# Patient Record
Sex: FEMALE | Race: WHITE | Employment: FULL TIME | URBAN - NONMETROPOLITAN AREA
[De-identification: names, ages, dates, MRNs, and addresses within clinical notes are randomized per-mention and may not be internally consistent; named-entity substitution may affect disease eponyms.]

---

## 2017-02-02 ENCOUNTER — OFFICE VISIT (OUTPATIENT)
Dept: CARDIOLOGY | Age: 47
End: 2017-02-02

## 2017-02-02 VITALS
BODY MASS INDEX: 41.02 KG/M2 | HEIGHT: 71 IN | WEIGHT: 293 LBS | DIASTOLIC BLOOD PRESSURE: 82 MMHG | SYSTOLIC BLOOD PRESSURE: 128 MMHG | HEART RATE: 81 BPM

## 2017-02-02 DIAGNOSIS — I25.10 CORONARY ARTERY DISEASE INVOLVING NATIVE CORONARY ARTERY OF NATIVE HEART WITHOUT ANGINA PECTORIS: ICD-10-CM

## 2017-02-02 DIAGNOSIS — I10 ESSENTIAL HYPERTENSION: ICD-10-CM

## 2017-02-02 DIAGNOSIS — F17.200 SMOKING: ICD-10-CM

## 2017-02-02 DIAGNOSIS — I10 ESSENTIAL HYPERTENSION: Primary | ICD-10-CM

## 2017-02-02 PROCEDURE — 99213 OFFICE O/P EST LOW 20 MIN: CPT | Performed by: NUCLEAR MEDICINE

## 2017-02-02 PROCEDURE — 93000 ELECTROCARDIOGRAM COMPLETE: CPT | Performed by: NUCLEAR MEDICINE

## 2017-02-02 RX ORDER — ATORVASTATIN CALCIUM 40 MG/1
TABLET, FILM COATED ORAL
Qty: 90 TABLET | Refills: 3 | Status: SHIPPED | OUTPATIENT
Start: 2017-02-02 | End: 2018-01-28 | Stop reason: SDUPTHER

## 2017-02-02 RX ORDER — ATORVASTATIN CALCIUM 40 MG/1
TABLET, FILM COATED ORAL
Qty: 90 TABLET | Refills: 0 | Status: SHIPPED | OUTPATIENT
Start: 2017-02-02 | End: 2017-02-02 | Stop reason: SDUPTHER

## 2017-02-02 RX ORDER — CLOPIDOGREL BISULFATE 75 MG/1
TABLET ORAL
Qty: 90 TABLET | Refills: 0 | Status: SHIPPED | OUTPATIENT
Start: 2017-02-02 | End: 2017-02-02 | Stop reason: SDUPTHER

## 2017-02-02 RX ORDER — CLOPIDOGREL BISULFATE 75 MG/1
TABLET ORAL
Qty: 90 TABLET | Refills: 3 | Status: SHIPPED | OUTPATIENT
Start: 2017-02-02 | End: 2018-01-28 | Stop reason: SDUPTHER

## 2017-02-02 RX ORDER — METOPROLOL SUCCINATE 25 MG/1
TABLET, EXTENDED RELEASE ORAL
Qty: 90 TABLET | Refills: 3 | Status: SHIPPED | OUTPATIENT
Start: 2017-02-02 | End: 2017-04-13 | Stop reason: SDUPTHER

## 2017-03-22 ENCOUNTER — TELEPHONE (OUTPATIENT)
Dept: CARDIOLOGY | Age: 47
End: 2017-03-22

## 2017-03-22 DIAGNOSIS — R00.2 PALPITATIONS: Primary | ICD-10-CM

## 2017-03-30 ENCOUNTER — TELEPHONE (OUTPATIENT)
Dept: CARDIOLOGY | Age: 47
End: 2017-03-30

## 2017-04-13 ENCOUNTER — OFFICE VISIT (OUTPATIENT)
Dept: CARDIOLOGY | Age: 47
End: 2017-04-13

## 2017-04-13 VITALS
BODY MASS INDEX: 39.68 KG/M2 | HEIGHT: 72 IN | WEIGHT: 293 LBS | DIASTOLIC BLOOD PRESSURE: 94 MMHG | SYSTOLIC BLOOD PRESSURE: 138 MMHG | HEART RATE: 80 BPM

## 2017-04-13 DIAGNOSIS — F17.200 SMOKING: Primary | ICD-10-CM

## 2017-04-13 DIAGNOSIS — E78.01 FAMILIAL HYPERCHOLESTEROLEMIA: ICD-10-CM

## 2017-04-13 DIAGNOSIS — I10 ESSENTIAL HYPERTENSION: ICD-10-CM

## 2017-04-13 DIAGNOSIS — R00.2 PALPITATION: ICD-10-CM

## 2017-04-13 PROCEDURE — 99213 OFFICE O/P EST LOW 20 MIN: CPT | Performed by: NUCLEAR MEDICINE

## 2017-04-13 RX ORDER — METOPROLOL SUCCINATE 50 MG/1
50 TABLET, EXTENDED RELEASE ORAL DAILY
Qty: 90 TABLET | Refills: 3 | Status: SHIPPED | OUTPATIENT
Start: 2017-04-13 | End: 2018-05-10 | Stop reason: SDUPTHER

## 2017-10-19 ENCOUNTER — OFFICE VISIT (OUTPATIENT)
Dept: CARDIOLOGY CLINIC | Age: 47
End: 2017-10-19
Payer: COMMERCIAL

## 2017-10-19 ENCOUNTER — HOSPITAL ENCOUNTER (OUTPATIENT)
Age: 47
Discharge: HOME | End: 2017-10-19
Payer: COMMERCIAL

## 2017-10-19 VITALS
SYSTOLIC BLOOD PRESSURE: 124 MMHG | WEIGHT: 293 LBS | BODY MASS INDEX: 39.68 KG/M2 | DIASTOLIC BLOOD PRESSURE: 82 MMHG | HEIGHT: 72 IN | HEART RATE: 80 BPM

## 2017-10-19 DIAGNOSIS — E78.01 FAMILIAL HYPERCHOLESTEROLEMIA: ICD-10-CM

## 2017-10-19 DIAGNOSIS — R06.09 DYSPNEA ON EXERTION: ICD-10-CM

## 2017-10-19 DIAGNOSIS — I10 ESSENTIAL HYPERTENSION: ICD-10-CM

## 2017-10-19 DIAGNOSIS — F17.200 SMOKING: Primary | ICD-10-CM

## 2017-10-19 LAB
ALBUMIN SERPL-MCNC: 3.9 G/DL (ref 3.5–5.1)
ALP BLD-CCNC: 95 U/L (ref 38–126)
ALT SERPL-CCNC: 19 U/L (ref 11–66)
AST SERPL-CCNC: 16 U/L (ref 5–40)
BILIRUB SERPL-MCNC: 0.6 MG/DL (ref 0.3–1.2)
BILIRUBIN DIRECT: < 0.2 MG/DL (ref 0–0.3)
CHOLESTEROL, TOTAL: 126 MG/DL (ref 100–199)
HDLC SERPL-MCNC: 26 MG/DL
LDL CHOLESTEROL CALCULATED: 72 MG/DL
TOTAL PROTEIN: 6.8 G/DL (ref 6.1–8)
TRIGL SERPL-MCNC: 139 MG/DL (ref 0–199)

## 2017-10-19 PROCEDURE — 36415 COLL VENOUS BLD VENIPUNCTURE: CPT

## 2017-10-19 PROCEDURE — 80076 HEPATIC FUNCTION PANEL: CPT

## 2017-10-19 PROCEDURE — 99213 OFFICE O/P EST LOW 20 MIN: CPT | Performed by: NUCLEAR MEDICINE

## 2017-10-19 PROCEDURE — 80061 LIPID PANEL: CPT

## 2017-10-19 NOTE — PROGRESS NOTES
[facility]  [facility] [place]  [address] [facility].  [address]  [address] [address]  Dept: [phone]  Dept Fax: [phone]  Loc: [phone]    Visit Date: 10/19/2017    Dixie Castano is a 41 y.o. female who presents today for:  Chief Complaint   Patient presents with    Check-Up     palpitations    Nicotine Dependence    Obesity    Hypertension    Hyperlipidemia   some palpitation  holter with minimal SVT ?/ A fib   Not worse than usual   Infrequent in nature  Cath 2014 was okay   Obesity issues  Still smoking  BP is better  No ER visits  No chest pain   No dizziness  No syncope        HPI:  HPI  Past Medical History:   Diagnosis Date    Hypothyroid       Past Surgical History:   Procedure Laterality Date    BREAST LUMPECTOMY      CARDIAC CATHETERIZATION      CHOLECYSTECTOMY      CORONARY ANGIOPLASTY       Family History   Problem Relation Age of Onset    Heart Disease Mother     Diabetes Mother     Cancer Mother      liposarcoma    Heart Disease Father     Diabetes Father      Social History   Substance Use Topics    Smoking status: Current Some Day Smoker     Packs/day: 1.00     Types: Cigarettes    Smokeless tobacco: Never Used    Alcohol use Yes      Comment: rare      Current Outpatient Prescriptions   Medication Sig Dispense Refill    metoprolol succinate (TOPROL XL) 50 MG extended release tablet Take 1 tablet by mouth daily TAKE 1 TABLET DAILY 90 tablet 3    atorvastatin (LIPITOR) 40 MG tablet TAKE 1 TABLET DAILY 90 tablet 3    clopidogrel (PLAVIX) 75 MG tablet TAKE 1 TABLET DAILY 90 tablet 3    aspirin 81 MG chewable tablet Take 1 tablet by mouth daily. 30 tablet 3    nitroGLYCERIN (NITROSTAT) 0.4 MG SL tablet Place 1 tablet under the tongue every 5 minutes as needed for Chest pain. 25 tablet 0    levothyroxine (SYNTHROID) 100 MCG tablet Take 125 mcg by mouth Daily        No current facility-administered medications for this visit.       Allergies

## 2018-01-28 DIAGNOSIS — I10 ESSENTIAL HYPERTENSION: ICD-10-CM

## 2018-01-29 RX ORDER — ATORVASTATIN CALCIUM 40 MG/1
TABLET, FILM COATED ORAL
Qty: 90 TABLET | Refills: 3 | Status: SHIPPED | OUTPATIENT
Start: 2018-01-29 | End: 2019-02-11 | Stop reason: SDUPTHER

## 2018-01-29 RX ORDER — CLOPIDOGREL BISULFATE 75 MG/1
TABLET ORAL
Qty: 90 TABLET | Refills: 3 | Status: SHIPPED | OUTPATIENT
Start: 2018-01-29 | End: 2019-02-11 | Stop reason: SDUPTHER

## 2018-04-24 ENCOUNTER — HOSPITAL ENCOUNTER (OUTPATIENT)
Age: 48
Discharge: HOME | End: 2018-04-24
Payer: COMMERCIAL

## 2018-04-24 LAB
ANION GAP SERPL CALCULATED.3IONS-SCNC: 12 MEQ/L (ref 8–16)
AVERAGE GLUCOSE: 105 MG/DL (ref 70–126)
BUN BLDV-MCNC: 10 MG/DL (ref 7–22)
CALCIUM SERPL-MCNC: 9.3 MG/DL (ref 8.5–10.5)
CHLORIDE BLD-SCNC: 104 MEQ/L (ref 98–111)
CO2: 25 MEQ/L (ref 23–33)
CREAT SERPL-MCNC: 0.6 MG/DL (ref 0.4–1.2)
GFR SERPL CREATININE-BSD FRML MDRD: > 90 ML/MIN/1.73M2
GLUCOSE BLD-MCNC: 95 MG/DL (ref 70–108)
HBA1C MFR BLD: 5.5 % (ref 4.4–6.4)
POTASSIUM SERPL-SCNC: 4.4 MEQ/L (ref 3.5–5.2)
SODIUM BLD-SCNC: 141 MEQ/L (ref 135–145)
T4 FREE: 1.26 NG/DL (ref 0.93–1.76)
TSH SERPL DL<=0.05 MIU/L-ACNC: 1.77 UIU/ML (ref 0.4–4.2)

## 2018-04-24 PROCEDURE — 80048 BASIC METABOLIC PNL TOTAL CA: CPT

## 2018-04-24 PROCEDURE — 36415 COLL VENOUS BLD VENIPUNCTURE: CPT

## 2018-04-24 PROCEDURE — 83036 HEMOGLOBIN GLYCOSYLATED A1C: CPT

## 2018-04-24 PROCEDURE — 84439 ASSAY OF FREE THYROXINE: CPT

## 2018-04-24 PROCEDURE — 84443 ASSAY THYROID STIM HORMONE: CPT

## 2018-05-10 DIAGNOSIS — I10 ESSENTIAL HYPERTENSION: ICD-10-CM

## 2018-05-10 RX ORDER — METOPROLOL SUCCINATE 50 MG/1
TABLET, EXTENDED RELEASE ORAL
Qty: 90 TABLET | Refills: 1 | Status: SHIPPED | OUTPATIENT
Start: 2018-05-10 | End: 2018-11-06 | Stop reason: SDUPTHER

## 2018-10-10 ENCOUNTER — TELEPHONE (OUTPATIENT)
Dept: CARDIOLOGY CLINIC | Age: 48
End: 2018-10-10

## 2018-10-10 DIAGNOSIS — I24.9 ACS (ACUTE CORONARY SYNDROME) (HCC): Primary | ICD-10-CM

## 2018-10-20 ENCOUNTER — HOSPITAL ENCOUNTER (OUTPATIENT)
Age: 48
Discharge: HOME | End: 2018-10-20
Payer: COMMERCIAL

## 2018-10-20 DIAGNOSIS — I24.9 ACS (ACUTE CORONARY SYNDROME) (HCC): ICD-10-CM

## 2018-10-20 LAB
ALBUMIN SERPL-MCNC: 3.9 G/DL (ref 3.5–5.1)
ALP BLD-CCNC: 84 U/L (ref 38–126)
ALT SERPL-CCNC: 12 U/L (ref 11–66)
AST SERPL-CCNC: 11 U/L (ref 5–40)
BILIRUB SERPL-MCNC: 0.8 MG/DL (ref 0.3–1.2)
BILIRUBIN DIRECT: < 0.2 MG/DL (ref 0–0.3)
CHOLESTEROL, TOTAL: 125 MG/DL (ref 100–199)
HDLC SERPL-MCNC: 32 MG/DL
LDL CHOLESTEROL CALCULATED: 72 MG/DL
TOTAL PROTEIN: 7.1 G/DL (ref 6.1–8)
TRIGL SERPL-MCNC: 106 MG/DL (ref 0–199)

## 2018-10-20 PROCEDURE — 36415 COLL VENOUS BLD VENIPUNCTURE: CPT

## 2018-10-20 PROCEDURE — 80076 HEPATIC FUNCTION PANEL: CPT

## 2018-10-20 PROCEDURE — 80061 LIPID PANEL: CPT

## 2018-10-24 ENCOUNTER — OFFICE VISIT (OUTPATIENT)
Dept: CARDIOLOGY CLINIC | Age: 48
End: 2018-10-24
Payer: COMMERCIAL

## 2018-10-24 VITALS
SYSTOLIC BLOOD PRESSURE: 124 MMHG | BODY MASS INDEX: 39.68 KG/M2 | HEART RATE: 66 BPM | WEIGHT: 293 LBS | DIASTOLIC BLOOD PRESSURE: 68 MMHG | HEIGHT: 72 IN

## 2018-10-24 DIAGNOSIS — F17.200 SMOKING: ICD-10-CM

## 2018-10-24 DIAGNOSIS — I47.1 SVT (SUPRAVENTRICULAR TACHYCARDIA) (HCC): ICD-10-CM

## 2018-10-24 DIAGNOSIS — E78.01 FAMILIAL HYPERCHOLESTEROLEMIA: ICD-10-CM

## 2018-10-24 DIAGNOSIS — I10 ESSENTIAL HYPERTENSION: Primary | ICD-10-CM

## 2018-10-24 PROCEDURE — 99214 OFFICE O/P EST MOD 30 MIN: CPT | Performed by: NUCLEAR MEDICINE

## 2018-10-24 PROCEDURE — 93000 ELECTROCARDIOGRAM COMPLETE: CPT | Performed by: NUCLEAR MEDICINE

## 2018-10-24 RX ORDER — NITROGLYCERIN 0.4 MG/1
0.4 TABLET SUBLINGUAL EVERY 5 MIN PRN
Qty: 25 TABLET | Refills: 3 | Status: SHIPPED | OUTPATIENT
Start: 2018-10-24 | End: 2019-02-25 | Stop reason: SDUPTHER

## 2018-10-24 NOTE — PROGRESS NOTES
[facility] CARDIOLOGY  [facility]  [address] [place] [address] Road [address]  Dept: [phone]  Dept Fax: [phone]  Loc: [phone]    Visit Date: 10/24/2018    Phillip Quinteros is a 57 y.o. female who presents todayfor:  Chief Complaint   Patient presents with    1 Year Follow Up    Hypertension    Nicotine Dependence    Obesity    Hyperlipidemia     Some more palpitation lately   Known SVT   No chest pain  Does have weight issues  Still smoking  Some dyspnea  Palpitation is once or twice a week   lasting few minutes  No associated symptoms  Still higher caffeine   Possible sleep apnea   On statins for hyperlipidemia  Cath 2014 was okay     HPI:  HPI  Past Medical History:   Diagnosis Date    Hypothyroid       Past Surgical History:   Procedure Laterality Date    BREAST LUMPECTOMY      CARDIAC CATHETERIZATION      CHOLECYSTECTOMY      CORONARY ANGIOPLASTY       Family History   Problem Relation Age of Onset    Heart Disease Mother     Diabetes Mother     Cancer Mother         liposarcoma    Heart Disease Father     Diabetes Father      Social History   Substance Use Topics    Smoking status: Current Some Day Smoker     Packs/day: 1.00     Types: Cigarettes    Smokeless tobacco: Never Used    Alcohol use Yes      Comment: rare      Current Outpatient Prescriptions   Medication Sig Dispense Refill    metoprolol succinate (TOPROL XL) 50 MG extended release tablet TAKE 1 TABLET DAILY 90 tablet 1    clopidogrel (PLAVIX) 75 MG tablet TAKE 1 TABLET DAILY 90 tablet 3    atorvastatin (LIPITOR) 40 MG tablet TAKE 1 TABLET DAILY 90 tablet 3    aspirin 81 MG chewable tablet Take 1 tablet by mouth daily. 30 tablet 3    nitroGLYCERIN (NITROSTAT) 0.4 MG SL tablet Place 1 tablet under the tongue every 5 minutes as needed for Chest pain.  25 tablet 0    levothyroxine (SYNTHROID) 100 MCG tablet Take 125 mcg by mouth Daily        No current facility-administered medications for this visit. Allergies   Allergen Reactions    Codeine     Cortisone     Darvocet A500 [Propoxyphene N-Acetaminophen]     Sulfa Antibiotics      Health Maintenance   Topic Date Due    HIV screen  06/29/1987    DTaP/Tdap/Td vaccine (1 - Tdap) 06/29/1991    Pneumococcal med risk (1 of 1 - PPSV23) 06/29/1991    Cervical cancer screen  06/29/1993    Flu vaccine (1) 09/01/2018    Lipid screen  10/20/2023       Subjective:  Review of Systems  General:   No fever, no chills, some fatigue or weight loss  Pulmonary:    some dyspnea, no wheezing  Cardiac:    Denies recent chest pain,   GI:     No nausea or vomiting, no abdominal pain  Neuro:     No dizziness or light headedness,   Musculoskeletal:  No recent active issues  Extremities:   No edema, good peripheral pulses      Objective:  Physical Exam  /68   Pulse 66   Ht 6' (1.829 m)   Wt 296 lb 11.2 oz (134.6 kg)   BMI 40.24 kg/m²   General:   Well developed, well nourished  Lungs:    Clear to auscultation  Heart:    Normal S1 S2, Slight murmur. no rubs, no gallops  Abdomen:   Soft, non tender, no organomegalies, positive bowel sounds  Extremities:   No edema, no cyanosis, good peripheral pulses  Neurological:   Awake, alert, oriented. No obvious focal deficits  Musculoskelatal:  No obvious deformities    Assessment:      Diagnosis Orders   1. Essential hypertension     2. Familial hypercholesterolemia     3. Smoking     4. SVT (supraventricular tachycardia) (HCC)     higher caffeine intake  Some more SVt lately   Risk for CAD  And risk for A fib   ECG in office was done today. I reviewed the ECG. No acute findings    Plan:  No Follow-up on file.   Discussed at length   Possible increase toprol   Cut down on caffeine  Obesity: extensive discussion was made with the patient regarding diet and weight control including specific food items to avoid and cut down  Continue risk factor modification and medical management  Thank you for

## 2018-11-06 DIAGNOSIS — I10 ESSENTIAL HYPERTENSION: ICD-10-CM

## 2018-11-06 RX ORDER — METOPROLOL SUCCINATE 50 MG/1
TABLET, EXTENDED RELEASE ORAL
Qty: 90 TABLET | Refills: 3 | Status: SHIPPED | OUTPATIENT
Start: 2018-11-06 | End: 2019-02-20 | Stop reason: SDUPTHER

## 2019-02-01 ENCOUNTER — APPOINTMENT (OUTPATIENT)
Dept: NON INVASIVE DIAGNOSTICS | Age: 49
DRG: 313 | End: 2019-02-01
Payer: COMMERCIAL

## 2019-02-01 ENCOUNTER — HOSPITAL ENCOUNTER (INPATIENT)
Age: 49
LOS: 1 days | Discharge: HOME | DRG: 313 | End: 2019-02-01
Attending: EMERGENCY MEDICINE | Admitting: INTERNAL MEDICINE
Payer: COMMERCIAL

## 2019-02-01 ENCOUNTER — APPOINTMENT (OUTPATIENT)
Dept: GENERAL RADIOLOGY | Age: 49
DRG: 313 | End: 2019-02-01
Payer: COMMERCIAL

## 2019-02-01 VITALS
HEIGHT: 72 IN | OXYGEN SATURATION: 97 % | SYSTOLIC BLOOD PRESSURE: 115 MMHG | HEART RATE: 61 BPM | TEMPERATURE: 97.3 F | WEIGHT: 292 LBS | RESPIRATION RATE: 16 BRPM | BODY MASS INDEX: 39.55 KG/M2 | DIASTOLIC BLOOD PRESSURE: 65 MMHG

## 2019-02-01 DIAGNOSIS — R07.9 CHEST PAIN WITH MODERATE RISK FOR CARDIAC ETIOLOGY: Primary | ICD-10-CM

## 2019-02-01 PROBLEM — Z72.0 TOBACCO ABUSE: Status: ACTIVE | Noted: 2019-02-01

## 2019-02-01 PROBLEM — I25.2 HISTORY OF MI (MYOCARDIAL INFARCTION): Status: ACTIVE | Noted: 2019-02-01

## 2019-02-01 PROBLEM — E03.9 HYPOTHYROID: Status: ACTIVE | Noted: 2019-02-01

## 2019-02-01 PROBLEM — I15.9 SECONDARY HYPERTENSION: Status: ACTIVE | Noted: 2019-02-01

## 2019-02-01 PROBLEM — E78.5 HLD (HYPERLIPIDEMIA): Status: ACTIVE | Noted: 2019-02-01

## 2019-02-01 LAB
ALBUMIN SERPL-MCNC: 4.2 G/DL (ref 3.5–5.1)
ALP BLD-CCNC: 76 U/L (ref 38–126)
ALT SERPL-CCNC: 17 U/L (ref 11–66)
ANION GAP SERPL CALCULATED.3IONS-SCNC: 13 MEQ/L (ref 8–16)
AST SERPL-CCNC: 16 U/L (ref 5–40)
AVERAGE GLUCOSE: 105 MG/DL (ref 70–126)
BASOPHILS # BLD: 0.4 %
BASOPHILS ABSOLUTE: 0 THOU/MM3 (ref 0–0.1)
BILIRUB SERPL-MCNC: 0.3 MG/DL (ref 0.3–1.2)
BILIRUBIN URINE: NEGATIVE
BLOOD, URINE: NEGATIVE
BUN BLDV-MCNC: 12 MG/DL (ref 7–22)
CALCIUM SERPL-MCNC: 8.6 MG/DL (ref 8.5–10.5)
CHARACTER, URINE: CLEAR
CHLORIDE BLD-SCNC: 102 MEQ/L (ref 98–111)
CO2: 22 MEQ/L (ref 23–33)
COLOR: YELLOW
CREAT SERPL-MCNC: 0.7 MG/DL (ref 0.4–1.2)
EKG ATRIAL RATE: 62 BPM
EKG ATRIAL RATE: 84 BPM
EKG P AXIS: 52 DEGREES
EKG P AXIS: 64 DEGREES
EKG P-R INTERVAL: 170 MS
EKG P-R INTERVAL: 178 MS
EKG Q-T INTERVAL: 372 MS
EKG Q-T INTERVAL: 416 MS
EKG QRS DURATION: 72 MS
EKG QRS DURATION: 82 MS
EKG QTC CALCULATION (BAZETT): 422 MS
EKG QTC CALCULATION (BAZETT): 439 MS
EKG R AXIS: 3 DEGREES
EKG R AXIS: 7 DEGREES
EKG T AXIS: 21 DEGREES
EKG T AXIS: 31 DEGREES
EKG VENTRICULAR RATE: 62 BPM
EKG VENTRICULAR RATE: 84 BPM
EOSINOPHIL # BLD: 3 %
EOSINOPHILS ABSOLUTE: 0.3 THOU/MM3 (ref 0–0.4)
ERYTHROCYTE [DISTWIDTH] IN BLOOD BY AUTOMATED COUNT: 12.5 % (ref 11.5–14.5)
ERYTHROCYTE [DISTWIDTH] IN BLOOD BY AUTOMATED COUNT: 42.4 FL (ref 35–45)
GFR SERPL CREATININE-BSD FRML MDRD: 90 ML/MIN/1.73M2
GLUCOSE BLD-MCNC: 132 MG/DL (ref 70–108)
GLUCOSE URINE: NEGATIVE MG/DL
HBA1C MFR BLD: 5.5 % (ref 4.4–6.4)
HCT VFR BLD CALC: 43 % (ref 37–47)
HEMOGLOBIN: 14.6 GM/DL (ref 12–16)
IMMATURE GRANS (ABS): 0.03 THOU/MM3 (ref 0–0.07)
IMMATURE GRANULOCYTES: 0.3 %
KETONES, URINE: NEGATIVE
LEUKOCYTE ESTERASE, URINE: NEGATIVE
LIPASE: 24 U/L (ref 5.6–51.3)
LV EF: 60 %
LVEF MODALITY: NORMAL
LYMPHOCYTES # BLD: 23 %
LYMPHOCYTES ABSOLUTE: 2.4 THOU/MM3 (ref 1–4.8)
MCH RBC QN AUTO: 31.3 PG (ref 26–33)
MCHC RBC AUTO-ENTMCNC: 34 GM/DL (ref 32.2–35.5)
MCV RBC AUTO: 92.3 FL (ref 81–99)
MONOCYTES # BLD: 6.6 %
MONOCYTES ABSOLUTE: 0.7 THOU/MM3 (ref 0.4–1.3)
NITRITE, URINE: NEGATIVE
NUCLEATED RED BLOOD CELLS: 0 /100 WBC
OSMOLALITY CALCULATION: 275.4 MOSMOL/KG (ref 275–300)
PH UA: 5.5
PLATELET # BLD: 213 THOU/MM3 (ref 130–400)
PMV BLD AUTO: 9.9 FL (ref 9.4–12.4)
POTASSIUM SERPL-SCNC: 3.6 MEQ/L (ref 3.5–5.2)
PRO-BNP: 56.4 PG/ML (ref 0–450)
PROTEIN UA: NEGATIVE
RBC # BLD: 4.66 MILL/MM3 (ref 4.2–5.4)
SEG NEUTROPHILS: 66.7 %
SEGMENTED NEUTROPHILS ABSOLUTE COUNT: 6.9 THOU/MM3 (ref 1.8–7.7)
SODIUM BLD-SCNC: 137 MEQ/L (ref 135–145)
SPECIFIC GRAVITY, URINE: 1.01 (ref 1–1.03)
TOTAL PROTEIN: 7.1 G/DL (ref 6.1–8)
TROPONIN T: < 0.01 NG/ML
TROPONIN T: < 0.01 NG/ML
TSH SERPL DL<=0.05 MIU/L-ACNC: 3.58 UIU/ML (ref 0.4–4.2)
UROBILINOGEN, URINE: 0.2 EU/DL
WBC # BLD: 10.4 THOU/MM3 (ref 4.8–10.8)

## 2019-02-01 PROCEDURE — G0378 HOSPITAL OBSERVATION PER HR: HCPCS

## 2019-02-01 PROCEDURE — 78452 HT MUSCLE IMAGE SPECT MULT: CPT | Performed by: NUCLEAR MEDICINE

## 2019-02-01 PROCEDURE — 71045 X-RAY EXAM CHEST 1 VIEW: CPT

## 2019-02-01 PROCEDURE — 2140000000 HC CCU INTERMEDIATE R&B

## 2019-02-01 PROCEDURE — 2709999900 HC NON-CHARGEABLE SUPPLY

## 2019-02-01 PROCEDURE — 6370000000 HC RX 637 (ALT 250 FOR IP): Performed by: INTERNAL MEDICINE

## 2019-02-01 PROCEDURE — 99239 HOSP IP/OBS DSCHRG MGMT >30: CPT | Performed by: INTERNAL MEDICINE

## 2019-02-01 PROCEDURE — 99285 EMERGENCY DEPT VISIT HI MDM: CPT

## 2019-02-01 PROCEDURE — 93306 TTE W/DOPPLER COMPLETE: CPT

## 2019-02-01 PROCEDURE — 99236 HOSP IP/OBS SAME DATE HI 85: CPT | Performed by: INTERNAL MEDICINE

## 2019-02-01 PROCEDURE — 36415 COLL VENOUS BLD VENIPUNCTURE: CPT

## 2019-02-01 PROCEDURE — 93010 ELECTROCARDIOGRAM REPORT: CPT | Performed by: INTERNAL MEDICINE

## 2019-02-01 PROCEDURE — 80053 COMPREHEN METABOLIC PANEL: CPT

## 2019-02-01 PROCEDURE — 93005 ELECTROCARDIOGRAM TRACING: CPT | Performed by: EMERGENCY MEDICINE

## 2019-02-01 PROCEDURE — 0296T HC EXT ECG RECORDING 2-21 DAY HOOKUP: CPT

## 2019-02-01 PROCEDURE — 3430000000 HC RX DIAGNOSTIC RADIOPHARMACEUTICAL: Performed by: NUCLEAR MEDICINE

## 2019-02-01 PROCEDURE — 93017 CV STRESS TEST TRACING ONLY: CPT | Performed by: NUCLEAR MEDICINE

## 2019-02-01 PROCEDURE — 83690 ASSAY OF LIPASE: CPT

## 2019-02-01 PROCEDURE — 83036 HEMOGLOBIN GLYCOSYLATED A1C: CPT

## 2019-02-01 PROCEDURE — 99254 IP/OBS CNSLTJ NEW/EST MOD 60: CPT | Performed by: NUCLEAR MEDICINE

## 2019-02-01 PROCEDURE — 83880 ASSAY OF NATRIURETIC PEPTIDE: CPT

## 2019-02-01 PROCEDURE — 84484 ASSAY OF TROPONIN QUANT: CPT

## 2019-02-01 PROCEDURE — 85025 COMPLETE CBC W/AUTO DIFF WBC: CPT

## 2019-02-01 PROCEDURE — 81003 URINALYSIS AUTO W/O SCOPE: CPT

## 2019-02-01 PROCEDURE — 6360000002 HC RX W HCPCS

## 2019-02-01 PROCEDURE — 93005 ELECTROCARDIOGRAM TRACING: CPT | Performed by: INTERNAL MEDICINE

## 2019-02-01 PROCEDURE — 84443 ASSAY THYROID STIM HORMONE: CPT

## 2019-02-01 PROCEDURE — A9500 TC99M SESTAMIBI: HCPCS | Performed by: NUCLEAR MEDICINE

## 2019-02-01 PROCEDURE — 6370000000 HC RX 637 (ALT 250 FOR IP): Performed by: EMERGENCY MEDICINE

## 2019-02-01 PROCEDURE — 2580000003 HC RX 258: Performed by: INTERNAL MEDICINE

## 2019-02-01 RX ORDER — IBUPROFEN 800 MG/1
800 TABLET ORAL EVERY 6 HOURS PRN
Status: ON HOLD | COMMUNITY
End: 2019-12-05 | Stop reason: HOSPADM

## 2019-02-01 RX ORDER — ONDANSETRON 2 MG/ML
4 INJECTION INTRAMUSCULAR; INTRAVENOUS EVERY 6 HOURS PRN
Status: DISCONTINUED | OUTPATIENT
Start: 2019-02-01 | End: 2019-02-01 | Stop reason: HOSPADM

## 2019-02-01 RX ORDER — LEVOTHYROXINE SODIUM 0.12 MG/1
125 TABLET ORAL DAILY
Status: DISCONTINUED | OUTPATIENT
Start: 2019-02-01 | End: 2019-02-01 | Stop reason: HOSPADM

## 2019-02-01 RX ORDER — ASPIRIN 81 MG/1
81 TABLET, CHEWABLE ORAL DAILY
Status: DISCONTINUED | OUTPATIENT
Start: 2019-02-01 | End: 2019-02-01 | Stop reason: HOSPADM

## 2019-02-01 RX ORDER — SODIUM CHLORIDE 9 MG/ML
INJECTION, SOLUTION INTRAVENOUS CONTINUOUS
Status: DISCONTINUED | OUTPATIENT
Start: 2019-02-01 | End: 2019-02-01

## 2019-02-01 RX ORDER — ATORVASTATIN CALCIUM 40 MG/1
40 TABLET, FILM COATED ORAL DAILY
Status: DISCONTINUED | OUTPATIENT
Start: 2019-02-01 | End: 2019-02-01 | Stop reason: HOSPADM

## 2019-02-01 RX ORDER — NICOTINE 21 MG/24HR
1 PATCH, TRANSDERMAL 24 HOURS TRANSDERMAL DAILY
Status: DISCONTINUED | OUTPATIENT
Start: 2019-02-01 | End: 2019-02-01 | Stop reason: HOSPADM

## 2019-02-01 RX ORDER — SODIUM CHLORIDE 0.9 % (FLUSH) 0.9 %
10 SYRINGE (ML) INJECTION PRN
Status: DISCONTINUED | OUTPATIENT
Start: 2019-02-01 | End: 2019-02-01 | Stop reason: HOSPADM

## 2019-02-01 RX ORDER — ASPIRIN 81 MG/1
243 TABLET, CHEWABLE ORAL ONCE
Status: COMPLETED | OUTPATIENT
Start: 2019-02-01 | End: 2019-02-01

## 2019-02-01 RX ORDER — ACETAMINOPHEN 325 MG/1
650 TABLET ORAL EVERY 4 HOURS PRN
Status: DISCONTINUED | OUTPATIENT
Start: 2019-02-01 | End: 2019-02-01 | Stop reason: HOSPADM

## 2019-02-01 RX ORDER — SODIUM CHLORIDE 0.9 % (FLUSH) 0.9 %
10 SYRINGE (ML) INJECTION EVERY 12 HOURS SCHEDULED
Status: DISCONTINUED | OUTPATIENT
Start: 2019-02-01 | End: 2019-02-01 | Stop reason: HOSPADM

## 2019-02-01 RX ORDER — METOPROLOL SUCCINATE 50 MG/1
50 TABLET, EXTENDED RELEASE ORAL DAILY
Status: DISCONTINUED | OUTPATIENT
Start: 2019-02-01 | End: 2019-02-01 | Stop reason: HOSPADM

## 2019-02-01 RX ORDER — CLOPIDOGREL BISULFATE 75 MG/1
75 TABLET ORAL DAILY
Status: DISCONTINUED | OUTPATIENT
Start: 2019-02-01 | End: 2019-02-01 | Stop reason: HOSPADM

## 2019-02-01 RX ADMIN — SODIUM CHLORIDE: 9 INJECTION, SOLUTION INTRAVENOUS at 06:19

## 2019-02-01 RX ADMIN — ASPIRIN 243 MG: 81 TABLET, CHEWABLE ORAL at 03:14

## 2019-02-01 RX ADMIN — NITROGLYCERIN 1 INCH: 20 OINTMENT TOPICAL at 03:14

## 2019-02-01 RX ADMIN — METOPROLOL SUCCINATE 50 MG: 50 TABLET, EXTENDED RELEASE ORAL at 11:34

## 2019-02-01 RX ADMIN — ASPIRIN 81 MG 81 MG: 81 TABLET ORAL at 11:34

## 2019-02-01 RX ADMIN — Medication 9.6 MILLICURIE: at 08:50

## 2019-02-01 RX ADMIN — CLOPIDOGREL 75 MG: 75 TABLET, FILM COATED ORAL at 11:36

## 2019-02-01 RX ADMIN — LEVOTHYROXINE SODIUM 125 MCG: 125 TABLET ORAL at 11:34

## 2019-02-01 RX ADMIN — Medication 32.3 MILLICURIE: at 10:25

## 2019-02-01 ASSESSMENT — ENCOUNTER SYMPTOMS
RHINORRHEA: 0
DIARRHEA: 0
WHEEZING: 0
EYE DISCHARGE: 0
ABDOMINAL DISTENTION: 0
VOMITING: 0
SHORTNESS OF BREATH: 0
COUGH: 0
ABDOMINAL PAIN: 0
NAUSEA: 0
EYE ITCHING: 0

## 2019-02-01 ASSESSMENT — PAIN DESCRIPTION - FREQUENCY: FREQUENCY: CONTINUOUS

## 2019-02-01 ASSESSMENT — PAIN DESCRIPTION - DESCRIPTORS
DESCRIPTORS_2: ACHING
DESCRIPTORS: DISCOMFORT
DESCRIPTORS: DISCOMFORT
DESCRIPTORS_3: ACHING

## 2019-02-01 ASSESSMENT — PAIN DESCRIPTION - PAIN TYPE
TYPE: ACUTE PAIN
TYPE_3: CHRONIC PAIN
TYPE_2: CHRONIC PAIN
TYPE: ACUTE PAIN

## 2019-02-01 ASSESSMENT — PAIN DESCRIPTION - LOCATION
LOCATION_2: BACK
LOCATION_3: KNEE
LOCATION: CHEST
LOCATION: CHEST

## 2019-02-01 ASSESSMENT — PAIN DESCRIPTION - ORIENTATION
ORIENTATION: MID
ORIENTATION_3: RIGHT;LEFT
ORIENTATION: MID

## 2019-02-01 ASSESSMENT — PAIN DESCRIPTION - INTENSITY
RATING_3: 2
RATING_2: 2

## 2019-02-01 ASSESSMENT — PAIN SCALES - GENERAL: PAINLEVEL_OUTOF10: 7

## 2019-02-01 ASSESSMENT — HEART SCORE: ECG: 1

## 2019-02-11 DIAGNOSIS — I10 ESSENTIAL HYPERTENSION: ICD-10-CM

## 2019-02-11 RX ORDER — CLOPIDOGREL BISULFATE 75 MG/1
TABLET ORAL
Qty: 90 TABLET | Refills: 0 | Status: SHIPPED | OUTPATIENT
Start: 2019-02-11 | End: 2019-02-20 | Stop reason: SDUPTHER

## 2019-02-11 RX ORDER — ATORVASTATIN CALCIUM 40 MG/1
TABLET, FILM COATED ORAL
Qty: 90 TABLET | Refills: 0 | Status: SHIPPED | OUTPATIENT
Start: 2019-02-11 | End: 2019-02-22 | Stop reason: SDUPTHER

## 2019-02-20 DIAGNOSIS — I10 ESSENTIAL HYPERTENSION: ICD-10-CM

## 2019-02-20 RX ORDER — METOPROLOL SUCCINATE 50 MG/1
TABLET, EXTENDED RELEASE ORAL
Qty: 90 TABLET | Refills: 3 | Status: SHIPPED | OUTPATIENT
Start: 2019-02-20 | End: 2019-02-25 | Stop reason: ALTCHOICE

## 2019-02-20 RX ORDER — CLOPIDOGREL BISULFATE 75 MG/1
TABLET ORAL
Qty: 90 TABLET | Refills: 3 | Status: SHIPPED | OUTPATIENT
Start: 2019-02-20 | End: 2020-01-08 | Stop reason: ALTCHOICE

## 2019-02-22 DIAGNOSIS — I10 ESSENTIAL HYPERTENSION: ICD-10-CM

## 2019-02-22 RX ORDER — ATORVASTATIN CALCIUM 40 MG/1
40 TABLET, FILM COATED ORAL DAILY
Qty: 90 TABLET | Refills: 3 | Status: SHIPPED | OUTPATIENT
Start: 2019-02-22 | End: 2020-02-04 | Stop reason: SDUPTHER

## 2019-02-25 ENCOUNTER — OFFICE VISIT (OUTPATIENT)
Dept: CARDIOLOGY CLINIC | Age: 49
End: 2019-02-25
Payer: COMMERCIAL

## 2019-02-25 VITALS
DIASTOLIC BLOOD PRESSURE: 70 MMHG | HEIGHT: 72 IN | WEIGHT: 293 LBS | HEART RATE: 84 BPM | BODY MASS INDEX: 39.68 KG/M2 | SYSTOLIC BLOOD PRESSURE: 110 MMHG

## 2019-02-25 DIAGNOSIS — Z72.0 TOBACCO ABUSE: ICD-10-CM

## 2019-02-25 DIAGNOSIS — I10 ESSENTIAL HYPERTENSION: ICD-10-CM

## 2019-02-25 DIAGNOSIS — I47.1 NARROW COMPLEX TACHYCARDIA (HCC): Primary | ICD-10-CM

## 2019-02-25 PROCEDURE — 99213 OFFICE O/P EST LOW 20 MIN: CPT | Performed by: PHYSICIAN ASSISTANT

## 2019-02-25 PROCEDURE — 99406 BEHAV CHNG SMOKING 3-10 MIN: CPT | Performed by: PHYSICIAN ASSISTANT

## 2019-02-25 RX ORDER — METOPROLOL SUCCINATE 50 MG/1
75 TABLET, EXTENDED RELEASE ORAL DAILY
Qty: 30 TABLET | Refills: 3 | Status: SHIPPED | OUTPATIENT
Start: 2019-02-25 | End: 2019-03-22 | Stop reason: SDUPTHER

## 2019-02-25 RX ORDER — NITROGLYCERIN 0.4 MG/1
0.4 TABLET SUBLINGUAL EVERY 5 MIN PRN
Qty: 25 TABLET | Refills: 3 | Status: SHIPPED | OUTPATIENT
Start: 2019-02-25

## 2019-03-22 ENCOUNTER — TELEPHONE (OUTPATIENT)
Dept: CARDIOLOGY CLINIC | Age: 49
End: 2019-03-22

## 2019-03-24 RX ORDER — METOPROLOL SUCCINATE 50 MG/1
75 TABLET, EXTENDED RELEASE ORAL DAILY
Qty: 135 TABLET | Refills: 0 | Status: SHIPPED | OUTPATIENT
Start: 2019-03-24 | End: 2019-06-12 | Stop reason: SDUPTHER

## 2019-04-23 ENCOUNTER — OFFICE VISIT (OUTPATIENT)
Dept: CARDIOLOGY CLINIC | Age: 49
End: 2019-04-23
Payer: COMMERCIAL

## 2019-04-23 VITALS
SYSTOLIC BLOOD PRESSURE: 136 MMHG | HEIGHT: 72 IN | WEIGHT: 293 LBS | BODY MASS INDEX: 39.68 KG/M2 | DIASTOLIC BLOOD PRESSURE: 62 MMHG | HEART RATE: 68 BPM

## 2019-04-23 DIAGNOSIS — R00.2 PALPITATION: Primary | ICD-10-CM

## 2019-04-23 DIAGNOSIS — E78.01 FAMILIAL HYPERCHOLESTEROLEMIA: ICD-10-CM

## 2019-04-23 DIAGNOSIS — I10 ESSENTIAL HYPERTENSION: ICD-10-CM

## 2019-04-23 PROCEDURE — 99213 OFFICE O/P EST LOW 20 MIN: CPT | Performed by: NUCLEAR MEDICINE

## 2019-04-23 NOTE — PROGRESS NOTES
by mouth every 6 hours as needed for Pain      aspirin 81 MG chewable tablet Take 1 tablet by mouth daily. 30 tablet 3    levothyroxine (SYNTHROID) 100 MCG tablet Take 125 mcg by mouth Daily        No current facility-administered medications for this visit. Allergies   Allergen Reactions    Codeine     Cortisone     Darvocet A500 [Propoxyphene N-Acetaminophen]     Sulfa Antibiotics      Health Maintenance   Topic Date Due    Pneumococcal 0-64 years Vaccine (1 of 1 - PPSV23) 06/29/1978    HIV screen  06/29/1987    DTaP/Tdap/Td vaccine (1 - Tdap) 06/29/1991    Cervical cancer screen  06/29/1993    TSH testing  02/01/2020    Diabetes screen  02/01/2022    Lipid screen  10/20/2023    Flu vaccine  Completed       Subjective:  Review of Systems  General:   No fever, no chills, No fatigue or weight loss  Pulmonary:    No dyspnea, no wheezing  Cardiac:    Denies recent chest pain,   GI:     No nausea or vomiting, no abdominal pain  Neuro:    No dizziness or light headedness,   Musculoskeletal:  No recent active issues  Extremities:   No edema, good peripheral pulses      Objective:  Physical Exam  /62   Pulse 68   Ht 6' (1.829 m)   Wt 293 lb (132.9 kg)   BMI 39.74 kg/m²   General:   Well developed, well nourished  Lungs:   Clear to auscultation  Heart:    Normal S1 S2, Slight murmur. no rubs, no gallops  Abdomen:   Soft, non tender, no organomegalies, positive bowel sounds  Extremities:   No edema, no cyanosis, good peripheral pulses  Neurological:   Awake, alert, oriented. No obvious focal deficits  Musculoskelatal:  No obvious deformities    Assessment:      Diagnosis Orders   1. Palpitation     2. Familial hypercholesterolemia     3. Essential hypertension     cardiac fair for now     Plan:  No follow-ups on file. As above  Continue risk factor modification and medical management  Thank you for allowing me to participate in the care of your patient.  Please don't hesitate to contact me regarding any further issues related to the patient care    Orders Placed:  No orders of the defined types were placed in this encounter. Medications Prescribed:  No orders of the defined types were placed in this encounter. Discussed use, benefit, and side effects of prescribed medications. All patient questions answered. Pt voicedunderstanding. Instructed to continue current medications, diet and exercise. Continue risk factor modification and medical management. Patient agreed with treatment plan. Follow up as directed.     Electronically signedby Brandon Carrero MD on 4/23/2019 at 10:50 AM

## 2019-04-23 NOTE — PROGRESS NOTES
Pt here for 3 mo fu appt    Pt c/o of tachycardia/palpitations    Pt denies chest pain, SOB,  Lightheadedness/dizziness, SARAHY

## 2019-06-12 RX ORDER — METOPROLOL SUCCINATE 50 MG/1
75 TABLET, EXTENDED RELEASE ORAL DAILY
Qty: 135 TABLET | Refills: 2 | Status: ON HOLD | OUTPATIENT
Start: 2019-06-12 | End: 2019-12-05 | Stop reason: SDUPTHER

## 2019-12-04 ENCOUNTER — APPOINTMENT (OUTPATIENT)
Dept: GENERAL RADIOLOGY | Age: 49
End: 2019-12-04
Payer: COMMERCIAL

## 2019-12-04 ENCOUNTER — HOSPITAL ENCOUNTER (OUTPATIENT)
Age: 49
Setting detail: OBSERVATION
Discharge: HOME | End: 2019-12-05
Attending: FAMILY MEDICINE | Admitting: INTERNAL MEDICINE
Payer: COMMERCIAL

## 2019-12-04 DIAGNOSIS — R07.9 ACUTE CHEST PAIN: Primary | ICD-10-CM

## 2019-12-04 PROBLEM — I25.10 CORONARY ARTERY DISEASE INVOLVING NATIVE CORONARY ARTERY OF NATIVE HEART WITHOUT ANGINA PECTORIS: Status: ACTIVE | Noted: 2019-12-04

## 2019-12-04 PROBLEM — I48.0 PAROXYSMAL ATRIAL FIBRILLATION (HCC): Status: ACTIVE | Noted: 2019-12-04

## 2019-12-04 PROBLEM — K21.9 GASTROESOPHAGEAL REFLUX DISEASE WITHOUT ESOPHAGITIS: Status: ACTIVE | Noted: 2019-12-04

## 2019-12-04 PROBLEM — I20.0 UNSTABLE ANGINA (HCC): Status: ACTIVE | Noted: 2019-12-04

## 2019-12-04 PROBLEM — I10 ESSENTIAL HYPERTENSION: Status: ACTIVE | Noted: 2019-02-01

## 2019-12-04 LAB
ALBUMIN SERPL-MCNC: 3.8 G/DL (ref 3.5–5.1)
ALP BLD-CCNC: 79 U/L (ref 38–126)
ALT SERPL-CCNC: 13 U/L (ref 11–66)
ANION GAP SERPL CALCULATED.3IONS-SCNC: 14 MEQ/L (ref 8–16)
APTT: 31.2 SECONDS (ref 22–38)
AST SERPL-CCNC: 12 U/L (ref 5–40)
BASOPHILS # BLD: 0.4 %
BASOPHILS ABSOLUTE: 0 THOU/MM3 (ref 0–0.1)
BILIRUB SERPL-MCNC: 0.3 MG/DL (ref 0.3–1.2)
BUN BLDV-MCNC: 9 MG/DL (ref 7–22)
CALCIUM SERPL-MCNC: 8.9 MG/DL (ref 8.5–10.5)
CHLORIDE BLD-SCNC: 104 MEQ/L (ref 98–111)
CO2: 23 MEQ/L (ref 23–33)
CREAT SERPL-MCNC: 0.6 MG/DL (ref 0.4–1.2)
D-DIMER QUANTITATIVE: < 215 NG/ML FEU (ref 0–500)
EKG ATRIAL RATE: 69 BPM
EKG P AXIS: 61 DEGREES
EKG P-R INTERVAL: 174 MS
EKG Q-T INTERVAL: 406 MS
EKG QRS DURATION: 84 MS
EKG QTC CALCULATION (BAZETT): 435 MS
EKG R AXIS: -2 DEGREES
EKG T AXIS: 13 DEGREES
EKG VENTRICULAR RATE: 69 BPM
EOSINOPHIL # BLD: 3.4 %
EOSINOPHILS ABSOLUTE: 0.3 THOU/MM3 (ref 0–0.4)
ERYTHROCYTE [DISTWIDTH] IN BLOOD BY AUTOMATED COUNT: 12.9 % (ref 11.5–14.5)
ERYTHROCYTE [DISTWIDTH] IN BLOOD BY AUTOMATED COUNT: 45 FL (ref 35–45)
GFR SERPL CREATININE-BSD FRML MDRD: > 90 ML/MIN/1.73M2
GLUCOSE BLD-MCNC: 137 MG/DL (ref 70–108)
HCT VFR BLD CALC: 43.7 % (ref 37–47)
HEMOGLOBIN: 14.3 GM/DL (ref 12–16)
IMMATURE GRANS (ABS): 0.02 THOU/MM3 (ref 0–0.07)
IMMATURE GRANULOCYTES: 0.2 %
INR BLD: 1.01 (ref 0.85–1.13)
LIPASE: 15.2 U/L (ref 5.6–51.3)
LYMPHOCYTES # BLD: 19.4 %
LYMPHOCYTES ABSOLUTE: 1.6 THOU/MM3 (ref 1–4.8)
MCH RBC QN AUTO: 31.3 PG (ref 26–33)
MCHC RBC AUTO-ENTMCNC: 32.7 GM/DL (ref 32.2–35.5)
MCV RBC AUTO: 95.6 FL (ref 81–99)
MONOCYTES # BLD: 6.2 %
MONOCYTES ABSOLUTE: 0.5 THOU/MM3 (ref 0.4–1.3)
NUCLEATED RED BLOOD CELLS: 0 /100 WBC
OSMOLALITY CALCULATION: 282.1 MOSMOL/KG (ref 275–300)
PLATELET # BLD: 204 THOU/MM3 (ref 130–400)
PMV BLD AUTO: 9.8 FL (ref 9.4–12.4)
POTASSIUM REFLEX MAGNESIUM: 3.8 MEQ/L (ref 3.5–5.2)
PRO-BNP: 91.6 PG/ML (ref 0–450)
RBC # BLD: 4.57 MILL/MM3 (ref 4.2–5.4)
SEG NEUTROPHILS: 70.4 %
SEGMENTED NEUTROPHILS ABSOLUTE COUNT: 5.8 THOU/MM3 (ref 1.8–7.7)
SODIUM BLD-SCNC: 141 MEQ/L (ref 135–145)
TOTAL PROTEIN: 6.9 G/DL (ref 6.1–8)
TROPONIN T: < 0.01 NG/ML
TROPONIN T: < 0.01 NG/ML
WBC # BLD: 8.3 THOU/MM3 (ref 4.8–10.8)

## 2019-12-04 PROCEDURE — 93010 ELECTROCARDIOGRAM REPORT: CPT | Performed by: INTERNAL MEDICINE

## 2019-12-04 PROCEDURE — 71045 X-RAY EXAM CHEST 1 VIEW: CPT

## 2019-12-04 PROCEDURE — 83880 ASSAY OF NATRIURETIC PEPTIDE: CPT

## 2019-12-04 PROCEDURE — 93005 ELECTROCARDIOGRAM TRACING: CPT | Performed by: FAMILY MEDICINE

## 2019-12-04 PROCEDURE — 85730 THROMBOPLASTIN TIME PARTIAL: CPT

## 2019-12-04 PROCEDURE — 6370000000 HC RX 637 (ALT 250 FOR IP): Performed by: FAMILY MEDICINE

## 2019-12-04 PROCEDURE — 99285 EMERGENCY DEPT VISIT HI MDM: CPT

## 2019-12-04 PROCEDURE — 99220 PR INITIAL OBSERVATION CARE/DAY 70 MINUTES: CPT | Performed by: INTERNAL MEDICINE

## 2019-12-04 PROCEDURE — 2580000003 HC RX 258: Performed by: INTERNAL MEDICINE

## 2019-12-04 PROCEDURE — 85379 FIBRIN DEGRADATION QUANT: CPT

## 2019-12-04 PROCEDURE — 80053 COMPREHEN METABOLIC PANEL: CPT

## 2019-12-04 PROCEDURE — G0378 HOSPITAL OBSERVATION PER HR: HCPCS

## 2019-12-04 PROCEDURE — 83690 ASSAY OF LIPASE: CPT

## 2019-12-04 PROCEDURE — 85025 COMPLETE CBC W/AUTO DIFF WBC: CPT

## 2019-12-04 PROCEDURE — 36415 COLL VENOUS BLD VENIPUNCTURE: CPT

## 2019-12-04 PROCEDURE — 85610 PROTHROMBIN TIME: CPT

## 2019-12-04 PROCEDURE — 84484 ASSAY OF TROPONIN QUANT: CPT

## 2019-12-04 PROCEDURE — 94761 N-INVAS EAR/PLS OXIMETRY MLT: CPT

## 2019-12-04 RX ORDER — NITROGLYCERIN 20 MG/100ML
5 INJECTION INTRAVENOUS CONTINUOUS PRN
Status: DISCONTINUED | OUTPATIENT
Start: 2019-12-04 | End: 2019-12-05 | Stop reason: HOSPADM

## 2019-12-04 RX ORDER — CLOPIDOGREL BISULFATE 75 MG/1
75 TABLET ORAL DAILY
Status: DISCONTINUED | OUTPATIENT
Start: 2019-12-05 | End: 2019-12-05 | Stop reason: HOSPADM

## 2019-12-04 RX ORDER — SODIUM CHLORIDE 0.9 % (FLUSH) 0.9 %
10 SYRINGE (ML) INJECTION EVERY 12 HOURS SCHEDULED
Status: DISCONTINUED | OUTPATIENT
Start: 2019-12-04 | End: 2019-12-05 | Stop reason: HOSPADM

## 2019-12-04 RX ORDER — ASPIRIN 81 MG/1
162 TABLET, CHEWABLE ORAL ONCE
Status: COMPLETED | OUTPATIENT
Start: 2019-12-04 | End: 2019-12-04

## 2019-12-04 RX ORDER — POTASSIUM CHLORIDE 20 MEQ/1
40 TABLET, EXTENDED RELEASE ORAL PRN
Status: DISCONTINUED | OUTPATIENT
Start: 2019-12-04 | End: 2019-12-05 | Stop reason: HOSPADM

## 2019-12-04 RX ORDER — ONDANSETRON 2 MG/ML
4 INJECTION INTRAMUSCULAR; INTRAVENOUS EVERY 6 HOURS PRN
Status: DISCONTINUED | OUTPATIENT
Start: 2019-12-04 | End: 2019-12-05 | Stop reason: HOSPADM

## 2019-12-04 RX ORDER — NITROGLYCERIN 0.4 MG/1
0.4 TABLET SUBLINGUAL EVERY 5 MIN PRN
Status: DISCONTINUED | OUTPATIENT
Start: 2019-12-04 | End: 2019-12-05 | Stop reason: HOSPADM

## 2019-12-04 RX ORDER — OMEPRAZOLE 10 MG/1
10 CAPSULE, DELAYED RELEASE ORAL DAILY
COMMUNITY

## 2019-12-04 RX ORDER — LEVOTHYROXINE SODIUM 0.12 MG/1
125 TABLET ORAL DAILY
Status: DISCONTINUED | OUTPATIENT
Start: 2019-12-05 | End: 2019-12-05 | Stop reason: HOSPADM

## 2019-12-04 RX ORDER — POLYETHYLENE GLYCOL 3350 17 G/17G
17 POWDER, FOR SOLUTION ORAL DAILY PRN
Status: DISCONTINUED | OUTPATIENT
Start: 2019-12-04 | End: 2019-12-05 | Stop reason: HOSPADM

## 2019-12-04 RX ORDER — ASPIRIN 81 MG/1
81 TABLET, CHEWABLE ORAL DAILY
Status: DISCONTINUED | OUTPATIENT
Start: 2019-12-05 | End: 2019-12-05 | Stop reason: HOSPADM

## 2019-12-04 RX ORDER — POTASSIUM CHLORIDE 7.45 MG/ML
10 INJECTION INTRAVENOUS PRN
Status: DISCONTINUED | OUTPATIENT
Start: 2019-12-04 | End: 2019-12-05 | Stop reason: HOSPADM

## 2019-12-04 RX ORDER — ACETAMINOPHEN 325 MG/1
650 TABLET ORAL EVERY 4 HOURS PRN
Status: DISCONTINUED | OUTPATIENT
Start: 2019-12-04 | End: 2019-12-05 | Stop reason: HOSPADM

## 2019-12-04 RX ORDER — PANTOPRAZOLE SODIUM 40 MG/1
40 TABLET, DELAYED RELEASE ORAL
Status: DISCONTINUED | OUTPATIENT
Start: 2019-12-05 | End: 2019-12-05

## 2019-12-04 RX ORDER — NITROGLYCERIN 0.4 MG/1
0.4 TABLET SUBLINGUAL EVERY 5 MIN PRN
Status: DISCONTINUED | OUTPATIENT
Start: 2019-12-04 | End: 2019-12-04

## 2019-12-04 RX ORDER — NICOTINE 21 MG/24HR
1 PATCH, TRANSDERMAL 24 HOURS TRANSDERMAL DAILY
Status: DISCONTINUED | OUTPATIENT
Start: 2019-12-04 | End: 2019-12-05 | Stop reason: HOSPADM

## 2019-12-04 RX ORDER — SODIUM CHLORIDE 0.9 % (FLUSH) 0.9 %
10 SYRINGE (ML) INJECTION PRN
Status: DISCONTINUED | OUTPATIENT
Start: 2019-12-04 | End: 2019-12-05 | Stop reason: HOSPADM

## 2019-12-04 RX ORDER — ATORVASTATIN CALCIUM 40 MG/1
40 TABLET, FILM COATED ORAL DAILY
Status: DISCONTINUED | OUTPATIENT
Start: 2019-12-05 | End: 2019-12-05 | Stop reason: HOSPADM

## 2019-12-04 RX ADMIN — NITROGLYCERIN 0.4 MG: 0.4 TABLET, ORALLY DISINTEGRATING SUBLINGUAL at 14:07

## 2019-12-04 RX ADMIN — NITROGLYCERIN 0.4 MG: 0.4 TABLET, ORALLY DISINTEGRATING SUBLINGUAL at 13:53

## 2019-12-04 RX ADMIN — NITROGLYCERIN 0.4 MG: 0.4 TABLET, ORALLY DISINTEGRATING SUBLINGUAL at 13:59

## 2019-12-04 RX ADMIN — Medication 10 ML: at 20:47

## 2019-12-04 RX ADMIN — NITROGLYCERIN 0.5 INCH: 20 OINTMENT TOPICAL at 15:55

## 2019-12-04 RX ADMIN — ASPIRIN 81 MG 162 MG: 81 TABLET ORAL at 13:53

## 2019-12-04 ASSESSMENT — PAIN DESCRIPTION - ORIENTATION: ORIENTATION: LEFT

## 2019-12-04 ASSESSMENT — PAIN SCALES - GENERAL
PAINLEVEL_OUTOF10: 6
PAINLEVEL_OUTOF10: 0
PAINLEVEL_OUTOF10: 4
PAINLEVEL_OUTOF10: 7

## 2019-12-04 ASSESSMENT — PAIN DESCRIPTION - PAIN TYPE: TYPE: ACUTE PAIN

## 2019-12-04 ASSESSMENT — PAIN DESCRIPTION - DESCRIPTORS: DESCRIPTORS: RADIATING

## 2019-12-04 ASSESSMENT — PAIN DESCRIPTION - FREQUENCY: FREQUENCY: CONTINUOUS

## 2019-12-04 ASSESSMENT — PAIN DESCRIPTION - LOCATION: LOCATION: CHEST;ARM

## 2019-12-04 ASSESSMENT — HEART SCORE: ECG: 0

## 2019-12-05 VITALS
BODY MASS INDEX: 38.63 KG/M2 | OXYGEN SATURATION: 96 % | HEART RATE: 70 BPM | RESPIRATION RATE: 16 BRPM | DIASTOLIC BLOOD PRESSURE: 82 MMHG | HEIGHT: 72 IN | SYSTOLIC BLOOD PRESSURE: 121 MMHG | WEIGHT: 285.2 LBS | TEMPERATURE: 97.9 F

## 2019-12-05 LAB
ANION GAP SERPL CALCULATED.3IONS-SCNC: 13 MEQ/L (ref 8–16)
BUN BLDV-MCNC: 8 MG/DL (ref 7–22)
CALCIUM SERPL-MCNC: 8.6 MG/DL (ref 8.5–10.5)
CHLORIDE BLD-SCNC: 106 MEQ/L (ref 98–111)
CHOLESTEROL, TOTAL: 138 MG/DL (ref 100–199)
CO2: 23 MEQ/L (ref 23–33)
CREAT SERPL-MCNC: 0.6 MG/DL (ref 0.4–1.2)
GFR SERPL CREATININE-BSD FRML MDRD: > 90 ML/MIN/1.73M2
GLUCOSE BLD-MCNC: 100 MG/DL (ref 70–108)
HDLC SERPL-MCNC: 31 MG/DL
LDL CHOLESTEROL CALCULATED: 79 MG/DL
MAGNESIUM: 2.3 MG/DL (ref 1.6–2.4)
POTASSIUM SERPL-SCNC: 4 MEQ/L (ref 3.5–5.2)
SODIUM BLD-SCNC: 142 MEQ/L (ref 135–145)
TRIGL SERPL-MCNC: 140 MG/DL (ref 0–199)
TROPONIN T: < 0.01 NG/ML
TSH SERPL DL<=0.05 MIU/L-ACNC: 3.25 UIU/ML (ref 0.4–4.2)

## 2019-12-05 PROCEDURE — G0378 HOSPITAL OBSERVATION PER HR: HCPCS

## 2019-12-05 PROCEDURE — 99217 PR OBSERVATION CARE DISCHARGE MANAGEMENT: CPT | Performed by: INTERNAL MEDICINE

## 2019-12-05 PROCEDURE — 36415 COLL VENOUS BLD VENIPUNCTURE: CPT

## 2019-12-05 PROCEDURE — 83735 ASSAY OF MAGNESIUM: CPT

## 2019-12-05 PROCEDURE — 80061 LIPID PANEL: CPT

## 2019-12-05 PROCEDURE — 99243 OFF/OP CNSLTJ NEW/EST LOW 30: CPT | Performed by: INTERNAL MEDICINE

## 2019-12-05 PROCEDURE — 93270 REMOTE 30 DAY ECG REV/REPORT: CPT

## 2019-12-05 PROCEDURE — 84443 ASSAY THYROID STIM HORMONE: CPT

## 2019-12-05 PROCEDURE — 84484 ASSAY OF TROPONIN QUANT: CPT

## 2019-12-05 PROCEDURE — 80048 BASIC METABOLIC PNL TOTAL CA: CPT

## 2019-12-05 PROCEDURE — 2580000003 HC RX 258: Performed by: INTERNAL MEDICINE

## 2019-12-05 RX ORDER — OMEPRAZOLE 10 MG/1
10 CAPSULE, DELAYED RELEASE ORAL DAILY
Status: DISCONTINUED | OUTPATIENT
Start: 2019-12-05 | End: 2019-12-05 | Stop reason: HOSPADM

## 2019-12-05 RX ORDER — METOPROLOL SUCCINATE 50 MG/1
100 TABLET, EXTENDED RELEASE ORAL DAILY
Qty: 60 TABLET | Refills: 2 | Status: SHIPPED | OUTPATIENT
Start: 2019-12-05 | End: 2020-03-06

## 2019-12-05 RX ADMIN — Medication 10 ML: at 09:15

## 2019-12-05 RX ADMIN — OMEPRAZOLE 10 MG: 10 CAPSULE, DELAYED RELEASE ORAL at 12:42

## 2019-12-05 RX ADMIN — ASPIRIN 81 MG: 81 TABLET, CHEWABLE ORAL at 12:42

## 2019-12-05 RX ADMIN — CLOPIDOGREL BISULFATE 75 MG: 75 TABLET ORAL at 12:43

## 2019-12-05 RX ADMIN — ATORVASTATIN CALCIUM 40 MG: 40 TABLET, FILM COATED ORAL at 12:44

## 2019-12-05 ASSESSMENT — PAIN SCALES - GENERAL
PAINLEVEL_OUTOF10: 0
PAINLEVEL_OUTOF10: 0

## 2020-01-08 ENCOUNTER — OFFICE VISIT (OUTPATIENT)
Dept: CARDIOLOGY CLINIC | Age: 50
End: 2020-01-08

## 2020-01-08 ENCOUNTER — TELEPHONE (OUTPATIENT)
Dept: CARDIOLOGY CLINIC | Age: 50
End: 2020-01-08

## 2020-01-08 VITALS
HEIGHT: 72 IN | HEART RATE: 77 BPM | DIASTOLIC BLOOD PRESSURE: 68 MMHG | BODY MASS INDEX: 39.68 KG/M2 | WEIGHT: 293 LBS | SYSTOLIC BLOOD PRESSURE: 121 MMHG

## 2020-01-08 PROCEDURE — 99213 OFFICE O/P EST LOW 20 MIN: CPT | Performed by: PHYSICIAN ASSISTANT

## 2020-01-08 RX ORDER — FLECAINIDE ACETATE 100 MG/1
100 TABLET ORAL 2 TIMES DAILY
Qty: 60 TABLET | Refills: 3 | Status: CANCELLED | OUTPATIENT
Start: 2020-01-08

## 2020-01-08 RX ORDER — FLECAINIDE ACETATE 100 MG/1
100 TABLET ORAL 2 TIMES DAILY
Qty: 180 TABLET | Refills: 3 | Status: CANCELLED | OUTPATIENT
Start: 2020-01-08

## 2020-01-08 RX ORDER — FLECAINIDE ACETATE 100 MG/1
100 TABLET ORAL 2 TIMES DAILY
COMMUNITY
End: 2020-01-09 | Stop reason: SDUPTHER

## 2020-01-08 NOTE — PROGRESS NOTES
Patient here for hospital fu. Patient complains of palpations. Patient denies SOB, chest pain, dizziness, lightheadedness and SARAHY.

## 2020-01-08 NOTE — TELEPHONE ENCOUNTER
PATIENT NOTIFIED AND VOICED UNDERSTANDING. SHE AGREED TO THE CHANGES AND WILL BE IN TOMORROW TO  SAMPLES. MEDICATION LIST UPDATED. RX PENDED IN A DIFFERENT ENCOUNTER.

## 2020-01-08 NOTE — PROGRESS NOTES
3    atorvastatin (LIPITOR) 40 MG tablet Take 1 tablet by mouth daily 90 tablet 3    clopidogrel (PLAVIX) 75 MG tablet TAKE 1 TABLET DAILY 90 tablet 3    aspirin 81 MG chewable tablet Take 1 tablet by mouth daily. 30 tablet 3    levothyroxine (SYNTHROID) 100 MCG tablet Take 125 mcg by mouth Daily        No current facility-administered medications for this visit.         Social History     Socioeconomic History    Marital status:      Spouse name: None    Number of children: 1    Years of education: None    Highest education level: None   Occupational History    Occupation: LPN   Social Needs    Financial resource strain: None    Food insecurity:     Worry: None     Inability: None    Transportation needs:     Medical: None     Non-medical: None   Tobacco Use    Smoking status: Current Every Day Smoker     Packs/day: 0.50     Years: 30.00     Pack years: 15.00     Types: Cigarettes     Start date: 12/4/1987    Smokeless tobacco: Never Used   Substance and Sexual Activity    Alcohol use: Yes     Comment: rare    Drug use: No    Sexual activity: None     Comment: boy friend   Lifestyle    Physical activity:     Days per week: None     Minutes per session: None    Stress: None   Relationships    Social connections:     Talks on phone: None     Gets together: None     Attends Jainism service: None     Active member of club or organization: None     Attends meetings of clubs or organizations: None     Relationship status: None    Intimate partner violence:     Fear of current or ex partner: None     Emotionally abused: None     Physically abused: None     Forced sexual activity: None   Other Topics Concern    None   Social History Narrative    None       Family History   Problem Relation Age of Onset    Heart Disease Mother     Diabetes Mother     Cancer Mother         liposarcoma    Kidney Disease Mother     Heart Disease Father     Diabetes Father     Kidney Disease Father    [facility] Cancer Father        Blood pressure 121/68, pulse 77, height 6' (1.829 m), weight 298 lb (135.2 kg). General:   Well developed, well nourished  Lungs:   Clear to auscultation  Heart:    Normal S1 S2, No murmur, rubs, or gallops  Abdomen:   Soft, non tender, no organomegalies, positive bowel sounds  Extremities:   No edema, no cyanosis, good peripheral pulses  Neurological:   Awake, alert, oriented. No obvious focal deficits  Musculoskeletal:  No obvious deformities      Event monitor 12/5/2019  CONCLUSION:  1. Intermittent episodes of atrial fibrillation with rapid ventricular  response. 2.  No sustained arrhythmia. 3.  No ventricular rhythm disturbance. 4.  Abnormal event monitor. 5.  Clinical correlation is recommended. Diagnosis Orders   1. Paroxysmal atrial fibrillation (HCC)     2. Essential hypertension     [id number]      I discussed her visit with Dr. Celestino Zayas. We did review her chads vascular score. He recommends that her Plavix be stopped, and she be started on Eliquis 5 mg twice a day. He also recommends that she be started on flecainide 100 mg grams twice a day. I would like her to continue aspirin. Continue all other current medications and with constant vigilance to changes in symptoms and also any potential side effects. Return for care if become worse or seek medical attention immediately. The patient is educated on symptoms of heart disease that include chest pain and passing out, dizziness, etc and to report them if there is any change or go to emergency room.          Follow up with Dr Celestino Zayas as scheduled or sooner if needed  (Please note that portions of this note were completed with a voice recognition program.  Efforts were made to edit the dictation but occasionally words are mis-transcribed.)      Ayanna Yañez PA-C

## 2020-01-08 NOTE — TELEPHONE ENCOUNTER
LM for pt to call the office. Per Jennie Castaneda, he would like the pt to start Flecainide 100 mg bid and Eliquis 5 mg bid. He would like her to stop Plavix. Jennie Castaneda would like her to agree to the changes. She could also stop in for samples of Eliquis, along with a 30 day free card. Pasha please agree.

## 2020-01-09 RX ORDER — FLECAINIDE ACETATE 100 MG/1
100 TABLET ORAL 2 TIMES DAILY
Qty: 180 TABLET | Refills: 3 | Status: SHIPPED | OUTPATIENT
Start: 2020-01-09 | End: 2020-12-16

## 2020-02-04 RX ORDER — ATORVASTATIN CALCIUM 40 MG/1
40 TABLET, FILM COATED ORAL DAILY
Qty: 90 TABLET | Refills: 3 | Status: SHIPPED | OUTPATIENT
Start: 2020-02-04 | End: 2021-01-18

## 2020-02-04 RX ORDER — CLOPIDOGREL BISULFATE 75 MG/1
TABLET ORAL
Qty: 90 TABLET | Refills: 3 | OUTPATIENT
Start: 2020-02-04

## 2020-02-04 RX ORDER — ATORVASTATIN CALCIUM 40 MG/1
TABLET, FILM COATED ORAL
Qty: 90 TABLET | Refills: 3 | OUTPATIENT
Start: 2020-02-04

## 2020-03-06 RX ORDER — METOPROLOL SUCCINATE 50 MG/1
TABLET, EXTENDED RELEASE ORAL
Qty: 135 TABLET | Refills: 0 | Status: SHIPPED | OUTPATIENT
Start: 2020-03-06 | End: 2020-06-11

## 2020-06-11 RX ORDER — METOPROLOL SUCCINATE 50 MG/1
TABLET, EXTENDED RELEASE ORAL
Qty: 135 TABLET | Refills: 0 | Status: SHIPPED | OUTPATIENT
Start: 2020-06-11 | End: 2020-09-08

## 2020-07-30 ENCOUNTER — OFFICE VISIT (OUTPATIENT)
Dept: CARDIOLOGY CLINIC | Age: 50
End: 2020-07-30
Payer: COMMERCIAL

## 2020-07-30 VITALS
HEART RATE: 68 BPM | WEIGHT: 292.8 LBS | DIASTOLIC BLOOD PRESSURE: 62 MMHG | SYSTOLIC BLOOD PRESSURE: 96 MMHG | HEIGHT: 72 IN | BODY MASS INDEX: 39.66 KG/M2

## 2020-07-30 PROCEDURE — 99214 OFFICE O/P EST MOD 30 MIN: CPT | Performed by: NUCLEAR MEDICINE

## 2020-07-30 RX ORDER — IBUPROFEN 800 MG/1
800 TABLET ORAL EVERY 6 HOURS PRN
COMMUNITY

## 2020-07-30 NOTE — PROGRESS NOTES
Pt here for a 1 yr f/u    Pt denies sob, dizziness and SARAHY     Pt c/o occasional cp and palpitations

## 2020-09-08 RX ORDER — METOPROLOL SUCCINATE 50 MG/1
TABLET, EXTENDED RELEASE ORAL
Qty: 135 TABLET | Refills: 0 | Status: SHIPPED | OUTPATIENT
Start: 2020-09-08 | End: 2020-12-16

## 2020-12-16 RX ORDER — APIXABAN 5 MG/1
TABLET, FILM COATED ORAL
Qty: 180 TABLET | Refills: 1 | Status: SHIPPED | OUTPATIENT
Start: 2020-12-16 | End: 2021-07-30

## 2020-12-16 RX ORDER — METOPROLOL SUCCINATE 50 MG/1
TABLET, EXTENDED RELEASE ORAL
Qty: 135 TABLET | Refills: 1 | Status: SHIPPED | OUTPATIENT
Start: 2020-12-16 | End: 2021-02-26 | Stop reason: SDUPTHER

## 2020-12-16 RX ORDER — FLECAINIDE ACETATE 100 MG/1
TABLET ORAL
Qty: 180 TABLET | Refills: 1 | Status: SHIPPED | OUTPATIENT
Start: 2020-12-16 | End: 2021-07-30

## 2021-01-17 DIAGNOSIS — I10 ESSENTIAL HYPERTENSION: ICD-10-CM

## 2021-01-18 RX ORDER — ATORVASTATIN CALCIUM 40 MG/1
TABLET, FILM COATED ORAL
Qty: 90 TABLET | Refills: 3 | Status: SHIPPED | OUTPATIENT
Start: 2021-01-18 | End: 2022-01-05

## 2021-02-25 NOTE — TELEPHONE ENCOUNTER
Pt states she has been taking 100 mg daily of metoprol succinate, but chart states 75 is it ok to order 100 for pt?

## 2021-02-26 RX ORDER — METOPROLOL SUCCINATE 100 MG/1
100 TABLET, EXTENDED RELEASE ORAL DAILY
Qty: 90 TABLET | Refills: 3 | Status: SHIPPED | OUTPATIENT
Start: 2021-02-26 | End: 2022-01-19

## 2021-04-08 ENCOUNTER — OFFICE VISIT (OUTPATIENT)
Dept: CARDIOLOGY CLINIC | Age: 51
End: 2021-04-08
Payer: COMMERCIAL

## 2021-04-08 VITALS
WEIGHT: 293 LBS | DIASTOLIC BLOOD PRESSURE: 79 MMHG | HEART RATE: 79 BPM | HEIGHT: 72 IN | SYSTOLIC BLOOD PRESSURE: 122 MMHG | BODY MASS INDEX: 39.68 KG/M2

## 2021-04-08 DIAGNOSIS — I10 ESSENTIAL HYPERTENSION: ICD-10-CM

## 2021-04-08 DIAGNOSIS — I25.2 HISTORY OF MI (MYOCARDIAL INFARCTION): ICD-10-CM

## 2021-04-08 DIAGNOSIS — I48.0 PAROXYSMAL ATRIAL FIBRILLATION (HCC): ICD-10-CM

## 2021-04-08 DIAGNOSIS — F17.200 SMOKING: ICD-10-CM

## 2021-04-08 DIAGNOSIS — R06.02 SOB (SHORTNESS OF BREATH) ON EXERTION: ICD-10-CM

## 2021-04-08 DIAGNOSIS — R53.83 FATIGUE, UNSPECIFIED TYPE: ICD-10-CM

## 2021-04-08 PROCEDURE — 93000 ELECTROCARDIOGRAM COMPLETE: CPT | Performed by: NURSE PRACTITIONER

## 2021-04-08 PROCEDURE — 99406 BEHAV CHNG SMOKING 3-10 MIN: CPT | Performed by: NURSE PRACTITIONER

## 2021-04-08 PROCEDURE — 99213 OFFICE O/P EST LOW 20 MIN: CPT | Performed by: NURSE PRACTITIONER

## 2021-04-08 RX ORDER — LEVOTHYROXINE SODIUM 0.12 MG/1
TABLET ORAL
COMMUNITY
Start: 2021-02-25

## 2021-04-08 NOTE — PROGRESS NOTES
[facility]  [facility] [address]   [address] [address]   Dept: [phone]   Dept Fax: [phone]   Loc: [phone]      Chief Complaint   Patient presents with   Hugovinicius Galvan     b/p issues    Atrial Fibrillation     OV to discuss B/P of 90's/60's in 51 y/o female with history of non-obstructive CAD, HTN, AFB on eliquis, HLD, Hypothyroid, currently smoking 6 cigarettes daily. B/P improved on its own. Denies chest pain, palpitations, lightheadedness, dizziness or syncope. Has intermittent fatigue and states just feels kind of off. Has some intermittent sob with exertion. Has intermittent swelling in ankles if she works 3 or more 12 hour shifts.      Cardiologist:  Dr. Safia Ryan:   No fever, no chills, No fatigue or weight loss  Pulmonary:    No dyspnea, no wheezing  Cardiac:    Denies recent chest pain   GI:     No nausea or vomiting, no abdominal pain  Neuro:    No dizziness or light headedness  Musculoskeletal:  No recent active issues  Extremities:   No edema, good peripheral pulses      Past Medical History:   Diagnosis Date    Afib ([address]     Arthritis     CAD (coronary artery disease)     GERD (gastroesophageal reflux disease)     Hyperlipidemia     Hypertension     Hypothyroid     Pneumonia        Allergies   Allergen Reactions    Codeine Hives and Swelling    Cortisone Swelling     Apparently tolerates topical and IV with benadryl well    Flu Virus Vaccine Other (See Comments)     Pt states could not walk after getting     Darvocet A500 [Propoxyphene N-Acetaminophen] Nausea And Vomiting    Sulfa Antibiotics Nausea And Vomiting and Swelling     Not throat swelling       Current Outpatient Medications   Medication Sig Dispense Refill    levothyroxine (SYNTHROID) 125 MCG tablet TAKE 1 TABLET BY MOUTH EVERY DAY IN THE MORNING ON EMPTY STOMACH      ZINC PO Take by mouth daily      Ascorbic Acid (VITAMIN C PO) Take by mouth daily      metoprolol succinate (TOPROL XL) 100 MG extended release tablet Take 1 tablet by mouth daily 90 tablet 3    atorvastatin (LIPITOR) 40 MG tablet TAKE 1 TABLET DAILY 90 tablet 3    ELIQUIS 5 MG TABS tablet TAKE 1 TABLET BY MOUTH TWICE A  tablet 1    flecainide (TAMBOCOR) 100 MG tablet TAKE 1 TABLET BY MOUTH TWICE A  tablet 1    ibuprofen (ADVIL;MOTRIN) 800 MG tablet Take 800 mg by mouth every 6 hours as needed for Pain      omeprazole (PRILOSEC) 10 MG delayed release capsule Take 10 mg by mouth daily      nitroGLYCERIN (NITROSTAT) 0.4 MG SL tablet Place 1 tablet under the tongue every 5 minutes as needed for Chest pain 25 tablet 3    aspirin 81 MG chewable tablet Take 1 tablet by mouth daily. 30 tablet 3     No current facility-administered medications for this visit.         Social History     Socioeconomic History    Marital status:      Spouse name: None    Number of children: 1    Years of education: None    Highest education level: None   Occupational History    Occupation: LPN   Social Needs    Financial resource strain: None    Food insecurity     Worry: None     Inability: None    Transportation needs     Medical: None     Non-medical: None   Tobacco Use    Smoking status: Current Every Day Smoker     Packs/day: 0.50     Years: 30.00     Pack years: 15.00     Types: Cigarettes     Start date: 12/4/1987    Smokeless tobacco: Never Used   Substance and Sexual Activity    Alcohol use: Yes     Comment: rare    Drug use: No    Sexual activity: None     Comment: boy friend   Lifestyle    Physical activity     Days per week: None     Minutes per session: None    Stress: None   Relationships    Social connections     Talks on phone: None     Gets together: None     Attends Lutheran service: None     Active member of club or organization: None     Attends meetings of clubs or organizations: None     Relationship status: None    Intimate partner violence Fear of current or ex partner: None     Emotionally abused: None     Physically abused: None     Forced sexual activity: None   Other Topics Concern    None   Social History Narrative    None       Family History   Problem Relation Age of Onset    Heart Disease Mother     Diabetes Mother     Cancer Mother         liposarcoma    Kidney Disease Mother     Heart Disease Father     Diabetes Father     Kidney Disease Father     Cancer Father        Blood pressure 122/79, pulse 79, height 6' (1.829 m), weight (!) 309 lb 8 oz (140.4 kg). General:   Well developed, well nourished  Lungs:   Clear to auscultation  Heart:    Normal S1 S2, No murmur, rubs, or gallops  Abdomen:   Soft, non tender, no organomegalies, positive bowel sounds  Extremities:   No edema, no cyanosis, good peripheral pulses  Neurological:   Awake, alert, oriented. No obvious focal deficits  Musculoskeletal:  No obvious deformities    EK21    SR, no acute abnormalities    Echo: 21  Summary   Ejection fraction is visually estimated at 60%. Overall left ventricular function is normal.      Signature      ----------------------------------------------------------------   Electronically signed by Vidal Bee MD     Stress Test: 19  Summary   This Nuclear Medicine study was negative for ischemia . normal E F      Recommendation   Medical management. Signatures      ----------------------------------------------------------------   Electronically signed by Vidal Bee MD    [facility]: 14   HEMODYNAMIC RESULTS AND LEFT VENTRICULOGRAM:  Left ventricular end diastolic  pressure was 12 mmHg with no with change before and after contrast injection. There was no significant gradient across the aortic valve to signify aortic  stenosis. Left ventricular function was lower limits of normal with ejection  fraction of 50%. CORONARY ARTERIOGRAM RESULTS:      1.     Left main is patent, gives rise to left anterior descending and  left   circumflex arteries. 2.    Left anterior descending artery has nonobstructive diffuse disease  about 40% with slight focal stenosis in the mid segment. Otherwise   nothing  severe or significantly obstructive. 3.    Left circumflex artery has diffuse nonobstructive luminal  irregularity. 4.    Right coronary artery has focal stenosis proximally about 50-60%  moderate with diffuse disease distally. CONCLUSION:      1. Nonobstructive coronary artery disease as above. 2.    Normal left ventricular function. 3.    No complications. RECOMMENDATIONS: Results were discussed with Dr. Denisa Dominguez. It was felt that the  patient is a candidate for aggressive medical therapy. She will be on statins  and metoprolol as tolerated and Plavix. We might consider a CT of the chest  if there is any recurrence of the symptoms to make sure PE would be ruled  out. Again dependent on her clinical scenario in the next 12-24 hours. For  the time being I would hold given the fact that she had good amount of  contrast and will decide accordingly. Carl Santana M.D. Diagnosis Orders   1. SOB (shortness of breath) on exertion  Stress test (Lexiscan)    Echo 2D w doppler w color complete   2. Paroxysmal atrial fibrillation (HCC)  EKG 12 lead    Stress test (Lexiscan)   3. History of MI (myocardial infarction)     4. Essential hypertension     5. Smoking     6. Fatigue, unspecified type         Orders Placed This Encounter   Procedures    Stress test Nallely Torres)     Standing Status:   Future     Standing Expiration Date:   4/8/2022     Order Specific Question:   Reason for Exam?     Answer:    Other     Order Specific Question:   Reason for Exam?     Answer:   Shortness of breath     Order Specific Question:   Does patient have left bundle branch block (LBBB) or left ventricular hypertrophy (LVH) based on resting ECG, a ventricular pacemaker, or is unable to exercise on a treadmill based on physical or mental limitations? Answer: Yes    EKG 12 lead     Order Specific Question:   Reason for Exam?     Answer: Other    Echo 2D w doppler w color complete     Standing Status:   Future     Standing Expiration Date:   4/8/2022     Order Specific Question:   Reason for exam:     Answer:   Fatigue, sob     OV f/u fatigue, sob with exertion, \"just feeling off, kind of like I did with my heart attack\"  No chest pain, intermittent sob with exertion, fatigue continues to smoke, has risk factors  B/P, HR stable. Did have one episode of B/P 90's/60's that resolved on its own. Further evaluation with Lexiscan stress- cannot walk on treadmill d/t knee issues and Echo    The patient has been educated on symptoms of heart disease that include chest pain, passing out, dizziness, etc. And to report them if there is any change or go to the emergency room. At least 5 min was spent discussing and encouraging smoking cessation to decrease adverse associated health risks including but not limited to heart disease, hypertension, peripheral vascular disease, lung disease, heart attack, stroke, cancer, loss of limb or death.     Discussed use, benefit, and side effects of prescribed medications. All patient questions answered. Pt voiced understanding. Instructed to continue current medications, diet and exercise. Continue risk factor modification and medical management. Patient agreed with treatment plan. Follow up as directed.     Continue Dr Zander Polk current treatment plan  Follow up with Dr Rivera Bernardo as scheduled or sooner if needed

## 2021-04-22 ENCOUNTER — HOSPITAL ENCOUNTER (OUTPATIENT)
Dept: NON INVASIVE DIAGNOSTICS | Age: 51
Discharge: HOME | End: 2021-04-22
Payer: COMMERCIAL

## 2021-04-22 VITALS — BODY MASS INDEX: 39.68 KG/M2 | HEIGHT: 72 IN | WEIGHT: 293 LBS

## 2021-04-22 DIAGNOSIS — I48.0 PAROXYSMAL ATRIAL FIBRILLATION (HCC): ICD-10-CM

## 2021-04-22 DIAGNOSIS — R06.02 SOB (SHORTNESS OF BREATH) ON EXERTION: ICD-10-CM

## 2021-04-22 LAB
LV EF: 60 %
LVEF MODALITY: NORMAL

## 2021-04-22 PROCEDURE — 93018 CV STRESS TEST I&R ONLY: CPT | Performed by: NUCLEAR MEDICINE

## 2021-04-22 PROCEDURE — A9500 TC99M SESTAMIBI: HCPCS | Performed by: NUCLEAR MEDICINE

## 2021-04-22 PROCEDURE — 93016 CV STRESS TEST SUPVJ ONLY: CPT | Performed by: NUCLEAR MEDICINE

## 2021-04-22 PROCEDURE — 6360000002 HC RX W HCPCS

## 2021-04-22 PROCEDURE — 78452 HT MUSCLE IMAGE SPECT MULT: CPT

## 2021-04-22 PROCEDURE — 93306 TTE W/DOPPLER COMPLETE: CPT

## 2021-04-22 PROCEDURE — 93017 CV STRESS TEST TRACING ONLY: CPT | Performed by: NUCLEAR MEDICINE

## 2021-04-22 PROCEDURE — 3430000000 HC RX DIAGNOSTIC RADIOPHARMACEUTICAL: Performed by: NUCLEAR MEDICINE

## 2021-04-22 PROCEDURE — 78452 HT MUSCLE IMAGE SPECT MULT: CPT | Performed by: NUCLEAR MEDICINE

## 2021-04-22 RX ADMIN — Medication 9.8 MILLICURIE: at 10:55

## 2021-04-22 RX ADMIN — Medication 32.5 MILLICURIE: at 11:35

## 2021-07-15 ENCOUNTER — OFFICE VISIT (OUTPATIENT)
Dept: CARDIOLOGY CLINIC | Age: 51
End: 2021-07-15
Payer: COMMERCIAL

## 2021-07-15 VITALS
BODY MASS INDEX: 39.68 KG/M2 | HEIGHT: 72 IN | WEIGHT: 293 LBS | DIASTOLIC BLOOD PRESSURE: 82 MMHG | HEART RATE: 64 BPM | SYSTOLIC BLOOD PRESSURE: 128 MMHG

## 2021-07-15 DIAGNOSIS — I10 ESSENTIAL HYPERTENSION: ICD-10-CM

## 2021-07-15 DIAGNOSIS — I48.0 PAROXYSMAL ATRIAL FIBRILLATION (HCC): Primary | ICD-10-CM

## 2021-07-15 PROCEDURE — 99213 OFFICE O/P EST LOW 20 MIN: CPT | Performed by: NUCLEAR MEDICINE

## 2021-07-15 NOTE — PROGRESS NOTES
[id number] Eileen Sarkar  [address]  [place] [address]  Dept: [phone]  Dept Fax: [phone]  Loc: [phone]    Visit Date: 7/15/2021    Sai Gunderson is a 54 y.o. female who presents todayfor:  Chief Complaint   Patient presents with    Check-Up    Atrial Fibrillation    Hypertension   seen Elastrid Carlton for low BP and chest heaviness  Known intermittent A fib   Known cath 2014  Mild CAD  Still smoking   No chest pain  No changes in breathing  Bp is stable  No dizziness  No syncope  Some palpitation         HPI:  HPI  Past Medical History:   Diagnosis Date    Afib ([address]     Arthritis     CAD (coronary artery disease)     GERD (gastroesophageal reflux disease)     Hyperlipidemia     Hypertension     Hypothyroid     Pneumonia       Past Surgical History:   Procedure Laterality Date    BREAST LUMPECTOMY Left 1999    CARDIAC CATHETERIZATION      CHOLECYSTECTOMY      CORONARY ANGIOPLASTY      KNEE ARTHROSCOPY Right     08    MOUTH SURGERY       Family History   Problem Relation Age of Onset    Heart Disease Mother     Diabetes Mother     Cancer Mother         liposarcoma    Kidney Disease Mother     Heart Disease Father     Diabetes Father     Kidney Disease Father     Cancer Father      Social History     Tobacco Use    Smoking status: Current Every Day Smoker     Packs/day: 0.50     Years: 30.00     Pack years: 15.00     Types: Cigarettes     Start date: 12/4/1987    Smokeless tobacco: Never Used   Substance Use Topics    Alcohol use: Yes     Comment: rare      Current Outpatient Medications   Medication Sig Dispense Refill    levothyroxine (SYNTHROID) 125 MCG tablet TAKE 1 TABLET BY MOUTH EVERY DAY IN THE MORNING ON EMPTY STOMACH      ZINC PO Take by mouth daily      Ascorbic Acid (VITAMIN C PO) Take by mouth daily      metoprolol succinate (TOPROL XL) 100 MG extended release tablet Take 1 tablet by mouth daily 90 tablet 3    atorvastatin (LIPITOR) 40 MG tablet TAKE 1 TABLET DAILY 90 tablet 3    ELIQUIS 5 MG TABS tablet TAKE 1 TABLET BY MOUTH TWICE A  tablet 1    flecainide (TAMBOCOR) 100 MG tablet TAKE 1 TABLET BY MOUTH TWICE A  tablet 1    ibuprofen (ADVIL;MOTRIN) 800 MG tablet Take 800 mg by mouth every 6 hours as needed for Pain      omeprazole (PRILOSEC) 10 MG delayed release capsule Take 10 mg by mouth daily      nitroGLYCERIN (NITROSTAT) 0.4 MG SL tablet Place 1 tablet under the tongue every 5 minutes as needed for Chest pain 25 tablet 3    aspirin 81 MG chewable tablet Take 1 tablet by mouth daily. 30 tablet 3     No current facility-administered medications for this visit.      Allergies   Allergen Reactions    Codeine Hives and Swelling    Cortisone Swelling     Apparently tolerates topical and IV with benadryl well    Flu Virus Vaccine Other (See Comments)     Pt states could not walk after getting     Darvocet A500 [Propoxyphene N-Acetaminophen] Nausea And Vomiting    Sulfa Antibiotics Nausea And Vomiting and Swelling     Not throat swelling     Health Maintenance   Topic Date Due    Hepatitis C screen  Never done    Pneumococcal 0-64 years Vaccine (1 of 2 - PPSV23) Never done    COVID-19 Vaccine (1) Never done    HIV screen  Never done    DTaP/Tdap/Td vaccine (1 - Tdap) Never done    Cervical cancer screen  Never done    Colon cancer screen colonoscopy  Never done    Lipid screen  12/05/2020    TSH testing  12/05/2020    Potassium monitoring  12/05/2020    Creatinine monitoring  12/05/2020    Hepatitis A vaccine  Aged Out    Hepatitis B vaccine  Aged Out    Hib vaccine  Aged Out    Meningococcal (ACWY) vaccine  Aged Out       Subjective:  Review of Systems  General:   No fever, no chills, No fatigue or weight loss  Pulmonary:    No dyspnea, no wheezing  Cardiac:    Denies recent chest pain,   GI:     No nausea or vomiting, no abdominal pain  Neuro:    No dizziness or light headedness,   Musculoskeletal:  No recent active issues  Extremities:   No edema, no obvious claudication       Objective:  Physical Exam  /82   Pulse 64   Ht 6' (1.829 m)   Wt (!) 313 lb 9.6 oz (142.2 kg)   BMI 42.53 kg/m²   General:   Well developed, well nourished  Lungs:   Clear to auscultation  Heart:    Normal S1 S2, Slight murmur. no rubs, no gallops  Abdomen:   Soft, non tender, no organomegalies, positive bowel sounds  Extremities:   No edema, no cyanosis, good peripheral pulses  Neurological:   Awake, alert, oriented. No obvious focal deficits  Musculoskelatal:  No obvious deformities    Assessment:      Diagnosis Orders   1. Paroxysmal atrial fibrillation (HCC)     2. Essential hypertension     as above  Seems to be stable   No active issues   Smoking: discussed with the patient the importance of smoke cessation especially with the risk of CAD. Plan:  No follow-ups on file. As above  Continue risk factor modification and medical management  Thank you for allowing me to participate in the care of your patient. Please don't hesitate to contact me regarding any further issues related to the patient care      Orders Placed:  No orders of the defined types were placed in this encounter. Medications Prescribed:  No orders of the defined types were placed in this encounter. Discussed use, benefit, and side effects of prescribed medications. All patient questions answered. Pt voicedunderstanding. Instructed to continue current medications, diet and exercise. Continue risk factor modification and medical management. Patient agreed with treatment plan. Follow up as directed.     Electronically signedby Kulwant Campos MD on 7/15/2021 at 1:46 PM

## 2021-07-30 RX ORDER — FLECAINIDE ACETATE 100 MG/1
TABLET ORAL
Qty: 180 TABLET | Refills: 3 | Status: SHIPPED | OUTPATIENT
Start: 2021-07-30 | End: 2022-05-02

## 2021-07-30 RX ORDER — METOPROLOL SUCCINATE 50 MG/1
100 TABLET, EXTENDED RELEASE ORAL DAILY
Qty: 30 TABLET | Refills: 0 | Status: ON HOLD | OUTPATIENT
Start: 2021-07-30 | End: 2022-03-22

## 2021-07-30 RX ORDER — APIXABAN 5 MG/1
TABLET, FILM COATED ORAL
Qty: 180 TABLET | Refills: 3 | Status: SHIPPED | OUTPATIENT
Start: 2021-07-30 | End: 2022-07-26

## 2022-01-05 DIAGNOSIS — I10 ESSENTIAL HYPERTENSION: ICD-10-CM

## 2022-01-05 RX ORDER — ATORVASTATIN CALCIUM 40 MG/1
TABLET, FILM COATED ORAL
Qty: 90 TABLET | Refills: 3 | Status: SHIPPED | OUTPATIENT
Start: 2022-01-05

## 2022-01-19 RX ORDER — METOPROLOL SUCCINATE 100 MG/1
TABLET, EXTENDED RELEASE ORAL
Qty: 90 TABLET | Refills: 1 | Status: SHIPPED | OUTPATIENT
Start: 2022-01-19 | End: 2022-08-12

## 2022-02-18 ENCOUNTER — HOSPITAL ENCOUNTER (OUTPATIENT)
Dept: WOMENS IMAGING | Age: 52
Discharge: HOME | End: 2022-02-18
Payer: COMMERCIAL

## 2022-02-18 ENCOUNTER — TELEPHONE (OUTPATIENT)
Dept: CARDIOLOGY CLINIC | Age: 52
End: 2022-02-18

## 2022-02-18 DIAGNOSIS — N63.42 SUBAREOLAR MASS OF LEFT BREAST: ICD-10-CM

## 2022-02-18 PROCEDURE — 76642 ULTRASOUND BREAST LIMITED: CPT

## 2022-02-18 PROCEDURE — G0279 TOMOSYNTHESIS, MAMMO: HCPCS

## 2022-02-18 NOTE — TELEPHONE ENCOUNTER
Pt called in requesting clearance for breast biopsy scheduled 2/24/2022. Would need to hold thinners starting Sunday.       Pre op Risk Assessment    Procedure breast biopsy  Physician   Date of surgery/procedure 2/24/22    Last OV 7/15/21  Last Stress 4/22/21  Last Echo 4/22/21  Last Cath 11/11/14   Last Stent none in epic   Is patient on blood thinners Eliquis, Asa   Hold Meds/how many days 5

## 2022-02-24 ENCOUNTER — HOSPITAL ENCOUNTER (OUTPATIENT)
Dept: WOMENS IMAGING | Age: 52
Discharge: HOME | End: 2022-02-24
Payer: COMMERCIAL

## 2022-02-24 DIAGNOSIS — N63.25 MASS OVERLAPPING MULTIPLE QUADRANTS OF LEFT BREAST: ICD-10-CM

## 2022-02-24 DIAGNOSIS — R59.9 LYMPH NODE ENLARGEMENT: ICD-10-CM

## 2022-02-24 PROCEDURE — C1894 INTRO/SHEATH, NON-LASER: HCPCS

## 2022-02-24 PROCEDURE — G0279 TOMOSYNTHESIS, MAMMO: HCPCS

## 2022-02-24 PROCEDURE — 88360 TUMOR IMMUNOHISTOCHEM/MANUAL: CPT

## 2022-02-24 PROCEDURE — 88377 M/PHMTRC ALYS ISHQUANT/SEMIQ: CPT

## 2022-02-24 PROCEDURE — 88305 TISSUE EXAM BY PATHOLOGIST: CPT

## 2022-02-24 PROCEDURE — 76942 ECHO GUIDE FOR BIOPSY: CPT

## 2022-02-24 NOTE — PROGRESS NOTES
Breast Biopsy Flowsheet/Post-Operative Care    Date of Procedure: 2/24/2022  Physician: Dr. Antonio Tovar  Technologist: Melva Scott guided breast biopsy  Lesion type: Palpable  Breast: left    Clock face position: Site #1: 12:00     Site #2: 9:00     Site #3 axilla    Primary Method of Detection: Palpation      Microcalcification's: no   Distribution: N/A    Asymmetry: asymmetric    Biopsy Method:   Sertera:    Site # 1    Gauge: 14    # of Passes: 4     Clip: U    Sertera:    Site # 2    Gauge: 14    # of Passes: 4     Clip:  Barbell      Sertera:    Site # 3    Gauge: 14    # of Passes: 4     Clip: Tribell    Pre-Op Assessment: (BI-RADS)   4. Suspicious Abnormality    Patient Tolerated Procedure: good  Complications: none  Comments: none    Post Operative Care  Steri strips: Yes  Dressing: Gauze, Tape   Ice Applied to Site:  Yes  Evidence of Bleeding:  No    Pain Verbalized: No      Written Discharge Instructions: Yes  Condition at Discharge: good  Time of Discharge: 0910    Electronically signed by Bud Covert on 2/24/2022 at 9:45 AM

## 2022-02-24 NOTE — PROGRESS NOTES
[facility] [address]  Pre-Biopsy Assessment      Patient Education    Written information about procedure Yes  left   Procedural steps explained Yes Ultrasound Biopsy   Post-op potential: bruising, hematoma, pain Yes    Self-care: activity, care of dressing Yes    Patient verbalized understanding Yes    Consent signed and witnessed Yes      Hormone Therapy Status: none    Recent Medication: Aspirin and Eliquis  Last Dose: stopped Monday                                     Hormone Replacement Therapy: no    Previous Breast Biopsy: yes - excisional biopsy done in 1998 left breast, benign    Previous Diagnosis Cancer: no    Hysterectomy:no    Emotional Status: Calm    Language or Physical Barriers: none    Comments: none      Electronically signed by Donold Kawasaki on 2/24/2022 at 9:43 AM

## 2022-02-25 ENCOUNTER — CLINICAL DOCUMENTATION (OUTPATIENT)
Dept: WOMENS IMAGING | Age: 52
End: 2022-02-25

## 2022-02-25 NOTE — PROGRESS NOTES
[facility]    Diagnosis:  Invasive ductal carcinoma    Home Disposition: Lives with adult daughter    Contact Information: Arrived alone for biopsy results. Dr. Mino Mckeon discussed pathology and recommended integrated breast clinic. Encouraged Breast Clinic attendance. All cards given for surgeons and oncologists. Wants to see Dr(s): Undecided. Patient Expresses Need(s) For: Her daughter is getting  in October. Requests Referral to Doctor(s) with appointment(s): Undecided    Additional Referral(s): Kat Cherry/Zhanna Bae: [facility]     Integrated breast cancer clinic appointment: 3-4-22 @ 7:30    Biopsy site status: Felt like she was kicked by a horse last night, but feels better today. Teaching Sheets provided: Cancer Type: Invasive ductal carcinoma, What is Breast Cancer, Tumor Markers, Needle Localization, [place] Lymph Node Surgery, Lumpectomy/Mastectomy Comparison, Radiation Therapy, Breast Cancer Navigation Packet, Nurse Navigation contact information, Breast Clinic appointment and map. Currently on HRT: no    If yes, was patient instructed to stop immediately: N/A     Notes: Explained terms doctor and navigators will discuss at next appointments. Questions addressed and encouraged patient to ask [facility]. Will receive a call Monday. Referral emailed to Navigation. Answered yes on Genetic Risk Assessment: yes     Additional information: Works third shift at [employer]. Results Faxed to:  Sujata Smith and Dr. Nel Melendez number [phone] Anytime on Monday. Does not work Sunday night.

## 2022-02-28 ENCOUNTER — TELEPHONE (OUTPATIENT)
Dept: CASE MANAGEMENT | Age: 52
End: 2022-02-28

## 2022-02-28 ENCOUNTER — CLINICAL DOCUMENTATION (OUTPATIENT)
Dept: CASE MANAGEMENT | Age: 52
End: 2022-02-28

## 2022-02-28 NOTE — TELEPHONE ENCOUNTER
Name: Austen Lowery  : 1970  MRN: [id number]    Oncology Navigation Follow-Up Note    Contact Type:  Telephone    Notes: Coming for teaching today at 1 pm, Verbalizes understanding of [facility] location and bringing packet.     Electronically signed by Flavia Bennett RN on 2022 at 9:47 AM

## 2022-02-28 NOTE — PROGRESS NOTES
Name: Katelynn Cueva  : 1970  MRN: [id number]    Oncology Navigation- Initial Note:    Intake-  Contact Type: Cancer Center    Diagnosis: Breast-malignant    Home Disposition: Lives with other who is able to assist, Patient has daughter, daughter's fiance, mother and father living with her. She works 12 hrs 7p-7a, 3 days per week. Patient needs and barriers to care: Coordination of Care, Knowledge deficit, Emotional Issues/ Fear/ Anxiety and Financial Concerns/ Disability-voicing concerns about ability to stay working during treatment, already requested [facility] paperwork for intermittent leave. Works as nurse on [employer]. Referral Source: Outpatient    Receptive to Advanced Care Planning/ Palliative Care:  Deferred, stage pending    Interventions-   General Interventions: Arrived alone at [facility] for new breast cancer teaching with me. Patient knows will need cardiac clearance by Dr. De La Torre Life ASA 81 mg, Eliquis, and flecainide (Tambocor) for Afib. Started within last 1-2 years. Had MI 7 years ago. Last Echo and stress test 2021-EF 60%. Genetics/Family History: PGM breast cancer, age 50 with recurrence, age 48; breast cancer-opposite breast, age 54's; ovarian cancer, age 46. Father non-melanoma skin cancer multiple times. Mother liposarcoma; maternal aunt breast cancer, age 48. Meets criteria for genetic evaluation but wants to think about it. Education/Screenings:  yes - Breast Cancer Information Packet, Navigation packet, Pre-op: Lymphedema, exercises after breast surgery, pre-hab, genetic evaluation and testing, breast MRI, sentinel node. Deferred needle localization and magseed to surgeon who determines which procedure to be ordered. Currently on HRT: no     Referrals: Financial Kathrynn Sessions, card given to patient; spiritual care per protocol for all newly diagnosed patients.       Continuum of Care: Diagnosis/Active Treatment    Notes: Teaching completed and verbalizes understanding. Questions addressed. States has exemption from [address] vaccine. Had bad reaction to flu shot in past and doctor recommended she not get either one. Affected her joints and muscles. Emotional support offered. Has our numbers if any questions. Will follow through continuum of care.     Electronically signed by Mj Malone RN on 2/28/2022 at 9:49 AM

## 2022-03-04 ENCOUNTER — CLINICAL DOCUMENTATION (OUTPATIENT)
Dept: CASE MANAGEMENT | Age: 52
End: 2022-03-04

## 2022-03-04 NOTE — PROGRESS NOTES
Name: Brigida Gandhi  : 1970  MRN: [id number]     Oncology Navigation Follow-Up Note     Contact Type:  Medical Oncology for genetic lab draw     Notes: Blood drawn using Segment kit and shipped by [employer]. Tracking information scanned under Media tab. [facility] Cancer Genetic Counseling Referral Form, breast cancer path report, cancer related family history, guarantor information  faxed to Carlos Shah. Patient instructed to monitor e-mail from [employer] and set up appointment for genetic counseling.        Electronically signed by Tara Godwin RN on 3/4/2022 at 9:03 AM

## 2022-03-04 NOTE — PROGRESS NOTES
Name: Jason Washington  : 1970  MRN: [id number]    Oncology Navigation Follow-Up Note      Contact Type: Integrated Breast Cancer Clinic    Interventions-   General Interventions: Patient arrived alone to breast clinic. Treatment plan discussed with breast team, Carlitos Flower (Virtual). Questions addressed. Emotional support given. Education/Screenings: Breast Clinic Recommendation form completed, discussed, and copy given to patient. Referrals: Oncology Rehab for baseline assessment,  consult with Dr. Fernando Rocha  For 3/7/22 at 0900. Referral made to Dr. Frederick Vallejo, information given to Zi Ramos to schedule 2 weeks post-op. Continuum of Care: Diagnosis/Active Treatment    Note: Will continue to follow through continuum of care. Refer to Recommendation form under media tab for further information.     Electronically signed by Connie Gandhi RN on 3/4/2022 at 10:33 AM

## 2022-03-07 ENCOUNTER — TELEPHONE (OUTPATIENT)
Dept: SURGERY | Age: 52
End: 2022-03-07

## 2022-03-07 ENCOUNTER — OFFICE VISIT (OUTPATIENT)
Dept: SURGERY | Age: 52
End: 2022-03-07
Payer: COMMERCIAL

## 2022-03-07 VITALS
HEART RATE: 62 BPM | HEIGHT: 72 IN | TEMPERATURE: 96.2 F | DIASTOLIC BLOOD PRESSURE: 62 MMHG | SYSTOLIC BLOOD PRESSURE: 128 MMHG | OXYGEN SATURATION: 98 % | RESPIRATION RATE: 18 BRPM | WEIGHT: 293 LBS | BODY MASS INDEX: 39.68 KG/M2

## 2022-03-07 DIAGNOSIS — I48.0 PAROXYSMAL ATRIAL FIBRILLATION (HCC): ICD-10-CM

## 2022-03-07 DIAGNOSIS — E03.9 ACQUIRED HYPOTHYROIDISM: ICD-10-CM

## 2022-03-07 DIAGNOSIS — I10 ESSENTIAL HYPERTENSION: ICD-10-CM

## 2022-03-07 DIAGNOSIS — Z17.1 MALIGNANT NEOPLASM OF LOWER-INNER QUADRANT OF LEFT BREAST IN FEMALE, ESTROGEN RECEPTOR NEGATIVE (HCC): Primary | ICD-10-CM

## 2022-03-07 DIAGNOSIS — I25.2 HISTORY OF MI (MYOCARDIAL INFARCTION): ICD-10-CM

## 2022-03-07 DIAGNOSIS — Z72.0 TOBACCO ABUSE: ICD-10-CM

## 2022-03-07 DIAGNOSIS — K21.9 GASTROESOPHAGEAL REFLUX DISEASE WITHOUT ESOPHAGITIS: ICD-10-CM

## 2022-03-07 DIAGNOSIS — C50.312 MALIGNANT NEOPLASM OF LOWER-INNER QUADRANT OF LEFT BREAST IN FEMALE, ESTROGEN RECEPTOR NEGATIVE (HCC): Primary | ICD-10-CM

## 2022-03-07 DIAGNOSIS — I25.10 CORONARY ARTERY DISEASE INVOLVING NATIVE CORONARY ARTERY OF NATIVE HEART WITHOUT ANGINA PECTORIS: ICD-10-CM

## 2022-03-07 PROCEDURE — 99204 OFFICE O/P NEW MOD 45 MIN: CPT | Performed by: SURGERY

## 2022-03-07 ASSESSMENT — ENCOUNTER SYMPTOMS
VOICE CHANGE: 0
WHEEZING: 0
NAUSEA: 0
TROUBLE SWALLOWING: 0
COLOR CHANGE: 0
SORE THROAT: 0
CHEST TIGHTNESS: 0
VOMITING: 0
COUGH: 0
BLOOD IN STOOL: 0
ABDOMINAL PAIN: 0
SHORTNESS OF BREATH: 0

## 2022-03-07 NOTE — PROGRESS NOTES
Bernard Potter MD   General Surgery  New Patient Evaluation in Office  Pt Name: Chayito Rajput  Date of Birth 1970   Today's Date: 3/7/2022  Medical Record Number: [id number]  Referring Provider:RODRIGO Freedman  Primary Care Provider: Antolin Brown  Chief Complaint:  Chief Complaint   Patient presents with    Surgical Consult     New patient-referred by [facility] breast invasive ductal carcinoma       ASSESSMENT      1. Malignant neoplasm of lower-inner quadrant of left breast in female, estrogen receptor negative ([address]    2. Essential hypertension    3. Tobacco abuse    4. History of MI (myocardial infarction)    5. Acquired hypothyroidism    6. Coronary artery disease involving native coronary artery of native heart without angina pectoris    7. Paroxysmal atrial fibrillation (HCC)    8. Gastroesophageal reflux disease without esophagitis    9. Tobacco dependence     PLANS      1. Genetic testing and counseling  2. Lengthy discussion regarding surgical treatment of triple negative breast cancer. Patient also presented at multidisciplinary breast clinic for discussion. If patient test negative for deleterious mutation she would opt towards breast conservation therapy with localization lumpectomy sentinel node biopsy as well as removal of the previously biopsied lymph node. Alternatively if she does test positive for deleterious mutation, she would likely opt for bilateral mastectomy with removal of lymph nodes. Discussed reconstruction as well. 3.  Postoperative oncology consultations as appropriate  4. Preoperative testing per anesthesia guidelines  5. Cardiology risk assessment. Patient to hold Eliquis 48 hours prior to procedure  6. Surgical risk discussed at length and include but not limited to bleeding, infection as well as need for reexcision, lymphedema as well as potential for recurrence of cancer  7. General anesthesia    8. 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   Ronda Longo had a prior biopsy of the left breast in 1998 which showed benign fibrocystic changes. She presented with palpable changes in the periareolar area of the left breast at about the 9 o'clock position. She underwent diagnostic imaging followed by ultrasound and ultrasound-guided biopsies. On mammography she had a hypoechoic lobulated mass inner central left breast 1 cm from the nipple which measured 1.9 x 1.4 x 1.6 cm correlating with mammographic finding. There is a small hypoechoic nodular lesion left breast 12 o'clock position about 3 mm in size. She had an enlarged left axillary lymph node with cortical thickening of 4.9 mm with retained fatty hilum. Ultrasound-guided biopsies of all 3 areas were performed. Pathology of the small lesion 12:00 U-Clip fibroglandular breast tissue with stromal fibrosis the mass at 9:00 was marked by a barbell clip showed invasive ductal carcinoma [place] grade 3 triple negative. Left axillary lymph node was negative for malignancy. Patient on examination today has questionable abnormal mass medial aspect left breast with biopsy changes and tenderness on examination. There is no clinical palpable enlarged lymph nodes. Right breast examination is benign. She is undergoing genetic counseling and testing with family history and triple negative cancer. She is on Eliquis for paroxysmal atrial fibrillation and follows with Dr. Adolfo Biswas. Menarche age 17.  G3, P3. Had her child at 27. She did take oral contraceptive medications for many years. Menopause age 41. No history of estrogen replacement therapy. Family history paternal grandmother history of breast ovarian and uterine cancer.   Paternal aunt history of breast cancer, maternal aunt history of breast cancer, mother history of liposarcoma. Past Medical History  Past Medical History:   Diagnosis Date    Afib [facility])     Baki    Arthritis     CAD (coronary artery disease)     GERD (gastroesophageal reflux disease)     Hyperlipidemia     Hypertension     Hypothyroid     Invasive ductal carcinoma of breast, female, left ([facility] [address] 02/24/2022    Malignant neoplasm of lower-inner quadrant of left breast in female, estrogen receptor negative ([facility] [address] 03/07/2022    Pneumonia        Past Surgical History  Past Surgical History:   Procedure Laterality Date    CARDIAC CATHETERIZATION      CHOLECYSTECTOMY  1992    CORONARY ANGIOPLASTY      KNEE ARTHROSCOPY Right     08    JENNIFER BIOPSY LYMPH NODE BY NEEDLE SUPERFICIAL  02/24/2022    JENNIFER BIOPSY LYMPH NODE BY NEEDLE SUPERFICIAL 2/24/2022 Colletta Chamber, MD [facility] US GUID NDL BIOPSY LEFT Left 02/24/2022    JENNIFER US GUID NDL BIOPSY LEFT 2/24/2022 Colletta Chamber, MD [facility]    MOUTH SURGERY      NV BIOPSY OF BREAST, INCISIONAL Left 1998    excisional bx-H. Chollet fibrocystic changes    US BREAST BIOPSY NEEDLE ADDITIONAL LEFT Left 02/24/2022    US BREAST BIOPSY NEEDLE ADDITIONAL LEFT 2/24/2022 Colletta Chamber, MD [facility]       Medications  Current Outpatient Medications   Medication Sig Dispense Refill    metoprolol succinate (TOPROL XL) 100 MG extended release tablet TAKE 1 TABLET BY MOUTH EVERY DAY 90 tablet 1    atorvastatin (LIPITOR) 40 MG tablet TAKE 1 TABLET DAILY 90 tablet 3    ELIQUIS 5 MG TABS tablet TAKE 1 TABLET BY MOUTH TWICE A  tablet 3    flecainide (TAMBOCOR) 100 MG tablet TAKE 1 TABLET BY MOUTH TWICE A  tablet 3    metoprolol succinate (TOPROL XL) 50 MG extended release tablet Take 2 tablets by mouth daily 30 tablet 0    levothyroxine (SYNTHROID) 125 MCG tablet TAKE 1 TABLET BY MOUTH EVERY DAY IN THE MORNING ON EMPTY STOMACH      ZINC PO Take by mouth daily      Ascorbic Acid (VITAMIN C PO) Take by mouth Current Every Day Smoker     Packs/day: 0.50     Years: 30.00     Pack years: 15.00     Types: Cigarettes     Start date: 12/4/1987    Smokeless tobacco: Never Used   Vaping Use    Vaping Use: Never used   Substance and Sexual Activity    Alcohol use: Yes     Comment: rare    Drug use: No    Sexual activity: Not on file     Comment: boy friend   Other Topics Concern    Not on file   Social History Narrative    Not on file     Social Determinants of Health     Financial Resource Strain:     Difficulty of Paying Living Expenses: Not on file   Food Insecurity:     Worried About Running Out of Food in the Last Year: Not on file    Gaurav of Food in the Last Year: Not on file   Transportation Needs:     Lack of Transportation (Medical): Not on file    Lack of Transportation (Non-Medical): Not on file   Physical Activity:     Days of Exercise per Week: Not on file    Minutes of Exercise per Session: Not on file   Stress:     Feeling of Stress : Not on file   Social Connections:     Frequency of Communication with Friends and Family: Not on file    Frequency of Social Gatherings with Friends and Family: Not on file    Attends Scientologist Services: Not on file    Active Member of [address] [place] [address] [employer] or Organizations: Not on file    Attends Club or Organization Meetings: Not on file    Marital Status: Not on file   Intimate Partner Violence:     Fear of Current or Ex-Partner: Not on file    Emotionally Abused: Not on file    Physically Abused: Not on file    Sexually Abused: Not on file   Housing Stability:     Unable to Pay for Housing in the Last Year: Not on file    Number of [place] in the Last Year: Not on file    Unstable Housing in the Last Year: Not on file           Review of Systems  Review of Systems   Constitutional: Negative for chills, fatigue, fever and unexpected weight change. HENT: Negative for sore throat, trouble swallowing and voice change. Eyes: Negative for visual disturbance. Respiratory: Negative for cough, chest tightness, shortness of breath and wheezing. Cardiovascular: Negative for chest pain and palpitations. Gastrointestinal: Negative for abdominal pain, blood in stool, nausea and vomiting. Endocrine: Negative for cold intolerance, heat intolerance and polydipsia. Genitourinary: Negative for dysuria, flank pain and hematuria. Musculoskeletal: Negative for gait problem, joint swelling and myalgias. Skin: Negative for color change and rash. Allergic/Immunologic: Negative for immunocompromised state. Neurological: Negative for dizziness, tremors, light-headedness and headaches. Hematological: Does not bruise/bleed easily. Psychiatric/Behavioral: Negative for behavioral problems and confusion. OBJECTIVE     /62 (Site: Right Upper Arm, Position: Sitting, Cuff Size: Medium Adult)   Pulse 62   Temp 96.2 °F (35.7 °C) (Axillary)   Resp 18   Ht 6' (1.829 m)   Wt (!) 318 lb 3.2 oz (144.3 kg)   SpO2 98%   BMI 43.16 kg/m²      Physical Exam  Constitutional:       Appearance: Normal appearance. She is well-developed. She is obese. She is not ill-appearing or diaphoretic. HENT:      Head: Normocephalic and atraumatic. Eyes:      General: No scleral icterus. Extraocular Movements: Extraocular movements intact. Pupils: Pupils are equal, round, and reactive to light. Comments: glasses   Neck:      Vascular: No JVD. Trachea: No tracheal deviation. Cardiovascular:      Rate and Rhythm: Normal rate. Heart sounds: Normal heart sounds. No murmur heard. Pulmonary:      Effort: Pulmonary effort is normal. No respiratory distress. Breath sounds: No wheezing or rales. Chest:      Chest wall: No tenderness. Breasts:      Right: No swelling, bleeding, inverted nipple, mass, nipple discharge, skin change, tenderness, axillary adenopathy or supraclavicular adenopathy. Left: Tenderness present.  No swelling, bleeding, inverted nipple, mass, nipple discharge, skin change, axillary adenopathy or supraclavicular adenopathy. Abdominal:      General: There is no distension. Palpations: There is no mass. Tenderness: There is no abdominal tenderness. Musculoskeletal:         General: No deformity. Right lower leg: No edema. Left lower leg: No edema. Lymphadenopathy:      Cervical: No cervical adenopathy. Right cervical: No superficial, deep or posterior cervical adenopathy. Left cervical: No superficial, deep or posterior cervical adenopathy. Upper Body:      Right upper body: No supraclavicular, axillary or pectoral adenopathy. Left upper body: No supraclavicular, axillary or pectoral adenopathy. Skin:     General: Skin is warm and dry. Coloration: Skin is not jaundiced. Findings: No rash. Neurological:      General: No focal deficit present. Mental Status: She is alert and oriented to person, place, and time. Cranial Nerves: No cranial nerve deficit. Psychiatric:         Behavior: Behavior normal.         Thought Content:  Thought content normal.         Lab Results   Component Value Date    WBC 8.8 12/21/2021    HGB 15.2 12/21/2021    HCT 47.3 (H) 12/21/2021     12/21/2021    CHOL 167 12/21/2021    TRIG 174 12/21/2021    HDL 35 12/21/2021    ALT 23 12/21/2021    AST 18 12/21/2021     12/21/2021    K 4.2 12/21/2021     12/21/2021    CREATININE 0.5 12/21/2021    BUN 12 12/21/2021    CO2 24 12/21/2021    TSH 4.690 (H) 12/21/2021    INR 1.01 12/04/2019    LABA1C 5.5 02/01/2019     Performed by: [id number] Pathology     Kellie English             [id number]   Assoc.                                              Page 1 of 1 [address], [place] [address]                                                       PROC: 02/24/2022   [facility]                                    RECV: 02/24/2022   [address]   buffered formalin   Tissue removal time:  08:34   Tissue fixation time:  08:35   Total fixation time: 11 hours 25 minutes     C - The container is labeled Jacqui Hathaway, left enlarged axillary   lymph node.  Received in formalin are four cylindrical fragments of tan   tissue, each 0.1 cm in diameter and varying in length from 0.4 cm up to   about 0.8 cm.  1 ns.  PCF/DKR:[id number]     Fixative:  10% neutral buffered formalin   Tissue removal time:  08:55   Tissue fixation time:  08:56   Total fixation time: 11 hours 4 minutes     Microscopic Examination:   A.  Sections show fibroglandular breast tissue with stromal fibrosis. There is no evidence of malignancy. B.  Procedure: Needle biopsy   Specimen laterality: Left   Tumor site: 9:00   Histologic type: Invasive ductal carcinoma     Rekha histologic score   Glandular/tubular differentiation: Score 3   Nuclear pleomorphism: Score 3   Mitotic rate: Score 3   Overall grade: Grade 3     Tumor size: At least 9 mm   Ductal carcinoma in situ: Not identified   Breast biomarker studies: Pending     C.  Sections show multiple fragments of benign lymph node. [id number] x 3   [id number]   [id number]                                                       <Sign Out Dr. Jah Zavala M.D., F.C. A. P       NVML/ [address] [address]  Printed on:  2/28/2022   [facility] [place] [address]   [address] [facility], [address] [place] [address]   Original print date: 02/28/2022          LOCATION: [place]       PROCEDURE: JENNIFER MICHAEL DIGITAL DIAGNOSTIC BILATERAL, US BREAST LIMITED LEFT       CLINICAL INFORMATION: Subareolar mass of left breast . Tomosynthesis.  Meets criteria for genetic evaluation and has been offered information for possible referral.           PATIENT MEDICAL HISTORY: No relevant medical history has been documented for this patient.       FAMILY HISTORY: Family medical history includes breast cancer in paternal grandmother and ovarian cancer in paternal grandmother.       RISK VALUES: Edy Cueva.: 4.2%, Reginald life: 15.3%       COMPARISON: 6/1/2015       [facility] MICHAEL DIGITAL DIAGNOSTIC BILATERAL:       TECHNIQUE: Bilateral CC and MLO views were obtained each breast. Tomosynthesis was used. CAD was used.        BREAST COMPOSITION: The tissue of the breast(s) is heterogeneously dense. This may lower the sensitivity of mammography.       FINDINGS:        There is a nodular density within the inner central left breast anterior depth which correlates to the palpable abnormality. Further evaluation with targeted ultrasound is reported below.       The additional palpable abnormality within the periareolar region of the left breast does not demonstrate 8 definite mammographic correlate. However, further evaluation with targeted ultrasound is reported below.           US BREAST LIMITED LEFT:       TECHNIQUE: Targeted ultrasound of the left breast was performed. Grayscale images and color images of the real-time examination were reviewed. The axilla was also imaged.       FINDINGS:        There is a hypoechoic lobulated mass within the inner central left breast near the 9:00 position 1 cm from the nipple measuring 1.9 x 1.4 x 1.6 cm. This correlates with the mammographic finding and the palpable abnormality. Recommend ultrasound-guided    biopsy.       There is a small hypoechoic nodular lesion within the upper central left breast 12:00 position 3 cm from the nipple measuring 0.3 x 0.2 x 0.3 cm. This is incidental. However, recommend ultrasound-guided biopsy.       There is an enlarged lymph node demonstrated within the left axilla was cortical thickening measuring 4.9 mm. There is retained fatty hilum. Jillene Bergamo is an additional prominent lymph node within the left axilla with slightly less cortical thickening    measuring 4 mm. Recommend ultrasound-guided biopsy of the largest left axillary lymph node.               Impression   1.  There is a hypoechoic lobulated mass within the inner central left breast near the 9:00 position 1 cm from the nipple measuring 1.9 x 1.4 x 1.6 cm. This correlates with the mammographic finding and the palpable abnormality. Recommend ultrasound-guided    biopsy.       2. There is a small hypoechoic nodular lesion within the upper central left breast 12:00 position 3 cm from the nipple measuring 0.3 x 0.2 x 0.3 cm. This is incidental. However, recommend ultrasound-guided biopsy.       3. There is an enlarged lymph node demonstrated within the left axilla was cortical thickening measuring 4.9 mm. There is retained fatty hilum. Lei Waylon is an additional prominent lymph node within the left axilla with slightly less cortical thickening    measuring 4 mm. Recommend ultrasound-guided biopsy of the largest left axillary lymph node.       These results were discussed with the patient. The tech navigator was notified. We will arrange scheduling the patient for her procedure per department protocol.       BI-RADS CATEGORY 4C - Suspicious abnormality - High suspicion for malignancy.       Management: Tissue diagnosis.  Biopsy should be performed in the absence of clinical contraindication.               **This report has been created using voice recognition software. It may contain minor errors which are inherent in voice recognition technology. **       Final report electronically signed by Dr. Oz Patetrson on 2/18/2022 4:48 PM          [address] LEFT (Order [id number]) - Reflex for Order [id number]  Narrative   LOCATION: [place]       EXAM:     1. MAMMOGRAM POST BX CLIP PLACEMENT LEFT, JENNIFER US GUID NDL BIOPSY LEFT, JENNIFER NEEDLE BIOPSY LYMPH NODE SUPERFICIAL, US BREAST BIOPSY W LOC DEVICE EACH ADDL LESION LEFT       1. Biopsy of the left breast, percutaneous, using imaging guidance, 2 sites. 2. Biopsy of the left axilla, axillary lymph node, percutaneous, using imaging guidance.    3. Ultrasound guidance for biopsy, imaging supervision and interpretation. 4. Postprocedural diagnostic left mammogram.           HISTORY: 51 years, Female, Mass overlapping multiple quadrants of left breast, Lymph node enlargement. .       COMPARISON:  2/18/2022 and 6/1/2015.       TECHNIQUE/FINDINGS:        Written, informed consent was obtained, including discussion of the risks, benefits and alternatives. The patient's left breast was marked by the physician with initials. A timeout was performed including the patient's name, date of birth, procedure and    laterality.       Site 1, 3 mm nodular density, upper central left breast, 12:00, U clip:  An ultrasound-guided biopsy using real-time ultrasound was performed. The nodule in the upper central left breast was identified, localized , and the site was marked. With    ultrasound guidance from a medial approach, the area was prepped and draped in sterile fashion. 2 % lidocaine was used for local anesthesia. 14 ga Sertera coaxial needle was advanced under ultrasound guidance. Once the needle was documented to be in the    correct location, 4 samples were taken from the area of interest. Samples were placed in formalin sent to pathology. A U shaped biopsy marker was placed at the biopsy site.       Site 2, 1.9 cm mass, 9:00, inner central left breast, barbell clip:  An ultrasound-guided biopsy using real-time ultrasound was performed. The mass in the inner central left breast was identified, localized , and the site was marked. With ultrasound    guidance from a medial approach, the area was prepped and draped in sterile fashion. 2 % lidocaine was used for local anesthesia. 14 ga Sertera coaxial needle was advanced under ultrasound guidance. Once the needle was documented to be in the correct    location, 4 samples were taken from the area of interest. Samples were placed in formalin sent to pathology.  A barbell biopsy marker was placed at the biopsy site.       Site 3, left axillary lymph node, tribell clip: An ultrasound-guided biopsy using real-time ultrasound was performed. The lymph node in the left axilla was identified, localized , and the site was marked. With ultrasound guidance from a lateral approach,    the area was prepped and draped in sterile fashion. 2 % lidocaine was used for local anesthesia. 14 ga Sertera coaxial needle was advanced under ultrasound guidance. Once the needle was documented to be in the correct location, 4 samples were taken from    the area of interest. Samples were placed in formalin sent to pathology. A tribell biopsy marker was placed at the biopsy site.       Clip placement was confirmed with a left digital diagnostic mammogram. The mammogram was performed as a separate procedure in a separate room with a dedicated machine.       The patient tolerated the procedure well. No evidence of immediate complication. The patient was given post-procedure instructions, including wound care.           POSTPROCEDURE MAMMOGRAM:       Images of the left include a CC view and MLO view. Tomosynthesis. CAD was utilized.       There are scattered fibroglandular elements in the breast(s) that could obscure a lesion on mammography. Post procedure mammograms were taken in a separate room. There is a U shaped biopsy clip at the biopsy site 1, 12:00, anterior depth. There is a    barbell clip in the inner central left breast, within the mammographic mass. This corresponds with the original mammographic density. There is a tribell clip in the left axilla.       There are no other significant findings in the breast.               Impression   1. Successful left breast ultrasound guided core needle biopsy, 2 sites. 2. Successful left axillary lymph node ultrasound guided core needle biopsy. 3. Successful marker clip placements with confirmation by digital diagnostic mammogram.            Waiting for pathology results.  A final report will be issued when these become available.           BI-RADS Final Assessment Category: Waiting for pathology.       Management Recommendation: Assess radiologic/pathologic concordance.                   **This report has been created using voice recognition software. It may contain minor errors which are inherent in voice recognition technology. **       Final report electronically signed by Dr. Aliza Su on 2/24/2022 10:00 AM          [facility] NEEDLE BIOPSY LYMPH NODE SUPERFICIAL (Order [id number]) - Reflex for Order [id number]  Narrative   [facility]       EXAM:     1. MAMMOGRAM POST BX CLIP PLACEMENT LEFT, JENNIFER US GUID NDL BIOPSY LEFT, JENNIFER NEEDLE BIOPSY LYMPH NODE SUPERFICIAL, US BREAST BIOPSY W LOC DEVICE EACH ADDL LESION LEFT       1. Biopsy of the left breast, percutaneous, using imaging guidance, 2 sites. 2. Biopsy of the left axilla, axillary lymph node, percutaneous, using imaging guidance. 3. Ultrasound guidance for biopsy, imaging supervision and interpretation. 4. Postprocedural diagnostic left mammogram.           HISTORY: 51 years, Female, Mass overlapping multiple quadrants of left breast, Lymph node enlargement. .       COMPARISON:  2/18/2022 and 6/1/2015.       TECHNIQUE/FINDINGS:        Written, informed consent was obtained, including discussion of the risks, benefits and alternatives. The patient's left breast was marked by the physician with initials. A timeout was performed including the patient's name, date of birth, procedure and    laterality.       Site 1, 3 mm nodular density, upper central left breast, 12:00, U clip:  An ultrasound-guided biopsy using real-time ultrasound was performed. The nodule in the upper central left breast was identified, localized , and the site was marked. With    ultrasound guidance from a medial approach, the area was prepped and draped in sterile fashion. 2 % lidocaine was used for local anesthesia. 14 ga Sertera coaxial needle was advanced under ultrasound guidance.  Once the needle was documented to be in the correct location, 4 samples were taken from the area of interest. Samples were placed in formalin sent to pathology. A U shaped biopsy marker was placed at the biopsy site.       Site 2, 1.9 cm mass, 9:00, inner central left breast, barbell clip:  An ultrasound-guided biopsy using real-time ultrasound was performed. The mass in the inner central left breast was identified, localized , and the site was marked. With ultrasound    guidance from a medial approach, the area was prepped and draped in sterile fashion. 2 % lidocaine was used for local anesthesia. 14 ga Sertera coaxial needle was advanced under ultrasound guidance. Once the needle was documented to be in the correct    location, 4 samples were taken from the area of interest. Samples were placed in formalin sent to pathology. A barbell biopsy marker was placed at the biopsy site.       Site 3, left axillary lymph node, tribell clip: An ultrasound-guided biopsy using real-time ultrasound was performed. The lymph node in the left axilla was identified, localized , and the site was marked. With ultrasound guidance from a lateral approach,    the area was prepped and draped in sterile fashion. 2 % lidocaine was used for local anesthesia. 14 ga Sertera coaxial needle was advanced under ultrasound guidance. Once the needle was documented to be in the correct location, 4 samples were taken from    the area of interest. Samples were placed in formalin sent to pathology. A tribell biopsy marker was placed at the biopsy site.       Clip placement was confirmed with a left digital diagnostic mammogram. The mammogram was performed as a separate procedure in a separate room with a dedicated machine.       The patient tolerated the procedure well. No evidence of immediate complication. The patient was given post-procedure instructions, including wound care.           POSTPROCEDURE MAMMOGRAM:       Images of the left include a CC view and MLO view. Tomosynthesis.  CAD was utilized.       There are scattered fibroglandular elements in the breast(s) that could obscure a lesion on mammography. Post procedure mammograms were taken in a separate room. There is a U shaped biopsy clip at the biopsy site 1, 12:00, anterior depth. There is a    barbell clip in the inner central left breast, within the mammographic mass. This corresponds with the original mammographic density. There is a tribell clip in the left axilla.       There are no other significant findings in the breast.               Impression   1. Successful left breast ultrasound guided core needle biopsy, 2 sites. 2. Successful left axillary lymph node ultrasound guided core needle biopsy. 3. Successful marker clip placements with confirmation by digital diagnostic mammogram.            Waiting for pathology results. A final report will be issued when these become available.           BI-RADS Final Assessment Category: Waiting for pathology.       Management Recommendation: Assess radiologic/pathologic concordance.                   **This report has been created using voice recognition software. It may contain minor errors which are inherent in voice recognition technology. **       Final report electronically signed by Dr. Colletta Chamber on 2/24/2022 10:00 AM          [facility] [address] LEFT (Order [id number]) - Reflex for Order [id number]  Narrative   LOCATION: [place]       EXAM:     1. MAMMOGRAM POST BX CLIP PLACEMENT LEFT, [facility] US GUID NDL BIOPSY LEFT, JENNIFER NEEDLE BIOPSY LYMPH NODE SUPERFICIAL, US BREAST BIOPSY W LOC DEVICE EACH ADDL LESION LEFT       1. Biopsy of the left breast, percutaneous, using imaging guidance, 2 sites. 2. Biopsy of the left axilla, axillary lymph node, percutaneous, using imaging guidance. 3. Ultrasound guidance for biopsy, imaging supervision and interpretation.     4. Postprocedural diagnostic left mammogram.           HISTORY: 54 years, Female, Mass overlapping multiple quadrants of left breast, Lymph node enlargement. .       COMPARISON:  2/18/2022 and 6/1/2015.       TECHNIQUE/FINDINGS:        Written, informed consent was obtained, including discussion of the risks, benefits and alternatives. The patient's left breast was marked by the physician with initials. A timeout was performed including the patient's name, date of birth, procedure and    laterality.       Site 1, 3 mm nodular density, upper central left breast, 12:00, U clip:  An ultrasound-guided biopsy using real-time ultrasound was performed. The nodule in the upper central left breast was identified, localized , and the site was marked. With    ultrasound guidance from a medial approach, the area was prepped and draped in sterile fashion. 2 % lidocaine was used for local anesthesia. 14 ga Sertera coaxial needle was advanced under ultrasound guidance. Once the needle was documented to be in the    correct location, 4 samples were taken from the area of interest. Samples were placed in formalin sent to pathology. A U shaped biopsy marker was placed at the biopsy site.       Site 2, 1.9 cm mass, 9:00, inner central left breast, barbell clip:  An ultrasound-guided biopsy using real-time ultrasound was performed. The mass in the inner central left breast was identified, localized , and the site was marked. With ultrasound    guidance from a medial approach, the area was prepped and draped in sterile fashion. 2 % lidocaine was used for local anesthesia. 14 ga Sertera coaxial needle was advanced under ultrasound guidance. Once the needle was documented to be in the correct    location, 4 samples were taken from the area of interest. Samples were placed in formalin sent to pathology. A barbell biopsy marker was placed at the biopsy site.       Site 3, left axillary lymph node, tribell clip: An ultrasound-guided biopsy using real-time ultrasound was performed.  The lymph node in the left axilla was identified, localized , and the site was marked. With ultrasound guidance from a lateral approach,    the area was prepped and draped in sterile fashion. 2 % lidocaine was used for local anesthesia. 14 ga Sertera coaxial needle was advanced under ultrasound guidance. Once the needle was documented to be in the correct location, 4 samples were taken from    the area of interest. Samples were placed in formalin sent to pathology. A tribell biopsy marker was placed at the biopsy site.       Clip placement was confirmed with a left digital diagnostic mammogram. The mammogram was performed as a separate procedure in a separate room with a dedicated machine.       The patient tolerated the procedure well. No evidence of immediate complication. The patient was given post-procedure instructions, including wound care.           POSTPROCEDURE MAMMOGRAM:       Images of the left include a CC view and MLO view. Tomosynthesis. CAD was utilized.       There are scattered fibroglandular elements in the breast(s) that could obscure a lesion on mammography. Post procedure mammograms were taken in a separate room. There is a U shaped biopsy clip at the biopsy site 1, 12:00, anterior depth. There is a    barbell clip in the inner central left breast, within the mammographic mass. This corresponds with the original mammographic density. There is a tribell clip in the left axilla.       There are no other significant findings in the breast.               Impression   1. Successful left breast ultrasound guided core needle biopsy, 2 sites. 2. Successful left axillary lymph node ultrasound guided core needle biopsy. 3. Successful marker clip placements with confirmation by digital diagnostic mammogram.            Waiting for pathology results.  A final report will be issued when these become available.           BI-RADS Final Assessment Category: Waiting for pathology.       Management Recommendation: Assess radiologic/pathologic concordance.                 **This report has been created using voice recognition software. It may contain minor errors which are inherent in voice recognition technology. **       Final report electronically signed by Dr. Arminda Jones on 2/24/2022 10:00 AM         Order History    Open Order Details     PACS Images     Show images for [facility] MICHAEL DIGITAL DIAGNOSTIC BILATERAL    Results History Report    View Report         Imaging reviewed

## 2022-03-07 NOTE — TELEPHONE ENCOUNTER
Pt of DR. Hurtado's last seen 7/15/21 was recently diagnosed with left breast cancer and will be scheduled for either a left breast lumpectomy, sentinel lymph node biopsy or bilateral breast mastectomy based on genetic testing results. Surgery will be scheduled after results of genetic testing are back in about a week. Can she be cleared for surgery? Roxana Schusteruty for pt to hold eliquis 2 days prior?

## 2022-03-08 ENCOUNTER — TELEPHONE (OUTPATIENT)
Dept: SPIRITUAL SERVICES | Facility: CLINIC | Age: 52
End: 2022-03-08

## 2022-03-14 ENCOUNTER — TELEPHONE (OUTPATIENT)
Dept: SURGERY | Age: 52
End: 2022-03-14

## 2022-03-14 NOTE — TELEPHONE ENCOUNTER
Spoke w/ pt, surgery scheduled 3/22, arrive at 9:30 am to [facility], SLN injection scheduled at 11:30, surgery to follow. Instructed pt to hold Eliquis 2 days prior to surgery, hold vitamins and supplements 1 week prior, NPO after midnight, bring a , shower with antibacterial soap morning of surgery, no jewelry or piercings.   Pt voiced understanding

## 2022-03-17 ENCOUNTER — HOSPITAL ENCOUNTER (OUTPATIENT)
Dept: PHYSICAL THERAPY | Age: 52
Setting detail: THERAPIES SERIES
Discharge: HOME | End: 2022-03-17

## 2022-03-22 ENCOUNTER — ANESTHESIA EVENT (OUTPATIENT)
Dept: OPERATING ROOM | Age: 52
End: 2022-03-22
Payer: COMMERCIAL

## 2022-03-22 ENCOUNTER — ANESTHESIA (OUTPATIENT)
Dept: OPERATING ROOM | Age: 52
End: 2022-03-22
Payer: COMMERCIAL

## 2022-03-22 ENCOUNTER — HOSPITAL ENCOUNTER (OUTPATIENT)
Dept: NUCLEAR MEDICINE | Age: 52
Discharge: HOME | End: 2022-03-22
Payer: COMMERCIAL

## 2022-03-22 ENCOUNTER — HOSPITAL ENCOUNTER (OUTPATIENT)
Dept: WOMENS IMAGING | Age: 52
Discharge: HOME | End: 2022-03-22
Payer: COMMERCIAL

## 2022-03-22 ENCOUNTER — HOSPITAL ENCOUNTER (OUTPATIENT)
Age: 52
Setting detail: OUTPATIENT SURGERY
Discharge: HOME | End: 2022-03-22
Attending: SURGERY | Admitting: SURGERY
Payer: COMMERCIAL

## 2022-03-22 VITALS — DIASTOLIC BLOOD PRESSURE: 68 MMHG | OXYGEN SATURATION: 96 % | SYSTOLIC BLOOD PRESSURE: 104 MMHG | TEMPERATURE: 97.7 F

## 2022-03-22 VITALS
HEIGHT: 72 IN | RESPIRATION RATE: 20 BRPM | OXYGEN SATURATION: 96 % | DIASTOLIC BLOOD PRESSURE: 83 MMHG | HEART RATE: 63 BPM | WEIGHT: 293 LBS | SYSTOLIC BLOOD PRESSURE: 130 MMHG | TEMPERATURE: 96.7 F | BODY MASS INDEX: 39.68 KG/M2

## 2022-03-22 DIAGNOSIS — C50.312 MALIGNANT NEOPLASM OF LOWER-INNER QUADRANT OF LEFT BREAST IN FEMALE, ESTROGEN RECEPTOR NEGATIVE (HCC): ICD-10-CM

## 2022-03-22 DIAGNOSIS — C50.912 INFILTRATING DUCTAL CARCINOMA OF LEFT BREAST (HCC): ICD-10-CM

## 2022-03-22 DIAGNOSIS — Z17.1 MALIGNANT NEOPLASM OF LOWER-INNER QUADRANT OF LEFT BREAST IN FEMALE, ESTROGEN RECEPTOR NEGATIVE (HCC): ICD-10-CM

## 2022-03-22 DIAGNOSIS — Z17.1 MALIGNANT NEOPLASM OF CENTRAL PORTION OF LEFT BREAST IN FEMALE, ESTROGEN RECEPTOR NEGATIVE (HCC): Primary | ICD-10-CM

## 2022-03-22 DIAGNOSIS — C50.112 MALIGNANT NEOPLASM OF CENTRAL PORTION OF LEFT BREAST IN FEMALE, ESTROGEN RECEPTOR NEGATIVE (HCC): Primary | ICD-10-CM

## 2022-03-22 PROCEDURE — 7100000001 HC PACU RECOVERY - ADDTL 15 MIN: Performed by: SURGERY

## 2022-03-22 PROCEDURE — 7100000010 HC PHASE II RECOVERY - FIRST 15 MIN: Performed by: SURGERY

## 2022-03-22 PROCEDURE — 76098 X-RAY EXAM SURGICAL SPECIMEN: CPT

## 2022-03-22 PROCEDURE — 7100000000 HC PACU RECOVERY - FIRST 15 MIN: Performed by: SURGERY

## 2022-03-22 PROCEDURE — 38792 RA TRACER ID OF SENTINL NODE: CPT

## 2022-03-22 PROCEDURE — 2500000003 HC RX 250 WO HCPCS

## 2022-03-22 PROCEDURE — 77065 DX MAMMO INCL CAD UNI: CPT

## 2022-03-22 PROCEDURE — 2709999900 HC NON-CHARGEABLE SUPPLY: Performed by: SURGERY

## 2022-03-22 PROCEDURE — 2580000003 HC RX 258: Performed by: SURGERY

## 2022-03-22 PROCEDURE — 38525 BIOPSY/REMOVAL LYMPH NODES: CPT | Performed by: SURGERY

## 2022-03-22 PROCEDURE — 6360000002 HC RX W HCPCS: Performed by: SURGERY

## 2022-03-22 PROCEDURE — 6370000000 HC RX 637 (ALT 250 FOR IP): Performed by: SURGERY

## 2022-03-22 PROCEDURE — 88307 TISSUE EXAM BY PATHOLOGIST: CPT

## 2022-03-22 PROCEDURE — 19301 PARTIAL MASTECTOMY: CPT | Performed by: SURGERY

## 2022-03-22 PROCEDURE — A9541 TC99M SULFUR COLLOID: HCPCS | Performed by: SURGERY

## 2022-03-22 PROCEDURE — 3700000000 HC ANESTHESIA ATTENDED CARE: Performed by: SURGERY

## 2022-03-22 PROCEDURE — 3430000000 HC RX DIAGNOSTIC RADIOPHARMACEUTICAL: Performed by: SURGERY

## 2022-03-22 PROCEDURE — 3700000001 HC ADD 15 MINUTES (ANESTHESIA): Performed by: SURGERY

## 2022-03-22 PROCEDURE — 2500000003 HC RX 250 WO HCPCS: Performed by: SURGERY

## 2022-03-22 PROCEDURE — 6360000002 HC RX W HCPCS: Performed by: ANESTHESIOLOGY

## 2022-03-22 PROCEDURE — 2709999900 MAM NEEDLE BREAST LOCALIZATION LEFT

## 2022-03-22 PROCEDURE — 3600000002 HC SURGERY LEVEL 2 BASE: Performed by: SURGERY

## 2022-03-22 PROCEDURE — 7100000011 HC PHASE II RECOVERY - ADDTL 15 MIN: Performed by: SURGERY

## 2022-03-22 PROCEDURE — 3600000012 HC SURGERY LEVEL 2 ADDTL 15MIN: Performed by: SURGERY

## 2022-03-22 PROCEDURE — 2709999900 US PLACE BREAST LOC DEVICE EACH ADDL LEFT

## 2022-03-22 PROCEDURE — 6360000002 HC RX W HCPCS

## 2022-03-22 RX ORDER — TRAMADOL HYDROCHLORIDE 50 MG/1
50 TABLET ORAL EVERY 4 HOURS PRN
Qty: 18 TABLET | Refills: 0 | Status: SHIPPED | OUTPATIENT
Start: 2022-03-22 | End: 2022-03-25

## 2022-03-22 RX ORDER — DEXAMETHASONE SODIUM PHOSPHATE 10 MG/ML
INJECTION, EMULSION INTRAMUSCULAR; INTRAVENOUS PRN
Status: DISCONTINUED | OUTPATIENT
Start: 2022-03-22 | End: 2022-03-22 | Stop reason: SDUPTHER

## 2022-03-22 RX ORDER — FENTANYL CITRATE 50 UG/ML
INJECTION, SOLUTION INTRAMUSCULAR; INTRAVENOUS PRN
Status: DISCONTINUED | OUTPATIENT
Start: 2022-03-22 | End: 2022-03-22 | Stop reason: SDUPTHER

## 2022-03-22 RX ORDER — TRAMADOL HYDROCHLORIDE 50 MG/1
100 TABLET ORAL EVERY 6 HOURS PRN
Status: DISCONTINUED | OUTPATIENT
Start: 2022-03-22 | End: 2022-03-22 | Stop reason: HOSPADM

## 2022-03-22 RX ORDER — SUCCINYLCHOLINE/SOD CL,ISO/PF 200MG/10ML
SYRINGE (ML) INTRAVENOUS PRN
Status: DISCONTINUED | OUTPATIENT
Start: 2022-03-22 | End: 2022-03-22 | Stop reason: SDUPTHER

## 2022-03-22 RX ORDER — ONDANSETRON 2 MG/ML
INJECTION INTRAMUSCULAR; INTRAVENOUS PRN
Status: DISCONTINUED | OUTPATIENT
Start: 2022-03-22 | End: 2022-03-22 | Stop reason: SDUPTHER

## 2022-03-22 RX ORDER — SODIUM CHLORIDE 0.9 % (FLUSH) 0.9 %
5-40 SYRINGE (ML) INJECTION EVERY 12 HOURS SCHEDULED
Status: DISCONTINUED | OUTPATIENT
Start: 2022-03-22 | End: 2022-03-22 | Stop reason: HOSPADM

## 2022-03-22 RX ORDER — SODIUM CHLORIDE 9 MG/ML
25 INJECTION, SOLUTION INTRAVENOUS PRN
Status: DISCONTINUED | OUTPATIENT
Start: 2022-03-22 | End: 2022-03-22 | Stop reason: HOSPADM

## 2022-03-22 RX ORDER — SODIUM CHLORIDE 9 MG/ML
INJECTION, SOLUTION INTRAVENOUS CONTINUOUS
Status: DISCONTINUED | OUTPATIENT
Start: 2022-03-22 | End: 2022-03-22 | Stop reason: HOSPADM

## 2022-03-22 RX ORDER — FENTANYL CITRATE 50 UG/ML
50 INJECTION, SOLUTION INTRAMUSCULAR; INTRAVENOUS EVERY 5 MIN PRN
Status: COMPLETED | OUTPATIENT
Start: 2022-03-22 | End: 2022-03-22

## 2022-03-22 RX ORDER — SODIUM CHLORIDE 0.9 % (FLUSH) 0.9 %
5-40 SYRINGE (ML) INJECTION PRN
Status: DISCONTINUED | OUTPATIENT
Start: 2022-03-22 | End: 2022-03-22 | Stop reason: HOSPADM

## 2022-03-22 RX ORDER — BUPIVACAINE HYDROCHLORIDE AND EPINEPHRINE 5; 5 MG/ML; UG/ML
INJECTION, SOLUTION EPIDURAL; INTRACAUDAL; PERINEURAL PRN
Status: DISCONTINUED | OUTPATIENT
Start: 2022-03-22 | End: 2022-03-22 | Stop reason: ALTCHOICE

## 2022-03-22 RX ORDER — TRAMADOL HYDROCHLORIDE 50 MG/1
50 TABLET ORAL EVERY 6 HOURS PRN
Status: DISCONTINUED | OUTPATIENT
Start: 2022-03-22 | End: 2022-03-22 | Stop reason: HOSPADM

## 2022-03-22 RX ORDER — PROPOFOL 10 MG/ML
INJECTION, EMULSION INTRAVENOUS PRN
Status: DISCONTINUED | OUTPATIENT
Start: 2022-03-22 | End: 2022-03-22 | Stop reason: SDUPTHER

## 2022-03-22 RX ORDER — MORPHINE SULFATE 2 MG/ML
4 INJECTION, SOLUTION INTRAMUSCULAR; INTRAVENOUS
Status: DISCONTINUED | OUTPATIENT
Start: 2022-03-22 | End: 2022-03-22 | Stop reason: HOSPADM

## 2022-03-22 RX ORDER — MORPHINE SULFATE 2 MG/ML
2 INJECTION, SOLUTION INTRAMUSCULAR; INTRAVENOUS
Status: DISCONTINUED | OUTPATIENT
Start: 2022-03-22 | End: 2022-03-22 | Stop reason: HOSPADM

## 2022-03-22 RX ORDER — ONDANSETRON 4 MG/1
4 TABLET, ORALLY DISINTEGRATING ORAL EVERY 8 HOURS PRN
Status: DISCONTINUED | OUTPATIENT
Start: 2022-03-22 | End: 2022-03-22 | Stop reason: HOSPADM

## 2022-03-22 RX ORDER — FENTANYL CITRATE 50 UG/ML
25 INJECTION, SOLUTION INTRAMUSCULAR; INTRAVENOUS EVERY 5 MIN PRN
Status: DISCONTINUED | OUTPATIENT
Start: 2022-03-22 | End: 2022-03-22 | Stop reason: HOSPADM

## 2022-03-22 RX ORDER — ONDANSETRON 2 MG/ML
4 INJECTION INTRAMUSCULAR; INTRAVENOUS EVERY 6 HOURS PRN
Status: DISCONTINUED | OUTPATIENT
Start: 2022-03-22 | End: 2022-03-22 | Stop reason: HOSPADM

## 2022-03-22 RX ORDER — ROCURONIUM BROMIDE 10 MG/ML
INJECTION, SOLUTION INTRAVENOUS PRN
Status: DISCONTINUED | OUTPATIENT
Start: 2022-03-22 | End: 2022-03-22 | Stop reason: SDUPTHER

## 2022-03-22 RX ADMIN — Medication 100 MG: at 14:29

## 2022-03-22 RX ADMIN — ROCURONIUM BROMIDE 10 MG: 10 INJECTION INTRAVENOUS at 14:29

## 2022-03-22 RX ADMIN — TRAMADOL HYDROCHLORIDE 100 MG: 50 TABLET, COATED ORAL at 17:36

## 2022-03-22 RX ADMIN — FENTANYL CITRATE 50 MCG: 50 INJECTION INTRAMUSCULAR; INTRAVENOUS at 16:05

## 2022-03-22 RX ADMIN — Medication 1 MILLICURIE: at 10:40

## 2022-03-22 RX ADMIN — PROPOFOL 250 MG: 10 INJECTION, EMULSION INTRAVENOUS at 14:29

## 2022-03-22 RX ADMIN — ONDANSETRON 4 MG: 2 INJECTION INTRAMUSCULAR; INTRAVENOUS at 14:37

## 2022-03-22 RX ADMIN — SODIUM CHLORIDE: 9 INJECTION, SOLUTION INTRAVENOUS at 11:44

## 2022-03-22 RX ADMIN — FENTANYL CITRATE 50 MCG: 50 INJECTION INTRAMUSCULAR; INTRAVENOUS at 16:00

## 2022-03-22 RX ADMIN — DEXAMETHASONE SODIUM PHOSPHATE 10 MG: 10 INJECTION, EMULSION INTRAMUSCULAR; INTRAVENOUS at 14:37

## 2022-03-22 RX ADMIN — Medication 3000 MG: at 14:37

## 2022-03-22 RX ADMIN — FENTANYL CITRATE 100 MCG: 50 INJECTION, SOLUTION INTRAMUSCULAR; INTRAVENOUS at 14:29

## 2022-03-22 RX ADMIN — Medication 180 MG: at 14:29

## 2022-03-22 ASSESSMENT — PULMONARY FUNCTION TESTS
PIF_VALUE: 25
PIF_VALUE: 26
PIF_VALUE: 26
PIF_VALUE: 0
PIF_VALUE: 25
PIF_VALUE: 33
PIF_VALUE: 27
PIF_VALUE: 27
PIF_VALUE: 25
PIF_VALUE: 22
PIF_VALUE: 6
PIF_VALUE: 23
PIF_VALUE: 25
PIF_VALUE: 0
PIF_VALUE: 27
PIF_VALUE: 1
PIF_VALUE: 25
PIF_VALUE: 27
PIF_VALUE: 24
PIF_VALUE: 20
PIF_VALUE: 26
PIF_VALUE: 26
PIF_VALUE: 6
PIF_VALUE: 26
PIF_VALUE: 25
PIF_VALUE: 0
PIF_VALUE: 1
PIF_VALUE: 19
PIF_VALUE: 24
PIF_VALUE: 0
PIF_VALUE: 22
PIF_VALUE: 27
PIF_VALUE: 24
PIF_VALUE: 25
PIF_VALUE: 26
PIF_VALUE: 24
PIF_VALUE: 26
PIF_VALUE: 6
PIF_VALUE: 15
PIF_VALUE: 19
PIF_VALUE: 22
PIF_VALUE: 25
PIF_VALUE: 1
PIF_VALUE: 25
PIF_VALUE: 21
PIF_VALUE: 26
PIF_VALUE: 24
PIF_VALUE: 21
PIF_VALUE: 26
PIF_VALUE: 26
PIF_VALUE: 14
PIF_VALUE: 33
PIF_VALUE: 5
PIF_VALUE: 22
PIF_VALUE: 26
PIF_VALUE: 6
PIF_VALUE: 28
PIF_VALUE: 22
PIF_VALUE: 22
PIF_VALUE: 0
PIF_VALUE: 2
PIF_VALUE: 25
PIF_VALUE: 26
PIF_VALUE: 25
PIF_VALUE: 24
PIF_VALUE: 0
PIF_VALUE: 1
PIF_VALUE: 14
PIF_VALUE: 19
PIF_VALUE: 9
PIF_VALUE: 8
PIF_VALUE: 35
PIF_VALUE: 24
PIF_VALUE: 5
PIF_VALUE: 24
PIF_VALUE: 1
PIF_VALUE: 22
PIF_VALUE: 24
PIF_VALUE: 25
PIF_VALUE: 20

## 2022-03-22 ASSESSMENT — PAIN SCALES - GENERAL
PAINLEVEL_OUTOF10: 5
PAINLEVEL_OUTOF10: 3
PAINLEVEL_OUTOF10: 7
PAINLEVEL_OUTOF10: 5
PAINLEVEL_OUTOF10: 7
PAINLEVEL_OUTOF10: 5

## 2022-03-22 ASSESSMENT — PAIN - FUNCTIONAL ASSESSMENT: PAIN_FUNCTIONAL_ASSESSMENT: 0-10

## 2022-03-22 NOTE — PROGRESS NOTES
Contact Type: [facility]    Contact Information: Arrived alone for needle localization. Someone dropped her off and is meeting her at the hospital later. Having breast surgery today with Dr. Teodoro White. Diagnosis: Invasive ductal carcinoma    Patient Expresses Need(s) For: n/a     Teaching Sheets/Booklets Provided: n/a    Notes: Procedure explained and questions addressed as needed. Dr. Joyce Gordon two wires. Secured with gauze and tape per protocol. Transported patient with belongings to Nuclear Medicine Department via wheelchair at [phone].

## 2022-03-22 NOTE — ANESTHESIA PRE PROCEDURE
Department of Anesthesiology  Preprocedure Note       Name:  Talya Gomez   Age:  54 y.o.  :  1970                                          MRN:  [id number]         Date:  3/22/2022      Surgeon: Ok Floor):  Re Parra MD    Procedure: Procedure(s):  LEFT BREAST LUMPECTOMY PARTIAL MASTECTOMY, SENTINEL LYMPH NODE BIOPSY, PRE OP NEEDLE LOC X 2    Medications prior to admission:   Prior to Admission medications    Medication Sig Start Date End Date Taking? Authorizing Provider   metoprolol succinate (TOPROL XL) 100 MG extended release tablet TAKE 1 TABLET BY MOUTH EVERY DAY 22   Eric Lawrence MD   atorvastatin (LIPITOR) 40 MG tablet TAKE 1 TABLET DAILY 22   Eric Lawrence MD   ELIQUIS 5 MG TABS tablet TAKE 1 TABLET BY MOUTH TWICE A DAY 21   Gaby Duran MD   flecainide (TAMBOCOR) 100 MG tablet TAKE 1 TABLET BY MOUTH TWICE A DAY 21   Eric Lawrence MD   levothyroxine (SYNTHROID) 125 MCG tablet TAKE 1 TABLET BY MOUTH EVERY DAY IN THE MORNING ON EMPTY STOMACH 21   Historical Provider, MD   ZINC PO Take by mouth daily    Historical Provider, MD   Ascorbic Acid (VITAMIN C PO) Take by mouth daily    Historical Provider, MD   ibuprofen (ADVIL;MOTRIN) 800 MG tablet Take 800 mg by mouth every 6 hours as needed for Pain    Historical Provider, MD   omeprazole (PRILOSEC) 10 MG delayed release capsule Take 10 mg by mouth daily    Historical Provider, MD   nitroGLYCERIN (NITROSTAT) 0.4 MG SL tablet Place 1 tablet under the tongue every 5 minutes as needed for Chest pain 19   Jose Roberto Powell PA-C   aspirin 81 MG chewable tablet Take 1 tablet by mouth daily.  14   [facility], [facility]       Current medications:    Current Facility-Administered Medications   Medication Dose Route Frequency Provider Last Rate Last Admin    0.9 % sodium chloride infusion   IntraVENous Continuous Re Parra  mL/hr at 22 1144 New Bag at 22 1144    sodium chloride flush 0.9 % injection 5-40 mL  5-40 mL IntraVENous 2 times per day Jerald Cee MD        sodium chloride flush 0.9 % injection 5-40 mL  5-40 mL IntraVENous PRN Jerald Cee MD        0.9 % sodium chloride infusion  25 mL IntraVENous PRN Jerald Cee MD        ceFAZolin (ANCEF) 3000 mg in dextrose 5 % 100 mL IVPB  3,000 mg IntraVENous On Call to [address] [place] [address] [place], MD           Allergies:     Allergies   Allergen Reactions    Codeine Hives and Swelling    Cortisone Swelling     Apparently tolerates topical and IV with benadryl well    Influenza Virus Vaccine Other (See Comments)     Pt states could not walk after getting    [address] 23 [Propoxyphene N-Acetaminophen] Nausea And Vomiting    Sulfa Antibiotics Nausea And Vomiting and Swelling     Not throat swelling       Problem List:    Patient Active Problem List   Diagnosis Code    ACS (acute coronary syndrome) (HCC) I24.9    Chest pain with moderate risk for cardiac etiology R07.9    Essential hypertension I10    HLD (hyperlipidemia) E78.5    Tobacco abuse Z72.0    History of MI (myocardial infarction) I25.2    Acquired hypothyroidism E03.9    Unstable angina ([address] I20.0    Coronary artery disease involving native coronary artery of native heart without angina pectoris I25.10    Paroxysmal atrial fibrillation (HCC) I48.0    Gastroesophageal reflux disease without esophagitis K21.9    Malignant neoplasm of lower-inner quadrant of left breast in female, estrogen receptor negative ([address] C50.312, Z17.1       Past Medical History:        Diagnosis Date    Afib ([address]     Baki    Arthritis     CAD (coronary artery disease)     GERD (gastroesophageal reflux disease)     Hyperlipidemia     Hypertension     Hypothyroid     Invasive ductal carcinoma of breast, female, left ([address] 02/24/2022    Malignant neoplasm of lower-inner quadrant of left breast in female, estrogen receptor negative ([address] 03/07/2022    Pneumonia        Past Surgical History:        Procedure Laterality Date    CARDIAC CATHETERIZATION      CHOLECYSTECTOMY  1992    CORONARY ANGIOPLASTY      KNEE ARTHROSCOPY Right     08    JENNIFER BIOPSY LYMPH NODE BY NEEDLE SUPERFICIAL  02/24/2022    JENNIFER BIOPSY LYMPH NODE BY NEEDLE SUPERFICIAL 2/24/2022 Maureen Ojeda MD [facility]    JENNIFER US GUID NDL BIOPSY LEFT Left 02/24/2022    JENNIFER US GUID NDL BIOPSY LEFT 2/24/2022 Maureen Ojeda MD [facility]    MOUTH SURGERY      CA BIOPSY OF BREAST, INCISIONAL Left 1998    excisional bx-H. Chollet fibrocystic changes    US BREAST BIOPSY NEEDLE ADDITIONAL LEFT Left 02/24/2022    US BREAST BIOPSY NEEDLE ADDITIONAL LEFT 2/24/2022 Maureen Ojeda MD [facility]       Social History:    Social History     Tobacco Use    Smoking status: Current Every Day Smoker     Packs/day: 0.50     Years: 30.00     Pack years: 15.00     Types: Cigarettes     Start date: 12/4/1987    Smokeless tobacco: Never Used   Substance Use Topics    Alcohol use: Yes     Comment: rare                                Ready to quit: Not Answered  Counseling given: Not Answered      Vital Signs (Current):   Vitals:    03/22/22 1115   BP: 107/70   Pulse: 67   Resp: 18   Temp: 97.6 °F (36.4 °C)   TempSrc: Temporal   SpO2: 97%   Weight: (!) 311 lb 12.8 oz (141.4 kg)   Height: 6' (1.829 m)                                              BP Readings from Last 3 Encounters:   03/22/22 107/70   03/07/22 128/62   07/15/21 128/82       NPO Status: Time of last liquid consumption: 2145                        Time of last solid consumption: 2145                        Date of last liquid consumption: 03/21/22                        Date of last solid food consumption: 03/21/22    BMI:   Wt Readings from Last 3 Encounters:   03/22/22 (!) 311 lb 12.8 oz (141.4 kg)   03/07/22 (!) 318 lb 3.2 oz (144.3 kg)   07/15/21 (!) 313 lb 9.6 oz (142.2 kg)     Body mass index is 42.29 kg/m².     CBC:   Lab Results   Component Value Date    WBC 8.8 12/21/2021    RBC 4.91 12/21/2021    HGB 15.2 12/21/2021    HCT 47.3 12/21/2021    MCV 96.3 12/21/2021    RDW 12.8 11/11/2014     12/21/2021       CMP:   Lab Results   Component Value Date     12/21/2021    K 4.2 12/21/2021    K 3.8 12/04/2019     12/21/2021    CO2 24 12/21/2021    BUN 12 12/21/2021    CREATININE 0.5 12/21/2021    LABGLOM >90 12/21/2021    GLUCOSE 105 12/21/2021    PROT 7.2 12/21/2021    CALCIUM 9.1 12/21/2021    BILITOT 0.6 12/21/2021    ALKPHOS 115 12/21/2021    AST 18 12/21/2021    ALT 23 12/21/2021       POC Tests: No results for input(s): POCGLU, POCNA, POCK, POCCL, POCBUN, POCHEMO, POCHCT in the last 72 hours. Coags:   Lab Results   Component Value Date    INR 1.01 12/04/2019    APTT 31.2 12/04/2019       HCG (If Applicable):   Lab Results   Component Value Date    PREGSERUM NEGATIVE 11/10/2014        ABGs: No results found for: PHART, PO2ART, HRF9HCJ, DBI2WEW, BEART, N4PLIQWA     Type & Screen (If Applicable):  Lab Results   Component Value Date    LABRH POS 11/10/2014       Drug/Infectious Status (If Applicable):  No results found for: HIV, HEPCAB    COVID-19 Screening (If Applicable): No results found for: COVID19        Anesthesia Evaluation  Patient summary reviewed  Airway: Mallampati: II  TM distance: >3 FB   Neck ROM: full  Mouth opening: > = 3 FB Dental:          Pulmonary:                              Cardiovascular:    (+) hypertension:, angina:, CAD:,       ECG reviewed                        Neuro/Psych:               GI/Hepatic/Renal:   (+) GERD:, morbid obesity          Endo/Other:    (+) hypothyroidism::., .                 Abdominal:             Vascular: Other Findings:             Anesthesia Plan      general     ASA 3       Induction: intravenous. MIPS: Postoperative opioids intended and Prophylactic antiemetics administered. Anesthetic plan and risks discussed with patient and spouse.       Plan discussed with Clemente MARTINEZ  [address] [place] [address],    3/22/2022

## 2022-03-22 NOTE — PROGRESS NOTES
1540- pt received to pacu, resp easy, unlabored, vss. Pt appears to be in no acute distress. 1550-pt remains in bed, eyes closed, reports unsure if pain when asked, falls back to sleep quickly    1558- pt wakes, reports being cold, in pain, medicated per orders, warm blankets given. 1608-pt reports pain improving, resting in bed, eyes closed. resp easy, unlabored.     1625- pt meets criteria for discharge from pacu, returned to [facility], report given to "[employer]

## 2022-03-22 NOTE — PROGRESS NOTES

## 2022-03-22 NOTE — OP NOTE
[address] [address]  Operative Report    PATIENT NAME: Kendy Kirkland  MEDICAL RECORD NO. [id number]  SURGEON: Elizabeth Roland MD   Primary Care Physician: Prabhu Combs  Date: 3/22/2022, 3:41 PM     PROCEDURE PERFORMED: 1. Left partial mastectomy with preoperative needle localization 2. Left axillary sentinel lymph node biopsy with sentinel lymph node mapping with technetium sulfur colloid  PREOPERATIVE DIAGNOSIS:   Active Hospital Problems    Diagnosis Date Noted    Malignant neoplasm of central portion of left breast in female, estrogen receptor negative ([facility] [address] [C50.112, Z17.1]    Clinical stage Ib  POSTOPERATIVE DIAGNOSIS: Same, path pending  SURGEON:  Elizabeth Roland MD   ANESTHESIA:  General endotracheal anesthesia and local  ANESTHESIA:  20 OF 0.5% Marcaine and 1% xylocaine in equal parts  ESTIMATED BLOOD LOSS:  30  ml  SPECIMEN: Lumpectomy specimen and left axillary sentinel nodes 2 pathology via [facility]'s [facility] center for specimen radiograph  COMPLICATIONS:  None; patient tolerated the procedure well. DRAINS: none  DISPOSITION: Recovery Room  CONDITION: stable  Indications: Jah Eli presented with palpable changes in the periareolar left breast.  Diagnostic imaging was followed by ultrasound and ultrasound-guided biopsy. On mammography she had a hypoechoic lobulated mass adjacent to the nipple maximum dimension 1.9 cm in size. She had an enlarged appearing axillary lymph node with cortical thickening with retained fatty hilum which was biopsied and was benign. Pathology revealed an invasive ductal carcinoma which was ER negative [place] negative and HER-2 negative. Surgery was felt to be the best first step in her treatment. She is aware she will be recommended for postoperative chemotherapy as well as radiation therapy. She did undergo genetic testing which was negative for deleterious mutation.   Patient had injection with technetium sulfur colloid as well as needle localization of the mass and previously biopsied lymph node prior to procedure. Procedure:      Robbie Mckinnon was brought to the operating suite and placed supine on the operating table. She had pneumatic sequential compression devices on the lower extremities. After induction of general anesthesia she was given 3 g of Ancef. Left breast and wires were prepped and draped. Timeout was performed. Utilizing the neoprobe there were high counts within the left axilla. Incision was made in the axilla dissection was carried down through the clavipectoral fascia the wire was delivered into the wound. For sentinel node was encountered it was a level 1 known ex vivo counts were about 46. There were additional nodes behind this adjacent to the wire. All this tissue was sent. On imaging the clip appeared away from the node. Clip was not removed with the specimen but the wire and nodes were. Axillary wound was irrigated hemostasis was achieved with electrocautery. Dermis was closed with interrupted 3-0 Vicryl suture. Skin was closed with running subcuticular 4-0 Monocryl suture. Skin glue was applied at the end of the case. Attention was then turned toward the lumpectomy. A periareolar incision at the inferior aspect of the left nipple was made. The lesion was below the nipple but did not seem to clinically involve the nipple. The nipple was elevated up off the mass. Lumpectomy surrounding the wire and lesion was performed using sharp dissection. Hemostasis was achieved with electrocautery. The lumpectomy specimen was oriented with margin map markers. Wound was irrigated. Specimen radiograph revealed mass and wire within the specimen. Dermis was closed with interrupted 3-0 Vicryl suture and skin was closed with running subcuticular 4-0 Monocryl suture. Skin glue was applied. Sponge sharp instrument counts were correct. Patient was spontaneously breathing, extubated and transported to the recovery area.     Synoptic SHERIF Schoenchen Lymph Node Biopsy for Breast Cancer  Element Response   Operation performed with curative intent Yes   Tracer(s) used to identify sentinel nodes in the upfront surgery (non-neoadjuvant) setting (select all that apply) Radioactive tracer   Tracer(s) used to identify sentinel nodes in the neoadjuvant setting (select all that apply) Not Applicable   All nodes (colored nodes or non colored) present at the end of a dye-filled lymphatic channel were removed. Not Applicable   All significantly radioactive nodes were removed. Yes   All palpable suspicious nodes were removed Yes   Biopsy-proven positive nodes marked with clips prior to chemotherapy were identified and removed.   Not Applicable

## 2022-03-22 NOTE — PROGRESS NOTES
ADMITTED TO [facility] AND ORIENTED TO UNIT. SCDS ON. FALL AND ALLERGY BANDS ON. PT VERBALIZED APPROVAL FOR FIRST NAME, LAST INITIAL AND PHYSICIAN NAME ON UNIT WHITEBOARD. BoyfriendTaz with the patient.

## 2022-03-22 NOTE — H&P
[facility]  History and Physical Update    Pt Name: Natali Mallory  MRN: [id number]  YOB: 1970  Date of evaluation: 3/22/2022    [x] I have examined the patient and reviewed the H&P/Consult and there are no changes to the patient or plans. Genetic testing negative    [] I have examined the patient and reviewed the H&P/Consult and have noted the following changes:        Bong Martinez MD MD  Electronically signed 3/22/2022 at 2:10 PM    Bong Martinez MD   General Surgery  New Patient Evaluation in Office  Pt Name: Natali Mallory  Date of Birth 1970   Today's Date: 3/7/2022  Medical Record Number: [id number]  Referring Provider:RODRIGO Freedman  Primary Care Provider: Shaniqua Dc  Chief Complaint:       Chief Complaint   Patient presents with    Surgical Consult       New patient-referred by [facility] breast invasive ductal carcinoma         ASSESSMENT   1. Malignant neoplasm of lower-inner quadrant of left breast in female, estrogen receptor negative ([address]    2. Essential hypertension    3. Tobacco abuse    4. History of MI (myocardial infarction)    5. Acquired hypothyroidism    6. Coronary artery disease involving native coronary artery of native heart without angina pectoris    7. Paroxysmal atrial fibrillation (HCC)    8. Gastroesophageal reflux disease without esophagitis    9. Tobacco dependence  PLANS   1. Genetic testing and counseling  2. Lengthy discussion regarding surgical treatment of triple negative breast cancer. Patient also presented at multidisciplinary breast clinic for discussion. If patient test negative for deleterious mutation she would opt towards breast conservation therapy with localization lumpectomy sentinel node biopsy as well as removal of the previously biopsied lymph node. Alternatively if she does test positive for deleterious mutation, she would likely opt for bilateral mastectomy with removal of lymph nodes.   Discussed reconstruction as well.  3.  Postoperative oncology consultations as appropriate  4. Preoperative testing per anesthesia guidelines  5. Cardiology risk assessment. Patient to hold Eliquis 48 hours prior to procedure  6. Surgical risk discussed at length and include but not limited to bleeding, infection as well as need for reexcision, lymphedema as well as potential for recurrence of cancer  7. General anesthesia    8. 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   Kiel Gabriel had a prior biopsy of the left breast in 1998 which showed benign fibrocystic changes. She presented with palpable changes in the periareolar area of the left breast at about the 9 o'clock position. She underwent diagnostic imaging followed by ultrasound and ultrasound-guided biopsies. On mammography she had a hypoechoic lobulated mass inner central left breast 1 cm from the nipple which measured 1.9 x 1.4 x 1.6 cm correlating with mammographic finding. There is a small hypoechoic nodular lesion left breast 12 o'clock position about 3 mm in size. She had an enlarged left axillary lymph node with cortical thickening of 4.9 mm with retained fatty hilum. Ultrasound-guided biopsies of all 3 areas were performed. Pathology of the small lesion 12:00 U-Clip fibroglandular breast tissue with stromal fibrosis the mass at 9:00 was marked by a barbell clip showed invasive ductal carcinoma [place] grade 3 triple negative. Left axillary lymph node was negative for malignancy. Patient on examination today has questionable abnormal mass medial aspect left breast with biopsy changes and tenderness on examination. There is no clinical palpable enlarged lymph nodes. Right breast examination is benign.   She is undergoing genetic counseling and testing with family history and triple negative cancer. She is on Eliquis for paroxysmal atrial fibrillation and follows with Dr. Dalia Callejas.     Menarche age 17.  G1, P3. Had her child at 27. She did take oral contraceptive medications for many years. Menopause age 41. No history of estrogen replacement therapy. Family history paternal grandmother history of breast ovarian and uterine cancer. Paternal aunt history of breast cancer, maternal aunt history of breast cancer, mother history of liposarcoma.           Past Medical History  Past Medical History        Past Medical History:   Diagnosis Date    Afib [facility])       Baki    Arthritis      CAD (coronary artery disease)      GERD (gastroesophageal reflux disease)      Hyperlipidemia      Hypertension      Hypothyroid      Invasive ductal carcinoma of breast, female, left ([facility] 02/24/2022    Malignant neoplasm of lower-inner quadrant of left breast in female, estrogen receptor [facility] 03/07/2022    Pneumonia            Past Surgical History  Past Surgical History         Past Surgical History:   Procedure Laterality Date    CARDIAC CATHETERIZATION        CHOLECYSTECTOMY   1992    CORONARY ANGIOPLASTY        KNEE ARTHROSCOPY Right       08    JENNIFER BIOPSY LYMPH NODE BY NEEDLE SUPERFICIAL   02/24/2022     JENNIFER BIOPSY LYMPH NODE BY NEEDLE SUPERFICIAL 2/24/2022 Edgar Yanez MD [facility] US GUID NDL BIOPSY LEFT Left 02/24/2022     JENNIFER US GUID NDL BIOPSY LEFT 2/24/2022 Edgar Yanez MD [facility]    MOUTH SURGERY        SD BIOPSY OF BREAST, INCISIONAL Left 1998     excisional bx-H. Chollet fibrocystic changes    US BREAST BIOPSY NEEDLE ADDITIONAL LEFT Left 02/24/2022     US BREAST BIOPSY NEEDLE ADDITIONAL LEFT 2/24/2022 Edgar Yanez MD [facility]          Medications  Current Facility-Administered Medications          Current Outpatient Medications   Medication Sig Dispense Refill    metoprolol succinate (TOPROL XL) 100 MG extended release tablet TAKE 1 TABLET BY MOUTH EVERY DAY 90 tablet 1    atorvastatin (LIPITOR) 40 MG tablet TAKE 1 TABLET DAILY 90 tablet 3    ELIQUIS 5 MG TABS tablet TAKE 1 TABLET BY MOUTH TWICE A  tablet 3    flecainide (TAMBOCOR) 100 MG tablet TAKE 1 TABLET BY MOUTH TWICE A  tablet 3    metoprolol succinate (TOPROL XL) 50 MG extended release tablet Take 2 tablets by mouth daily 30 tablet 0    levothyroxine (SYNTHROID) 125 MCG tablet TAKE 1 TABLET BY MOUTH EVERY DAY IN THE MORNING ON EMPTY STOMACH        ZINC PO Take by mouth daily        Ascorbic Acid (VITAMIN C PO) Take by mouth daily        ibuprofen (ADVIL;MOTRIN) 800 MG tablet Take 800 mg by mouth every 6 hours as needed for Pain        omeprazole (PRILOSEC) 10 MG delayed release capsule Take 10 mg by mouth daily        nitroGLYCERIN (NITROSTAT) 0.4 MG SL tablet Place 1 tablet under the tongue every 5 minutes as needed for Chest pain 25 tablet 3    aspirin 81 MG chewable tablet Take 1 tablet by mouth daily.  30 tablet 3      No current facility-administered medications for this visit.         Allergies           Allergies   Allergen Reactions    Codeine Hives and Swelling    Cortisone Swelling       Apparently tolerates topical and IV with benadryl well    Influenza Virus Vaccine Other (See Comments)       Pt states could not walk after getting     Darvocet A500 [Propoxyphene N-Acetaminophen] Nausea And Vomiting    Sulfa Antibiotics Nausea And Vomiting and Swelling       Not throat swelling       Family History  Family History         Family History   Problem Relation Age of Onset    Heart Disease Mother      Diabetes Mother      Cancer Mother           liposarcoma    Kidney Disease Mother      Heart Disease Father      Diabetes Father      Kidney Disease Father      Cancer Father           Non-melanoma skin cancer multiple times    No Known Problems Brother      No Known Problems Brother      No Known Problems Brother      No Known Problems Maternal Grandmother      No Known Problems Maternal Grandfather      Breast Cancer Paternal Grandmother 48         reoccurred at 46    Ovarian Cancer Paternal Grandmother 46    Bilateral breast cancer Paternal Grandmother           Opposite breast, age 52's    Breast Cancer Maternal Aunt 48    Breast Cancer Paternal Aunt           breast          SocialHistory  Social History               Socioeconomic History    Marital status:        Spouse name: Not on file    Number of children: 1    Years of education: Not on file    Highest education level: Not on file   Occupational History    Occupation: LPN   Tobacco Use    Smoking status: Current Every Day Smoker       Packs/day: 0.50       Years: 30.00       Pack years: 15.00       Types: Cigarettes       Start date: 12/4/1987    Smokeless tobacco: Never Used   Vaping Use    Vaping Use: Never used   Substance and Sexual Activity    Alcohol use: Yes       Comment: rare    Drug use: No    Sexual activity: Not on file       Comment: boy friend   Other Topics Concern    Not on file   Social History Narrative    Not on file      Social Determinants of Health          Financial Resource Strain:     Difficulty of Paying Living Expenses: Not on file   Food Insecurity:     Worried About Running Out of Food in the Last Year: Not on file    Gaurav of Food in the Last Year: Not on file   Transportation Needs:     Lack of Transportation (Medical): Not on file    Lack of Transportation (Non-Medical):  Not on file   Physical Activity:     Days of Exercise per Week: Not on file    Minutes of Exercise per Session: Not on file   Stress:     Feeling of Stress : Not on file   Social Connections:     Frequency of Communication with Friends and Family: Not on file    Frequency of Social Gatherings with Friends and Family: Not on file    Attends Christianity Services: Not on file    Active Member of Clubs or Organizations: Not on file    Attends Club or Organization Meetings: Not on file    Marital Status: Not on file   Intimate Partner Violence:     Fear of Current or Ex-Partner: Not on file    Emotionally Abused: Not on file    Physically Abused: Not on file    Sexually Abused: Not on file   Housing Stability:     Unable to Pay for Housing in the Last Year: Not on file    Number of Jeaniemocodey in the Last Year: Not on file    Unstable Housing in the Last Year: Not on file              Review of Systems  Review of Systems   Constitutional: Negative for chills, fatigue, fever and unexpected weight change. HENT: Negative for sore throat, trouble swallowing and voice change. Eyes: Negative for visual disturbance. Respiratory: Negative for cough, chest tightness, shortness of breath and wheezing. Cardiovascular: Negative for chest pain and palpitations. Gastrointestinal: Negative for abdominal pain, blood in stool, nausea and vomiting. Endocrine: Negative for cold intolerance, heat intolerance and polydipsia. Genitourinary: Negative for dysuria, flank pain and hematuria. Musculoskeletal: Negative for gait problem, joint swelling and myalgias. Skin: Negative for color change and rash. Allergic/Immunologic: Negative for immunocompromised state. Neurological: Negative for dizziness, tremors, light-headedness and headaches. Hematological: Does not bruise/bleed easily. Psychiatric/Behavioral: Negative for behavioral problems and confusion.         OBJECTIVE      /62 (Site: Right Upper Arm, Position: Sitting, Cuff Size: Medium Adult)   Pulse 62   Temp 96.2 °F (35.7 °C) (Axillary)   Resp 18   Ht 6' (1.829 m)   Wt (!) 318 lb 3.2 oz (144.3 kg)   SpO2 98%   BMI 43.16 kg/m²       Physical Exam  Constitutional:       Appearance: Normal appearance. She is well-developed. She is obese. She is not ill-appearing or diaphoretic. HENT:      Head: Normocephalic and atraumatic.    Eyes:      General: No scleral icterus. Extraocular Movements: Extraocular movements intact. Pupils: Pupils are equal, round, and reactive to light. Comments: glasses   Neck:      Vascular: No JVD. Trachea: No tracheal deviation. Cardiovascular:      Rate and Rhythm: Normal rate. Heart sounds: Normal heart sounds. No murmur heard.       Pulmonary:      Effort: Pulmonary effort is normal. No respiratory distress. Breath sounds: No wheezing or rales. Chest:      Chest wall: No tenderness. Breasts:      Right: No swelling, bleeding, inverted nipple, mass, nipple discharge, skin change, tenderness, axillary adenopathy or supraclavicular adenopathy. Left: Tenderness present. No swelling, bleeding, inverted nipple, mass, nipple discharge, skin change, axillary adenopathy or supraclavicular adenopathy.          Abdominal:      General: There is no distension. Palpations: There is no mass. Tenderness: There is no abdominal tenderness. Musculoskeletal:         General: No deformity. Right lower leg: No edema. Left lower leg: No edema. Lymphadenopathy:      Cervical: No cervical adenopathy. Right cervical: No superficial, deep or posterior cervical adenopathy. Left cervical: No superficial, deep or posterior cervical adenopathy. Upper Body:      Right upper body: No supraclavicular, axillary or pectoral adenopathy. Left upper body: No supraclavicular, axillary or pectoral adenopathy. Skin:     General: Skin is warm and dry. Coloration: Skin is not jaundiced. Findings: No rash. Neurological:      General: No focal deficit present. Mental Status: She is alert and oriented to person, place, and time. Cranial Nerves: No cranial nerve deficit. Psychiatric:         Behavior: Behavior normal.         Thought Content:  Thought content normal.                  Lab Results   Component Value Date     WBC 8.8 12/21/2021     HGB 15.2 12/21/2021     HCT 47.3 (H) 2021      2021     CHOL 167 2021     TRIG 174 2021     HDL 35 2021     ALT 23 2021     AST 18 2021      2021     K 4.2 2021      2021     CREATININE 0.5 2021     BUN 12 2021     CO2 24 2021     TSH 4.690 (H) 2021     INR 1.01 2019     LABA1C 5.5 2019      Performed by: [id number] Pathology     Rogene Favre             [id number]   Assoc.                                              Page 1 of 1   [address]   [facility], [place] [address]                                                       PROC: 2022   [facility]                                    RECV: 2022   [address]                                    RPTD: 2022   [facility], [place] [address]                       MRN:  [id number]     LOC: WW                       ACCT: [de-identified]  SEX: F                       : 1970  AGE: 51 Y                          PATHOLOGY REPORT                       ATTN: ADAM BESS                       REQ: Agustín Arcos       Copies To:   SUSANNAH BERRY; Ketty Telles; CARLOS MENDENHALL       Clinical Information: LT BREAST MASS 12:00, 9:00, LT ENLARGED AXILLARY   LYMPH NODE     ADDENDUM REPORT 2022     FINAL DIAGNOSIS:   A.  Left breast mass, 12:00, U-Clip, biopsy:             Fibroglandular breast tissue with stromal fibrosis. B.  Left breast mass, 9:00, barbell clip, biopsy:             Invasive ductal carcinoma, [place] grade 3. C.  Left axillary lymph node, [place] clip, biopsy:    Fragments of lymph node, negative for malignancy.      BREAST BIOMARKERS*   Estrogen Receptor: (Clone SP1), [employer]       Negative (<1% of cells staining)     Progesterone Receptor: (Clone 1E2), [employer]       Negative (<1% of cells staining)     Ki-67 (clone 30-9)       Percentage of positive nuclei:  95%               Favorable <10%               Borderline 10-20%               Unfavorable >20%        2018          Inform HER2 Dual CHEPE         HER2 IHCClone 4B5      ASCO/CAP      [employer]      [facility]      HER2 dual                                  Systems Additional      CHEPE Group #                                Workup   (  Group 4:      HER2/CEP17 Ratio <2.0 and    HER2 NEGATIVE, HER2   X                >=.0 and <6.0 HER2           IHC is 0-1+. See Group   )                signals/cell                  4 comment.                      Number of observers: 3                    (PCF/MTK/TERRIE)                    Number of invasive tumor                    cells counted: 20                    Using dual probe assay:                       Average number of HER2                    signals per cell:  4.3                                 Average number                     of CEP17 signals per cell:                      3.4                     HER2/CEP17 ratio:  1.3     Group 4 comment: It is uncertain whether patients with an average of >=   4.0 and < 6.0 HER2 signals per cell and a HER2/CEP17 ratio of < 2.0   benefit from HER2 targeted therapy in the absence of protein   overexpression (IHC 3+). If the specimen test result is close to the   CHEPE ratio threshold for positive, there is a high likelihood that   repeat testing will result in different results by chance alone. Therefore, when [facility] results are not 3+ positive, it is recommended that   the sample be considered HER2 negative without additional testing on   the same specimen.      External Controls:  Adequate   Internal Controls:  Adequate   Standard Assay Conditions:  Met     Staining Method Used:    Formalin fixation    Antigen retrieval type:  Cell Conditioning 1, mild merle    Time in antigen retrieval:  30 minutes    Detection system type:   DAB Ultraview kit       Specimen:   A) CORE NEEDLE BREAST BIOPSY, LT MASS 12:00 U CLIP   B) CORE NEEDLE BREAST BIOPSY, LT MASS 9:00 ED   C) CORE NEEDLE BREAST BIOPSY, LT AXILLARY LYMPH NODE TRIBELL       Gross Examination:   A - The container is labeled Vladislav Hay, left breast mass, 12   o'clock, U-clip.  Received in formalin are several cylindrical and   fragmented bits of white tissue aggregating to 0.9 x 0.6 x 0.1 cm.  1   ns. Fixative:  10% neutral buffered formalin   Tissue removal time:  08:31   Tissue fixation time:  08:32   Total fixation time: 11 hours 28 minutes     B - The container is labeled Vladislav Hay, left breast mass, 9   o'clock, barbell.  Received in formalin are five cylindrical fragments   of yellow-white tissue, each about 0.1 cm in diameter and varying in   length from 0.3 cm up to 1.8 cm.  1 ns. Fixative:  10% neutral buffered formalin   Tissue removal time:  08:34   Tissue fixation time:  08:35   Total fixation time: 11 hours 25 minutes     C - The container is labeled Vladislav Armstrong, left enlarged axillary   lymph node.  Received in formalin are four cylindrical fragments of tan   tissue, each 0.1 cm in diameter and varying in length from 0.4 cm up to   about 0.8 cm.  1 ns.  PCF/DKR:[id number]     Fixative:  10% neutral buffered formalin   Tissue removal time:  08:55   Tissue fixation time:  08:56   Total fixation time: 11 hours 4 minutes     Microscopic Examination:   A.  Sections show fibroglandular breast tissue with stromal fibrosis. There is no evidence of malignancy. B.  Procedure: Needle biopsy   Specimen laterality: Left   Tumor site: 9:00   Histologic type: Invasive ductal carcinoma     Rekha histologic score   Glandular/tubular differentiation: Score 3   Nuclear pleomorphism: Score 3   Mitotic rate: Score 3   Overall grade: Grade 3     Tumor size: At least 9 mm   Ductal carcinoma in situ: Not identified   Breast biomarker studies: Pending     C.  Sections show multiple fragments of benign lymph node.      52463 x 3   [id number]   [id number]                                                      Tian Perdomo M.D., F.C. A. P       NVML/ [id number] [address]  Printed on:  2/28/2022   Jesusita Wong [address]   [address] [facility], [address]   Original print date: 02/28/2022             LOCATION: [place]       PROCEDURE: [facility] MICHAEL DIGITAL DIAGNOSTIC BILATERAL, US BREAST LIMITED LEFT       CLINICAL INFORMATION: Subareolar mass of left breast . Tomosynthesis. Meets criteria for genetic evaluation and has been offered information for possible referral.           PATIENT MEDICAL HISTORY: No relevant medical history has been documented for this patient.       FAMILY HISTORY: Family medical history includes breast cancer in paternal grandmother and ovarian cancer in paternal grandmother.       RISK VALUES: Jeffrey Luz.: 4.2%, Reginald enriquez: 15.3%       COMPARISON: 6/1/2015       [facility] MICHAEL DIGITAL DIAGNOSTIC BILATERAL:       TECHNIQUE: Bilateral CC and MLO views were obtained each breast. Tomosynthesis was used. CAD was used.        BREAST COMPOSITION: The tissue of the breast(s) is heterogeneously dense. This may lower the sensitivity of mammography.       FINDINGS:        There is a nodular density within the inner central left breast anterior depth which correlates to the palpable abnormality. Further evaluation with targeted ultrasound is reported below.       The additional palpable abnormality within the periareolar region of the left breast does not demonstrate 8 definite mammographic correlate. However, further evaluation with targeted ultrasound is reported below.           US BREAST LIMITED LEFT:       TECHNIQUE: Targeted ultrasound of the left breast was performed. Grayscale images and color images of the real-time examination were reviewed.  The axilla was also imaged.       FINDINGS:        There is a hypoechoic lobulated mass within the inner central left breast near the 9:00 position 1 cm from the nipple measuring 1.9 x 1.4 x 1.6 cm. This correlates with the mammographic finding and the palpable abnormality. Recommend ultrasound-guided    biopsy.       There is a small hypoechoic nodular lesion within the upper central left breast 12:00 position 3 cm from the nipple measuring 0.3 x 0.2 x 0.3 cm. This is incidental. However, recommend ultrasound-guided biopsy.       There is an enlarged lymph node demonstrated within the left axilla was cortical thickening measuring 4.9 mm. There is retained fatty hilum. Gilbert Spatz is an additional prominent lymph node within the left axilla with slightly less cortical thickening    measuring 4 mm. Recommend ultrasound-guided biopsy of the largest left axillary lymph node.               Impression   1. There is a hypoechoic lobulated mass within the inner central left breast near the 9:00 position 1 cm from the nipple measuring 1.9 x 1.4 x 1.6 cm. This correlates with the mammographic finding and the palpable abnormality. Recommend ultrasound-guided    biopsy.       2. There is a small hypoechoic nodular lesion within the upper central left breast 12:00 position 3 cm from the nipple measuring 0.3 x 0.2 x 0.3 cm. This is incidental. However, recommend ultrasound-guided biopsy.       3. There is an enlarged lymph node demonstrated within the left axilla was cortical thickening measuring 4.9 mm. There is retained fatty hilum. Gilbert Spatz is an additional prominent lymph node within the left axilla with slightly less cortical thickening    measuring 4 mm. Recommend ultrasound-guided biopsy of the largest left axillary lymph node.       These results were discussed with the patient. The tech navigator was notified.  We will arrange scheduling the patient for her procedure per department protocol.       BI-RADS CATEGORY 4C - Suspicious abnormality - High suspicion for malignancy.       Management: Tissue diagnosis.  Biopsy should be performed in the absence of clinical contraindication.               **This report has been created using voice recognition software. It may contain minor errors which are inherent in voice recognition technology. **       Final report electronically signed by Dr. Jessica Chu on 2/18/2022 4:48 PM          [address] LEFT (Order [id number]) - Reflex for Order [id number]  Narrative   LOCATION: [place]       EXAM:     1. MAMMOGRAM POST BX CLIP PLACEMENT LEFT, JENNIFER US GUID NDL BIOPSY LEFT, JENNIFER NEEDLE BIOPSY LYMPH NODE SUPERFICIAL, US BREAST BIOPSY W LOC DEVICE EACH ADDL LESION LEFT       1. Biopsy of the left breast, percutaneous, using imaging guidance, 2 sites. 2. Biopsy of the left axilla, axillary lymph node, percutaneous, using imaging guidance. 3. Ultrasound guidance for biopsy, imaging supervision and interpretation. 4. Postprocedural diagnostic left mammogram.           HISTORY: 51 years, Female, Mass overlapping multiple quadrants of left breast, Lymph node enlargement. .       COMPARISON:  2/18/2022 and 6/1/2015.       TECHNIQUE/FINDINGS:        Written, informed consent was obtained, including discussion of the risks, benefits and alternatives. The patient's left breast was marked by the physician with initials. A timeout was performed including the patient's name, date of birth, procedure and    laterality.       Site 1, 3 mm nodular density, upper central left breast, 12:00, U clip:  An ultrasound-guided biopsy using real-time ultrasound was performed. The nodule in the upper central left breast was identified, localized , and the site was marked. With    ultrasound guidance from a medial approach, the area was prepped and draped in sterile fashion. 2 % lidocaine was used for local anesthesia. 14 ga Sertera coaxial needle was advanced under ultrasound guidance. Once the needle was documented to be in the    correct location, 4 samples were taken from the area of interest. Samples were placed in formalin sent to pathology.  A U shaped biopsy marker was placed at the biopsy site.       Site 2, 1.9 cm mass, 9:00, inner central left breast, barbell clip:  An ultrasound-guided biopsy using real-time ultrasound was performed. The mass in the inner central left breast was identified, localized , and the site was marked. With ultrasound    guidance from a medial approach, the area was prepped and draped in sterile fashion. 2 % lidocaine was used for local anesthesia. 14 ga Sertera coaxial needle was advanced under ultrasound guidance. Once the needle was documented to be in the correct    location, 4 samples were taken from the area of interest. Samples were placed in formalin sent to pathology. A barbell biopsy marker was placed at the biopsy site.       Site 3, left axillary lymph node, tribell clip: An ultrasound-guided biopsy using real-time ultrasound was performed. The lymph node in the left axilla was identified, localized , and the site was marked. With ultrasound guidance from a lateral approach,    the area was prepped and draped in sterile fashion. 2 % lidocaine was used for local anesthesia. 14 ga Sertera coaxial needle was advanced under ultrasound guidance. Once the needle was documented to be in the correct location, 4 samples were taken from    the area of interest. Samples were placed in formalin sent to pathology. A tribell biopsy marker was placed at the biopsy site.       Clip placement was confirmed with a left digital diagnostic mammogram. The mammogram was performed as a separate procedure in a separate room with a dedicated machine.       The patient tolerated the procedure well. No evidence of immediate complication. The patient was given post-procedure instructions, including wound care.           POSTPROCEDURE MAMMOGRAM:       Images of the left include a CC view and MLO view. Tomosynthesis. CAD was utilized.       There are scattered fibroglandular elements in the breast(s) that could obscure a lesion on mammography.  Post procedure mammograms were taken in a separate room. There is a U shaped biopsy clip at the biopsy site 1, 12:00, anterior depth. There is a    barbell clip in the inner central left breast, within the mammographic mass. This corresponds with the original mammographic density. There is a tribell clip in the left axilla.       There are no other significant findings in the breast.               Impression   1. Successful left breast ultrasound guided core needle biopsy, 2 sites. 2. Successful left axillary lymph node ultrasound guided core needle biopsy. 3. Successful marker clip placements with confirmation by digital diagnostic mammogram.            Waiting for pathology results. A final report will be issued when these become available.           BI-RADS Final Assessment Category: Waiting for pathology.       Management Recommendation: Assess radiologic/pathologic concordance.                   **This report has been created using voice recognition software. It may contain minor errors which are inherent in voice recognition technology. **       Final report electronically signed by Dr. Jihan Leon on 2/24/2022 10:00 AM          [facility] NEEDLE BIOPSY LYMPH NODE SUPERFICIAL (Order [id number]) - Reflex for Order [id number]  Narrative   LOCATION: [place]       EXAM:     1. MAMMOGRAM POST BX CLIP PLACEMENT LEFT, JENNIFER US GUID NDL BIOPSY LEFT, JENNIFER NEEDLE BIOPSY LYMPH NODE SUPERFICIAL, US BREAST BIOPSY W LOC DEVICE EACH ADDL LESION LEFT       1. Biopsy of the left breast, percutaneous, using imaging guidance, 2 sites. 2. Biopsy of the left axilla, axillary lymph node, percutaneous, using imaging guidance. 3. Ultrasound guidance for biopsy, imaging supervision and interpretation. 4. Postprocedural diagnostic left mammogram.           HISTORY: 51 years, Female, Mass overlapping multiple quadrants of left breast, Lymph node enlargement.  .       COMPARISON:  2/18/2022 and 6/1/2015.       TECHNIQUE/FINDINGS:        Written, informed consent was obtained, including discussion of the risks, benefits and alternatives. The patient's left breast was marked by the physician with initials. A timeout was performed including the patient's name, date of birth, procedure and    laterality.       Site 1, 3 mm nodular density, upper central left breast, 12:00, U clip:  An ultrasound-guided biopsy using real-time ultrasound was performed. The nodule in the upper central left breast was identified, localized , and the site was marked. With    ultrasound guidance from a medial approach, the area was prepped and draped in sterile fashion. 2 % lidocaine was used for local anesthesia. 14 ga Sertera coaxial needle was advanced under ultrasound guidance. Once the needle was documented to be in the    correct location, 4 samples were taken from the area of interest. Samples were placed in formalin sent to pathology. A U shaped biopsy marker was placed at the biopsy site.       Site 2, 1.9 cm mass, 9:00, inner central left breast, barbell clip:  An ultrasound-guided biopsy using real-time ultrasound was performed. The mass in the inner central left breast was identified, localized , and the site was marked. With ultrasound    guidance from a medial approach, the area was prepped and draped in sterile fashion. 2 % lidocaine was used for local anesthesia. 14 ga Sertera coaxial needle was advanced under ultrasound guidance. Once the needle was documented to be in the correct    location, 4 samples were taken from the area of interest. Samples were placed in formalin sent to pathology. A barbell biopsy marker was placed at the biopsy site.       Site 3, left axillary lymph node, tribell clip: An ultrasound-guided biopsy using real-time ultrasound was performed. The lymph node in the left axilla was identified, localized , and the site was marked. With ultrasound guidance from a lateral approach,    the area was prepped and draped in sterile fashion.  2 % lidocaine was used for local anesthesia. 14 ga Sertera coaxial needle was advanced under ultrasound guidance. Once the needle was documented to be in the correct location, 4 samples were taken from    the area of interest. Samples were placed in formalin sent to pathology. A tribell biopsy marker was placed at the biopsy site.       Clip placement was confirmed with a left digital diagnostic mammogram. The mammogram was performed as a separate procedure in a separate room with a dedicated machine.       The patient tolerated the procedure well. No evidence of immediate complication. The patient was given post-procedure instructions, including wound care.           POSTPROCEDURE MAMMOGRAM:       Images of the left include a CC view and MLO view. Tomosynthesis. CAD was utilized.       There are scattered fibroglandular elements in the breast(s) that could obscure a lesion on mammography. Post procedure mammograms were taken in a separate room. There is a U shaped biopsy clip at the biopsy site 1, 12:00, anterior depth. There is a    barbell clip in the inner central left breast, within the mammographic mass. This corresponds with the original mammographic density. There is a tribell clip in the left axilla.       There are no other significant findings in the breast.               Impression   1. Successful left breast ultrasound guided core needle biopsy, 2 sites. 2. Successful left axillary lymph node ultrasound guided core needle biopsy. 3. Successful marker clip placements with confirmation by digital diagnostic mammogram.            Waiting for pathology results. A final report will be issued when these become available.           BI-RADS Final Assessment Category: Waiting for pathology.       Management Recommendation: Assess radiologic/pathologic concordance.                   **This report has been created using voice recognition software. It may contain minor errors which are inherent in voice recognition technology. **       Final report electronically signed by Dr. Charlie Salazar on 2/24/2022 10:00 AM          [facility] [address] LEFT (Order [id number]) - Reflex for Order [id number]  Narrative   LOCATION: [place]       EXAM:     1. MAMMOGRAM POST BX CLIP PLACEMENT LEFT, JENNIFER US GUID NDL BIOPSY LEFT, JENNIFER NEEDLE BIOPSY LYMPH NODE SUPERFICIAL, US BREAST BIOPSY W LOC DEVICE EACH ADDL LESION LEFT       1. Biopsy of the left breast, percutaneous, using imaging guidance, 2 sites. 2. Biopsy of the left axilla, axillary lymph node, percutaneous, using imaging guidance. 3. Ultrasound guidance for biopsy, imaging supervision and interpretation. 4. Postprocedural diagnostic left mammogram.           HISTORY: 51 years, Female, Mass overlapping multiple quadrants of left breast, Lymph node enlargement. .       COMPARISON:  2/18/2022 and 6/1/2015.       TECHNIQUE/FINDINGS:        Written, informed consent was obtained, including discussion of the risks, benefits and alternatives. The patient's left breast was marked by the physician with initials. A timeout was performed including the patient's name, date of birth, procedure and    laterality.       Site 1, 3 mm nodular density, upper central left breast, 12:00, U clip:  An ultrasound-guided biopsy using real-time ultrasound was performed. The nodule in the upper central left breast was identified, localized , and the site was marked. With    ultrasound guidance from a medial approach, the area was prepped and draped in sterile fashion. 2 % lidocaine was used for local anesthesia. 14 ga Sertera coaxial needle was advanced under ultrasound guidance. Once the needle was documented to be in the    correct location, 4 samples were taken from the area of interest. Samples were placed in formalin sent to pathology.  A U shaped biopsy marker was placed at the biopsy site.       Site 2, 1.9 cm mass, 9:00, inner central left breast, barbell clip:  An ultrasound-guided biopsy using real-time ultrasound was performed. The mass in the inner central left breast was identified, localized , and the site was marked. With ultrasound    guidance from a medial approach, the area was prepped and draped in sterile fashion. 2 % lidocaine was used for local anesthesia. 14 ga Sertera coaxial needle was advanced under ultrasound guidance. Once the needle was documented to be in the correct    location, 4 samples were taken from the area of interest. Samples were placed in formalin sent to pathology. A barbell biopsy marker was placed at the biopsy site.       Site 3, left axillary lymph node, tribell clip: An ultrasound-guided biopsy using real-time ultrasound was performed. The lymph node in the left axilla was identified, localized , and the site was marked. With ultrasound guidance from a lateral approach,    the area was prepped and draped in sterile fashion. 2 % lidocaine was used for local anesthesia. 14 ga Sertera coaxial needle was advanced under ultrasound guidance. Once the needle was documented to be in the correct location, 4 samples were taken from    the area of interest. Samples were placed in formalin sent to pathology. A tribell biopsy marker was placed at the biopsy site.       Clip placement was confirmed with a left digital diagnostic mammogram. The mammogram was performed as a separate procedure in a separate room with a dedicated machine.       The patient tolerated the procedure well. No evidence of immediate complication. The patient was given post-procedure instructions, including wound care.           POSTPROCEDURE MAMMOGRAM:       Images of the left include a CC view and MLO view. Tomosynthesis. CAD was utilized.       There are scattered fibroglandular elements in the breast(s) that could obscure a lesion on mammography. Post procedure mammograms were taken in a separate room. There is a U shaped biopsy clip at the biopsy site 1, 12:00, anterior depth.  There is a    barbell clip in the inner central left breast, within the mammographic mass. This corresponds with the original mammographic density. There is a tribell clip in the left axilla.       There are no other significant findings in the breast.               Impression   1. Successful left breast ultrasound guided core needle biopsy, 2 sites. 2. Successful left axillary lymph node ultrasound guided core needle biopsy. 3. Successful marker clip placements with confirmation by digital diagnostic mammogram.            Waiting for pathology results. A final report will be issued when these become available.           BI-RADS Final Assessment Category: Waiting for pathology.       Management Recommendation: Assess radiologic/pathologic concordance.                   **This report has been created using voice recognition software. It may contain minor errors which are inherent in voice recognition technology. **       Final report electronically signed by Dr. Mikayla Irvin on 2/24/2022 10:00 AM          Order History

## 2022-03-22 NOTE — BRIEF OP NOTE
Brief Postoperative Note      Patient: Willie Rahman  YOB: 1970  MRN: [id number]    Date of Procedure: 3/22/2022    Pre-Op Diagnosis: INVASIVE DUCTAL CARCINOMA LEFT BREAST    Post-Op Diagnosis: Same       Procedure(s):  LEFT BREAST LUMPECTOMY PARTIAL MASTECTOMY, SENTINEL LYMPH NODE BIOPSY, PRE OP NEEDLE LOC X 2    Surgeon(s):  Claudetta Ra, MD    Assistant:  First Assistant: Eva Gillette RN    Anesthesia: General    Estimated Blood Loss (mL): Minimal    Complications: None    Specimens:   ID Type Source Tests Collected by Time Destination   A : Left Axillary [place] Lymph Node Tissue Lymph Node SURGICAL PATHOLOGY Claudetta Ra, MD 3/22/2022 1453    B : Left Breast Cancer Lump Tissue Breast SURGICAL PATHOLOGY Claudetta Ra, MD 3/22/2022 1452        Implants:  * No implants in log *      Drains: * No LDAs found *        Electronically signed by Claudetta Ra, MD on 3/22/2022 at 3:28 PM

## 2022-03-23 NOTE — ANESTHESIA POSTPROCEDURE EVALUATION
Department of Anesthesiology  Postprocedure Note    Patient: Mahendra Ortega  MRN: [id number]  YOB: 1970  Date of evaluation: 3/22/2022  Time:  10:12 PM     Procedure Summary     Date: 03/22/22 Room / Location: [address] [place] [address] JOYCELYN Trevizo    Anesthesia Start: 8631 Anesthesia Stop: [phone]    Procedure: LEFT BREAST LUMPECTOMY PARTIAL MASTECTOMY, SENTINEL LYMPH NODE BIOPSY, PRE OP NEEDLE LOC X 2 (Left Breast) Diagnosis: (INVASIVE DUCTAL CARCINOMA LEFT BREAST)    Surgeons: Mariluz Michael MD Responsible Provider: Gt Harper DO    Anesthesia Type: general ASA Status: 3          Anesthesia Type: general    Andre Phase I: Andre Score: 9    Andre Phase II: Andre Score: 10    Last vitals: Reviewed and per EMR flowsheets. Anesthesia Post Evaluation    Patient location during evaluation: PACU  Patient participation: complete - patient participated  Level of consciousness: awake and alert  Airway patency: patent  Nausea & Vomiting: no nausea and no vomiting  Complications: no  Cardiovascular status: hemodynamically stable  Respiratory status: acceptable  Hydration status: euvolemic    [facility]  POST-ANESTHESIA NOTE       Name:  Mahendra Ortega                                         Age:  54 y.o. MRN:  [id number]      Last Vitals:  /83   Pulse 63   Temp 96.7 °F (35.9 °C)   Resp 20   Ht 6' (1.829 m)   Wt (!) 311 lb 12.8 oz (141.4 kg)   SpO2 96%   BMI 42.29 kg/m²   No data found.     Level of Consciousness:  Awake    Respiratory:  Stable    Oxygen Saturation:  Stable    Cardiovascular:  Stable    Hydration:  Adequate    PONV:  Stable    Post-op Pain:  Adequate analgesia    Post-op Assessment:  No apparent anesthetic complications    Additional Follow-Up / Treatment / Comment:  None    Aniceto Plummer MD  March 22, 2022   10:12 PM

## 2022-03-25 ENCOUNTER — TELEPHONE (OUTPATIENT)
Dept: SURGERY | Age: 52
End: 2022-03-25

## 2022-03-25 NOTE — TELEPHONE ENCOUNTER
Pt calling office with complaints of a large amount of serosanguineous fluid draining from axillary incision. She states there is a small opening at the end of her incision. Pt has follow up appointment on Monday 3/28/22. She denies any fevers, redness or irritation. Pt advised to keep area clean and apply a abd pad to protect her clothing. Large amount of bruising noted, but no other s/sx of infection. Poss seroma draining. Phone number provided if needed.

## 2022-03-28 ENCOUNTER — OFFICE VISIT (OUTPATIENT)
Dept: SURGERY | Age: 52
End: 2022-03-28
Payer: COMMERCIAL

## 2022-03-28 VITALS
TEMPERATURE: 98.5 F | BODY MASS INDEX: 39.68 KG/M2 | HEIGHT: 72 IN | DIASTOLIC BLOOD PRESSURE: 80 MMHG | HEART RATE: 75 BPM | SYSTOLIC BLOOD PRESSURE: 120 MMHG | RESPIRATION RATE: 18 BRPM | WEIGHT: 293 LBS | OXYGEN SATURATION: 99 %

## 2022-03-28 DIAGNOSIS — S40.022D: Primary | ICD-10-CM

## 2022-03-28 DIAGNOSIS — Z17.1 MALIGNANT NEOPLASM OF LOWER-INNER QUADRANT OF LEFT BREAST IN FEMALE, ESTROGEN RECEPTOR NEGATIVE (HCC): ICD-10-CM

## 2022-03-28 DIAGNOSIS — Z98.890 STATUS POST LEFT BREAST LUMPECTOMY: ICD-10-CM

## 2022-03-28 DIAGNOSIS — C50.312 MALIGNANT NEOPLASM OF LOWER-INNER QUADRANT OF LEFT BREAST IN FEMALE, ESTROGEN RECEPTOR NEGATIVE (HCC): ICD-10-CM

## 2022-03-28 PROCEDURE — 99024 POSTOP FOLLOW-UP VISIT: CPT | Performed by: SURGERY

## 2022-03-29 ENCOUNTER — TELEPHONE (OUTPATIENT)
Dept: SPIRITUAL SERVICES | Facility: CLINIC | Age: 52
End: 2022-03-29

## 2022-03-29 NOTE — PROGRESS NOTES
Brock Mccall MD  General Surgery  Postprocedure Evaluation in Office  Pt Name: Yehuda Perez  Date of Birth 1970   Today's Date: 3/28/2022  Medical Record Number: [id number]  Primary Care Provider: Sudheer Mendoza  Chief Complaint   Patient presents with    Post-Op Check     s/p Left partial mastectomy with preoperative needle localization, Left axillary sentinel lymph node biopsy with sentinel lymph node mapping with technetium sulfur colloid-3/22/2022     ASSESSMENT      1. Hematoma of left axilla, subsequent encounter    2. Malignant neoplasm of lower-inner quadrant of left breast in female, estrogen receptor negative ([facility] [address]    3. Status post left breast lumpectomy       Pathologic stage IIa.  T2 N0 M0 ER negative UT negative HER-2 negative  PLANS       1. Pathology reviewed with the patient who understands. All questions were answered. Margins negative. Primary tumor larger than on imaging. 2.  Hematoma left axilla. Patient on blood thinners. Needs surgical evacuation. Hold Eliquis plan surgery Wednesday. 3.  Patient see oncology next week. Adjuvant chemotherapy. Dr. Markel Lam. Loreda Apgar is seen today for post-op follow-up. She called over the weekend complained of some wound issues some separation of the skin. Some ecchymoses. Patient resumed her Eliquis. She has a rather large palpable hematoma. This will need explored in the operating suite. She was counseled to stop her Eliquis. Pathology discussed with patient. Margins negative. Primary tumor larger than initially thought. Final pathologic stage IIa triple negative. Lymph nodes negative.     Medications    Current Outpatient Medications:     metoprolol succinate (TOPROL XL) 100 MG extended release tablet, TAKE 1 TABLET BY MOUTH EVERY DAY, Disp: 90 tablet, Rfl: 1    atorvastatin (LIPITOR) 40 MG tablet, TAKE 1 TABLET DAILY, Disp: 90 tablet, Rfl: 3    ELIQUIS 5 MG TABS tablet, TAKE 1 TABLET BY MOUTH TWICE A DAY, Disp: 180 tablet, Rfl: 3    flecainide (TAMBOCOR) 100 MG tablet, TAKE 1 TABLET BY MOUTH TWICE A DAY, Disp: 180 tablet, Rfl: 3    levothyroxine (SYNTHROID) 125 MCG tablet, TAKE 1 TABLET BY MOUTH EVERY DAY IN THE MORNING ON EMPTY STOMACH, Disp: , Rfl:     ZINC PO, Take by mouth daily, Disp: , Rfl:     Ascorbic Acid (VITAMIN C PO), Take by mouth daily, Disp: , Rfl:     ibuprofen (ADVIL;MOTRIN) 800 MG tablet, Take 800 mg by mouth every 6 hours as needed for Pain, Disp: , Rfl:     omeprazole (PRILOSEC) 10 MG delayed release capsule, Take 10 mg by mouth daily, Disp: , Rfl:     nitroGLYCERIN (NITROSTAT) 0.4 MG SL tablet, Place 1 tablet under the tongue every 5 minutes as needed for Chest pain, Disp: 25 tablet, Rfl: 3    aspirin 81 MG chewable tablet, Take 1 tablet by mouth daily. , Disp: 30 tablet, Rfl: 3  Allergies    Allergies   Allergen Reactions    Codeine Hives and Swelling    Cortisone Swelling     Apparently tolerates topical and IV with benadryl well    Influenza Virus Vaccine Other (See Comments)     Pt states could not walk after getting     Darvocet A500 [Propoxyphene N-Acetaminophen] Nausea And Vomiting    Sulfa Antibiotics Nausea And Vomiting and Swelling     Not throat swelling       Review of Systems  History obtained from the patient. Constitutional: Denies any fever, chills, fatigue. Wound: Bruising at axilla with wound separation of axillary incision  Resp: Denies any cough, shortness of breath. CV: Denies any chest pain, orthopnea or syncope. GI: Denies any nausea, vomiting, blood in the stool, constipation or diarrhea. OBJECTIVE     VITALS: /80 (Site: Left Wrist, Position: Sitting, Cuff Size: Medium Adult)   Pulse 75   Temp 98.5 °F (36.9 °C) (Temporal)   Resp 18   Ht 6' (1.829 m)   Wt (!) 318 lb (144.2 kg)   SpO2 99%   BMI 43.13 kg/m²     CONSTITUTIONAL: Alert and oriented times 3, no acute distress and cooperative to examination.   SKIN: Skin color, texture, turgor normal. No rashes or lesions. INCISION: Breast wound looks good. Ecchymoses from left axillary hematoma sectioning onto breast  NECK: Soft, trachea midline and straight  BREAST: Left axillary hematoma  LUNGS: Clear  CARDIOVASCULAR: Normal rate  NEUROLOGIC: No sensory or motor nerve irritation  EXTREMITIES: no cyanosis, no clubbing and no edema.

## 2022-03-29 NOTE — TELEPHONE ENCOUNTER
[place] [address] PROGRESS NOTE      Patient: Talya Gomez           YOB: 1970  Age: 54 y.o. Gender: female            Assessment:  [facility] is a 54year old female who answered the phone when this  called. She spoke of a recent surgery, last Tuesday that was able to give her good news, but she did say she has to go in tomorrow for anther surgery. Interventions:  Our thoughts and prayers will be with her tomorrow as she goes for surgery early. Outcomes:  Encouraged and thankful for the call    Plan:    1.follow up with a phone call.      Electronically signed by Brittni Matta on 3/29/2022 at 4:58 PM.  Simon Yan  [phone]

## 2022-03-30 ENCOUNTER — ANESTHESIA (OUTPATIENT)
Dept: OPERATING ROOM | Age: 52
End: 2022-03-30
Payer: COMMERCIAL

## 2022-03-30 ENCOUNTER — HOSPITAL ENCOUNTER (OUTPATIENT)
Age: 52
Setting detail: OUTPATIENT SURGERY
Discharge: HOME | End: 2022-03-30
Attending: SURGERY | Admitting: SURGERY
Payer: COMMERCIAL

## 2022-03-30 ENCOUNTER — ANESTHESIA EVENT (OUTPATIENT)
Dept: OPERATING ROOM | Age: 52
End: 2022-03-30
Payer: COMMERCIAL

## 2022-03-30 VITALS
OXYGEN SATURATION: 100 % | HEIGHT: 72 IN | BODY MASS INDEX: 39.68 KG/M2 | DIASTOLIC BLOOD PRESSURE: 72 MMHG | RESPIRATION RATE: 18 BRPM | HEART RATE: 63 BPM | SYSTOLIC BLOOD PRESSURE: 126 MMHG | TEMPERATURE: 97.2 F | WEIGHT: 293 LBS

## 2022-03-30 VITALS — SYSTOLIC BLOOD PRESSURE: 130 MMHG | TEMPERATURE: 96.6 F | OXYGEN SATURATION: 92 % | DIASTOLIC BLOOD PRESSURE: 83 MMHG

## 2022-03-30 DIAGNOSIS — T14.8XXA HEMATOMA: Primary | ICD-10-CM

## 2022-03-30 PROCEDURE — 3600000002 HC SURGERY LEVEL 2 BASE: Performed by: SURGERY

## 2022-03-30 PROCEDURE — 6360000002 HC RX W HCPCS: Performed by: NURSE ANESTHETIST, CERTIFIED REGISTERED

## 2022-03-30 PROCEDURE — 7100000010 HC PHASE II RECOVERY - FIRST 15 MIN: Performed by: SURGERY

## 2022-03-30 PROCEDURE — 2709999900 HC NON-CHARGEABLE SUPPLY: Performed by: SURGERY

## 2022-03-30 PROCEDURE — 2580000003 HC RX 258: Performed by: SURGERY

## 2022-03-30 PROCEDURE — 6370000000 HC RX 637 (ALT 250 FOR IP): Performed by: SURGERY

## 2022-03-30 PROCEDURE — 7100000001 HC PACU RECOVERY - ADDTL 15 MIN: Performed by: SURGERY

## 2022-03-30 PROCEDURE — 6360000002 HC RX W HCPCS: Performed by: SURGERY

## 2022-03-30 PROCEDURE — 2720000010 HC SURG SUPPLY STERILE: Performed by: SURGERY

## 2022-03-30 PROCEDURE — 3600000012 HC SURGERY LEVEL 2 ADDTL 15MIN: Performed by: SURGERY

## 2022-03-30 PROCEDURE — 3700000000 HC ANESTHESIA ATTENDED CARE: Performed by: SURGERY

## 2022-03-30 PROCEDURE — 3700000001 HC ADD 15 MINUTES (ANESTHESIA): Performed by: SURGERY

## 2022-03-30 PROCEDURE — 7100000000 HC PACU RECOVERY - FIRST 15 MIN: Performed by: SURGERY

## 2022-03-30 PROCEDURE — 2500000003 HC RX 250 WO HCPCS: Performed by: NURSE ANESTHETIST, CERTIFIED REGISTERED

## 2022-03-30 PROCEDURE — 10140 I&D HMTMA SEROMA/FLUID COLLJ: CPT | Performed by: SURGERY

## 2022-03-30 PROCEDURE — 7100000011 HC PHASE II RECOVERY - ADDTL 15 MIN: Performed by: SURGERY

## 2022-03-30 PROCEDURE — 6360000002 HC RX W HCPCS: Performed by: ANESTHESIOLOGY

## 2022-03-30 RX ORDER — MORPHINE SULFATE 2 MG/ML
2 INJECTION, SOLUTION INTRAMUSCULAR; INTRAVENOUS
Status: DISCONTINUED | OUTPATIENT
Start: 2022-03-30 | End: 2022-03-30 | Stop reason: HOSPADM

## 2022-03-30 RX ORDER — LIDOCAINE HYDROCHLORIDE 20 MG/ML
INJECTION, SOLUTION INTRAVENOUS PRN
Status: DISCONTINUED | OUTPATIENT
Start: 2022-03-30 | End: 2022-03-30 | Stop reason: SDUPTHER

## 2022-03-30 RX ORDER — SODIUM CHLORIDE 9 MG/ML
INJECTION, SOLUTION INTRAVENOUS CONTINUOUS
Status: DISCONTINUED | OUTPATIENT
Start: 2022-03-30 | End: 2022-03-30 | Stop reason: HOSPADM

## 2022-03-30 RX ORDER — FENTANYL CITRATE 50 UG/ML
50 INJECTION, SOLUTION INTRAMUSCULAR; INTRAVENOUS EVERY 5 MIN PRN
Status: DISCONTINUED | OUTPATIENT
Start: 2022-03-30 | End: 2022-03-30 | Stop reason: HOSPADM

## 2022-03-30 RX ORDER — SODIUM CHLORIDE 0.9 % (FLUSH) 0.9 %
5-40 SYRINGE (ML) INJECTION EVERY 12 HOURS SCHEDULED
Status: DISCONTINUED | OUTPATIENT
Start: 2022-03-30 | End: 2022-03-30 | Stop reason: HOSPADM

## 2022-03-30 RX ORDER — ONDANSETRON 2 MG/ML
INJECTION INTRAMUSCULAR; INTRAVENOUS PRN
Status: DISCONTINUED | OUTPATIENT
Start: 2022-03-30 | End: 2022-03-30 | Stop reason: SDUPTHER

## 2022-03-30 RX ORDER — PROPOFOL 10 MG/ML
INJECTION, EMULSION INTRAVENOUS PRN
Status: DISCONTINUED | OUTPATIENT
Start: 2022-03-30 | End: 2022-03-30 | Stop reason: SDUPTHER

## 2022-03-30 RX ORDER — FENTANYL CITRATE 50 UG/ML
25 INJECTION, SOLUTION INTRAMUSCULAR; INTRAVENOUS EVERY 5 MIN PRN
Status: DISCONTINUED | OUTPATIENT
Start: 2022-03-30 | End: 2022-03-30 | Stop reason: HOSPADM

## 2022-03-30 RX ORDER — ONDANSETRON 4 MG/1
4 TABLET, ORALLY DISINTEGRATING ORAL EVERY 8 HOURS PRN
Status: DISCONTINUED | OUTPATIENT
Start: 2022-03-30 | End: 2022-03-30 | Stop reason: HOSPADM

## 2022-03-30 RX ORDER — SODIUM CHLORIDE 0.9 % (FLUSH) 0.9 %
5-40 SYRINGE (ML) INJECTION PRN
Status: DISCONTINUED | OUTPATIENT
Start: 2022-03-30 | End: 2022-03-30 | Stop reason: HOSPADM

## 2022-03-30 RX ORDER — SODIUM CHLORIDE 9 MG/ML
25 INJECTION, SOLUTION INTRAVENOUS PRN
Status: DISCONTINUED | OUTPATIENT
Start: 2022-03-30 | End: 2022-03-30 | Stop reason: HOSPADM

## 2022-03-30 RX ORDER — SUCCINYLCHOLINE/SOD CL,ISO/PF 200MG/10ML
SYRINGE (ML) INTRAVENOUS PRN
Status: DISCONTINUED | OUTPATIENT
Start: 2022-03-30 | End: 2022-03-30 | Stop reason: SDUPTHER

## 2022-03-30 RX ORDER — MORPHINE SULFATE 2 MG/ML
4 INJECTION, SOLUTION INTRAMUSCULAR; INTRAVENOUS
Status: DISCONTINUED | OUTPATIENT
Start: 2022-03-30 | End: 2022-03-30 | Stop reason: HOSPADM

## 2022-03-30 RX ORDER — DIPHENHYDRAMINE HYDROCHLORIDE 50 MG/ML
12.5 INJECTION INTRAMUSCULAR; INTRAVENOUS
Status: DISCONTINUED | OUTPATIENT
Start: 2022-03-30 | End: 2022-03-30 | Stop reason: HOSPADM

## 2022-03-30 RX ORDER — TRAMADOL HYDROCHLORIDE 50 MG/1
50 TABLET ORAL EVERY 6 HOURS PRN
Status: DISCONTINUED | OUTPATIENT
Start: 2022-03-30 | End: 2022-03-30 | Stop reason: HOSPADM

## 2022-03-30 RX ORDER — ROCURONIUM BROMIDE 10 MG/ML
INJECTION, SOLUTION INTRAVENOUS PRN
Status: DISCONTINUED | OUTPATIENT
Start: 2022-03-30 | End: 2022-03-30 | Stop reason: SDUPTHER

## 2022-03-30 RX ORDER — MEPERIDINE HYDROCHLORIDE 25 MG/ML
12.5 INJECTION INTRAMUSCULAR; INTRAVENOUS; SUBCUTANEOUS EVERY 5 MIN PRN
Status: DISCONTINUED | OUTPATIENT
Start: 2022-03-30 | End: 2022-03-30 | Stop reason: HOSPADM

## 2022-03-30 RX ORDER — SODIUM CHLORIDE 9 MG/ML
INJECTION, SOLUTION INTRAVENOUS PRN
Status: DISCONTINUED | OUTPATIENT
Start: 2022-03-30 | End: 2022-03-30 | Stop reason: HOSPADM

## 2022-03-30 RX ORDER — METOCLOPRAMIDE HYDROCHLORIDE 5 MG/ML
10 INJECTION INTRAMUSCULAR; INTRAVENOUS
Status: DISCONTINUED | OUTPATIENT
Start: 2022-03-30 | End: 2022-03-30 | Stop reason: HOSPADM

## 2022-03-30 RX ORDER — MIDAZOLAM HYDROCHLORIDE 1 MG/ML
INJECTION INTRAMUSCULAR; INTRAVENOUS PRN
Status: DISCONTINUED | OUTPATIENT
Start: 2022-03-30 | End: 2022-03-30 | Stop reason: SDUPTHER

## 2022-03-30 RX ORDER — FENTANYL CITRATE 50 UG/ML
INJECTION, SOLUTION INTRAMUSCULAR; INTRAVENOUS PRN
Status: DISCONTINUED | OUTPATIENT
Start: 2022-03-30 | End: 2022-03-30 | Stop reason: SDUPTHER

## 2022-03-30 RX ORDER — ONDANSETRON 2 MG/ML
4 INJECTION INTRAMUSCULAR; INTRAVENOUS EVERY 6 HOURS PRN
Status: DISCONTINUED | OUTPATIENT
Start: 2022-03-30 | End: 2022-03-30 | Stop reason: HOSPADM

## 2022-03-30 RX ORDER — TRAMADOL HYDROCHLORIDE 50 MG/1
50 TABLET ORAL EVERY 4 HOURS PRN
Qty: 18 TABLET | Refills: 0 | Status: SHIPPED | OUTPATIENT
Start: 2022-03-30 | End: 2022-04-02

## 2022-03-30 RX ADMIN — TRAMADOL HYDROCHLORIDE 50 MG: 50 TABLET ORAL at 09:09

## 2022-03-30 RX ADMIN — FENTANYL CITRATE 100 MCG: 50 INJECTION, SOLUTION INTRAMUSCULAR; INTRAVENOUS at 07:16

## 2022-03-30 RX ADMIN — LIDOCAINE HYDROCHLORIDE 80 MG: 20 INJECTION, SOLUTION INTRAVENOUS at 07:16

## 2022-03-30 RX ADMIN — Medication 3000 MG: at 07:22

## 2022-03-30 RX ADMIN — Medication 140 MG: at 07:16

## 2022-03-30 RX ADMIN — MIDAZOLAM 2 MG: 1 INJECTION INTRAMUSCULAR; INTRAVENOUS at 07:11

## 2022-03-30 RX ADMIN — SODIUM CHLORIDE: 9 INJECTION, SOLUTION INTRAVENOUS at 06:50

## 2022-03-30 RX ADMIN — ONDANSETRON 4 MG: 2 INJECTION INTRAMUSCULAR; INTRAVENOUS at 07:36

## 2022-03-30 RX ADMIN — PROPOFOL 180 MG: 10 INJECTION, EMULSION INTRAVENOUS at 07:16

## 2022-03-30 RX ADMIN — FENTANYL CITRATE 50 MCG: 50 INJECTION, SOLUTION INTRAMUSCULAR; INTRAVENOUS at 08:09

## 2022-03-30 RX ADMIN — ROCURONIUM BROMIDE 5 MG: 10 INJECTION INTRAVENOUS at 07:16

## 2022-03-30 ASSESSMENT — PULMONARY FUNCTION TESTS
PIF_VALUE: 23
PIF_VALUE: 24
PIF_VALUE: 2
PIF_VALUE: 22
PIF_VALUE: 23
PIF_VALUE: 0
PIF_VALUE: 23
PIF_VALUE: 2
PIF_VALUE: 23
PIF_VALUE: 2
PIF_VALUE: 23
PIF_VALUE: 2
PIF_VALUE: 1
PIF_VALUE: 23
PIF_VALUE: 19
PIF_VALUE: 19
PIF_VALUE: 1
PIF_VALUE: 16
PIF_VALUE: 3
PIF_VALUE: 2
PIF_VALUE: 9
PIF_VALUE: 23
PIF_VALUE: 24
PIF_VALUE: 10
PIF_VALUE: 2
PIF_VALUE: 19
PIF_VALUE: 3
PIF_VALUE: 2
PIF_VALUE: 1
PIF_VALUE: 21
PIF_VALUE: 19
PIF_VALUE: 8
PIF_VALUE: 2
PIF_VALUE: 2
PIF_VALUE: 1
PIF_VALUE: 23
PIF_VALUE: 2
PIF_VALUE: 3
PIF_VALUE: 23
PIF_VALUE: 3
PIF_VALUE: 23
PIF_VALUE: 3
PIF_VALUE: 10
PIF_VALUE: 0
PIF_VALUE: 0
PIF_VALUE: 23

## 2022-03-30 ASSESSMENT — PAIN SCALES - GENERAL
PAINLEVEL_OUTOF10: 6
PAINLEVEL_OUTOF10: 4
PAINLEVEL_OUTOF10: 7
PAINLEVEL_OUTOF10: 4
PAINLEVEL_OUTOF10: 6
PAINLEVEL_OUTOF10: 4
PAINLEVEL_OUTOF10: 6
PAINLEVEL_OUTOF10: 6
PAINLEVEL_OUTOF10: 0

## 2022-03-30 ASSESSMENT — PAIN DESCRIPTION - ORIENTATION: ORIENTATION: LEFT

## 2022-03-30 ASSESSMENT — PAIN DESCRIPTION - DESCRIPTORS: DESCRIPTORS: SORE

## 2022-03-30 ASSESSMENT — PAIN DESCRIPTION - PAIN TYPE: TYPE: SURGICAL PAIN

## 2022-03-30 NOTE — H&P
[address] [place] [address]  History and Physical Update    Pt Name: Anthony Gaxiola  MRN: [id number]  YOB: 1970  Date of evaluation: 3/30/2022    [x] I have examined the patient and reviewed the H&P/Consult and there are no changes to the patient or plans. [] I have examined the patient and reviewed the H&P/Consult and have noted the following changes:        Jodie Donaldson MD   Electronically signed 3/30/2022 at 6:52 AM    Jodie Donaldson MD  General Surgery  Postprocedure Evaluation in Office  Pt Name: Anthony Gaxiola  Date of Birth 1970   Today's Date: 3/28/2022  Medical Record Number: [id number]  Primary Care Provider: Abbey Sweeney       Chief Complaint   Patient presents with    Post-Op Check       s/p Left partial mastectomy with preoperative needle localization, Left axillary sentinel lymph node biopsy with sentinel lymph node mapping with technetium sulfur colloid-3/22/2022      ASSESSMENT   1. Hematoma of left axilla, subsequent encounter    2. Malignant neoplasm of lower-inner quadrant of left breast in female, estrogen receptor negative ([facility] [address]    3. Status post left breast lumpectomy        Pathologic stage IIa.  T2 N0 M0 ER negative [place] negative HER-2 negative  PLANS       1. Pathology reviewed with the patient who understands. All questions were answered. Margins negative. Primary tumor larger than on imaging. 2.  Hematoma left axilla. Patient on blood thinners. Needs surgical evacuation. Hold Eliquis plan surgery Wednesday. 3.  Patient see oncology next week. Adjuvant chemotherapy. Dr. GOODWIN. ABI Raymond is seen today for post-op follow-up. She called over the weekend complained of some wound issues some separation of the skin. Some ecchymoses. Patient resumed her Eliquis. She has a rather large palpable hematoma. This will need explored in the operating suite. She was counseled to stop her Eliquis. Pathology discussed with patient.   Margins negative. Primary tumor larger than initially thought. Final pathologic stage IIa triple negative. Lymph nodes negative.     Medications    Current Medication      Current Outpatient Medications:     metoprolol succinate (TOPROL XL) 100 MG extended release tablet, TAKE 1 TABLET BY MOUTH EVERY DAY, Disp: 90 tablet, Rfl: 1    atorvastatin (LIPITOR) 40 MG tablet, TAKE 1 TABLET DAILY, Disp: 90 tablet, Rfl: 3    ELIQUIS 5 MG TABS tablet, TAKE 1 TABLET BY MOUTH TWICE A DAY, Disp: 180 tablet, Rfl: 3    flecainide (TAMBOCOR) 100 MG tablet, TAKE 1 TABLET BY MOUTH TWICE A DAY, Disp: 180 tablet, Rfl: 3    levothyroxine (SYNTHROID) 125 MCG tablet, TAKE 1 TABLET BY MOUTH EVERY DAY IN THE MORNING ON EMPTY STOMACH, Disp: , Rfl:     ZINC PO, Take by mouth daily, Disp: , Rfl:     Ascorbic Acid (VITAMIN C PO), Take by mouth daily, Disp: , Rfl:     ibuprofen (ADVIL;MOTRIN) 800 MG tablet, Take 800 mg by mouth every 6 hours as needed for Pain, Disp: , Rfl:     omeprazole (PRILOSEC) 10 MG delayed release capsule, Take 10 mg by mouth daily, Disp: , Rfl:     nitroGLYCERIN (NITROSTAT) 0.4 MG SL tablet, Place 1 tablet under the tongue every 5 minutes as needed for Chest pain, Disp: 25 tablet, Rfl: 3    aspirin 81 MG chewable tablet, Take 1 tablet by mouth daily. , Disp: 30 tablet, Rfl: 3     Allergies           Allergies   Allergen Reactions    Codeine Hives and Swelling    Cortisone Swelling       Apparently tolerates topical and IV with benadryl well    Influenza Virus Vaccine Other (See Comments)       Pt states could not walk after getting     Darvocet A500 [Propoxyphene N-Acetaminophen] Nausea And Vomiting    Sulfa Antibiotics Nausea And Vomiting and Swelling       Not throat swelling         Review of Systems  History obtained from the patient.     Constitutional: Denies any fever, chills, fatigue. Wound: Bruising at axilla with wound separation of axillary incision  Resp: Denies any cough, shortness of breath.   CV: Denies any chest pain, orthopnea or syncope. GI: Denies any nausea, vomiting, blood in the stool, constipation or diarrhea.      OBJECTIVE      VITALS: /80 (Site: Left Wrist, Position: Sitting, Cuff Size: Medium Adult)   Pulse 75   Temp 98.5 °F (36.9 °C) (Temporal)   Resp 18   Ht 6' (1.829 m)   Wt (!) 318 lb (144.2 kg)   SpO2 99%   BMI 43.13 kg/m²      CONSTITUTIONAL: Alert and oriented times 3, no acute distress and cooperative to examination. SKIN: Skin color, texture, turgor normal. No rashes or lesions. INCISION: Breast wound looks good.   Ecchymoses from left axillary hematoma sectioning onto breast  NECK: Soft, trachea midline and straight  BREAST: Left axillary hematoma  LUNGS: Clear  CARDIOVASCULAR: Normal rate  NEUROLOGIC: No sensory or motor nerve irritation  EXTREMITIES: no cyanosis, no clubbing and no edema.

## 2022-03-30 NOTE — ANESTHESIA PRE PROCEDURE
Department of Anesthesiology  Preprocedure Note       Name:  Jose Medeiros   Age:  54 y.o.  :  1970                                          MRN:  [id number]         Date:  3/30/2022      Surgeon: Sarai Garcia):  Claude Huitron MD    Procedure: Procedure(s):  Evacuation hematoma left axilla    Medications prior to admission:   Prior to Admission medications    Medication Sig Start Date End Date Taking? Authorizing Provider   metoprolol succinate (TOPROL XL) 100 MG extended release tablet TAKE 1 TABLET BY MOUTH EVERY DAY 22   Eric Lopez MD   atorvastatin (LIPITOR) 40 MG tablet TAKE 1 TABLET DAILY 22   Eric Lopez MD   ELIQUIS 5 MG TABS tablet TAKE 1 TABLET BY MOUTH TWICE A DAY 21   Kaylene Nguyễn MD   flecainide (TAMBOCOR) 100 MG tablet TAKE 1 TABLET BY MOUTH TWICE A DAY 21   Eric Lopez MD   levothyroxine (SYNTHROID) 125 MCG tablet TAKE 1 TABLET BY MOUTH EVERY DAY IN THE MORNING ON EMPTY STOMACH 21   Historical Provider, MD   ZINC PO Take by mouth daily    Historical Provider, MD   Ascorbic Acid (VITAMIN C PO) Take by mouth daily    Historical Provider, MD   ibuprofen (ADVIL;MOTRIN) 800 MG tablet Take 800 mg by mouth every 6 hours as needed for Pain    Historical Provider, MD   omeprazole (PRILOSEC) 10 MG delayed release capsule Take 10 mg by mouth daily    Historical Provider, MD   nitroGLYCERIN (NITROSTAT) 0.4 MG SL tablet Place 1 tablet under the tongue every 5 minutes as needed for Chest pain 19   Anthony Powell PA-C   aspirin 81 MG chewable tablet Take 1 tablet by mouth daily.  14   Shawnie Gaucher, APRN - CNP       Current medications:    Current Facility-Administered Medications   Medication Dose Route Frequency Provider Last Rate Last Admin    0.9 % sodium chloride infusion   IntraVENous Continuous Claude Huitron  mL/hr at 22 0650 New Bag at 22 0650    sodium chloride flush 0.9 % injection 5-40 mL  5-40 mL IntraVENous 2 times per day Briana Magaña MD        sodium chloride flush 0.9 % injection 5-40 mL  5-40 mL IntraVENous PRN Briana Magaña MD        0.9 % sodium chloride infusion  25 mL IntraVENous PRN Briana Magaña MD        ceFAZolin (ANCEF) 3000 mg in dextrose 5 % 100 mL IVPB  3,000 mg IntraVENous On Call to [address] [facility], MD           Allergies:     Allergies   Allergen Reactions    Codeine Hives and Swelling    Cortisone Swelling     Apparently tolerates topical and IV with benadryl well    Influenza Virus Vaccine Other (See Comments)     Pt states could not walk after getting    [address] 23 [Propoxyphene N-Acetaminophen] Nausea And Vomiting    Sulfa Antibiotics Nausea And Vomiting and Swelling     Not throat swelling       Problem List:    Patient Active Problem List   Diagnosis Code    ACS (acute coronary syndrome) (HCC) I24.9    Chest pain with moderate risk for cardiac etiology R07.9    Essential hypertension I10    HLD (hyperlipidemia) E78.5    Tobacco abuse Z72.0    History of MI (myocardial infarction) I25.2    Acquired hypothyroidism E03.9    Unstable angina ([address] I20.0    Coronary artery disease involving native coronary artery of native heart without angina pectoris I25.10    Paroxysmal atrial fibrillation (HCC) I48.0    Gastroesophageal reflux disease without esophagitis K21.9    Malignant neoplasm of lower-inner quadrant of left breast in female, estrogen receptor negative ([address] C50.312, Z17.1    Malignant neoplasm of central portion of left breast in female, estrogen receptor negative ([address] C50.112, Z17.1       Past Medical History:        Diagnosis Date    Afib ([address]     Baki    Arthritis     CAD (coronary artery disease)     GERD (gastroesophageal reflux disease)     Hyperlipidemia     Hypertension     Hypothyroid     Invasive ductal carcinoma of breast, female, left ([address] 02/24/2022    Malignant neoplasm of lower-inner quadrant of left breast in female, estrogen receptor negative ([facility] [address] 03/07/2022    Pneumonia        Past Surgical History:        Procedure Laterality Date    BREAST LUMPECTOMY Left 3/22/2022    LEFT BREAST LUMPECTOMY PARTIAL MASTECTOMY, SENTINEL LYMPH NODE BIOPSY, PRE OP NEEDLE LOC X 2 performed by Re Parra MD at [address] [place] [address] ARTHROSCOPY Right     08    JENNIFER BIOPSY LYMPH NODE BY NEEDLE SUPERFICIAL  02/24/2022    JENNIFER BIOPSY LYMPH NODE BY NEEDLE SUPERFICIAL 2/24/2022 Powell Ganser, MD [facility] US GUID NDL BIOPSY LEFT Left 02/24/2022    JENNIFER US GUID NDL BIOPSY LEFT 2/24/2022 Powell Ganser, MD [facility]    MOUTH SURGERY      FL BIOPSY OF BREAST, INCISIONAL Left 1998    excisional bx-H. Chollet fibrocystic changes    US BREAST BIOPSY NEEDLE ADDITIONAL LEFT Left 02/24/2022    US BREAST BIOPSY NEEDLE ADDITIONAL LEFT 2/24/2022 Powell Ganser,  [facility] NEEDLE LOC BREAST ADDL LEFT Left 3/22/2022    US GUIDED NEEDLE LOC BREAST ADDL LEFT 3/22/2022 [facility]       Social History:    Social History     Tobacco Use    Smoking status: Current Every Day Smoker     Packs/day: 0.50     Years: 30.00     Pack years: 15.00     Types: Cigarettes     Start date: 12/4/1987    Smokeless tobacco: Never Used   Substance Use Topics    Alcohol use: Yes     Comment: rare                                Ready to quit: Not Answered  Counseling given: Not Answered      Vital Signs (Current):   Vitals:    03/30/22 0618   BP: 129/79   Pulse: 75   Resp: 16   Temp: 97.7 °F (36.5 °C)   TempSrc: Temporal   SpO2: 96%   Weight: (!) 315 lb 6.4 oz (143.1 kg)   Height: 6' (1.829 m)                                              BP Readings from Last 3 Encounters:   03/30/22 129/79   03/28/22 120/80   03/22/22 130/83       NPO Status: Time of last liquid consumption: 2245                        Time of last solid consumption: 2245 Date of last liquid consumption: 03/29/22                        Date of last solid food consumption: 03/29/22    BMI:   Wt Readings from Last 3 Encounters:   03/30/22 (!) 315 lb 6.4 oz (143.1 kg)   03/28/22 (!) 318 lb (144.2 kg)   03/22/22 (!) 311 lb 12.8 oz (141.4 kg)     Body mass index is 42.78 kg/m². CBC:   Lab Results   Component Value Date    WBC 8.8 12/21/2021    RBC 4.91 12/21/2021    HGB 15.2 12/21/2021    HCT 47.3 12/21/2021    MCV 96.3 12/21/2021    RDW 12.8 11/11/2014     12/21/2021       CMP:   Lab Results   Component Value Date     12/21/2021    K 4.2 12/21/2021    K 3.8 12/04/2019     12/21/2021    CO2 24 12/21/2021    BUN 12 12/21/2021    CREATININE 0.5 12/21/2021    LABGLOM >90 12/21/2021    GLUCOSE 105 12/21/2021    PROT 7.2 12/21/2021    CALCIUM 9.1 12/21/2021    BILITOT 0.6 12/21/2021    ALKPHOS 115 12/21/2021    AST 18 12/21/2021    ALT 23 12/21/2021       POC Tests: No results for input(s): POCGLU, POCNA, POCK, POCCL, POCBUN, POCHEMO, POCHCT in the last 72 hours.     Coags:   Lab Results   Component Value Date    INR 1.01 12/04/2019    APTT 31.2 12/04/2019       HCG (If Applicable):   Lab Results   Component Value Date    PREGSERUM NEGATIVE 11/10/2014        ABGs: No results found for: PHART, PO2ART, WOJ6RPW, DOZ8RAN, BEART, Z9DGAFHM     Type & Screen (If Applicable):  Lab Results   Component Value Date    LABRH POS 11/10/2014       Drug/Infectious Status (If Applicable):  No results found for: HIV, HEPCAB    COVID-19 Screening (If Applicable): No results found for: COVID19        Anesthesia Evaluation  Patient summary reviewed no history of anesthetic complications:   Airway: Mallampati: II  TM distance: >3 FB   Neck ROM: full  Mouth opening: > = 3 FB Dental: normal exam         Pulmonary:normal exam                               Cardiovascular:    (+) hypertension:, angina:, CAD:, hyperlipidemia                  Neuro/Psych:               GI/Hepatic/Renal:   (+) GERD:, morbid obesity          Endo/Other:    (+) hypothyroidism::., .                 Abdominal:   (+) obese,           Vascular: Other Findings:             Anesthesia Plan      general     ASA 3       Induction: intravenous. MIPS: Postoperative opioids intended and Prophylactic antiemetics administered. Anesthetic plan and risks discussed with patient and spouse.       Plan discussed with CRNA and surgical team.                  Mario Muñoz MD   3/30/2022

## 2022-03-30 NOTE — BRIEF OP NOTE
Brief Postoperative Note      Patient: Dilia Vernon  YOB: 1970  MRN: [id number]    Date of Procedure: 3/30/2022    Pre-Op Diagnosis: Hematoma left axilla S/P left breast lumpectomy SLN bx    Post-Op Diagnosis: Same       Procedure(s):  Evacuation hematoma left axilla    Surgeon(s):  Lisa Galicia MD    Assistant:  First Assistant: Martin Hill RN    Anesthesia: General    Estimated Blood Loss (mL): less than 731     Complications: None    Specimens:   * No specimens in log *    Implants:  * No implants in log *      Drains: * No LDAs found *    Findings: old hematoma    Electronically signed by Lisa Galicia MD on 3/30/2022 at 7:48 AM

## 2022-03-30 NOTE — OP NOTE
[facility]  Operative Report    PATIENT NAME: Hilda Healy  MEDICAL RECORD NO. [id number]  SURGEON: Maame Sanchez MD   Primary Care Physician: Antonietta Coe  Date: 3/30/2022, 7:52 AM     PROCEDURE PERFORMED: Evacuation left axillary hematoma and closure  PREOPERATIVE DIAGNOSIS:   Active Hospital Problems    Diagnosis Date Noted    Hematoma [T14. 8XXA] 03/30/2022      POSTOPERATIVE DIAGNOSIS: Same  SURGEON:  Maame Sanchez MD   ANESTHESIA:  General endotracheal anesthesia and local  ESTIMATED BLOOD LOSS:  100  ml  SPECIMEN: None  COMPLICATIONS:  None; patient tolerated the procedure well. DRAINS: none  DISPOSITION: Recovery Room  CONDITION: stable      Procedure:    Patient underwent recent left partial mastectomy and sentinel lymph node biopsy. She is on blood thinners for atrial fibrillation. She had resumed her blood thinners. Developed some swelling in the left axilla and dehiscence of her wound secondary to the hematoma. She was recommended evacuation as she will need to undergo future chemotherapy with risk of infection of this hematoma. Patient agreed. She was brought to the operating suite where she was placed supine on the operating table. She had stopped her Eliquis. After prepping and draping timeout was performed. She was given antibiotic intravenously. Old incision was opened old hematoma was evacuated about 100 250 mL. Wound was irrigated with saline. There was no obvious large bleeding points some raw surfaces were cauterized. Wound was then irrigated with irrisept it was suctioned out. Powdered Surgicel was placed deep within the wound again it was examined no evidence of bleeding. Dermis and subcutaneous fat were closed with interrupted 3-0 Vicryl suture. Skin was closed with running subcuticular Monocryl suture and some interrupted sutures of 3-0 nylon vertical mattress type were placed as well. Patient was spontaneously breathing. Gauze dressing applied.   She was transported to the recovery area in stable condition. She will follow up in the office Monday prior to resuming any blood thinning medication.

## 2022-03-30 NOTE — ANESTHESIA POSTPROCEDURE EVALUATION
Department of Anesthesiology  Postprocedure Note    Patient: Austen Lowery  MRN: [id number]  YOB: 1970  Date of evaluation: 3/30/2022  Time:  8:33 AM     Procedure Summary     Date: 03/30/22 Room / Location: [address] JO[place]N [place]    Anesthesia Start: 0711 Anesthesia Stop: 3142    Procedure: Evacuation hematoma left axilla (Left Breast) Diagnosis: (Hematoma left axilla S/P left breast lumpectomy SLN bx)    Surgeons: Coralie Meigs, MD Responsible Provider: J Luis Brooks MD    Anesthesia Type: general ASA Status: 3          Anesthesia Type: general    Andre Phase I: Andre Score: 8    Andre Phase II:      Last vitals: Reviewed and per EMR flowsheets. Anesthesia Post Evaluation    Patient location during evaluation: PACU  Patient participation: complete - patient participated  Level of consciousness: awake and alert  Airway patency: patent  Nausea & Vomiting: no nausea and no vomiting  Complications: no  Cardiovascular status: hemodynamically stable  Respiratory status: acceptable  Hydration status: euvolemic      [facility]  POST-ANESTHESIA NOTE       Name:  Austen Lowery                                         Age:  54 y.o.   MRN:  [id number]      Last Vitals:  /68   Pulse 71   Temp 97.2 °F (36.2 °C) (Temporal)   Resp 14   Ht 6' (1.829 m)   Wt (!) 315 lb 6.4 oz (143.1 kg)   SpO2 99%   BMI 42.78 kg/m²   Patient Vitals for the past 4 hrs:   BP Temp Temp src Pulse Resp SpO2 Height Weight   03/30/22 0820 135/68 -- -- 71 14 99 % -- --   03/30/22 0815 134/63 -- -- 73 14 98 % -- --   03/30/22 0810 (!) 143/73 -- -- 74 17 97 % -- --   03/30/22 0805 130/73 -- -- 78 21 94 % -- --   03/30/22 0759 130/73 97.2 °F (36.2 °C) Temporal 78 20 95 % -- --   03/30/22 0618 129/79 97.7 °F (36.5 °C) Temporal 75 16 96 % 6' (1.829 m) (!) 315 lb 6.4 oz (143.1 kg)       Level of Consciousness:  Awake    Respiratory:  Stable    Oxygen Saturation:  Stable    Cardiovascular: Stable    Hydration:  Adequate    PONV:  Stable    Post-op Pain:  Adequate analgesia    Post-op Assessment:  No apparent anesthetic complications    Additional Follow-Up / Treatment / Comment:  None    Lucy Vu MD  March 30, 2022   8:33 AM

## 2022-03-30 NOTE — PROGRESS NOTES
Patient arrived to OR with no personal clothing, jewelery, hearing aids, dentures/partials, or glasses.

## 2022-03-30 NOTE — PROGRESS NOTES
Patient admitted to [facility] room 19 with family at bedside. Bed in low position side rails up call light in reach. Patient denies questions at this time.

## 2022-03-31 ENCOUNTER — TELEPHONE (OUTPATIENT)
Dept: SURGERY | Age: 52
End: 2022-03-31

## 2022-03-31 NOTE — TELEPHONE ENCOUNTER
Pt states that she is feeling good after surgery. She states that her incisions are clean, dry and intact; denies any s/sx of infection. States she is up and moving around, and using the bathroom with no issues. Pt denies any questions or concerns at this time. Advised to call the office if needed.

## 2022-04-04 ENCOUNTER — OFFICE VISIT (OUTPATIENT)
Dept: SURGERY | Age: 52
End: 2022-04-04

## 2022-04-04 DIAGNOSIS — Z51.89 VISIT FOR WOUND CHECK: Primary | ICD-10-CM

## 2022-04-04 PROCEDURE — 99024 POSTOP FOLLOW-UP VISIT: CPT | Performed by: SURGERY

## 2022-04-04 NOTE — PROGRESS NOTES
Patient here for a wound check-incision well approximated. Sutures intact. Patient having moderate amount of yellowish colored drainage noted on old dressing. F/u made for Dr Janel Fields on Thursday.

## 2022-04-05 ENCOUNTER — OFFICE VISIT (OUTPATIENT)
Dept: ONCOLOGY | Age: 52
End: 2022-04-05
Payer: COMMERCIAL

## 2022-04-05 ENCOUNTER — HOSPITAL ENCOUNTER (OUTPATIENT)
Dept: INFUSION THERAPY | Age: 52
Discharge: HOME | End: 2022-04-05
Payer: COMMERCIAL

## 2022-04-05 ENCOUNTER — TELEPHONE (OUTPATIENT)
Dept: CARDIOLOGY CLINIC | Age: 52
End: 2022-04-05

## 2022-04-05 ENCOUNTER — CLINICAL DOCUMENTATION (OUTPATIENT)
Dept: CASE MANAGEMENT | Age: 52
End: 2022-04-05

## 2022-04-05 VITALS
HEART RATE: 83 BPM | RESPIRATION RATE: 18 BRPM | TEMPERATURE: 98.1 F | SYSTOLIC BLOOD PRESSURE: 115 MMHG | DIASTOLIC BLOOD PRESSURE: 74 MMHG

## 2022-04-05 VITALS
OXYGEN SATURATION: 96 % | TEMPERATURE: 98.1 F | HEART RATE: 83 BPM | HEIGHT: 72 IN | RESPIRATION RATE: 20 BRPM | WEIGHT: 293 LBS | BODY MASS INDEX: 39.68 KG/M2 | SYSTOLIC BLOOD PRESSURE: 115 MMHG | DIASTOLIC BLOOD PRESSURE: 74 MMHG

## 2022-04-05 DIAGNOSIS — C50.312 MALIGNANT NEOPLASM OF LOWER-INNER QUADRANT OF LEFT BREAST IN FEMALE, ESTROGEN RECEPTOR NEGATIVE (HCC): ICD-10-CM

## 2022-04-05 DIAGNOSIS — Z17.1 MALIGNANT NEOPLASM OF LOWER-INNER QUADRANT OF LEFT BREAST IN FEMALE, ESTROGEN RECEPTOR NEGATIVE (HCC): ICD-10-CM

## 2022-04-05 DIAGNOSIS — C50.112 MALIGNANT NEOPLASM OF CENTRAL PORTION OF LEFT BREAST IN FEMALE, ESTROGEN RECEPTOR NEGATIVE (HCC): ICD-10-CM

## 2022-04-05 DIAGNOSIS — Z17.1 MALIGNANT NEOPLASM OF CENTRAL PORTION OF LEFT BREAST IN FEMALE, ESTROGEN RECEPTOR NEGATIVE (HCC): ICD-10-CM

## 2022-04-05 DIAGNOSIS — Z17.1 MALIGNANT NEOPLASM OF LOWER-INNER QUADRANT OF LEFT BREAST IN FEMALE, ESTROGEN RECEPTOR NEGATIVE (HCC): Primary | ICD-10-CM

## 2022-04-05 DIAGNOSIS — C50.312 MALIGNANT NEOPLASM OF LOWER-INNER QUADRANT OF LEFT BREAST IN FEMALE, ESTROGEN RECEPTOR NEGATIVE (HCC): Primary | ICD-10-CM

## 2022-04-05 LAB
ABSOLUTE IMMATURE GRANULOCYTE: 0.02 THOU/MM3 (ref 0–0.07)
ALBUMIN SERPL-MCNC: 4.2 G/DL (ref 3.5–5.1)
ALP BLD-CCNC: 119 U/L (ref 38–126)
ALT SERPL-CCNC: 15 U/L (ref 11–66)
AST SERPL-CCNC: 12 U/L (ref 5–40)
BASINOPHIL, AUTOMATED: 0 % (ref 0–3)
BASOPHILS ABSOLUTE: 0 THOU/MM3 (ref 0–0.1)
BILIRUB SERPL-MCNC: 0.3 MG/DL (ref 0.3–1.2)
BILIRUBIN DIRECT: < 0.2 MG/DL (ref 0–0.3)
BUN, WHOLE BLOOD: 9 MG/DL (ref 8–26)
CHLORIDE, WHOLE BLOOD: 105 MEQ/L (ref 98–109)
CREATININE, WHOLE BLOOD: 0.5 MG/DL (ref 0.5–1.2)
EOSINOPHILS ABSOLUTE: 0.4 THOU/MM3 (ref 0–0.4)
EOSINOPHILS RELATIVE PERCENT: 4 % (ref 0–4)
GFR, ESTIMATED: > 90 ML/MIN/1.73M2
GLUCOSE, WHOLE BLOOD: 90 MG/DL (ref 70–108)
HCT VFR BLD CALC: 43.6 % (ref 37–47)
HEMOGLOBIN: 14.3 GM/DL (ref 12–16)
IMMATURE GRANULOCYTES: 0 %
IONIZED CALCIUM, WHOLE BLOOD: 1.09 MMOL/L (ref 1.12–1.32)
LYMPHOCYTES # BLD: 18 % (ref 15–47)
LYMPHOCYTES ABSOLUTE: 1.8 THOU/MM3 (ref 1–4.8)
MCH RBC QN AUTO: 30.8 PG (ref 26–33)
MCHC RBC AUTO-ENTMCNC: 32.8 GM/DL (ref 32.2–35.5)
MCV RBC AUTO: 94 FL (ref 81–99)
MONOCYTES ABSOLUTE: 0.5 THOU/MM3 (ref 0.4–1.3)
MONOCYTES: 5 % (ref 0–12)
PDW BLD-RTO: 13.1 % (ref 11.5–14.5)
PLATELET # BLD: 160 THOU/MM3 (ref 130–400)
PMV BLD AUTO: 9.6 FL (ref 9.4–12.4)
POTASSIUM, WHOLE BLOOD: 3.7 MEQ/L (ref 3.5–4.9)
RBC # BLD: 4.64 MILL/MM3 (ref 4.2–5.4)
SEG NEUTROPHILS: 73 % (ref 43–75)
SEGMENTED NEUTROPHILS ABSOLUTE COUNT: 7.4 THOU/MM3 (ref 1.8–7.7)
SODIUM, WHOLE BLOOD: 144 MEQ/L (ref 138–146)
TOTAL CO2, WHOLE BLOOD: 28 MEQ/L (ref 23–33)
TOTAL PROTEIN: 7.9 G/DL (ref 6.1–8)
WBC # BLD: 10.2 THOU/MM3 (ref 4.8–10.8)

## 2022-04-05 PROCEDURE — 80076 HEPATIC FUNCTION PANEL: CPT

## 2022-04-05 PROCEDURE — 99205 OFFICE O/P NEW HI 60 MIN: CPT | Performed by: INTERNAL MEDICINE

## 2022-04-05 PROCEDURE — 83001 ASSAY OF GONADOTROPIN (FSH): CPT

## 2022-04-05 PROCEDURE — 85025 COMPLETE CBC W/AUTO DIFF WBC: CPT

## 2022-04-05 PROCEDURE — 80047 BASIC METABLC PNL IONIZED CA: CPT

## 2022-04-05 PROCEDURE — 86300 IMMUNOASSAY TUMOR CA 15-3: CPT

## 2022-04-05 PROCEDURE — 99211 OFF/OP EST MAY X REQ PHY/QHP: CPT

## 2022-04-05 PROCEDURE — 36415 COLL VENOUS BLD VENIPUNCTURE: CPT

## 2022-04-05 PROCEDURE — 82670 ASSAY OF TOTAL ESTRADIOL: CPT

## 2022-04-05 PROCEDURE — 83002 ASSAY OF GONADOTROPIN (LH): CPT

## 2022-04-05 RX ORDER — MEPERIDINE HYDROCHLORIDE 50 MG/ML
12.5 INJECTION INTRAMUSCULAR; INTRAVENOUS; SUBCUTANEOUS PRN
Status: CANCELLED | OUTPATIENT
Start: 2022-05-10

## 2022-04-05 RX ORDER — ONDANSETRON 2 MG/ML
8 INJECTION INTRAMUSCULAR; INTRAVENOUS
Status: CANCELLED | OUTPATIENT
Start: 2022-05-10

## 2022-04-05 RX ORDER — EPINEPHRINE 1 MG/ML
0.3 INJECTION, SOLUTION, CONCENTRATE INTRAVENOUS PRN
Status: CANCELLED | OUTPATIENT
Start: 2022-05-10

## 2022-04-05 RX ORDER — LIDOCAINE AND PRILOCAINE 25; 25 MG/G; MG/G
CREAM TOPICAL
Qty: 5 G | Refills: 1 | Status: SHIPPED | OUTPATIENT
Start: 2022-04-05

## 2022-04-05 RX ORDER — ONDANSETRON 8 MG/1
8 TABLET, ORALLY DISINTEGRATING ORAL EVERY 8 HOURS PRN
Qty: 20 TABLET | Refills: 2 | Status: SHIPPED | OUTPATIENT
Start: 2022-04-05

## 2022-04-05 RX ORDER — SODIUM CHLORIDE 0.9 % (FLUSH) 0.9 %
5-40 SYRINGE (ML) INJECTION PRN
Status: CANCELLED | OUTPATIENT
Start: 2022-04-26

## 2022-04-05 RX ORDER — DIPHENHYDRAMINE HYDROCHLORIDE 50 MG/ML
50 INJECTION INTRAMUSCULAR; INTRAVENOUS
Status: CANCELLED | OUTPATIENT
Start: 2022-05-10

## 2022-04-05 RX ORDER — HEPARIN SODIUM (PORCINE) LOCK FLUSH IV SOLN 100 UNIT/ML 100 UNIT/ML
500 SOLUTION INTRAVENOUS PRN
Status: CANCELLED | OUTPATIENT
Start: 2022-05-10

## 2022-04-05 RX ORDER — SODIUM CHLORIDE 9 MG/ML
5-40 INJECTION INTRAVENOUS PRN
Status: CANCELLED | OUTPATIENT
Start: 2022-05-10

## 2022-04-05 RX ORDER — PROCHLORPERAZINE MALEATE 10 MG
10 TABLET ORAL EVERY 6 HOURS PRN
Qty: 30 TABLET | Refills: 1 | Status: SHIPPED | OUTPATIENT
Start: 2022-04-05

## 2022-04-05 RX ORDER — ACETAMINOPHEN 325 MG/1
650 TABLET ORAL
Status: CANCELLED | OUTPATIENT
Start: 2022-05-10

## 2022-04-05 RX ORDER — ALBUTEROL SULFATE 90 UG/1
4 AEROSOL, METERED RESPIRATORY (INHALATION) PRN
Status: CANCELLED | OUTPATIENT
Start: 2022-05-10

## 2022-04-05 RX ORDER — SODIUM CHLORIDE 9 MG/ML
25 INJECTION, SOLUTION INTRAVENOUS PRN
Status: CANCELLED | OUTPATIENT
Start: 2022-04-26

## 2022-04-05 RX ORDER — SODIUM CHLORIDE 9 MG/ML
20 INJECTION, SOLUTION INTRAVENOUS ONCE
Status: CANCELLED | OUTPATIENT
Start: 2022-05-10 | End: 2022-04-26

## 2022-04-05 RX ORDER — DOXORUBICIN HYDROCHLORIDE 2 MG/ML
60 INJECTION, SOLUTION INTRAVENOUS ONCE
Status: CANCELLED | OUTPATIENT
Start: 2022-05-10 | End: 2022-04-26

## 2022-04-05 RX ORDER — PALONOSETRON 0.05 MG/ML
0.25 INJECTION, SOLUTION INTRAVENOUS ONCE
Status: CANCELLED | OUTPATIENT
Start: 2022-05-10 | End: 2022-04-26

## 2022-04-05 RX ORDER — SODIUM CHLORIDE 9 MG/ML
INJECTION, SOLUTION INTRAVENOUS CONTINUOUS
Status: CANCELLED | OUTPATIENT
Start: 2022-05-10

## 2022-04-05 NOTE — PROGRESS NOTES
Name: Benja Pierre  : 1970  MRN: [id number]    Oncology Navigation Follow-Up Note    Contact Type:  Medical Oncology    Subjective: Seen in conjunction with Dr. Jena Torres for initial consult. Patient arrived alone    Objective: Left breast lumpectomy 3/22/22 for triple negative IDC, grade 3, nodes negative 0/4. Large amount of bruising left breast.  Left axillary sutures intact with clear yellow drainage noted on ABD dressing. Assistance Needed: not at this time    Receptive to [address] / Palliative Care:  Stage 2A    Referrals: Referral for Dr. Michael Brown called to Doctor Job Reese, asked to expiteded for cardiac clearance for Adriamycin, Cytoxan, Taxol with estimate start date of 22. Dr. Patricia Ash for port placement. Education: Plan of care discussed, questions addressed. Emotional support provided    Notes: Will follow through continuum of care.      Electronically signed by Sera Pantoja RN on 2022 at 3:16 PM

## 2022-04-05 NOTE — TELEPHONE ENCOUNTER
Request for pre op clearance:  Requested by: Dr Allyn Kirkpatrick  Date of surgery 04-23-22 (for chemo treatment)  Procedure chemo treatment  Office phone # [phone]  Fax #  [phone]    Is the patient on anti-coag medication?  no  If yes, how many days does the surgeon want anti-coag medication held:     Date of last visit with cardiologist: 07-15-21

## 2022-04-05 NOTE — PROGRESS NOTES
an MI in 2014 and proximal A. fib for which she is on anticoagulation followed by Dr. aCrin Copeland. Echocardiogram last year normal.  And recent history no ischemic cardiac events. ROS:  Review of Systems 14 point negative except as above. PMH:   Past Medical History:   Diagnosis Date    Afib ([facility]     Baki    Arthritis     CAD (coronary artery disease)     GERD (gastroesophageal reflux disease)     Hyperlipidemia     Hypertension     Hypothyroid     Invasive ductal carcinoma of breast, female, left ([facility] 02/24/2022    Malignant neoplasm of lower-inner quadrant of left breast in female, estrogen receptor negative ([facility] 03/07/2022    MI (myocardial infarction) ([facility]     Dr. Carin Copeland    Pneumonia     Migraines  MI  Nov 2014  C Cath 2014. Social HX:   Social History     Socioeconomic History    Marital status:      Spouse name: Not on file    Number of children: 1    Years of education: Not on file    Highest education level: Not on file   Occupational History    Occupation: LPN   Tobacco Use    Smoking status: Current Every Day Smoker     Packs/day: 0.25     Years: 30.00     Pack years: 7.50     Types: Cigarettes     Start date: 12/4/1987    Smokeless tobacco: Never Used   Vaping Use    Vaping Use: Never used   Substance and Sexual Activity    Alcohol use: Yes     Comment: rare    Drug use: No    Sexual activity: Yes     Partners: Male     Comment: boy friend   Other Topics Concern    Not on file   Social History Narrative    Not on file     Social Determinants of Health     Financial Resource Strain:     Difficulty of Paying Living Expenses: Not on file   Food Insecurity:     Worried About Running Out of Food in the Last Year: Not on file    Gaurav of Food in the Last Year: Not on file   Transportation Needs:     Lack of Transportation (Medical): Not on file    Lack of Transportation (Non-Medical):  Not on file   Physical Activity:     Days of Exercise per Week: Not on file    Minutes of Exercise per Session: Not on file   Stress:     Feeling of Stress : Not on file   Social Connections:     Frequency of Communication with Friends and Family: Not on file    Frequency of Social Gatherings with Friends and Family: Not on file    Attends [employer] Services: Not on file    Active Member of [address] [place] [address] [employer] or Organizations: Not on file    Attends Club or Organization Meetings: Not on file    Marital Status: Not on file   Intimate Partner Violence:     Fear of Current or Ex-Partner: Not on file    Emotionally Abused: Not on file    Physically Abused: Not on file    Sexually Abused: Not on file   Housing Stability:     Unable to Pay for Housing in the Last Year: Not on file    Number of [place] in the Last Year: Not on file    Unstable Housing in the Last Year: Not on file   Smoking history: 30+ years of smoking currently down to about 3 cigarettes/day average 1 to 1/2 packs/day. Spouse:   1 daughter age 32. 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 Dr. Kimberlyn Robertser of OB  C-Scope: Not yet      Gyn HX:   GPA:  ROSS/BSO: all present. Last normal menstrual cycle in 6 years  LMP: No LMP recorded. Patient is postmenopausal.     Health Maintenance Due   Topic Date Due    Hepatitis C screen  Never done    COVID-19 Vaccine (1) Never done    Pneumococcal 0-64 years Vaccine (1 of 2 - PPSV23) Never done    Depression Screen  Never done    HIV screen  Never done    DTaP/Tdap/Td vaccine (1 - Tdap) Never done    Cervical cancer screen  Never done    Colorectal Cancer Screen  Never done    Diabetes screen  2022        Interests:   4H advisor.      Shane HX:   Family History   Problem Relation Age of Onset    Heart Disease Mother     Diabetes Mother     Cancer Mother         liposarcoma    Kidney Disease Mother     Heart Disease Father     Diabetes Father     Kidney Disease Father     Cancer Father         Non-melanoma skin cancer multiple times    No Known Problems Brother     No Known Problems Brother     No Known Problems Brother     No Known Problems Maternal Grandmother     No Known Problems Maternal Grandfather     Breast Cancer Paternal Grandmother 47        reoccurred at 46    Ovarian Cancer Paternal Grandmother 46    Bilateral breast cancer Paternal Grandmother         Opposite breast, age 52's    Breast Cancer Maternal Aunt 48    Breast Cancer Paternal Aunt         breast      Germline genetic testing: Sent and negative 3/4/22. Hospitalizations:   None recent    Allergies:   Allergies   Allergen Reactions    Codeine Hives and Swelling     And vomiting    Cortisone Swelling     Apparently tolerates topical and IV with benadryl well    Influenza Virus Vaccine Other (See Comments)     Pt states could not walk after getting     Darvocet A500 [Propoxyphene N-Acetaminophen] Nausea And Vomiting    Sulfa Antibiotics Nausea And Vomiting and Swelling     Not throat swelling        Adult Illness:  Patient Active Problem List   Diagnosis    ACS (acute coronary syndrome) (HCC)    Chest pain with moderate risk for cardiac etiology    Essential hypertension    HLD (hyperlipidemia)    Tobacco abuse    History of MI (myocardial infarction)    Acquired hypothyroidism    Unstable angina ([address]    Coronary artery disease involving native coronary artery of native heart without angina pectoris    Paroxysmal atrial fibrillation (HCC)    Gastroesophageal reflux disease without esophagitis    Malignant neoplasm of lower-inner quadrant of left breast in female, estrogen receptor negative ([address]    Malignant neoplasm of central portion of left breast in female, estrogen receptor negative ([facility]    Hematoma      Patient is on Eliquis and aspirin for PAF. Surgery:  Past Surgical History:   Procedure Laterality Date    BREAST LUMPECTOMY Left 3/22/2022    LEFT BREAST LUMPECTOMY PARTIAL MASTECTOMY, SENTINEL LYMPH NODE BIOPSY, PRE OP NEEDLE LOC X 2 performed by Effie Antoine MD at [address] [facility] Left 3/30/2022    Evacuation hematoma left axilla performed by Effie Antoine MD at [address] [place] [address] ARTHROSCOPY Right     08    JENNIFER BIOPSY LYMPH NODE BY NEEDLE SUPERFICIAL  02/24/2022    JENNIFER BIOPSY LYMPH NODE BY NEEDLE SUPERFICIAL 2/24/2022 China Vargas MD [facility] US GUID NDL BIOPSY LEFT Left 02/24/2022    JENNIFER US GUID NDL BIOPSY LEFT 2/24/2022 China Vargas MD [facility]    MOUTH SURGERY      ID BIOPSY OF BREAST, INCISIONAL Left 1998    excisional bx-H. Chollet fibrocystic changes    US BREAST BIOPSY NEEDLE ADDITIONAL LEFT Left 02/24/2022    US BREAST BIOPSY NEEDLE ADDITIONAL LEFT 2/24/2022 China Vargas MD [facility]    US GUIDED NEEDLE LOC BREAST ADDL LEFT Left 3/22/2022    US GUIDED NEEDLE LOC BREAST ADDL LEFT 3/22/2022 [facility] [address]        Medications:  Current Outpatient Medications   Medication Sig Dispense Refill    ondansetron (ZOFRAN-ODT) 8 MG TBDP disintegrating tablet Place 1 tablet under the tongue every 8 hours as needed for Nausea or Vomiting 20 tablet 2    prochlorperazine (COMPAZINE) 10 MG tablet Take 1 tablet by mouth every 6 hours as needed (nausea) 30 tablet 1    lidocaine-prilocaine (EMLA) 2.5-2.5 % cream Apply topically as needed.  5 g 1    metoprolol succinate (TOPROL XL) 100 MG extended release tablet TAKE 1 TABLET BY MOUTH EVERY DAY 90 tablet 1    atorvastatin (LIPITOR) 40 MG tablet TAKE 1 TABLET DAILY 90 tablet 3    ELIQUIS 5 MG TABS tablet TAKE 1 TABLET BY MOUTH TWICE A  tablet 3    flecainide (TAMBOCOR) 100 MG tablet TAKE 1 TABLET BY MOUTH TWICE A  tablet 3    levothyroxine (SYNTHROID) 125 MCG tablet TAKE 1 TABLET BY MOUTH EVERY DAY IN THE MORNING ON EMPTY STOMACH      ZINC PO Take by mouth daily      Ascorbic Acid (VITAMIN C PO) Take by mouth daily      omeprazole (PRILOSEC) 10 MG delayed release capsule Take 10 mg by mouth daily      aspirin 81 MG chewable tablet Take 1 tablet by mouth daily. 30 tablet 3    ibuprofen (ADVIL;MOTRIN) 800 MG tablet Take 800 mg by mouth every 6 hours as needed for Pain (Patient not taking: Reported on 2022)      nitroGLYCERIN (NITROSTAT) 0.4 MG SL tablet Place 1 tablet under the tongue every 5 minutes as needed for Chest pain (Patient not taking: Reported on 2022) 25 tablet 3     No current facility-administered medications for this visit. EXAM:   height is 6' (1.829 m) and weight is 323 lb (146.5 kg) (abnormal). Her oral temperature is 98.1 °F (36.7 °C). Her blood pressure is 115/74 and her pulse is 83. Her respiration is 20 and oxygen saturation is 96%. Estimated body surface area is 2.73 meters squared as calculated from the following:    Height as of this encounter: 6' (1.829 m). Weight as of this encounter: 323 lb (146.5 kg). ECO  General: Non-ill appearing. Very pleasant and upbeat. Morbidly obese. HEENT: NC/AT,nonicteric, perrla,eom intact, no mucosal lesions, dentition in good repair. Neck: normal thyroid, no masses. pulses nl, no bruits,   Nodes: No adenopathy  Lungs/chest: clear, no rales,rhonchi or wheezing, lung bases clear  CV: rrr, no rubs ,gallops or murmurs  Breasts: Postsurgical ecchymosis in the left breast post lumpectomy and recent evacuation of hematoma in the left axilla. No palpable masses bilaterally  Abd/Rectal: soft, non-tender,bowel sounds normal , no HSM,no masses, obese  Back: normal curvature, No midline tenderness. flanks nontender  : Not Examined  Extremities: no cyanosis,clubbing or edema.   Skin: unremarkable  Neuro: A and O x 4, CN exam nonfocal, Motor- no deficits, Sensory- no deficits, gait-nl, speech- fluent, no ataxia. Devices: none      DATA:    LAB:   4/5/2022-CBC, CMP, CA 27-29, serum estradiol, FSH and LH.     CBC with Differential:      Lab Results   Component Value Date    WBC 8.8 12/21/2021    RBC 4.91 12/21/2021    HGB 15.2 12/21/2021    HCT 47.3 12/21/2021     12/21/2021    MCV 96.3 12/21/2021    MCH 31.0 12/21/2021    MCHC 32.1 12/21/2021    RDW 12.8 11/11/2014    NRBC 0 12/21/2021    SEGSPCT 65.5 12/21/2021    MONOPCT 7.2 12/21/2021    MONOSABS 0.6 12/21/2021    LYMPHSABS 1.9 12/21/2021    EOSABS 0.4 12/21/2021    BASOSABS 0.0 12/21/2021     Lab Results   Component Value Date/Time    SEGSABS 5.8 12/21/2021 12:28 PM       CMP:    Lab Results   Component Value Date     12/21/2021    K 4.2 12/21/2021    K 3.8 12/04/2019     12/21/2021    CO2 24 12/21/2021    BUN 12 12/21/2021    CREATININE 0.5 12/21/2021    LABGLOM >90 12/21/2021    GLUCOSE 105 12/21/2021    PROT 7.2 12/21/2021    LABALBU 4.1 12/21/2021    CALCIUM 9.1 12/21/2021    BILITOT 0.6 12/21/2021    ALKPHOS 115 12/21/2021    AST 18 12/21/2021    ALT 23 12/21/2021       BMP:    Lab Results   Component Value Date     12/21/2021    K 4.2 12/21/2021    K 3.8 12/04/2019     12/21/2021    CO2 24 12/21/2021    BUN 12 12/21/2021    LABALBU 4.1 12/21/2021    CREATININE 0.5 12/21/2021    CALCIUM 9.1 12/21/2021    LABGLOM >90 12/21/2021    GLUCOSE 105 12/21/2021       Magnesium:    Lab Results   Component Value Date    MG 2.3 12/05/2019     PT/INR:    Lab Results   Component Value Date    INR 1.01 12/04/2019     TSH:    Lab Results   Component Value Date    TSH 4.690 12/21/2021     VITAMIN B12: No components found for: B12  FOLATE:  No results found for: FOLATE  IRON:  No results found for: IRON  Iron Saturation:  No components found for: PERCENTFE  TIBC:  No results found for: TIBC  FERRITIN:  No results found for: FERRITIN  PSA: No results found for: PSA         IMAGING:  LOCATION: [place]       PROCEDURE: [facility] MICHAEL DIGITAL DIAGNOSTIC BILATERAL, US BREAST LIMITED LEFT       CLINICAL INFORMATION: Subareolar mass of left breast . Tomosynthesis. Meets criteria for genetic evaluation and has been offered information for possible referral.           PATIENT MEDICAL HISTORY: No relevant medical history has been documented for this patient.       FAMILY HISTORY: Family medical history includes breast cancer in paternal grandmother and ovarian cancer in paternal grandmother.       RISK VALUES: Dannial Carbon.: 4.2%, Tyrer-Shirleyzick life: 15.3%       COMPARISON: 6/1/2015       [facility] MICHAEL DIGITAL DIAGNOSTIC BILATERAL: 2/18/22       TECHNIQUE: Bilateral CC and MLO views were obtained each breast. Tomosynthesis was used. CAD was used.        BREAST COMPOSITION: The tissue of the breast(s) is heterogeneously dense. This may lower the sensitivity of mammography.       FINDINGS:        There is a nodular density within the inner central left breast anterior depth which correlates to the palpable abnormality. Further evaluation with targeted ultrasound is reported below.       The additional palpable abnormality within the periareolar region of the left breast does not demonstrate 8 definite mammographic correlate. However, further evaluation with targeted ultrasound is reported below.           US BREAST LIMITED LEFT:       TECHNIQUE: Targeted ultrasound of the left breast was performed. Grayscale images and color images of the real-time examination were reviewed. The axilla was also imaged.       FINDINGS:        There is a hypoechoic lobulated mass within the inner central left breast near the 9:00 position 1 cm from the nipple measuring 1.9 x 1.4 x 1.6 cm. This correlates with the mammographic finding and the palpable abnormality.  Recommend ultrasound-guided    biopsy.       There is a small hypoechoic nodular lesion within the upper central left breast 12:00 position 3 cm from the nipple measuring 0.3 x 0.2 x 0.3 cm. This is incidental. However, recommend ultrasound-guided biopsy.       There is an enlarged lymph node demonstrated within the left axilla was cortical thickening measuring 4.9 mm. There is retained fatty hilum. Rhett Al is an additional prominent lymph node within the left axilla with slightly less cortical thickening    measuring 4 mm. Recommend ultrasound-guided biopsy of the largest left axillary lymph node.            2/18/22   Impression   1. There is a hypoechoic lobulated mass within the inner central left breast near the 9:00 position 1 cm from the nipple measuring 1.9 x 1.4 x 1.6 cm. This correlates with the mammographic finding and the palpable abnormality. Recommend ultrasound-guided    biopsy.       2. There is a small hypoechoic nodular lesion within the upper central left breast 12:00 position 3 cm from the nipple measuring 0.3 x 0.2 x 0.3 cm. This is incidental. However, recommend ultrasound-guided biopsy.       3. There is an enlarged lymph node demonstrated within the left axilla was cortical thickening measuring 4.9 mm. There is retained fatty hilum. Rhett Al is an additional prominent lymph node within the left axilla with slightly less cortical thickening    measuring 4 mm. Recommend ultrasound-guided biopsy of the largest left axillary lymph node.       These results were discussed with the patient. The tech navigator was notified. We will arrange scheduling the patient for her procedure per department protocol.       BI-RADS CATEGORY 4C - Suspicious abnormality - High suspicion for malignancy.       Management: Tissue diagnosis.  Biopsy should be performed in the absence of clinical contraindication.               **This report has been created using voice recognition software. It may contain minor errors which are inherent in voice recognition technology. **       Final report electronically signed by Dr. Caleb Barnard on 2/18/2022 4:48 PM        US BREAST BIOPSY W LOC DEVICE EACH ADDL LESION LEFT (Order [id number]) - Reflex for Order [id number]  Narrative   LOCATION: [place]       EXAM:     1. MAMMOGRAM POST BX CLIP PLACEMENT LEFT, JENNIFER US GUID NDL BIOPSY LEFT, JENNIFER NEEDLE BIOPSY LYMPH NODE SUPERFICIAL, US BREAST BIOPSY W LOC DEVICE EACH ADDL LESION LEFT       1. Biopsy of the left breast, percutaneous, using imaging guidance, 2 sites. 2. Biopsy of the left axilla, axillary lymph node, percutaneous, using imaging guidance. 3. Ultrasound guidance for biopsy, imaging supervision and interpretation. 4. Postprocedural diagnostic left mammogram.           HISTORY: 51 years, Female, Mass overlapping multiple quadrants of left breast, Lymph node enlargement. .       COMPARISON:  2/18/2022 and 6/1/2015.       TECHNIQUE/FINDINGS:        Written, informed consent was obtained, including discussion of the risks, benefits and alternatives. The patient's left breast was marked by the physician with initials. A timeout was performed including the patient's name, date of birth, procedure and    laterality.       Site 1, 3 mm nodular density, upper central left breast, 12:00, U clip:  An ultrasound-guided biopsy using real-time ultrasound was performed. The nodule in the upper central left breast was identified, localized , and the site was marked. With    ultrasound guidance from a medial approach, the area was prepped and draped in sterile fashion. 2 % lidocaine was used for local anesthesia. 14 ga Sertera coaxial needle was advanced under ultrasound guidance. Once the needle was documented to be in the    correct location, 4 samples were taken from the area of interest. Samples were placed in formalin sent to pathology. A U shaped biopsy marker was placed at the biopsy site.       Site 2, 1.9 cm mass, 9:00, inner central left breast, barbell clip:  An ultrasound-guided biopsy using real-time ultrasound was performed.  The mass in the inner central left breast was identified, localized , and the site was marked. With ultrasound    guidance from a medial approach, the area was prepped and draped in sterile fashion. 2 % lidocaine was used for local anesthesia. 14 ga Sertera coaxial needle was advanced under ultrasound guidance. Once the needle was documented to be in the correct    location, 4 samples were taken from the area of interest. Samples were placed in formalin sent to pathology. A barbell biopsy marker was placed at the biopsy site.       Site 3, left axillary lymph node, tribell clip: An ultrasound-guided biopsy using real-time ultrasound was performed. The lymph node in the left axilla was identified, localized , and the site was marked. With ultrasound guidance from a lateral approach,    the area was prepped and draped in sterile fashion. 2 % lidocaine was used for local anesthesia. 14 ga Sertera coaxial needle was advanced under ultrasound guidance. Once the needle was documented to be in the correct location, 4 samples were taken from    the area of interest. Samples were placed in formalin sent to pathology. A tribell biopsy marker was placed at the biopsy site.       Clip placement was confirmed with a left digital diagnostic mammogram. The mammogram was performed as a separate procedure in a separate room with a dedicated machine.       The patient tolerated the procedure well. No evidence of immediate complication. The patient was given post-procedure instructions, including wound care.           POSTPROCEDURE MAMMOGRAM:       Images of the left include a CC view and MLO view. Tomosynthesis. CAD was utilized.       There are scattered fibroglandular elements in the breast(s) that could obscure a lesion on mammography. Post procedure mammograms were taken in a separate room. There is a U shaped biopsy clip at the biopsy site 1, 12:00, anterior depth. There is a    barbell clip in the inner central left breast, within the mammographic mass.  This corresponds with the original mammographic density. There is a tribell clip in the left axilla.       There are no other significant findings in the breast.               Impression   1. Successful left breast ultrasound guided core needle biopsy, 2 sites. 2. Successful left axillary lymph node ultrasound guided core needle biopsy. 3. Successful marker clip placements with confirmation by digital diagnostic mammogram.            Waiting for pathology results. A final report will be issued when these become available.           BI-RADS Final Assessment Category: Waiting for pathology.       Management Recommendation: Assess radiologic/pathologic concordance.                   **This report has been created using voice recognition software. It may contain minor errors which are inherent in voice recognition technology. **       Final report electronically signed by Dr. Laz Colindres on 2022 10:00 AM          [facility] NEEDLE BIOPSY LYMPH NODE SUPERFICIAL (Order [id number]) - Reflex for Order [id number]  Narrative   LOCATION: [place]       EXAM:     1. MAMMOGRAM POST BX CLIP PLACEMENT LEFT, JENNIFER US GUID NDL BIOPSY LEFT, JENNIFER NEEDLE BIOPSY LYMPH NODE SUPERFICIAL, US BREAST BIOPSY W LOC DEVICE EACH ADDL LESION LEFT       1. Biopsy of the left breast, percutaneous, using imaging guidance, 2 sites. 2. Biopsy of the left axilla, axillary lymph node, percutaneous, using imaging guidance. 3. Ultrasound guidance for biopsy, imaging supervision and interpretation. 4. Postprocedural diagnostic left mammogram.           HISTORY: 51 years, Female, Mass overlapping multiple quadrants of left breast, Lymph node enlargement. .       COMPARISON:  2022 and 2015.       TECHNIQUE/FINDINGS:        Written, informed consent was obtained, including discussion of the risks, benefits and alternatives. The patient's left breast was marked by the physician with initials.  A timeout was performed including the patient's name, date of birth, procedure and    laterality.       Site 1, 3 mm nodular density, upper central left breast, 12:00, U clip:  An ultrasound-guided biopsy using real-time ultrasound was performed. The nodule in the upper central left breast was identified, localized , and the site was marked. With    ultrasound guidance from a medial approach, the area was prepped and draped in sterile fashion. 2 % lidocaine was used for local anesthesia. 14 ga Sertera coaxial needle was advanced under ultrasound guidance. Once the needle was documented to be in the    correct location, 4 samples were taken from the area of interest. Samples were placed in formalin sent to pathology. A U shaped biopsy marker was placed at the biopsy site.       Site 2, 1.9 cm mass, 9:00, inner central left breast, barbell clip:  An ultrasound-guided biopsy using real-time ultrasound was performed. The mass in the inner central left breast was identified, localized , and the site was marked. With ultrasound    guidance from a medial approach, the area was prepped and draped in sterile fashion. 2 % lidocaine was used for local anesthesia. 14 ga Sertera coaxial needle was advanced under ultrasound guidance. Once the needle was documented to be in the correct    location, 4 samples were taken from the area of interest. Samples were placed in formalin sent to pathology. A barbell biopsy marker was placed at the biopsy site.       Site 3, left axillary lymph node, tribell clip: An ultrasound-guided biopsy using real-time ultrasound was performed. The lymph node in the left axilla was identified, localized , and the site was marked. With ultrasound guidance from a lateral approach,    the area was prepped and draped in sterile fashion. 2 % lidocaine was used for local anesthesia. 14 ga Sertera coaxial needle was advanced under ultrasound guidance.  Once the needle was documented to be in the correct location, 4 samples were taken from    the area of interest. Samples were placed in formalin sent to pathology. A tribell biopsy marker was placed at the biopsy site.       Clip placement was confirmed with a left digital diagnostic mammogram. The mammogram was performed as a separate procedure in a separate room with a dedicated machine.       The patient tolerated the procedure well. No evidence of immediate complication. The patient was given post-procedure instructions, including wound care.           POSTPROCEDURE MAMMOGRAM:       Images of the left include a CC view and MLO view. Tomosynthesis. CAD was utilized.       There are scattered fibroglandular elements in the breast(s) that could obscure a lesion on mammography. Post procedure mammograms were taken in a separate room. There is a U shaped biopsy clip at the biopsy site 1, 12:00, anterior depth. There is a    barbell clip in the inner central left breast, within the mammographic mass. This corresponds with the original mammographic density. There is a tribell clip in the left axilla.       There are no other significant findings in the breast.               Impression   1. Successful left breast ultrasound guided core needle biopsy, 2 sites. 2. Successful left axillary lymph node ultrasound guided core needle biopsy. 3. Successful marker clip placements with confirmation by digital diagnostic mammogram.            Waiting for pathology results. A final report will be issued when these become available.           BI-RADS Final Assessment Category: Waiting for pathology.       Management Recommendation: Assess radiologic/pathologic concordance.                   **This report has been created using voice recognition software. It may contain minor errors which are inherent in voice recognition technology. **       Final report electronically signed by Dr. Demarco Handkatarina on 2/24/2022 10:00 AM          JENNIFER HIRSCH/ [address] (Order [id number]) - Reflex for Order [id number]  Narrative   LOCATION: [place]     EXAM:     1. MAMMOGRAM POST BX CLIP PLACEMENT LEFT, JENNIFER US GUID NDL BIOPSY LEFT, JENNIFER NEEDLE BIOPSY LYMPH NODE SUPERFICIAL, US BREAST BIOPSY W LOC DEVICE EACH ADDL LESION LEFT       1. Biopsy of the left breast, percutaneous, using imaging guidance, 2 sites. 2. Biopsy of the left axilla, axillary lymph node, percutaneous, using imaging guidance. 3. Ultrasound guidance for biopsy, imaging supervision and interpretation. 4. Postprocedural diagnostic left mammogram.           HISTORY: 51 years, Female, Mass overlapping multiple quadrants of left breast, Lymph node enlargement. .       COMPARISON:  2/18/2022 and 6/1/2015.       TECHNIQUE/FINDINGS:        Written, informed consent was obtained, including discussion of the risks, benefits and alternatives. The patient's left breast was marked by the physician with initials. A timeout was performed including the patient's name, date of birth, procedure and    laterality.       Site 1, 3 mm nodular density, upper central left breast, 12:00, U clip:  An ultrasound-guided biopsy using real-time ultrasound was performed. The nodule in the upper central left breast was identified, localized , and the site was marked. With    ultrasound guidance from a medial approach, the area was prepped and draped in sterile fashion. 2 % lidocaine was used for local anesthesia. 14 ga Sertera coaxial needle was advanced under ultrasound guidance. Once the needle was documented to be in the    correct location, 4 samples were taken from the area of interest. Samples were placed in formalin sent to pathology. A U shaped biopsy marker was placed at the biopsy site.       Site 2, 1.9 cm mass, 9:00, inner central left breast, barbell clip:  An ultrasound-guided biopsy using real-time ultrasound was performed. The mass in the inner central left breast was identified, localized , and the site was marked.  With ultrasound    guidance from a medial approach, the area was prepped and draped in sterile fashion. 2 % lidocaine was used for local anesthesia. 14 ga Sertera coaxial needle was advanced under ultrasound guidance. Once the needle was documented to be in the correct    location, 4 samples were taken from the area of interest. Samples were placed in formalin sent to pathology. A barbell biopsy marker was placed at the biopsy site.       Site 3, left axillary lymph node, tribell clip: An ultrasound-guided biopsy using real-time ultrasound was performed. The lymph node in the left axilla was identified, localized , and the site was marked. With ultrasound guidance from a lateral approach,    the area was prepped and draped in sterile fashion. 2 % lidocaine was used for local anesthesia. 14 ga Sertera coaxial needle was advanced under ultrasound guidance. Once the needle was documented to be in the correct location, 4 samples were taken from    the area of interest. Samples were placed in formalin sent to pathology. A tribell biopsy marker was placed at the biopsy site.       Clip placement was confirmed with a left digital diagnostic mammogram. The mammogram was performed as a separate procedure in a separate room with a dedicated machine.       The patient tolerated the procedure well. No evidence of immediate complication. The patient was given post-procedure instructions, including wound care.           POSTPROCEDURE MAMMOGRAM:       Images of the left include a CC view and MLO view. Tomosynthesis. CAD was utilized.       There are scattered fibroglandular elements in the breast(s) that could obscure a lesion on mammography. Post procedure mammograms were taken in a separate room. There is a U shaped biopsy clip at the biopsy site 1, 12:00, anterior depth. There is a    barbell clip in the inner central left breast, within the mammographic mass. This corresponds with the original mammographic density.  There is a tribell clip in the left axilla.       There are no other significant findings in the breast.               Impression   1. Successful left breast ultrasound guided core needle biopsy, 2 sites. 2. Successful left axillary lymph node ultrasound guided core needle biopsy. 3. Successful marker clip placements with confirmation by digital diagnostic mammogram.            Waiting for pathology results. A final report will be issued when these become available.           BI-RADS Final Assessment Category: Waiting for pathology.       Management Recommendation: Assess radiologic/pathologic concordance.                   **This report has been created using voice recognition software. It may contain minor errors which are inherent in voice recognition technology. **       Final report electronically signed by Dr. Allie Regalado on 2022 10:00 AM           TTE procedure:ECHOCARDIOGRAM COMPLETE 2D W DOPPLER W COLOR. Procedure Date  Date: 2021 Start: 09:44 AM     Study Location: Echo Lab  Technical Quality: Limited visualization due to body habitus.     Indications:Fatigue and Shortness of breath.     Additional Medical History:acute coronary syndrome, chest pain,  hypertension, tobacco abuse, history of myocardial infarction, unstable  angina, coronary artery disease, atrial fibrillation, gastroesophageal  reflux disease, hyperlipidemia, hypothyroid     Patient Status: Routine     Height: 72 inches Weight: 309.01 pounds BSA: 2.56 m^2 BMI: 41.91 kg/m^2     BP: 122/79 mmHg      Conclusions      Summary  21   Ejection fraction is visually estimated at 60%. Overall left ventricular function is normal.      Signature     Followed by Dr. Janet Clemente cardiology; history of proximal A. fib.     PROCEDURES:  -See above    PATHOLOGY:              : 1970  AGE: 51 Y                          PATHOLOGY REPORT                       ATTN: ADAM Joshua.Shutters                  REQ: Josiah Shen       Copies To:   SUSANNAH BERRY; Vladislav Vasquez; CARLOS MENDENHALL       Clinical Information: LT BREAST MASS 12:00, 9:00, LT ENLARGED AXILLARY   LYMPH NODE   2/24/22  ADDENDUM REPORT 2/28/2022     FINAL DIAGNOSIS:   A.  Left breast mass, 12:00, U-Clip, biopsy:             Fibroglandular breast tissue with stromal fibrosis. B.  Left breast mass, 9:00, barbell clip, biopsy:             Invasive ductal carcinoma, Rekha grade 3. C.  Left axillary lymph node, [place] clip, biopsy:    Fragments of lymph node, negative for malignancy. BREAST BIOMARKERS*   Estrogen Receptor: (Clone SP1), [employer]       Negative (<1% of cells staining)     Progesterone Receptor: (Clone 1E2), [employer]       Negative (<1% of cells staining)     Ki-67 (clone 30-9)       Percentage of positive nuclei:  95%               Favorable <10%               Borderline 10-20%               Unfavorable >20%        2018          Inform HER2 Dual CHEPE         HER2 IHCClone 4B5      ASCO/CAP      [employer]      HER2 dual                                  Systems Additional      CHEPE Group #                                Workup   (  Group 4:      HER2/CEP17 Ratio <2.0 and    HER2 NEGATIVE, HER2   X                >=.0 and <6.0 HER2           IHC is 0-1+.  See Group   )                signals/cell                  4 comment.                      Number of observers: 3                    (PCF/MTK/ALP)                    Number of invasive tumor                    cells counted: 20                    Using dual probe assay:                       Average number of HER2                    signals per cell:  4.3                                 Average number                     of CEP17 signals per cell:                      3.4                     HER2/CEP17 ratio:  1.3 but the latter is considered negative and was reflexed to  [facility] which is negative ,therefore pathology Dr. Aliza Villarreal reassures me that her  HER-2 negative and that this does not have to be repeated on the definitive surgical specimen. Group 4 comment: It is uncertain whether patients with an average of >=   4.0 and < 6.0 HER2 signals per cell and a HER2/CEP17 ratio of < 2.0   benefit from HER2 targeted therapy in the absence of protein   overexpression (IHC 3+). If the specimen test result is close to the   CHEPE ratio threshold for positive, there is a high likelihood that   repeat testing will result in different results by chance alone. Therefore, when [facility] results are not 3+ positive, it is recommended that   the sample be considered HER2 negative without additional testing on   the same specimen. External Controls:  Adequate   Internal Controls:  Adequate   Standard Assay Conditions:  Met     Staining Method Used:    Formalin fixation    Antigen retrieval type:  Cell Conditioning 1, mild merle    Time in antigen retrieval:  30 minutes    Detection system type:   DAB Ultraview kit       Specimen:   A) CORE NEEDLE BREAST BIOPSY, LT MASS 12:00 U CLIP   B) CORE NEEDLE BREAST BIOPSY, LT MASS 9:00 BARBELL   C) CORE NEEDLE BREAST BIOPSY, LT AXILLARY LYMPH NODE TRIBELL       Definitive lumpectomy and sentinel node biopsy 3/22/2022  Clinical Information: INVASIVE DUCTAL CARCINOMA LEFT BREAST 3/22/22    FINAL DIAGNOSIS:   A: Left axillary sentinel lymph nodes, resection:     Four lymph nodes with no evidence of malignancy.  (0/4)     One lymph node with previous biopsy site changes.     One lymph node with black pigment deposition.    B: Left breast, lumpectomy specimen:     Invasive ductal carcinoma, Rekha grade 3 of 3.     Invasive carcinoma is 2.7 cm in greatest dimension.     Invasive carcinoma is 1.5 mm to the closest margin-inferior/caudal.   Mati Blood carcinoma is > 5 mm to all other margins.     Changes consistent with previous biopsy site.       pT2, pN0(sn)     Specimen:   A) SENTINEL LYMPH NODE(S), LEFT AXILLARY   B) EXCISION OF BREAST, LEFT CANCER LUMP     CASE SUMMARY: (INVASIVE CARCINOMA OF THE BREAST: Resection) Standard(s): AJCC-UICC 8     SPECIMEN   Procedure   Excision (less than total mastectomy)     Specimen Laterality   Left     TUMOR   Tumor Site   Clock position    Specify Clock Position    9 o'clock     Histologic Type   Invasive carcinoma of no special type (ductal)     Histologic Grade (Rekha Histologic Score)   Rescue Score    Glandular (Acinar) / Tubular Differentiation    Score 3 (less than 10% of tumor area forming glandular / tubular   structures)      Nuclear Pleomorphism    Score 3 (Vesicular nuclei, often with prominent nucleoli, exhibiting    marked variation in size and shape, occasionally with very large and    bizarre forms)      Mitotic Rate    See Table 1 in CAP Protocol.    Score 3      Overall Grade    Grade 3 (scores of 8 or 9)     Tumor Size   Greatest dimension of largest invasive focus greater than 1 mm (specify   exact measurement in Millimeters (mm)): 27 mm    Additional Dimension in Millimeters (mm): 17 x 15 mm     Tumor Focality   Single focus of invasive carcinoma     Ductal Carcinoma In Situ (DCIS)   Not identified     Lobular Carcinoma In Situ (LCIS)   Not identified     Tumor Extent    Tumor Extent    Not applicable (skin, nipple, and skeletal muscle are absent)     Lymphovascular Invasion   Not identified     Dermal Lymphovascular Invasion   No skin present     Treatment Effect in the Breast   No known presurgical therapy     Treatment Effect in the Lymph Nodes   Not applicable     MARGINS   Margin Status for Invasive Carcinoma   All margins negative for invasive carcinoma    Distance from Invasive Carcinoma to Closest Margin    Specify in Millimeters (mm)    Exact distance: 1.5 mm      Closest Margin to Invasive Carcinoma    Inferior/caudal      Distance from Invasive Carcinoma to Anterior/Skin Margin    Specify in Millimeters (mm)    Exact distance: 7 mm      Distance from Invasive Carcinoma to Posterior/Deep Margin    Specify in Millimeters (mm)    Exact distance: 10 mm  Distance from Invasive Carcinoma to Superior/Cranial Margin    Specify in Millimeters (mm)    Exact distance: 7 mm      Distance from Invasive Carcinoma to Inferior/Caudal Margin    Specify in Millimeters (mm)    Exact distance: 1.5 mm      Distance from Invasive Carcinoma to Medial Margin    Specify in Millimeters (mm)    Greater than: 10 mm      Distance from Invasive Carcinoma to Lateral Margin    Specify in Millimeters (mm)    Greater than: 10 mm     Margin Status for DCIS   Not applicable (no DCIS in specimen)       REGIONAL LYMPH NODES   Regional Lymph Node Status   Regional lymph nodes present    All regional lymph nodes negative for tumor      Total Number of Lymph Nodes Examined (sentinel and non-sentinel)    Exact number (specify): 4    Number of Ona Nodes Examined    Exact number (specify): 4     Regional Lymph Node Comment: Biopsy site changes are present within one   of the lymph nodes.  In addition, another lymph node demonstrates black   pigment deposition (possibly tattoo or anthracosis). DISTANT METASTASIS   Distant Site   Not applicable     PATHOLOGIC STAGE CLASSIFICATION (pTNM, AJCC 8th Edition)   TNM Descriptors   Not applicable     pT Category   pT2: Tumor greater than 20 mm but less than or equal to 50 mm in   greatest dimension     Regional Lymph Nodes Modifier   The (sn) modifier is added to the N category when a sentinel node   biopsy is performed (using either dye or tracer) and fewer than six   lymph nodes are removed (sentinel and nonsentinel). (sn): Ona nodes evaluated.      pN Category   pN0: No regional lymph node metastasis identified or ITCs only#     pM Category   Not applicable - pM cannot be determined from the submitted specimen(s)       ADDITIONAL FINDINGS   Additional Findings (specify): Changes consistent with previous biopsy   site, usual ductal hyperplasia     SPECIAL STUDIES   Breast Biomarker Testing Performed on Previous Biopsy   Estrogen Receptor (ER)  Estrogen Receptor (ER) Status    Negative     Progesterone Receptor (PgR)    Progesterone Receptor (PgR) Status    Negative     HER2 (by immunohistochemistry)    HER2 (by immunohistochemistry)    Negative (Score 0-1+)     HER2 (by in situ hybridization)    HER2 (by in situ hybridization)    Indeterminate   Case reviewed with Dr. Donna Lawson pathology who reassured me that in fact the HER-2 is negative    Ki-67    Ki-67 Percentage of Positive Nuclei: 95%    Testing Performed on Case Number: [id number]     [id number] x 2   [id number] x2                                                       <Sign Out Dr. Dorota Castillo M.D., F.C.A.P.       GENETICS:  Germline testing done 3/4/2022-negative    MOLECULAR:  Not applicable    ASSESSMENT/PLAN:    1: Diagnosis: 47-year-old postmenopausal female with a palpable lesion noted in 2/8/2022 with subsequent biopsy and definitive lumpectomy showing high risk cancer of the left breast.  Pathologic stage T2N0 triple negative grade 3.    2) Prognosis / Disease Status: High risk/JAVIER    3) Work-up:    Labs: CBC, CMP, CA 27-29, estradiol, FSH and LH   Imaging: Bone scan. No additional imaging needed unless labs indicate abnormalities. Procedures: Left lumpectomy and axillary dissection. Consults: Request port by Dr. Matthew Giang. Chemotherapy teaching   Other: Most importantly, need input from Dr. Brian Shaikh of cardiology regarding her cardiac history and status and ability to tolerate Adriamycin as part of her adjuvant chemotherapy. 4) Symptom Management: Patient is asymptomatic. 5) Supportive care provided. Level of care is appropriate. Teaching done today. Reviewed her high risk disease. Reviewed treatment options including dose dense AC followed by dose dense Taxol. Depending on cardiology input may need to switch to [facility] [place] which is Taxotere with Cytoxan.   Germline testing negative therefore no role for a part inhibitor after chemotherapy completed. 6) Treatment goal: Adjuvant      Treatment plan: If cleared by cardiology, dose dense AC followed by dose dense Taxol  Otherwise, TC i.e. Taxotere with Cytoxan. 7) Medications reviewed. Prescriptions today: Zofran ODT, Compazine, EMLA cream              Orders Placed This Encounter   Medications    ondansetron (ZOFRAN-ODT) 8 MG TBDP disintegrating tablet     Sig: Place 1 tablet under the tongue every 8 hours as needed for Nausea or Vomiting     Dispense:  20 tablet     Refill:  2    prochlorperazine (COMPAZINE) 10 MG tablet     Sig: Take 1 tablet by mouth every 6 hours as needed (nausea)     Dispense:  30 tablet     Refill:  1    lidocaine-prilocaine (EMLA) 2.5-2.5 % cream     Sig: Apply topically as needed. Dispense:  5 g     Refill:  1        [place]:  Controlled Substance Monitoring:    Acute and Chronic Pain Monitoring:   No flowsheet data found. 8) Research Options:      None      9) Other:        none    10) Follow Up: Follow-up with me on April 19 to review above data and establish final treatment plan after input from cardiology.       Tita Reagan MD

## 2022-04-05 NOTE — TELEPHONE ENCOUNTER
Dr Joshua Ni, the chemo they would like to start are:     Adriamycin Cytoxan and Taxol. Dr French Shows is asking your opinion from a cardiac standpoint.

## 2022-04-06 ENCOUNTER — HOSPITAL ENCOUNTER (OUTPATIENT)
Dept: PHYSICAL THERAPY | Age: 52
Setting detail: THERAPIES SERIES
Discharge: HOME | End: 2022-04-06
Payer: COMMERCIAL

## 2022-04-06 LAB
ESTRADIOL LEVEL: 15.4 PG/ML (ref 27–314)
FOLLICLE STIMULATING HORMONE: 41.9 MIU/ML (ref 1.7–21.5)
LUTEINIZING HORMONE: 39.5 MIU/ML (ref 1–95.6)

## 2022-04-06 PROCEDURE — 97162 PT EVAL MOD COMPLEX 30 MIN: CPT

## 2022-04-06 PROCEDURE — 97140 MANUAL THERAPY 1/> REGIONS: CPT

## 2022-04-06 NOTE — PROGRESS NOTES
** PLEASE SIGN, DATE AND TIME CERTIFICATION BELOW AND RETURN TO [facility] OUTPATIENT REHABILITATION (FAX #: [phone]). ATTEST/CO-SIGN IF ACCESSING VIA INSpac[employer]et. THANK YOU.**    I certify that I have examined the patient below and determined that Physical Medicine and Rehabilitation service is necessary and that I approve the established plan of care for up to 90 days or as specifically noted. Attestation, signature or co-signature of physician indicates approval of certification requirements.    ________________________ ____________ __________  Physician Signature   Date   Time  [facility]  PHYSICAL THERAPY  [x] EVALUATION    [x]  [facility]     Date: 2022  Patient Name:  Stacey Hatfield  : 1970  MRN: [id number]  CSN: [id number]    Referring Practitioner Dr. Tre Chang MD   Diagnosis Malignant neoplasm of lower-inner quadrant of left female breast [C50.312]  Estrogen receptor negative status (ER-) [Z17.1]    Treatment Diagnosis --Difficulty with prolonged ADLs & overhead reaching. Date of Evaluation 22    Additional Pertinent History -Essential Hypertension                 -MI  -Acquired Hypothroidism                -CAD  -Paroxysmal Atrial Fibrillation         -GERD      Functional Outcome Measure Used O: Lymphedema Life Impact Scale (Version 2)   Functional Outcome Score 21 (31%) (22-Initial evaluation)       Insurance: Primary: Payor: [employer] /  /  / ,   Secondary:    Authorization Information: -NO PRE-CERTIFICATION REQUIRED. Visit # 1, 1/10 for progress note   Visits Allowed: 60 VISITS PT/OT/ST COMBINED PER CALENDAR YEAR.    Recertification Date:  (12 weeks)   Physician Follow-Up: -Follow up appointment with Dr. Jordyn Monk MD tomorrow (2022)- Patient will discuss precautions with MD.   Physician Orders: AMBULATORY REFERRAL TO ONCOLOGY REHABILITATION (2022)   History of Present Illness: -\"Lucy is a 24-year-old premenopausal female developed a lump in the left breast just medial to the nipple on February 8. Strong family history of malignancy including breast cancer. Last mammogram was in 2015. Mammogram on 2/18 additional imaging confirmed a approximate 2 cm nodular density inferior central medial left breast.  Initial biopsy revealed invasive ductal cancer triple and grade 3. Definitive left breast lumpectomy and sentinel node resection on 3/22 revealed a 2.7 cm pathologic stage T2N0 triple negative grade 3 breast cancer. Other feeling the lump she is asymptomatic. Specifically denies any headaches bone pain breathing problems or any other focal or systemic complaints. Followed by cardiology with a history of a an MI in 2014 and proximal A. fib for which she is on anticoagulation followed by Dr. Carin Copeland. Echocardiogram last year normal.  And recent history no ischemic cardiac events\" (Reported per Dr. Esther Alvarado. Prosper Montez, Hematology/Oncology notes). SUBJECTIVE: The patient is a pleasant 54year old female who presented to the clinic on this date with healing incisions from 2 prior surgeries (03/22/2022-Lumpectomy with 4 Lymph Node Excision)(04/07/2022-Hematome Removal). The patient's incisions continue to demonstrate healing with scant drainage noted from left lateral trunk/breast, which the patient has placed an ABD pad for drainage. Social/Functional History and Current Status:  Medications and Allergies have been reviewed and are listed on Medical History Questionnaire. Precious Barney lives 32year old daughter, patient's parents in a single story home with Ramp entrance (front/back). .  Support system: Patient's daughter/future son-in-law and parents. Task Previous Current   ADLs  Independent Modified Independent: Slight limitation with overhead activities and prolonged upper extremity use (Increased fatigue of left arm).    IADL's Independent Independent   Ambulation Independent Independent   Transfers Independent Independent   Recreation Independent Independent Slight limitation with overhead activities and prolonged upper extremity use (Increased fatigue of left arm). Community Integration Independent Modified Independent   Driving Active  Active    Work [employer]. 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 Currently on medical leave. OBJECTIVE:   Posture: Fair (Slight posterior pelvic tilt). Palpation: -Tenderness reported in left lateral breast/trunk and axillary regions (surgical location with increased swelling/firmness. Observation: Noted tightness in left axillary region with shoulder flexion (skin stretching). Sensation: Patient reported numbness in posterior aspect of left upper arm. Range of Motion/Strength (Range of Motion in degrees)    Right Left Comments   Shoulder Flexion AROM 148 137 Seated position   Shoulder ABDuction AROM 152 140 Seated position   Shoulder Strength 4/5 4/5    Elbow Strength 5/5 5/5    Hip Flexion AROM WFL WFL Observed during functional activity. [facility]    \"   Knee Extension/Flexion [facility]OM [facility]    \"   Knee Strength [facility]    WFL    \"   Ankle AROM [facility]    WFL    \"   Ankle Strength [facility]    WFL    \"     Circumferential Measurements:   Upper Extremity Circumferential Measurements    Right (cm) Left (cm) Comments   Met Heads 19.3 19.7 Seated position with arm resting on tray table. Ulnar Styloid Process 17.6 17.6 \"   10 cm distal to Lateral Epicondyle 25.1 25.8 \"   Elbow Crease (24 cm) 31.4 32.6 \"   10 cm proximal from Lateral Epicondyle (30 cm) 39.7 42.2 \"   Axilla 49.2 49.3 \"   Total 182.3 187. 2 Axilla - to - Axilla:   Finger measurements: (cm)  -Little finger: (L) 5.7      (R) 5.7  -Ring finger: (L) 6.6      (R) 6.5  -Middle finger: (L) 6.8    (R) 7.0  -Index finger: (L) 6.9     (R) 7.0  -Thumb: (L) 7.0             (R) 7.1    Treatment Initiated:   1) Educated patient on proper skin care and risk of infection (Verbally and provided handouts). Specific Interventions Next Treatment:   1) Lymphedema awareness (Verbally, Demonstration, Handouts)  2) Demonstrate proper MLD (Drainage)/Skin care/precautions. 3) Review surgical precautions prior to initiating exercises (ROM). Activity Tolerance: Patient tolerated today's evaluation well. She became tearful when discussing the appearance of left breast and axillary region post surgery and with discussion of plan of care for Chemo and Radiation (Cancer treatments). ASSESSMENT:  Assessment: Refer to [facility] [employer]" above. Patient is motivated and eager to complete all treatments necessary. He goal is to have all of her Cancer treatments completed prior to her daughter's wedding in October 2022. Body Structures/Functions/Activity Limitations:Decreased affected limb strength, Decreased overall strength, Decreased tolerance of activities, Decreased affected limb range of motion, Difficulty reaching/carrying/pushing/pulling, Difficulty sleeping, Pain, Lymphedema Risk, Impaired posture and Disrupted scapulo-humeral rhythm  Prognosis: good    GOALS:  Patient Goal: \"I want to make sure these incisions heal properly and that the swelling and healing will be completed before my daughter gets  in October 2022. \" per patient. Short Term Goals to be met in 6 weeks:  1. 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 Long Term Goals to be met in 12 weeks:   1. Patient will demonstrate improvement by at least 3 points in Lymphedema Life Impact Scale (Version 2) demonstrating improved functional mobility and quality of life. Patient Education: Plan of care, goals.  See \"Treatment Initiated\" for further details. Education Outcome: Verbalized understanding  Education Barriers: None    PLAN:  Treatment Recommendations: Strengthening, Range of Motion, Conditioning, Lymphedema Management, Manual Techniques, Pain Management, Neuropathy Management, Postural Re-Training, Body Mechanics/Ergonomics, Home Exercise Prescription and Safety Education    Plan of care initiated. Plan to see patient 2 times per week for 12 weeks to address the treatment planned outlined above-Decrease treatment frequency as appropriate. Patient will call [facility] to schedule 1st treatment appointment following appointment with surgeon on 04/07/2022 (with reports of precautions). PENDING:   -Mediport: Placement April 2022 (No specific date reported by patient). -Chemotherapy: (Tentative date to begin-April 26, 2022)-2x/week for 12 weeks. -Radiation: ~20 Radiation treatments. THE PATIENT DEMONSTRATES GOOD POTENTIAL TO MAKE GOOD PROGRESS AND MEET ALL GOALS WITH TREATMENTS COMPLETED BY A SKILLED PHYSICAL THERAPIST/PHYSICAL THERAPIST ASSISTANT. Time In 0805   Time Out 0910   Timed Code Minutes: 10 min   Total Treatment Time: 65 min     Santino BARNES MD FOR ALLOWING US TO ASSIST IN THE CARE AND TREATMENT OF THIS PATIENT!      Electronically Signed by: Nadia Rinaldi PT, CLT-JAJA, ANDREY 4/6/2022

## 2022-04-06 NOTE — TELEPHONE ENCOUNTER
Peter Naidu RN at [facility]. Will forward this encounter to Dr Cesia Cheung as well.

## 2022-04-07 ENCOUNTER — OFFICE VISIT (OUTPATIENT)
Dept: SURGERY | Age: 52
End: 2022-04-07

## 2022-04-07 VITALS
HEIGHT: 72 IN | HEART RATE: 86 BPM | TEMPERATURE: 97.8 F | DIASTOLIC BLOOD PRESSURE: 62 MMHG | BODY MASS INDEX: 39.68 KG/M2 | SYSTOLIC BLOOD PRESSURE: 110 MMHG | WEIGHT: 293 LBS | RESPIRATION RATE: 18 BRPM | OXYGEN SATURATION: 91 %

## 2022-04-07 DIAGNOSIS — I25.2 HISTORY OF MI (MYOCARDIAL INFARCTION): ICD-10-CM

## 2022-04-07 DIAGNOSIS — I10 ESSENTIAL HYPERTENSION: ICD-10-CM

## 2022-04-07 DIAGNOSIS — I25.10 CORONARY ARTERY DISEASE INVOLVING NATIVE CORONARY ARTERY OF NATIVE HEART WITHOUT ANGINA PECTORIS: ICD-10-CM

## 2022-04-07 DIAGNOSIS — I48.0 PAROXYSMAL ATRIAL FIBRILLATION (HCC): ICD-10-CM

## 2022-04-07 DIAGNOSIS — C50.312 MALIGNANT NEOPLASM OF LOWER-INNER QUADRANT OF LEFT BREAST IN FEMALE, ESTROGEN RECEPTOR NEGATIVE (HCC): Primary | ICD-10-CM

## 2022-04-07 DIAGNOSIS — Z17.1 MALIGNANT NEOPLASM OF LOWER-INNER QUADRANT OF LEFT BREAST IN FEMALE, ESTROGEN RECEPTOR NEGATIVE (HCC): Primary | ICD-10-CM

## 2022-04-07 DIAGNOSIS — Z09 POSTOP CHECK: ICD-10-CM

## 2022-04-07 PROCEDURE — 99024 POSTOP FOLLOW-UP VISIT: CPT | Performed by: SURGERY

## 2022-04-08 LAB — CA 27-29: 15 U/ML (ref 0–38)

## 2022-04-11 ENCOUNTER — HOSPITAL ENCOUNTER (OUTPATIENT)
Dept: NUCLEAR MEDICINE | Age: 52
Discharge: HOME | End: 2022-04-11
Payer: COMMERCIAL

## 2022-04-11 ENCOUNTER — HOSPITAL ENCOUNTER (OUTPATIENT)
Dept: NON INVASIVE DIAGNOSTICS | Age: 52
Discharge: HOME | End: 2022-04-11
Payer: COMMERCIAL

## 2022-04-11 ENCOUNTER — HOSPITAL ENCOUNTER (OUTPATIENT)
Age: 52
Discharge: HOME | End: 2022-04-11
Payer: COMMERCIAL

## 2022-04-11 DIAGNOSIS — Z17.1 MALIGNANT NEOPLASM OF LOWER-INNER QUADRANT OF LEFT BREAST IN FEMALE, ESTROGEN RECEPTOR NEGATIVE (HCC): ICD-10-CM

## 2022-04-11 DIAGNOSIS — C50.312 MALIGNANT NEOPLASM OF LOWER-INNER QUADRANT OF LEFT BREAST IN FEMALE, ESTROGEN RECEPTOR NEGATIVE (HCC): ICD-10-CM

## 2022-04-11 LAB
EKG ATRIAL RATE: 68 BPM
EKG P AXIS: 71 DEGREES
EKG P-R INTERVAL: 168 MS
EKG Q-T INTERVAL: 402 MS
EKG QRS DURATION: 76 MS
EKG QTC CALCULATION (BAZETT): 427 MS
EKG R AXIS: 23 DEGREES
EKG T AXIS: 49 DEGREES
EKG VENTRICULAR RATE: 68 BPM
LV EF: 60 %
LVEF MODALITY: NORMAL

## 2022-04-11 PROCEDURE — 3430000000 HC RX DIAGNOSTIC RADIOPHARMACEUTICAL: Performed by: INTERNAL MEDICINE

## 2022-04-11 PROCEDURE — 78306 BONE IMAGING WHOLE BODY: CPT

## 2022-04-11 PROCEDURE — 93005 ELECTROCARDIOGRAM TRACING: CPT

## 2022-04-11 PROCEDURE — 93010 ELECTROCARDIOGRAM REPORT: CPT | Performed by: INTERNAL MEDICINE

## 2022-04-11 PROCEDURE — A9503 TC99M MEDRONATE: HCPCS | Performed by: INTERNAL MEDICINE

## 2022-04-11 PROCEDURE — 93306 TTE W/DOPPLER COMPLETE: CPT

## 2022-04-11 RX ORDER — TC 99M MEDRONATE 20 MG/10ML
26.7 INJECTION, POWDER, LYOPHILIZED, FOR SOLUTION INTRAVENOUS
Status: COMPLETED | OUTPATIENT
Start: 2022-04-11 | End: 2022-04-11

## 2022-04-11 RX ADMIN — TC 99M MEDRONATE 26.7 MILLICURIE: 20 INJECTION, POWDER, LYOPHILIZED, FOR SOLUTION INTRAVENOUS at 10:40

## 2022-04-12 ENCOUNTER — PREP FOR PROCEDURE (OUTPATIENT)
Dept: SURGERY | Age: 52
End: 2022-04-12

## 2022-04-12 RX ORDER — SODIUM CHLORIDE 9 MG/ML
INJECTION, SOLUTION INTRAVENOUS CONTINUOUS
Status: CANCELLED | OUTPATIENT
Start: 2022-04-12

## 2022-04-13 ENCOUNTER — ANESTHESIA (OUTPATIENT)
Dept: OPERATING ROOM | Age: 52
End: 2022-04-13
Payer: COMMERCIAL

## 2022-04-13 ENCOUNTER — ANESTHESIA EVENT (OUTPATIENT)
Dept: OPERATING ROOM | Age: 52
End: 2022-04-13
Payer: COMMERCIAL

## 2022-04-13 ENCOUNTER — APPOINTMENT (OUTPATIENT)
Dept: GENERAL RADIOLOGY | Age: 52
End: 2022-04-13
Attending: SURGERY
Payer: COMMERCIAL

## 2022-04-13 ENCOUNTER — HOSPITAL ENCOUNTER (OUTPATIENT)
Age: 52
Setting detail: OUTPATIENT SURGERY
Discharge: HOME | End: 2022-04-13
Attending: SURGERY | Admitting: SURGERY
Payer: COMMERCIAL

## 2022-04-13 VITALS — DIASTOLIC BLOOD PRESSURE: 63 MMHG | OXYGEN SATURATION: 97 % | SYSTOLIC BLOOD PRESSURE: 128 MMHG

## 2022-04-13 VITALS
DIASTOLIC BLOOD PRESSURE: 64 MMHG | WEIGHT: 293 LBS | BODY MASS INDEX: 39.68 KG/M2 | TEMPERATURE: 96.9 F | SYSTOLIC BLOOD PRESSURE: 117 MMHG | HEART RATE: 68 BPM | RESPIRATION RATE: 18 BRPM | OXYGEN SATURATION: 98 % | HEIGHT: 72 IN

## 2022-04-13 LAB
APTT: 23.5 SECONDS (ref 22–38)
INR BLD: 1 (ref 0.85–1.13)
POTASSIUM SERPL-SCNC: 4 MEQ/L (ref 3.5–5.2)

## 2022-04-13 PROCEDURE — 7100000010 HC PHASE II RECOVERY - FIRST 15 MIN: Performed by: SURGERY

## 2022-04-13 PROCEDURE — 85730 THROMBOPLASTIN TIME PARTIAL: CPT

## 2022-04-13 PROCEDURE — 77001 FLUOROGUIDE FOR VEIN DEVICE: CPT | Performed by: SURGERY

## 2022-04-13 PROCEDURE — 6360000002 HC RX W HCPCS: Performed by: SURGERY

## 2022-04-13 PROCEDURE — 85610 PROTHROMBIN TIME: CPT

## 2022-04-13 PROCEDURE — C1788 PORT, INDWELLING, IMP: HCPCS | Performed by: SURGERY

## 2022-04-13 PROCEDURE — 2709999900 HC NON-CHARGEABLE SUPPLY: Performed by: SURGERY

## 2022-04-13 PROCEDURE — 84132 ASSAY OF SERUM POTASSIUM: CPT

## 2022-04-13 PROCEDURE — 76937 US GUIDE VASCULAR ACCESS: CPT | Performed by: SURGERY

## 2022-04-13 PROCEDURE — 2500000003 HC RX 250 WO HCPCS: Performed by: SURGERY

## 2022-04-13 PROCEDURE — 36561 INSERT TUNNELED CV CATH: CPT | Performed by: SURGERY

## 2022-04-13 PROCEDURE — 6360000002 HC RX W HCPCS: Performed by: ANESTHESIOLOGY

## 2022-04-13 PROCEDURE — 77001 FLUOROGUIDE FOR VEIN DEVICE: CPT

## 2022-04-13 PROCEDURE — 3700000000 HC ANESTHESIA ATTENDED CARE: Performed by: SURGERY

## 2022-04-13 PROCEDURE — 2580000003 HC RX 258: Performed by: ANESTHESIOLOGY

## 2022-04-13 PROCEDURE — 3700000001 HC ADD 15 MINUTES (ANESTHESIA): Performed by: SURGERY

## 2022-04-13 PROCEDURE — 7100000011 HC PHASE II RECOVERY - ADDTL 15 MIN: Performed by: SURGERY

## 2022-04-13 PROCEDURE — 71045 X-RAY EXAM CHEST 1 VIEW: CPT

## 2022-04-13 PROCEDURE — 3600000002 HC SURGERY LEVEL 2 BASE: Performed by: SURGERY

## 2022-04-13 PROCEDURE — 3600000012 HC SURGERY LEVEL 2 ADDTL 15MIN: Performed by: SURGERY

## 2022-04-13 PROCEDURE — 2580000003 HC RX 258

## 2022-04-13 DEVICE — PORT INFUS 8FR PLAS ATTCH OPN END POLYUR CATH SIL FILL SUT: Type: IMPLANTABLE DEVICE | Site: CHEST  WALL | Status: FUNCTIONAL

## 2022-04-13 RX ORDER — FENTANYL CITRATE 50 UG/ML
INJECTION, SOLUTION INTRAMUSCULAR; INTRAVENOUS PRN
Status: DISCONTINUED | OUTPATIENT
Start: 2022-04-13 | End: 2022-04-13 | Stop reason: SDUPTHER

## 2022-04-13 RX ORDER — ONDANSETRON 2 MG/ML
4 INJECTION INTRAMUSCULAR; INTRAVENOUS EVERY 6 HOURS PRN
Status: DISCONTINUED | OUTPATIENT
Start: 2022-04-13 | End: 2022-04-13 | Stop reason: HOSPADM

## 2022-04-13 RX ORDER — ONDANSETRON 2 MG/ML
INJECTION INTRAMUSCULAR; INTRAVENOUS PRN
Status: DISCONTINUED | OUTPATIENT
Start: 2022-04-13 | End: 2022-04-13 | Stop reason: SDUPTHER

## 2022-04-13 RX ORDER — MORPHINE SULFATE 2 MG/ML
2 INJECTION, SOLUTION INTRAMUSCULAR; INTRAVENOUS
Status: DISCONTINUED | OUTPATIENT
Start: 2022-04-13 | End: 2022-04-13 | Stop reason: HOSPADM

## 2022-04-13 RX ORDER — SODIUM CHLORIDE 9 MG/ML
25 INJECTION, SOLUTION INTRAVENOUS PRN
Status: DISCONTINUED | OUTPATIENT
Start: 2022-04-13 | End: 2022-04-13 | Stop reason: HOSPADM

## 2022-04-13 RX ORDER — LIDOCAINE HYDROCHLORIDE 10 MG/ML
INJECTION, SOLUTION EPIDURAL; INFILTRATION; INTRACAUDAL; PERINEURAL PRN
Status: DISCONTINUED | OUTPATIENT
Start: 2022-04-13 | End: 2022-04-13 | Stop reason: ALTCHOICE

## 2022-04-13 RX ORDER — SODIUM CHLORIDE 0.9 % (FLUSH) 0.9 %
5-40 SYRINGE (ML) INJECTION PRN
Status: DISCONTINUED | OUTPATIENT
Start: 2022-04-13 | End: 2022-04-13 | Stop reason: HOSPADM

## 2022-04-13 RX ORDER — OXYCODONE HYDROCHLORIDE 5 MG/1
10 TABLET ORAL EVERY 4 HOURS PRN
Status: DISCONTINUED | OUTPATIENT
Start: 2022-04-13 | End: 2022-04-13 | Stop reason: HOSPADM

## 2022-04-13 RX ORDER — SODIUM CHLORIDE 9 MG/ML
INJECTION, SOLUTION INTRAVENOUS CONTINUOUS PRN
Status: DISCONTINUED | OUTPATIENT
Start: 2022-04-13 | End: 2022-04-13 | Stop reason: SDUPTHER

## 2022-04-13 RX ORDER — SODIUM CHLORIDE 9 MG/ML
INJECTION, SOLUTION INTRAVENOUS CONTINUOUS
Status: DISCONTINUED | OUTPATIENT
Start: 2022-04-13 | End: 2022-04-13 | Stop reason: HOSPADM

## 2022-04-13 RX ORDER — OXYCODONE HYDROCHLORIDE 5 MG/1
5 TABLET ORAL EVERY 4 HOURS PRN
Status: DISCONTINUED | OUTPATIENT
Start: 2022-04-13 | End: 2022-04-13 | Stop reason: HOSPADM

## 2022-04-13 RX ORDER — SODIUM CHLORIDE 0.9 % (FLUSH) 0.9 %
5-40 SYRINGE (ML) INJECTION EVERY 12 HOURS SCHEDULED
Status: DISCONTINUED | OUTPATIENT
Start: 2022-04-13 | End: 2022-04-13 | Stop reason: HOSPADM

## 2022-04-13 RX ORDER — MORPHINE SULFATE 2 MG/ML
4 INJECTION, SOLUTION INTRAMUSCULAR; INTRAVENOUS
Status: DISCONTINUED | OUTPATIENT
Start: 2022-04-13 | End: 2022-04-13 | Stop reason: HOSPADM

## 2022-04-13 RX ORDER — SODIUM CHLORIDE 9 MG/ML
INJECTION, SOLUTION INTRAVENOUS CONTINUOUS
Status: DISCONTINUED | OUTPATIENT
Start: 2022-04-13 | End: 2022-04-13 | Stop reason: DRUGHIGH

## 2022-04-13 RX ORDER — ONDANSETRON 4 MG/1
4 TABLET, ORALLY DISINTEGRATING ORAL EVERY 8 HOURS PRN
Status: DISCONTINUED | OUTPATIENT
Start: 2022-04-13 | End: 2022-04-13 | Stop reason: HOSPADM

## 2022-04-13 RX ORDER — PROPOFOL 10 MG/ML
INJECTION, EMULSION INTRAVENOUS PRN
Status: DISCONTINUED | OUTPATIENT
Start: 2022-04-13 | End: 2022-04-13 | Stop reason: SDUPTHER

## 2022-04-13 RX ORDER — ACETAMINOPHEN 325 MG/1
650 TABLET ORAL EVERY 4 HOURS PRN
Status: DISCONTINUED | OUTPATIENT
Start: 2022-04-13 | End: 2022-04-13 | Stop reason: HOSPADM

## 2022-04-13 RX ORDER — HEPARIN SODIUM (PORCINE) LOCK FLUSH IV SOLN 100 UNIT/ML 100 UNIT/ML
SOLUTION INTRAVENOUS PRN
Status: DISCONTINUED | OUTPATIENT
Start: 2022-04-13 | End: 2022-04-13 | Stop reason: ALTCHOICE

## 2022-04-13 RX ORDER — MIDAZOLAM HYDROCHLORIDE 1 MG/ML
INJECTION INTRAMUSCULAR; INTRAVENOUS PRN
Status: DISCONTINUED | OUTPATIENT
Start: 2022-04-13 | End: 2022-04-13 | Stop reason: SDUPTHER

## 2022-04-13 RX ADMIN — PROPOFOL 100 MG: 10 INJECTION, EMULSION INTRAVENOUS at 10:31

## 2022-04-13 RX ADMIN — FENTANYL CITRATE 100 MCG: 50 INJECTION, SOLUTION INTRAMUSCULAR; INTRAVENOUS at 09:34

## 2022-04-13 RX ADMIN — PROPOFOL 70 MG: 10 INJECTION, EMULSION INTRAVENOUS at 09:54

## 2022-04-13 RX ADMIN — ONDANSETRON HYDROCHLORIDE 4 MG: 4 INJECTION, SOLUTION INTRAMUSCULAR; INTRAVENOUS at 09:33

## 2022-04-13 RX ADMIN — MIDAZOLAM 2 MG: 1 INJECTION INTRAMUSCULAR; INTRAVENOUS at 09:35

## 2022-04-13 RX ADMIN — PROPOFOL 30 MG: 10 INJECTION, EMULSION INTRAVENOUS at 09:39

## 2022-04-13 RX ADMIN — PROPOFOL 100 MG: 10 INJECTION, EMULSION INTRAVENOUS at 10:37

## 2022-04-13 RX ADMIN — PROPOFOL 50 MG: 10 INJECTION, EMULSION INTRAVENOUS at 10:13

## 2022-04-13 RX ADMIN — Medication 2000 MG: at 09:35

## 2022-04-13 RX ADMIN — SODIUM CHLORIDE: 9 INJECTION, SOLUTION INTRAVENOUS at 08:18

## 2022-04-13 RX ADMIN — PROPOFOL 50 MG: 10 INJECTION, EMULSION INTRAVENOUS at 10:41

## 2022-04-13 RX ADMIN — PROPOFOL 200 MG: 10 INJECTION, EMULSION INTRAVENOUS at 10:54

## 2022-04-13 RX ADMIN — SODIUM CHLORIDE: 9 INJECTION, SOLUTION INTRAVENOUS at 09:28

## 2022-04-13 ASSESSMENT — PULMONARY FUNCTION TESTS
PIF_VALUE: 0

## 2022-04-13 ASSESSMENT — PAIN SCALES - GENERAL
PAINLEVEL_OUTOF10: 7
PAINLEVEL_OUTOF10: 4
PAINLEVEL_OUTOF10: 4

## 2022-04-13 ASSESSMENT — LIFESTYLE VARIABLES: SMOKING_STATUS: 1

## 2022-04-13 ASSESSMENT — PAIN - FUNCTIONAL ASSESSMENT: PAIN_FUNCTIONAL_ASSESSMENT: 0-10

## 2022-04-13 NOTE — ANESTHESIA POSTPROCEDURE EVALUATION
Department of Anesthesiology  Postprocedure Note    Patient: Addison Haynes  MRN: [id number]  YOB: 1970  Date of evaluation: 4/13/2022  Time:  12:06 PM     Procedure Summary     Date: 04/13/22 Room / Location: [address] JOYCELYN Trevizo    Anesthesia Start: 0928 Anesthesia Stop: 1110    Procedure: Single Lumen Smartport Right Subclavian, aspiration of left axillary seroma (Right Chest) Diagnosis: (Left breast cancer)    Surgeons: Roxana Richardson MD Responsible Provider: Elías Albraran MD    Anesthesia Type: MAC ASA Status: 3          Anesthesia Type: MAC    Andre Phase I:      Andre Phase II: Andre Score: 9    Last vitals: Reviewed and per EMR flowsheets.        Anesthesia Post Evaluation    Patient location during evaluation: bedside  Patient participation: complete - patient participated  Level of consciousness: awake  Airway patency: patent  Nausea & Vomiting: no vomiting  Complications: no  Cardiovascular status: hemodynamically stable  Respiratory status: acceptable  Hydration status: stable

## 2022-04-13 NOTE — ANESTHESIA PRE PROCEDURE
Department of Anesthesiology  Preprocedure Note       Name:  Stacey Hatfield   Age:  54 y.o.  :  1970                                          MRN:  [id number]         Date:  2022      Surgeon: Iva Marti):  Tre Chang MD    Procedure: Procedure(s):  Single Lumen Smartport Right Subclavian, aspiration of left axillary seroma    Medications prior to admission:   Prior to Admission medications    Medication Sig Start Date End Date Taking? Authorizing Provider   ondansetron (ZOFRAN-ODT) 8 MG TBDP disintegrating tablet Place 1 tablet under the tongue every 8 hours as needed for Nausea or Vomiting  Patient not taking: Reported on 2022   Mk Galaviz MD   prochlorperazine (COMPAZINE) 10 MG tablet Take 1 tablet by mouth every 6 hours as needed (nausea)  Patient not taking: Reported on 2022   Mk Galaviz MD   lidocaine-prilocaine (EMLA) 2.5-2.5 % cream Apply topically as needed.   Patient not taking: Reported on 2022   Mk Galaviz MD   metoprolol succinate (TOPROL XL) 100 MG extended release tablet TAKE 1 TABLET BY MOUTH EVERY DAY 22   Ori Madera MD   atorvastatin (LIPITOR) 40 MG tablet TAKE 1 TABLET DAILY 22   Eric Madera MD   ELIQUIS 5 MG TABS tablet TAKE 1 TABLET BY MOUTH TWICE A DAY  Patient not taking: Reported on 2022   Paige Portillo MD   flecainide (TAMBOCOR) 100 MG tablet TAKE 1 TABLET BY MOUTH TWICE A DAY 21   Eric Madera MD   levothyroxine (SYNTHROID) 125 MCG tablet TAKE 1 TABLET BY MOUTH EVERY DAY IN THE MORNING ON EMPTY STOMACH 21   Historical Provider, MD   ZINC PO Take by mouth daily    Historical Provider, MD   Ascorbic Acid (VITAMIN C PO) Take by mouth daily    Historical Provider, MD   ibuprofen (ADVIL;MOTRIN) 800 MG tablet Take 800 mg by mouth every 6 hours as needed for Pain     Historical Provider, MD   omeprazole (PRILOSEC) 10 MG delayed release capsule Take 10 mg by mouth daily Historical Provider, MD   nitroGLYCERIN (NITROSTAT) 0.4 MG SL tablet Place 1 tablet under the tongue every 5 minutes as needed for Chest pain 2/25/19   Ramona Severance Buettner, PA-C   aspirin 81 MG chewable tablet Take 1 tablet by mouth daily. Patient not taking: Reported on 4/7/2022 11/12/14   TOSHIA Stoddard - CNP       Current medications:    Current Facility-Administered Medications   Medication Dose Route Frequency Provider Last Rate Last Admin    0.9 % sodium chloride infusion   IntraVENous Continuous Crystal TobiasrichelleSABINA 602 mL/hr at 99/35/08 0818 New Bag at 04/13/22 0818    ceFAZolin (ANCEF) 2000 mg in sterile water 20 mL IV syringe  2,000 mg IntraVENous 30 Min Pre-Op Bao Simmons MD           Allergies:     Allergies   Allergen Reactions    Codeine Hives and Swelling     And vomiting    Cortisone Swelling     Apparently tolerates topical and IV with benadryl well    Influenza Virus Vaccine Other (See Comments)     Pt states could not walk after getting     Darvocet A500 [Propoxyphene N-Acetaminophen] Nausea And Vomiting    Sulfa Antibiotics Nausea And Vomiting and Swelling     Not throat swelling       Problem List:    Patient Active Problem List   Diagnosis Code    ACS (acute coronary syndrome) (HCC) I24.9    Chest pain with moderate risk for cardiac etiology R07.9    Essential hypertension I10    HLD (hyperlipidemia) E78.5    Tobacco abuse Z72.0    History of MI (myocardial infarction) I25.2    Acquired hypothyroidism E03.9    Unstable angina ([address] I20.0    Coronary artery disease involving native coronary artery of native heart without angina pectoris I25.10    Paroxysmal atrial fibrillation (HCC) I48.0    Gastroesophageal reflux disease without esophagitis K21.9    Malignant neoplasm of lower-inner quadrant of left breast in female, estrogen receptor negative ([address] C50.312, Z17.1    Malignant neoplasm of central portion of left breast in female, estrogen receptor negative ([address] C50.112, Z17.1    Hematoma T14. 8XXA       Past Medical History:        Diagnosis Date    [facility]     Baki    Arthritis     CAD (coronary artery disease)     GERD (gastroesophageal reflux disease)     Hyperlipidemia     Hypertension     Hypothyroid     Invasive ductal carcinoma of breast, female, left ([facility] 02/24/2022    Malignant neoplasm of lower-inner quadrant of left breast in female, estrogen receptor negative ([facility] 03/07/2022    MI (myocardial infarction) ([facility]     Dr. Mccartney [place]    Pneumonia        Past Surgical History:        Procedure Laterality Date    BREAST LUMPECTOMY Left 3/22/2022    LEFT BREAST LUMPECTOMY PARTIAL MASTECTOMY, SENTINEL LYMPH NODE BIOPSY, PRE OP NEEDLE LOC X 2 performed by Ynes Freed MD at [address] Left 3/30/2022    Evacuation hematoma left axilla performed by Ynes Freed MD at [address] [place] [address] ARTHROSCOPY Right     08    JENNIFER BIOPSY LYMPH NODE BY NEEDLE SUPERFICIAL  02/24/2022    JENNIFER BIOPSY LYMPH NODE BY NEEDLE SUPERFICIAL 2/24/2022 Alfred Dimas MD [facility] US GUID NDL BIOPSY LEFT Left 02/24/2022    JENNIFER US GUID NDL BIOPSY LEFT 2/24/2022 Alfred Dimas MD [facility]    MOUTH SURGERY      NV BIOPSY OF BREAST, INCISIONAL Left 1998    excisional bx-H. Chollet fibrocystic changes    US BREAST BIOPSY NEEDLE ADDITIONAL LEFT Left 02/24/2022    US BREAST BIOPSY NEEDLE ADDITIONAL LEFT 2/24/2022 Alfred Dimas MD [address] GUIDED NEEDLE LOC BREAST ADDL LEFT Left 3/22/2022    US GUIDED NEEDLE LOC BREAST ADDL LEFT 3/22/2022 [facility]       Social History:    Social History     Tobacco Use    Smoking status: Current Every Day Smoker     Packs/day: 0.25     Years: 30.00     Pack years: 7.50     Types: Cigarettes     Start date: 12/4/1987    Smokeless tobacco: Never Used   Substance Use Topics    Alcohol use: Yes     Comment: rare                                Ready to quit: Not Answered  Counseling given: Not Answered      Vital Signs (Current):   Vitals:    04/13/22 0738   BP: 120/76   Pulse: 70   Resp: 18   Temp: 97.4 °F (36.3 °C)   TempSrc: Temporal   SpO2: 97%   Weight: (!) 317 lb (143.8 kg)   Height: 6' (1.829 m)                                              BP Readings from Last 3 Encounters:   04/13/22 120/76   04/07/22 110/62   04/05/22 115/74       NPO Status: Time of last liquid consumption: 0630                        Time of last solid consumption: 2300                        Date of last liquid consumption: 04/13/22                        Date of last solid food consumption: 04/12/22    BMI:   Wt Readings from Last 3 Encounters:   04/13/22 (!) 317 lb (143.8 kg)   04/07/22 (!) 323 lb (146.5 kg)   04/05/22 (!) 323 lb (146.5 kg)     Body mass index is 42.99 kg/m². CBC:   Lab Results   Component Value Date    WBC 10.2 04/05/2022    RBC 4.64 04/05/2022    HGB 14.3 04/05/2022    HCT 43.6 04/05/2022    MCV 94 04/05/2022    RDW 13.1 04/05/2022     04/05/2022       CMP:   Lab Results   Component Value Date     04/05/2022     12/21/2021    K 3.7 04/05/2022    K 4.2 12/21/2021    K 3.8 12/04/2019     12/21/2021    CO2 24 12/21/2021    BUN 12 12/21/2021    CREATININE 0.5 04/05/2022    CREATININE 0.5 12/21/2021    LABGLOM >90 12/21/2021    GLUCOSE 105 12/21/2021    PROT 7.9 04/05/2022    CALCIUM 9.1 12/21/2021    BILITOT 0.3 04/05/2022    ALKPHOS 119 04/05/2022    AST 12 04/05/2022    ALT 15 04/05/2022       POC Tests: No results for input(s): POCGLU, POCNA, POCK, POCCL, POCBUN, POCHEMO, POCHCT in the last 72 hours.     Coags:   Lab Results   Component Value Date    INR 1.01 12/04/2019    APTT 31.2 12/04/2019       HCG (If Applicable):   Lab Results   Component Value Date    PREGSERUM NEGATIVE 11/10/2014        ABGs: No results found for: PHART, PO2ART, UCF6BSX, RMR0AIQ, BEART, B2FZNSUQ     Type & Screen (If Applicable):  Lab Results   Component Value Date    LABRH POS 11/10/2014       Drug/Infectious Status (If Applicable):  No results found for: HIV, HEPCAB    COVID-19 Screening (If Applicable): No results found for: COVID19        Anesthesia Evaluation    Airway: Mallampati: II  TM distance: >3 FB   Neck ROM: full  Mouth opening: > = 3 FB Dental:          Pulmonary:   (+) decreased breath sounds,  current smoker                           Cardiovascular:    (+) hypertension:, CAD:,         Rhythm: regular                      Neuro/Psych:               GI/Hepatic/Renal:   (+) GERD:, morbid obesity          Endo/Other:    (+) hypothyroidism::., .                 Abdominal:   (+) obese,           Vascular: Other Findings:             Anesthesia Plan      MAC     ASA 3       Induction: intravenous. Anesthetic plan and risks discussed with patient.                       Zulema Keating MD   4/13/2022

## 2022-04-13 NOTE — PROGRESS NOTES
Pt has met discharge criteria and states she is ready for discharge to home. IV removed, gauze and tape applied. Dressed in own clothes and personal belongings gathered. Discharge instructions (with opioid medication education information) given to pt. Patient refuses for friend to be educated on discharge instructions. ; pt verbalized understanding of discharge instructions, prescriptions and follow up appointments. Pt transported to discharge lobby by [facility] staff, friend in car.

## 2022-04-13 NOTE — OP NOTE
[address] [address]  Operative Report    PATIENT NAME: Precious Barney  MEDICAL RECORD NO. [id number]  SURGEON: Kathleen Gardner MD  Primary Care Physician: Charu Brown  Date: 4/13/2022, 11:01 AM     PROCEDURE PERFORMED:1. Insertion of central venous catheter with subcutaneous port by right IJ approach under ultrasound and fluoroscopic guidance 2. Aspiration seroma left axilla  PREOPERATIVE DIAGNOSIS:   Active Hospital Problems    Diagnosis Date Noted    Malignant neoplasm of lower-inner quadrant of left breast in female, estrogen receptor negative ([facility] [C50.312, Z17.1] 03/07/2022      POSTOPERATIVE DIAGNOSIS: Same  SURGEON:  Kathleen Gardner MD   ANESTHESIA:  Monitored Local Anesthesia with Sedation and local  ANESTHESIA:  30 ml OF 0.5% Marcaine  ESTIMATED BLOOD LOSS:  20  ml  SPECIMEN: none  COMPLICATIONS: No complications but very difficult placement  DRAINS: none  DISPOSITION: Outpatient Surgery  CONDITION: Hemodynamically stable    Indication patient with triple negative breast cancer she has request for port placement for planned chemotherapy. Techniques and risks of port placement as well as aspiration of left axillary small seroma discussed with patient in the office. All questions answered she wished to proceed. Procedure: The patient was brought to the operating room and placed supine on the operating table. The left arm was placed in abduction and the right arm was tucked at the patient's right side. She was given 3 g of Ancef intravenously prior to start of the procedure. Bilateral chest and axilla were prepped and draped in usual sterile fashion as was her neck. Utilizing gentle aspiration the seroma in the left axilla puncture aspiration was performed proximately 30 mL of clear serous fluid was removed. I then proceeded to the right side of the patient to perform port insertion. She has a larger test and her clavicle was somewhat deep.   Initial attempts at placing the introducer needle into the subclavian vein we never could get any vascular access. This was after infiltrating beneath the clavicle. Decision was then made to proceed to a cutdown over the deltopectoral groove. After infiltrating the area with local anesthetic skin incision was made and dissection was carried down in between the deltoid and the pectoralis muscle. No adequate cephalic vein could be identified dissected deeper and could actually palpate the axillary artery and attempted to place a needle in the axillary vein this was unsuccessful as well. Patient remained hemodynamically stable throughout had no drop in oxygen saturations. I then went to the neck` and under ultrasound guidance the internal jugular vein was identified. The introducer needle was passed into the internal jugular vein and venous blood was aspirated. The guidewire was passed down the needle and confirmed in the central position towards the heart. The catheter was then tunneled onto the previous pocket onto the anterior chest wall. Catheter was secured to the chest wall but it is quite deep due to her body habitus. It was secured at 3 points to the chest wall. The port is present at the medial aspect of her incision. Post procedure chest x-ray revealed no evidence of pneumothorax. Port easily flushed following aspiration of blood. Skin incisions were closed in layers of absorbable suture. Subcuticular Vicryl close superficial skin. Skin glue was applied. Sponge sharp instrument counts were correct. Final postplacement chest film was obtained. Catheters in the distal internal jugular vein at the junction of the superior vena cava. There was no evidence of pneumothorax.

## 2022-04-13 NOTE — BRIEF OP NOTE
Brief Postoperative Note      Patient: Allyn Souza  YOB: 1970  MRN: [id number]    Date of Procedure: 4/13/2022    Pre-Op Diagnosis: Left breast cancer triple negative    Post-Op Diagnosis: Same       Procedure(s):  Single Lumen Smartport Right Subclavian, aspiration of left axillary seroma  US and fluoroscopic guidance  Surgeon(s):  Cuco Cr MD    Assistant:  First Assistant: Marita Mckay RN    Anesthesia: Monitor Anesthesia Care    Estimated Blood Loss (mL): Minimal    Complications: Other: difficult access    Specimens:   * No specimens in log *    Implants:  Implant Name Type Inv.  Item Serial No.  Lot No. LRB No. Used Action   PORT INFUS 8FR PLAS ATTCH OPN END POLYUR CATH ALEX FILL SUT - [id number]  PORT INFUS 8FR PLAS ATTCH OPN END POLYUR CATH ALEX FILL SUT  BARD PERIPHERAL VASCULAR-WD [id number] Right 1 Implanted         Drains: * No LDAs found * none        Electronically signed by Cuco Cr MD on 4/13/2022 at 10:55 AM

## 2022-04-13 NOTE — PROGRESS NOTES
Patient in Same Day with no family present. Patient verified surgical and anesthesia consents. Patient arrived to OR with no hearing aides, dentures, jewelry, or glasses. Undergarment present during procedure and left on.

## 2022-04-13 NOTE — PROGRESS NOTES
Pt returned to [facility] room 9. Vitals and assessment as charted. 0.9 infusing, @300ml to count from PACU. Pt has muffins and pepsi. Oscar Hearn Pt verbalized understanding of discharge criteria and call light use. Call light in reach.

## 2022-04-13 NOTE — H&P
[address] [address]  History and Physical Update    Pt Name: Precious Barney  MRN: [id number]  YOB: 1970  Date of evaluation: 4/13/2022    [x] I have examined the patient and reviewed the H&P/Consult and there are no changes to the patient or plans. [] I have examined the patient and reviewed the H&P/Consult and have noted the following changes:        Kathleen Gardner MD MD  Electronically signed 4/13/2022 at Laurie Rushing MD  General Surgery  Postprocedure Evaluation in Office  Pt Name: Precious Barney  Date of Birth 1970   Today's Date: 4/7/2022  Medical Record Number: [id number]  Primary Care Provider: Charu Brown       Chief Complaint   Patient presents with    Post-Op Check       s/p 3/30  Evacuation left axillary hematoma and closure      ASSESSMENT   1. Malignant neoplasm of lower-inner quadrant of left breast in female, estrogen receptor negative ([facility] [address]    2. Essential hypertension    3. History of MI (myocardial infarction)    4. Coronary artery disease involving native coronary artery of native heart without angina pectoris    5. Paroxysmal atrial fibrillation (HCC)    6. Postop check    Pathologic stage IIa triple negative  PLANS       1. Pathology reviewed with the patient who understands. All questions were answered. Dereje Cueva has seen Dr. Prosper Montez. Triple negative disease. Request for port placement for chemotherapy. 2.Schedule patient for right subclavian MediPort insertion  3. Techniques for long term IV access were discussed. Risks, complications and benefits of each were reviewed including bleeding, infection, thrombosis and lung injury were reviewed. The patient was given the opportunity to ask questions. Once answered, informed consent was obtained and the patient scheduled for the procedure. 4.  MAC anesthesia  5. Hold Eliquis preop  6. Left axillary seroma aspirated today. 90 mL approximately clear fluid. No evidence of recurrent hematoma. ABI Medina is seen today for post-op follow-up. She is status post left partial mastectomy and sentinel lymph node biopsy. She had an axillary hematoma which required evacuation. She has a seroma now 90 mL of clear fluid aspirated. She has not resumed her Eliquis. She has seen medical oncology and with triple negative disease chemotherapy recommended. Request for port placement. Patient is agreeable. Instructed patient she may resume her Eliquis and stop 48 hours prior to port placement. Medications    Current Medication      Current Outpatient Medications:     ondansetron (ZOFRAN-ODT) 8 MG TBDP disintegrating tablet, Place 1 tablet under the tongue every 8 hours as needed for Nausea or Vomiting, Disp: 20 tablet, Rfl: 2    prochlorperazine (COMPAZINE) 10 MG tablet, Take 1 tablet by mouth every 6 hours as needed (nausea), Disp: 30 tablet, Rfl: 1    metoprolol succinate (TOPROL XL) 100 MG extended release tablet, TAKE 1 TABLET BY MOUTH EVERY DAY, Disp: 90 tablet, Rfl: 1    atorvastatin (LIPITOR) 40 MG tablet, TAKE 1 TABLET DAILY, Disp: 90 tablet, Rfl: 3    flecainide (TAMBOCOR) 100 MG tablet, TAKE 1 TABLET BY MOUTH TWICE A DAY, Disp: 180 tablet, Rfl: 3    levothyroxine (SYNTHROID) 125 MCG tablet, TAKE 1 TABLET BY MOUTH EVERY DAY IN THE MORNING ON EMPTY STOMACH, Disp: , Rfl:     ZINC PO, Take by mouth daily, Disp: , Rfl:     Ascorbic Acid (VITAMIN C PO), Take by mouth daily, Disp: , Rfl:     ibuprofen (ADVIL;MOTRIN) 800 MG tablet, Take 800 mg by mouth every 6 hours as needed for Pain , Disp: , Rfl:     omeprazole (PRILOSEC) 10 MG delayed release capsule, Take 10 mg by mouth daily, Disp: , Rfl:     nitroGLYCERIN (NITROSTAT) 0.4 MG SL tablet, Place 1 tablet under the tongue every 5 minutes as needed for Chest pain, Disp: 25 tablet, Rfl: 3    lidocaine-prilocaine (EMLA) 2.5-2.5 % cream, Apply topically as needed.  (Patient not taking: Reported on 4/7/2022), Disp: 5 g, Rfl: 1    ELIQUIS 5 MG TABS tablet, TAKE 1 TABLET BY MOUTH TWICE A DAY (Patient not taking: Reported on 4/7/2022), Disp: 180 tablet, Rfl: 3    aspirin 81 MG chewable tablet, Take 1 tablet by mouth daily. (Patient not taking: Reported on 4/7/2022), Disp: 30 tablet, Rfl: 3     Allergies           Allergies   Allergen Reactions    Codeine Hives and Swelling       And vomiting    Cortisone Swelling       Apparently tolerates topical and IV with benadryl well    Influenza Virus Vaccine Other (See Comments)       Pt states could not walk after getting     Darvocet A500 [Propoxyphene N-Acetaminophen] Nausea And Vomiting    Sulfa Antibiotics Nausea And Vomiting and Swelling       Not throat swelling         Review of Systems  History obtained from the patient.     Constitutional: Denies any fever, chills, fatigue. Wound: Denies any rash, skin color changes or wound problems. Resp: Denies any cough, shortness of breath. CV: Denies any chest pain, orthopnea or syncope. GI: Denies any nausea, vomiting, blood in the stool, constipation or diarrhea.      OBJECTIVE      VITALS: /62 (Site: Right Upper Arm, Position: Sitting, Cuff Size: Medium Adult)   Pulse 86   Temp 97.8 °F (36.6 °C) (Temporal)   Resp 18   Ht 6' (1.829 m)   Wt (!) 323 lb (146.5 kg)   SpO2 91%   BMI 43.81 kg/m²      CONSTITUTIONAL: Alert and oriented times 3, no acute distress and cooperative to examination. SKIN: Skin color, texture, turgor normal. No rashes or lesions. INCISION: wound margins intact and healing well. No signs of infection. No drainage.   NECK: Soft, trachea midline and straight  BREAST: Axillary left and Lumpectomy left No evidence of Nit[place] has a palpable left axillary seroma  LUNGS:clear  CARDIOVASCULAR: Heart sounds are normal.   NEUROLOGIC: No sensory or motor nerve irritation  EXTREMITIES: no cyanosis, no clubbing and no edema.        Performed by: [facility]     [facility]             [id number]   Assoc.                                              Page 1 of 1   [address]   [place], [place] [address]                                                       PROC: 2022   NVML/Asael Cordobas                                    RECV: 2022   [address]                                    RPTD: 2022   [place] [address]                       MRN:  [id number]     LOC: WW                       ACCT: [de-identified]  SEX: F                       : 1970  AGE: 51 Y                          PATHOLOGY REPORT                       ATTN: ADAM BESS                       REQ: Negra Macias       Copies To:   SUSANNAH BERRY; Oral Downy; CARLOS MENDENHALL       Clinical Information: LT BREAST MASS 12:00, 9:00, LT ENLARGED AXILLARY   LYMPH NODE     ADDENDUM REPORT 2022     FINAL DIAGNOSIS:   A.  Left breast mass, 12:00, U-Clip, biopsy:             Fibroglandular breast tissue with stromal fibrosis. B.  Left breast mass, 9:00, barbell clip, biopsy:             Invasive ductal carcinoma, [place] grade 3. C.  Left axillary lymph node, Tanana clip, biopsy:    Fragments of lymph node, negative for malignancy. BREAST BIOMARKERS*   Estrogen Receptor: (Clone SP1), [employer]       Negative (<1% of cells staining)     Progesterone Receptor: (Clone 1E2), [employer]       Negative (<1% of cells staining)     Ki-67 (clone 30-9)       Percentage of positive nuclei:  95%               Favorable <10%               Borderline 10-20%               Unfavorable >20%                  Inform HER2 Dual CHEPE         HER2 IHCClone 4B5      ASCO/CAP      [employer]      [employer]      HER2 dual                                  Systems Additional      CHEPE Group #                                Workup   (  Group 4:      HER2/CEP17 Ratio <2.0 and    HER2 NEGATIVE, HER2   X                >=.0 and <6.0 HER2           IHC is 0-1+.  See Group   )                signals/cell                  4 comment.                      Number of observers: 3                    (PCF/MTK/ALP)                    Number of invasive tumor                    cells counted: 20                    Using dual probe assay:                       Average number of HER2                    signals per cell:  4.3                                 Average number                     of CEP17 signals per cell:                      3.4                     HER2/CEP17 ratio:  1.3     Group 4 comment: It is uncertain whether patients with an average of >=   4.0 and < 6.0 HER2 signals per cell and a HER2/CEP17 ratio of < 2.0   benefit from HER2 targeted therapy in the absence of protein   overexpression (IHC 3+). If the specimen test result is close to the   CHEPE ratio threshold for positive, there is a high likelihood that   repeat testing will result in different results by chance alone. Therefore, when [facility] results are not 3+ positive, it is recommended that   the sample be considered HER2 negative without additional testing on   the same specimen. External Controls:  Adequate   Internal Controls:  Adequate   Standard Assay Conditions:  Met     Staining Method Used:    Formalin fixation    Antigen retrieval type:  Cell Conditioning 1, mild merel    Time in antigen retrieval:  30 minutes    Detection system type:   DAB Ultraview kit       Specimen:   A) CORE NEEDLE BREAST BIOPSY, LT MASS 12:00 U CLIP   B) CORE NEEDLE BREAST BIOPSY, LT MASS 9:00 BARBELL   C) CORE NEEDLE BREAST BIOPSY, LT AXILLARY LYMPH NODE TRIBELL       Gross Examination:   A - The container is labeled Deandre Meeks, left breast mass, 12   o'clock, U-clip.  Received in formalin are several cylindrical and   fragmented bits of white tissue aggregating to 0.9 x 0.6 x 0.1 cm.  1   ns.      Fixative:  10% neutral buffered formalin   Tissue removal time:  08:31   Tissue fixation time:  08:32   Total fixation time: 11 hours 28 minutes     B - The container is labeled Rafael Carias, left breast mass, 9   o'clock, barbell.  Received in formalin are five cylindrical fragments   of yellow-white tissue, each about 0.1 cm in diameter and varying in   length from 0.3 cm up to 1.8 cm.  1 ns. Fixative:  10% neutral buffered formalin   Tissue removal time:  08:34   Tissue fixation time:  08:35   Total fixation time: 11 hours 25 minutes     C - The container is labeled Rafael Carias left enlarged axillary   lymph node.  Received in formalin are four cylindrical fragments of tan   tissue, each 0.1 cm in diameter and varying in length from 0.4 cm up to   about 0.8 cm.  1 ns.  PCF/DKR:[id number]     Fixative:  10% neutral buffered formalin   Tissue removal time:  08:55   Tissue fixation time:  08:56   Total fixation time: 11 hours 4 minutes     Microscopic Examination:   A.  Sections show fibroglandular breast tissue with stromal fibrosis. There is no evidence of malignancy. B.  Procedure: Needle biopsy   Specimen laterality: Left   Tumor site: 9:00   Histologic type: Invasive ductal carcinoma     Rekha histologic score   Glandular/tubular differentiation: Score 3   Nuclear pleomorphism: Score 3   Mitotic rate: Score 3   Overall grade: Grade 3     Tumor size: At least 9 mm   Ductal carcinoma in situ: Not identified   Breast biomarker studies: Pending     C.  Sections show multiple fragments of benign lymph node. 51130 x 3   [id number]   [id number]                                                       <Sign Out Dr. Nader Mota M.D., F.C. A. P       NVML/ [address] [address]  Printed on:  2/28/2022   Jesusita Wong [address] [place], [place] [address]AMANDA BOSCH II.VIERTEL, [facility] [address] [email or web address]   Original print date: 02/28/2022

## 2022-04-14 ENCOUNTER — HOSPITAL ENCOUNTER (OUTPATIENT)
Dept: INFUSION THERAPY | Age: 52
Discharge: HOME | End: 2022-04-14
Attending: SURGERY
Payer: COMMERCIAL

## 2022-04-14 VITALS
DIASTOLIC BLOOD PRESSURE: 83 MMHG | SYSTOLIC BLOOD PRESSURE: 130 MMHG | OXYGEN SATURATION: 96 % | HEART RATE: 73 BPM | TEMPERATURE: 97.6 F | RESPIRATION RATE: 18 BRPM

## 2022-04-14 PROCEDURE — 99212 OFFICE O/P EST SF 10 MIN: CPT

## 2022-04-14 ASSESSMENT — PAIN DESCRIPTION - PAIN TYPE: TYPE: ACUTE PAIN;SURGICAL PAIN

## 2022-04-14 ASSESSMENT — PAIN DESCRIPTION - LOCATION: LOCATION: CHEST

## 2022-04-14 ASSESSMENT — PAIN SCALES - GENERAL: PAINLEVEL_OUTOF10: 6

## 2022-04-14 ASSESSMENT — PAIN DESCRIPTION - DESCRIPTORS: DESCRIPTORS: CONSTANT;ACHING

## 2022-04-14 ASSESSMENT — PAIN DESCRIPTION - ORIENTATION: ORIENTATION: RIGHT

## 2022-04-14 NOTE — PROGRESS NOTES
Patient instructed on adriamycin, cytoxan and taxol and Pre-medications. A folder containing the following handouts given to patient and discussed: Drug information sheets, Chemotherapy and you, eating hints, understanding your blood counts, bland diet, importance of hydration, when to call physician sheet, reduce risk infection, bleeding precautions and neutropenia precaution sheets. Patient instructed to have a  with them for at least the first visit. All questions answered satisfactorily and support given. Patient given a tour of facility. Approximately 45 minutes spent with patient.

## 2022-04-19 ENCOUNTER — OFFICE VISIT (OUTPATIENT)
Dept: ONCOLOGY | Age: 52
End: 2022-04-19
Payer: COMMERCIAL

## 2022-04-19 ENCOUNTER — HOSPITAL ENCOUNTER (OUTPATIENT)
Dept: INFUSION THERAPY | Age: 52
Discharge: HOME | End: 2022-04-19
Payer: COMMERCIAL

## 2022-04-19 VITALS
OXYGEN SATURATION: 99 % | RESPIRATION RATE: 18 BRPM | WEIGHT: 293 LBS | BODY MASS INDEX: 39.68 KG/M2 | DIASTOLIC BLOOD PRESSURE: 72 MMHG | SYSTOLIC BLOOD PRESSURE: 114 MMHG | HEART RATE: 80 BPM | HEIGHT: 72 IN | TEMPERATURE: 97.9 F

## 2022-04-19 VITALS
HEART RATE: 80 BPM | TEMPERATURE: 97.9 F | DIASTOLIC BLOOD PRESSURE: 72 MMHG | SYSTOLIC BLOOD PRESSURE: 114 MMHG | RESPIRATION RATE: 18 BRPM

## 2022-04-19 DIAGNOSIS — Z17.1 MALIGNANT NEOPLASM OF LOWER-INNER QUADRANT OF LEFT BREAST IN FEMALE, ESTROGEN RECEPTOR NEGATIVE (HCC): Primary | ICD-10-CM

## 2022-04-19 DIAGNOSIS — C50.312 MALIGNANT NEOPLASM OF LOWER-INNER QUADRANT OF LEFT BREAST IN FEMALE, ESTROGEN RECEPTOR NEGATIVE (HCC): Primary | ICD-10-CM

## 2022-04-19 PROCEDURE — 99211 OFF/OP EST MAY X REQ PHY/QHP: CPT

## 2022-04-19 PROCEDURE — 99215 OFFICE O/P EST HI 40 MIN: CPT | Performed by: INTERNAL MEDICINE

## 2022-04-19 NOTE — PROGRESS NOTES
[address] CANCER [address] [place] [address]  Dept: [phone]  Loc: [phone]   Hematology/Oncology Consult (Clinic)        4/19/22     Candida Monique   1970     No ref. provider found   Grisel Live          DIAGNOSIS:  -Invasive ductal carcinoma, left breast, ER negative, CO negative and HER-2 negative, 2.7 cm, grade 3, 0/ 4 sentinel nodes . ps T2N0M0 /IIA. (High risk: triple negative/grade 3). Inner central left breast anteriorly. -Extended panel germline testing - March 2022    TREATMENT:  - Left breast lumpectomy and sentinel node resection 3/22/2022. Dr Syed Keys. - Adjuvant AC dose dense x4 followed by dose dense Taxol every other week. Start 4/26/2022  -After adjuvant chemotherapy, postlumpectomy radiation. PARAMETERS:  -History, exam,  -Bone scan ordered    SUBJECTIVE: Patient is doing well. Her port is in place. She has had chemotherapy teaching. Bone scan reviewed and shows no bony mets. Labs reviewed. She is ready to proceed with her first course of adjuvant AC next week on 4/26. She has picked up her antiemetics and Emla cream.  All questions answered. Reviewed her cardiac history of an MI in 2014 with paroxysmal A. fib on anticoagulation. Since 2014 she has had no subsequent ischemic events including no congestive heart failure or angina. Cardiac risk reviewed and she agrees to proceed. HPI: Lizzie Loza is a 82-year-old premenopausal female developed a lump in the left breast just medial to the nipple on February 8. Strong family history of malignancy including breast cancer. Last mammogram was in 2015. Mammogram on 2/18 additional imaging confirmed a approximate 2 cm nodular density inferior central medial left breast.  Initial biopsy revealed invasive ductal cancer triple and grade 3.   Definitive left breast lumpectomy and sentinel node resection on 3/22 revealed a 2.7 cm pathologic stage T2N0 triple negative grade 3 breast cancer. Other feeling the lump she is asymptomatic. Specifically denies any headaches bone pain breathing problems or any other focal or systemic complaints. Followed by cardiology with a history of a an MI in 2014 and proximal A. fib for which she is on anticoagulation followed by Dr. Debbie Burden. Echocardiogram last year normal.  And recent history no ischemic cardiac events. ROS:  Review of Systems 14 point negative except as above. PMH:   Past Medical History:   Diagnosis Date    [facility]ib ([facility]     Baki    Arthritis     CAD (coronary artery disease)     GERD (gastroesophageal reflux disease)     Hyperlipidemia     Hypertension     Hypothyroid     Invasive ductal carcinoma of breast, female, left ([facility] 02/24/2022    Malignant neoplasm of lower-inner quadrant of left breast in female, estrogen receptor negative ([facility] 03/07/2022    MI (myocardial infarction) ([facility]     Dr. Debbie Burden    Pneumonia     Migraines  MI  Nov 2014  C Cath 2014.     Social HX:   Social History     Socioeconomic History    Marital status:      Spouse name: Not on file    Number of children: 1    Years of education: Not on file    Highest education level: Not on file   Occupational History    Occupation: LPN   Tobacco Use    Smoking status: Current Every Day Smoker     Packs/day: 0.25     Years: 30.00     Pack years: 7.50     Types: Cigarettes     Start date: 12/4/1987    Smokeless tobacco: Never Used    Tobacco comment: 3 cigs/day-pt wants to quit but declines help   Vaping Use    Vaping Use: Never used   Substance and Sexual Activity    Alcohol use: Yes     Comment: rare    Drug use: No    Sexual activity: Yes     Partners: Male     Comment: boy friend   Other Topics Concern    Not on file   Social History Narrative    Not on file     Social Determinants of Health     Financial Resource Strain:     Difficulty of Paying Living Expenses: Not on file   Food Insecurity:     Worried About [facility] in the Last Year: Not on file    Gaurav of Food in the Last Year: Not on file   Transportation Needs:     Lack of Transportation (Medical): Not on file    Lack of Transportation (Non-Medical): Not on file   Physical Activity:     Days of Exercise per Week: Not on file    Minutes of Exercise per Session: Not on file   Stress:     Feeling of Stress : Not on file   Social Connections:     Frequency of Communication with Friends and Family: Not on file    Frequency of Social Gatherings with Friends and Family: Not on file    Attends Jew Services: Not on file    Active Member of [address] [place] [address] [employer] or Organizations: Not on file    Attends Club or Organization Meetings: Not on file    Marital Status: Not on file   Intimate Partner Violence:     Fear of Current or Ex-Partner: Not on file    Emotionally Abused: Not on file    Physically Abused: Not on file    Sexually Abused: Not on file   Housing Stability:     Unable to Pay for Housing in the Last Year: Not on file    Number of [place] in the Last Year: Not on file    Unstable Housing in the Last Year: Not on file   Smoking history: 30+ years of smoking currently down to about 3 cigarettes/day average 1 to 1/2 packs/day. Spouse:   1 daughter age 32. 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 Dr. Sharron Betancourt of OB  C-Scope: Not yet      Gyn HX:   GPA:  ROSS/BSO: all present. Last normal menstrual cycle in 6 years  LMP: No LMP recorded.  Patient is postmenopausal.     Health Maintenance Due   Topic Date Due    Hepatitis C screen  Never done    COVID-19 Vaccine (1) Never done    Pneumococcal 0-64 years Vaccine (1 - PCV) Never done  Depression Screen  Never done    HIV screen  Never done    DTaP/Tdap/Td vaccine (1 - Tdap) Never done    Cervical cancer screen  Never done    Colorectal Cancer Screen  Never done    Diabetes screen  02/01/2022        Interests:   4H advisor. Fam HX:   Family History   Problem Relation Age of Onset    Heart Disease Mother     Diabetes Mother     Cancer Mother         liposarcoma    Kidney Disease Mother     Heart Disease Father     Diabetes Father     Kidney Disease Father     Cancer Father         Non-melanoma skin cancer multiple times    No Known Problems Brother     No Known Problems Brother     No Known Problems Brother     No Known Problems Maternal Grandmother     No Known Problems Maternal Grandfather     Breast Cancer Paternal Grandmother 47        reoccurred at 46    Ovarian Cancer Paternal Grandmother 46    Bilateral breast cancer Paternal Grandmother         Opposite breast, age 52's    Breast Cancer Maternal Aunt 48    Breast Cancer Paternal Aunt         breast      Germline genetic testing: Sent and negative 3/4/22. Hospitalizations:   None recent    Allergies:   Allergies   Allergen Reactions    Codeine Hives and Swelling     And vomiting    Cortisone Swelling     Apparently tolerates topical and IV with benadryl well    Influenza Virus Vaccine Other (See Comments)     Pt states could not walk after getting     Darvocet A500 [Propoxyphene N-Acetaminophen] Nausea And Vomiting    Sulfa Antibiotics Nausea And Vomiting and Swelling     Not throat swelling        Adult Illness:  Patient Active Problem List   Diagnosis    ACS (acute coronary syndrome) (HCC)    Chest pain with moderate risk for cardiac etiology    Essential hypertension    HLD (hyperlipidemia)    Tobacco abuse    History of MI (myocardial infarction)    Acquired hypothyroidism    Unstable angina ([facility]    Coronary artery disease involving native coronary artery of native heart without angina MG TABS tablet TAKE 1 TABLET BY MOUTH TWICE A  tablet 3    flecainide (TAMBOCOR) 100 MG tablet TAKE 1 TABLET BY MOUTH TWICE A  tablet 3    levothyroxine (SYNTHROID) 125 MCG tablet TAKE 1 TABLET BY MOUTH EVERY DAY IN THE MORNING ON EMPTY STOMACH      ZINC PO Take by mouth daily      Ascorbic Acid (VITAMIN C PO) Take by mouth daily      omeprazole (PRILOSEC) 10 MG delayed release capsule Take 10 mg by mouth daily      aspirin 81 MG chewable tablet Take 81 mg by mouth daily  30 tablet 3    ondansetron (ZOFRAN-ODT) 8 MG TBDP disintegrating tablet Place 1 tablet under the tongue every 8 hours as needed for Nausea or Vomiting (Patient not taking: Reported on 2022) 20 tablet 2    prochlorperazine (COMPAZINE) 10 MG tablet Take 1 tablet by mouth every 6 hours as needed (nausea) (Patient not taking: Reported on 2022) 30 tablet 1    lidocaine-prilocaine (EMLA) 2.5-2.5 % cream Apply topically as needed. (Patient not taking: Reported on 2022) 5 g 1    ibuprofen (ADVIL;MOTRIN) 800 MG tablet Take 800 mg by mouth every 6 hours as needed for Pain  (Patient not taking: Reported on 2022)      nitroGLYCERIN (NITROSTAT) 0.4 MG SL tablet Place 1 tablet under the tongue every 5 minutes as needed for Chest pain (Patient not taking: Reported on 2022) 25 tablet 3     No current facility-administered medications for this visit. EXAM:   height is 6' (1.829 m) and weight is 323 lb (146.5 kg) (abnormal). Her oral temperature is 97.9 °F (36.6 °C). Her blood pressure is 114/72 and her pulse is 80. Her respiration is 18 and oxygen saturation is 99%. Estimated body surface area is 2.73 meters squared as calculated from the following:    Height as of this encounter: 6' (1.829 m). Weight as of this encounter: 323 lb (146.5 kg). ECO  General: Non-ill appearing. Very pleasant and upbeat. Morbidly obese.   HEENT: NC/AT,nonicteric, perrla,eom intact, no mucosal lesions, dentition in good repair. Neck: normal thyroid, no masses. pulses nl, no bruits,   Nodes: No adenopathy  Lungs/chest: clear, no rales,rhonchi or wheezing, lung bases clear  CV: rrr, no rubs ,gallops or murmurs  Breasts: Postsurgical ecchymosis in the left breast post lumpectomy and recent evacuation of hematoma in the left axilla. No palpable masses bilaterally  Abd/Rectal: soft, non-tender,bowel sounds normal , no HSM,no masses, obese  Back: normal curvature, No midline tenderness. flanks nontender  : Not Examined  Extremities: no cyanosis,clubbing or edema. Skin: unremarkable  Neuro: A and O x 4, CN exam nonfocal, Motor- no deficits, Sensory- no deficits, gait-nl, speech- fluent, no ataxia. Devices: Right chest Ugptxn-d-Dytm. DATA:    LAB:   4/5/2022-CBC, CMP, CA 27-29, serum estradiol, FSH and LH.   CA 27-20 9 = 15  Estradiol 15.4/decreased    CBC with Differential:      Lab Results   Component Value Date    WBC 10.2 04/05/2022    RBC 4.64 04/05/2022    HGB 14.3 04/05/2022    HCT 43.6 04/05/2022     04/05/2022    MCV 94 04/05/2022    MCH 30.8 04/05/2022    MCHC 32.8 04/05/2022    RDW 13.1 04/05/2022    NRBC 0 12/21/2021    SEGSPCT 65.5 12/21/2021    MONOPCT 7.2 12/21/2021    MONOSABS 0.5 04/05/2022    LYMPHSABS 1.8 04/05/2022    EOSABS 0.4 04/05/2022    BASOSABS 0.0 04/05/2022     Lab Results   Component Value Date/Time    SEGSABS 7.4 04/05/2022 03:21 PM       CMP:    Lab Results   Component Value Date     04/05/2022     12/21/2021    K 4.0 04/13/2022    K 3.8 12/04/2019     12/21/2021    CO2 24 12/21/2021    BUN 12 12/21/2021    CREATININE 0.5 04/05/2022    CREATININE 0.5 12/21/2021    LABGLOM >90 12/21/2021    GLUCOSE 105 12/21/2021    PROT 7.9 04/05/2022    LABALBU 4.2 04/05/2022    CALCIUM 9.1 12/21/2021    BILITOT 0.3 04/05/2022    ALKPHOS 119 04/05/2022    AST 12 04/05/2022    ALT 15 04/05/2022       BMP:    Lab Results   Component Value Date     04/05/2022     12/21/2021 K 4.0 04/13/2022    K 3.8 12/04/2019     12/21/2021    CO2 24 12/21/2021    BUN 12 12/21/2021    LABALBU 4.2 04/05/2022    CREATININE 0.5 04/05/2022    CREATININE 0.5 12/21/2021    CALCIUM 9.1 12/21/2021    LABGLOM >90 12/21/2021    GLUCOSE 105 12/21/2021       Magnesium:    Lab Results   Component Value Date    MG 2.3 12/05/2019     PT/INR:    Lab Results   Component Value Date    INR 1.00 04/13/2022     TSH:    Lab Results   Component Value Date    TSH 4.690 12/21/2021     VITAMIN B12: No components found for: B12  FOLATE:  No results found for: FOLATE  IRON:  No results found for: IRON  Iron Saturation:  No components found for: PERCENTFE  TIBC:  No results found for: TIBC  FERRITIN:  No results found for: FERRITIN  PSA: No results found for: PSA         IMAGING:  Bone scan 4/11/2022-no evidence of bony metastatic disease. LOCATION: [facility]       PROCEDURE: [facility] MICHAEL DIGITAL DIAGNOSTIC BILATERAL, US BREAST LIMITED LEFT       CLINICAL INFORMATION: Subareolar mass of left breast . Tomosynthesis. Meets criteria for genetic evaluation and has been offered information for possible referral.           PATIENT MEDICAL HISTORY: No relevant medical history has been documented for this patient.       FAMILY HISTORY: Family medical history includes breast cancer in paternal grandmother and ovarian cancer in paternal grandmother.       RISK VALUES: [facility] Dew.: 4.2%, [facility] life: 15.3%       COMPARISON: 6/1/2015       [facility] MICHAEL DIGITAL DIAGNOSTIC BILATERAL: 2/18/22       TECHNIQUE: Bilateral CC and MLO views were obtained each breast. Tomosynthesis was used. CAD was used.        BREAST COMPOSITION: The tissue of the breast(s) is heterogeneously dense. This may lower the sensitivity of mammography.       FINDINGS:        There is a nodular density within the inner central left breast anterior depth which correlates to the palpable abnormality.  Further evaluation with targeted ultrasound is reported below.     The additional palpable abnormality within the periareolar region of the left breast does not demonstrate 8 definite mammographic correlate. However, further evaluation with targeted ultrasound is reported below.           US BREAST LIMITED LEFT:       TECHNIQUE: Targeted ultrasound of the left breast was performed. Grayscale images and color images of the real-time examination were reviewed. The axilla was also imaged.       FINDINGS:        There is a hypoechoic lobulated mass within the inner central left breast near the 9:00 position 1 cm from the nipple measuring 1.9 x 1.4 x 1.6 cm. This correlates with the mammographic finding and the palpable abnormality. Recommend ultrasound-guided    biopsy.       There is a small hypoechoic nodular lesion within the upper central left breast 12:00 position 3 cm from the nipple measuring 0.3 x 0.2 x 0.3 cm. This is incidental. However, recommend ultrasound-guided biopsy.       There is an enlarged lymph node demonstrated within the left axilla was cortical thickening measuring 4.9 mm. There is retained fatty hilum. [place] An is an additional prominent lymph node within the left axilla with slightly less cortical thickening    measuring 4 mm. Recommend ultrasound-guided biopsy of the largest left axillary lymph node.            2/18/22   Impression   1. There is a hypoechoic lobulated mass within the inner central left breast near the 9:00 position 1 cm from the nipple measuring 1.9 x 1.4 x 1.6 cm. This correlates with the mammographic finding and the palpable abnormality. Recommend ultrasound-guided    biopsy.       2. There is a small hypoechoic nodular lesion within the upper central left breast 12:00 position 3 cm from the nipple measuring 0.3 x 0.2 x 0.3 cm. This is incidental. However, recommend ultrasound-guided biopsy.       3. There is an enlarged lymph node demonstrated within the left axilla was cortical thickening measuring 4.9 mm. There is retained fatty hilum. [place] Duboseo is an additional prominent lymph node within the left axilla with slightly less cortical thickening    measuring 4 mm. Recommend ultrasound-guided biopsy of the largest left axillary lymph node.       These results were discussed with the patient. The tech navigator was notified. We will arrange scheduling the patient for her procedure per department protocol.       BI-RADS CATEGORY 4C - Suspicious abnormality - High suspicion for malignancy.       Management: Tissue diagnosis.  Biopsy should be performed in the absence of clinical contraindication.               **This report has been created using voice recognition software. It may contain minor errors which are inherent in voice recognition technology. **       Final report electronically signed by Dr. Héctor Barnes on 2/18/2022 4:48 PM          [address] LEFT (Order [id number]) - Reflex for Order [id number]  Narrative   LOCATION: [facility]       EXAM:     1. MAMMOGRAM POST BX CLIP PLACEMENT LEFT, [facility] US GUID NDL BIOPSY LEFT, [facility] NEEDLE BIOPSY LYMPH NODE SUPERFICIAL, US BREAST BIOPSY W LOC DEVICE EACH ADDL LESION LEFT       1. Biopsy of the left breast, percutaneous, using imaging guidance, 2 sites. 2. Biopsy of the left axilla, axillary lymph node, percutaneous, using imaging guidance. 3. Ultrasound guidance for biopsy, imaging supervision and interpretation. 4. Postprocedural diagnostic left mammogram.           HISTORY: 51 years, Female, Mass overlapping multiple quadrants of left breast, Lymph node enlargement. .       COMPARISON:  2/18/2022 and 6/1/2015.       TECHNIQUE/FINDINGS:        Written, informed consent was obtained, including discussion of the risks, benefits and alternatives. The patient's left breast was marked by the physician with initials.  A timeout was performed including the patient's name, date of birth, procedure and    laterality.       Site 1, 3 mm nodular density, upper central left breast, 12:00, U clip:  An ultrasound-guided biopsy using real-time ultrasound was performed. The nodule in the upper central left breast was identified, localized , and the site was marked. With    ultrasound guidance from a medial approach, the area was prepped and draped in sterile fashion. 2 % lidocaine was used for local anesthesia. 14 ga Sertera coaxial needle was advanced under ultrasound guidance. Once the needle was documented to be in the    correct location, 4 samples were taken from the area of interest. Samples were placed in formalin sent to pathology. A U shaped biopsy marker was placed at the biopsy site.       Site 2, 1.9 cm mass, 9:00, inner central left breast, barbell clip:  An ultrasound-guided biopsy using real-time ultrasound was performed. The mass in the inner central left breast was identified, localized , and the site was marked. With ultrasound    guidance from a medial approach, the area was prepped and draped in sterile fashion. 2 % lidocaine was used for local anesthesia. 14 ga Sertera coaxial needle was advanced under ultrasound guidance. Once the needle was documented to be in the correct    location, 4 samples were taken from the area of interest. Samples were placed in formalin sent to pathology. A barbell biopsy marker was placed at the biopsy site.       Site 3, left axillary lymph node, tribell clip: An ultrasound-guided biopsy using real-time ultrasound was performed. The lymph node in the left axilla was identified, localized , and the site was marked. With ultrasound guidance from a lateral approach,    the area was prepped and draped in sterile fashion. 2 % lidocaine was used for local anesthesia. 14 ga Sertera coaxial needle was advanced under ultrasound guidance. Once the needle was documented to be in the correct location, 4 samples were taken from    the area of interest. Samples were placed in formalin sent to pathology.  A tribell biopsy marker was placed at the biopsy site.     Clip placement was confirmed with a left digital diagnostic mammogram. The mammogram was performed as a separate procedure in a separate room with a dedicated machine.       The patient tolerated the procedure well. No evidence of immediate complication. The patient was given post-procedure instructions, including wound care.           POSTPROCEDURE MAMMOGRAM:       Images of the left include a CC view and MLO view. Tomosynthesis. CAD was utilized.       There are scattered fibroglandular elements in the breast(s) that could obscure a lesion on mammography. Post procedure mammograms were taken in a separate room. There is a U shaped biopsy clip at the biopsy site 1, 12:00, anterior depth. There is a    barbell clip in the inner central left breast, within the mammographic mass. This corresponds with the original mammographic density. There is a tribell clip in the left axilla.       There are no other significant findings in the breast.               Impression   1. Successful left breast ultrasound guided core needle biopsy, 2 sites. 2. Successful left axillary lymph node ultrasound guided core needle biopsy. 3. Successful marker clip placements with confirmation by digital diagnostic mammogram.            Waiting for pathology results. A final report will be issued when these become available.           BI-RADS Final Assessment Category: Waiting for pathology.       Management Recommendation: Assess radiologic/pathologic concordance.                   **This report has been created using voice recognition software. It may contain minor errors which are inherent in voice recognition technology. **       Final report electronically signed by Dr. Hattie Tay on 2/24/2022 10:00 AM          [facility] NEEDLE BIOPSY LYMPH NODE SUPERFICIAL (Order [id number]) - Reflex for Order [id number]  Narrative   LOCATION: [facility]       EXAM:     1.  MAMMOGRAM POST BX CLIP PLACEMENT LEFT, [facility] US GUID NDL BIOPSY LEFT, [facility] NEEDLE BIOPSY LYMPH NODE SUPERFICIAL, US BREAST BIOPSY W LOC DEVICE EACH ADDL LESION LEFT       1. Biopsy of the left breast, percutaneous, using imaging guidance, 2 sites. 2. Biopsy of the left axilla, axillary lymph node, percutaneous, using imaging guidance. 3. Ultrasound guidance for biopsy, imaging supervision and interpretation. 4. Postprocedural diagnostic left mammogram.           HISTORY: 51 years, Female, Mass overlapping multiple quadrants of left breast, Lymph node enlargement. .       COMPARISON:  2/18/2022 and 6/1/2015.       TECHNIQUE/FINDINGS:        Written, informed consent was obtained, including discussion of the risks, benefits and alternatives. The patient's left breast was marked by the physician with initials. A timeout was performed including the patient's name, date of birth, procedure and    laterality.       Site 1, 3 mm nodular density, upper central left breast, 12:00, U clip:  An ultrasound-guided biopsy using real-time ultrasound was performed. The nodule in the upper central left breast was identified, localized , and the site was marked. With    ultrasound guidance from a medial approach, the area was prepped and draped in sterile fashion. 2 % lidocaine was used for local anesthesia. 14 ga Sertera coaxial needle was advanced under ultrasound guidance. Once the needle was documented to be in the    correct location, 4 samples were taken from the area of interest. Samples were placed in formalin sent to pathology. A U shaped biopsy marker was placed at the biopsy site.       Site 2, 1.9 cm mass, 9:00, inner central left breast, barbell clip:  An ultrasound-guided biopsy using real-time ultrasound was performed. The mass in the inner central left breast was identified, localized , and the site was marked. With ultrasound    guidance from a medial approach, the area was prepped and draped in sterile fashion. 2 % lidocaine was used for local anesthesia. 14 ga Sertera coaxial needle was advanced under ultrasound guidance. Once the needle was documented to be in the correct    location, 4 samples were taken from the area of interest. Samples were placed in formalin sent to pathology. A barbell biopsy marker was placed at the biopsy site.       Site 3, left axillary lymph node, tribell clip: An ultrasound-guided biopsy using real-time ultrasound was performed. The lymph node in the left axilla was identified, localized , and the site was marked. With ultrasound guidance from a lateral approach,    the area was prepped and draped in sterile fashion. 2 % lidocaine was used for local anesthesia. 14 ga Sertera coaxial needle was advanced under ultrasound guidance. Once the needle was documented to be in the correct location, 4 samples were taken from    the area of interest. Samples were placed in formalin sent to pathology. A tribell biopsy marker was placed at the biopsy site.       Clip placement was confirmed with a left digital diagnostic mammogram. The mammogram was performed as a separate procedure in a separate room with a dedicated machine.       The patient tolerated the procedure well. No evidence of immediate complication. The patient was given post-procedure instructions, including wound care.           POSTPROCEDURE MAMMOGRAM:       Images of the left include a CC view and MLO view. Tomosynthesis. CAD was utilized.       There are scattered fibroglandular elements in the breast(s) that could obscure a lesion on mammography. Post procedure mammograms were taken in a separate room. There is a U shaped biopsy clip at the biopsy site 1, 12:00, anterior depth. There is a    barbell clip in the inner central left breast, within the mammographic mass. This corresponds with the original mammographic density. There is a tribell clip in the left axilla.       There are no other significant findings in the breast.               Impression   1.  Successful left breast ultrasound guided core needle biopsy, 2 sites.    2. Successful left axillary lymph node ultrasound guided core needle biopsy. 3. Successful marker clip placements with confirmation by digital diagnostic mammogram.            Waiting for pathology results. A final report will be issued when these become available.           BI-RADS Final Assessment Category: Waiting for pathology.       Management Recommendation: Assess radiologic/pathologic concordance.                   **This report has been created using voice recognition software. It may contain minor errors which are inherent in voice recognition technology. **       Final report electronically signed by Dr. Allie Regalado on 2/24/2022 10:00 AM          [facility] [place] [address] LEFT (Order [id number]) - Reflex for Order [id number]  Narrative   LOCATION: [place]       EXAM:     1. MAMMOGRAM POST BX CLIP PLACEMENT LEFT, [facility] [place] US GUID NDL BIOPSY LEFT, [facility] [place] NEEDLE BIOPSY LYMPH NODE SUPERFICIAL, US BREAST BIOPSY W LOC DEVICE EACH ADDL LESION LEFT       1. Biopsy of the left breast, percutaneous, using imaging guidance, 2 sites. 2. Biopsy of the left axilla, axillary lymph node, percutaneous, using imaging guidance. 3. Ultrasound guidance for biopsy, imaging supervision and interpretation. 4. Postprocedural diagnostic left mammogram.           HISTORY: 51 years, Female, Mass overlapping multiple quadrants of left breast, Lymph node enlargement. .       COMPARISON:  2/18/2022 and 6/1/2015.       TECHNIQUE/FINDINGS:        Written, informed consent was obtained, including discussion of the risks, benefits and alternatives. The patient's left breast was marked by the physician with initials. A timeout was performed including the patient's name, date of birth, procedure and    laterality.       Site 1, 3 mm nodular density, upper central left breast, 12:00, U clip:  An ultrasound-guided biopsy using real-time ultrasound was performed.  The nodule in the upper central left breast was identified, localized , and the site was marked. With    ultrasound guidance from a medial approach, the area was prepped and draped in sterile fashion. 2 % lidocaine was used for local anesthesia. 14 ga Sertera coaxial needle was advanced under ultrasound guidance. Once the needle was documented to be in the    correct location, 4 samples were taken from the area of interest. Samples were placed in formalin sent to pathology. A U shaped biopsy marker was placed at the biopsy site.       Site 2, 1.9 cm mass, 9:00, inner central left breast, barbell clip:  An ultrasound-guided biopsy using real-time ultrasound was performed. The mass in the inner central left breast was identified, localized , and the site was marked. With ultrasound    guidance from a medial approach, the area was prepped and draped in sterile fashion. 2 % lidocaine was used for local anesthesia. 14 ga Sertera coaxial needle was advanced under ultrasound guidance. Once the needle was documented to be in the correct    location, 4 samples were taken from the area of interest. Samples were placed in formalin sent to pathology. A barbell biopsy marker was placed at the biopsy site.       Site 3, left axillary lymph node, tribell clip: An ultrasound-guided biopsy using real-time ultrasound was performed. The lymph node in the left axilla was identified, localized , and the site was marked. With ultrasound guidance from a lateral approach,    the area was prepped and draped in sterile fashion. 2 % lidocaine was used for local anesthesia. 14 ga Sertera coaxial needle was advanced under ultrasound guidance. Once the needle was documented to be in the correct location, 4 samples were taken from    the area of interest. Samples were placed in formalin sent to pathology.  A tribell biopsy marker was placed at the biopsy site.       Clip placement was confirmed with a left digital diagnostic mammogram. The mammogram was performed as a separate procedure in a separate room with a dedicated machine.       The patient tolerated the procedure well. No evidence of immediate complication. The patient was given post-procedure instructions, including wound care.           POSTPROCEDURE MAMMOGRAM:       Images of the left include a CC view and MLO view. Tomosynthesis. CAD was utilized.       There are scattered fibroglandular elements in the breast(s) that could obscure a lesion on mammography. Post procedure mammograms were taken in a separate room. There is a U shaped biopsy clip at the biopsy site 1, 12:00, anterior depth. There is a    barbell clip in the inner central left breast, within the mammographic mass. This corresponds with the original mammographic density. There is a tribell clip in the left axilla.       There are no other significant findings in the breast.               Impression   1. Successful left breast ultrasound guided core needle biopsy, 2 sites. 2. Successful left axillary lymph node ultrasound guided core needle biopsy. 3. Successful marker clip placements with confirmation by digital diagnostic mammogram.            Waiting for pathology results. A final report will be issued when these become available.           BI-RADS Final Assessment Category: Waiting for pathology.       Management Recommendation: Assess radiologic/pathologic concordance.                   **This report has been created using voice recognition software. It may contain minor errors which are inherent in voice recognition technology. **       Final report electronically signed by Dr. Monae Rutledge on 2/24/2022 10:00 AM           TTE procedure:ECHOCARDIOGRAM COMPLETE 2D W DOPPLER W COLOR.      Procedure Date  Date: 04/22/2021 Start: 09:44 AM     Study Location: Echo Lab  Technical Quality: Limited visualization due to body habitus.     Indications:Fatigue and Shortness of breath.     Additional Medical History:acute coronary syndrome, chest pain,  hypertension, tobacco abuse, history of myocardial infarction, unstable  angina, coronary artery disease, atrial fibrillation, gastroesophageal  reflux disease, hyperlipidemia, hypothyroid     Patient Status: Routine     Height: 72 inches Weight: 309.01 pounds BSA: 2.56 m^2 BMI: 41.91 kg/m^2     BP: 122/79 mmHg      Conclusions      Summary  21   Ejection fraction is visually estimated at 60%. Overall left ventricular function is normal.      Signature     Followed by Dr. Val Howe cardiology; history of proximal A. fib. Echocardiogram 2022     Conclusions      Summary   Ejection fraction is visually estimated at 60%. Overall left ventricular function is normal.      Signature      ----------------------------------------------------------------   Electronically signed by Jaclyn Childers MD (Interpreting   physician) on 2022 at 04:02 PM   ----------------------------------------------------------------       PROCEDURES:  -See above    PATHOLOGY:              : 1970  AGE: 51 Y                          PATHOLOGY REPORT                       ATTN: ADAM BESS                       REQ: Cleopatra Miller       Copies To:   SUSANNAH BERRY; Cherelle Scott; CARLOS MENDENHALL       Clinical Information: LT BREAST MASS 12:00, 9:00, LT ENLARGED AXILLARY   LYMPH NODE   22  ADDENDUM REPORT 2022     FINAL DIAGNOSIS:   A.  Left breast mass, 12:00, U-Clip, biopsy:             Fibroglandular breast tissue with stromal fibrosis. B.  Left breast mass, 9:00, barbell clip, biopsy:             Invasive ductal carcinoma, Rekha grade 3. C.  Left axillary lymph node, [place] clip, biopsy:    Fragments of lymph node, negative for malignancy.      BREAST BIOMARKERS*   Estrogen Receptor: (Clone SP1), BMdr       Negative (<1% of cells staining)     Progesterone Receptor: (Clone 1E2), [employer]       Negative (<1% of cells staining)     Ki-67 (clone 30-9)       Percentage of positive nuclei:  95%               Favorable <10%               Borderline 10-20%               Unfavorable >20%        2018          Inform HER2 Dual CHEPE         HER2 IHCClone 4B5      ASCO/CAP      [employer]      [employer]      HER2 dual                                  Systems Additional      CHEPE Group #                                Workup   (  Group 4:      HER2/CEP17 Ratio <2.0 and    HER2 NEGATIVE, HER2   X                >=.0 and <6.0 HER2           IHC is 0-1+. See Group   )                signals/cell                  4 comment.                      Number of observers: 3                    (PCF/MTK/ALP)                    Number of invasive tumor                    cells counted: 20                    Using dual probe assay:                       Average number of HER2                    signals per cell:  4.3                                 Average number                     of CEP17 signals per cell:                      3.4                     HER2/CEP17 ratio:  1.3 but the latter is considered negative and was reflexed to  [facility] which is negative ,therefore pathology Dr. Fern Zarco reassures me that her  HER-2 negative and that this does not have to be repeated on the definitive surgical specimen. Group 4 comment: It is uncertain whether patients with an average of >=   4.0 and < 6.0 HER2 signals per cell and a HER2/CEP17 ratio of < 2.0   benefit from HER2 targeted therapy in the absence of protein   overexpression (IHC 3+). If the specimen test result is close to the   CHEPE ratio threshold for positive, there is a high likelihood that   repeat testing will result in different results by chance alone. Therefore, when [facility] results are not 3+ positive, it is recommended that   the sample be considered HER2 negative without additional testing on   the same specimen.      External Controls:  Adequate   Internal Controls:  Adequate   Standard Assay Conditions:  Met     Staining Method Used:  Formalin fixation    Antigen retrieval type:  Cell Conditioning 1, mild merle    Time in antigen retrieval:  30 minutes    Detection system type:   DAB Ultraview kit       Specimen:   A) CORE NEEDLE BREAST BIOPSY, LT MASS 12:00 U CLIP   B) CORE NEEDLE BREAST BIOPSY, LT MASS 9:00 BARBELL   C) CORE NEEDLE BREAST BIOPSY, LT AXILLARY LYMPH NODE TRIBELL       Definitive lumpectomy and sentinel node biopsy 3/22/2022  Clinical Information: INVASIVE DUCTAL CARCINOMA LEFT BREAST 3/22/22    FINAL DIAGNOSIS:   A: Left axillary sentinel lymph nodes, resection:     Four lymph nodes with no evidence of malignancy.  (0/4)     One lymph node with previous biopsy site changes.     One lymph node with black pigment deposition.    B: Left breast, lumpectomy specimen:     Invasive ductal carcinoma, [place] grade 3 of 3.     Invasive carcinoma is 2.7 cm in greatest dimension.     Invasive carcinoma is 1.5 mm to the closest margin-inferior/caudal.   Kieran Colla carcinoma is > 5 mm to all other margins.     Changes consistent with previous biopsy site.       pT2, pN0(sn)     Specimen:   A) SENTINEL LYMPH NODE(S), LEFT AXILLARY   B) EXCISION OF BREAST, LEFT CANCER LUMP     CASE SUMMARY: (INVASIVE CARCINOMA OF THE BREAST: Resection)   Standard(s): AJCC-UICC 8     SPECIMEN   Procedure   Excision (less than total mastectomy)     Specimen Laterality   Left     TUMOR   Tumor Site   Clock position    Specify Clock Position    9 o'clock     Histologic Type   Invasive carcinoma of no special type (ductal)     Histologic Grade ([place] Histologic Score)   Rekha Score    Glandular (Acinar) / Tubular Differentiation    Score 3 (less than 10% of tumor area forming glandular / tubular   structures)      Nuclear Pleomorphism    Score 3 (Vesicular nuclei, often with prominent nucleoli, exhibiting    marked variation in size and shape, occasionally with very large and    bizarre forms)      Mitotic Rate    See Table 1 in CAP Protocol.    Score 3      Overall Grade    Grade 3 (scores of 8 or 9)     Tumor Size   Greatest dimension of largest invasive focus greater than 1 mm (specify   exact measurement in Millimeters (mm)): 27 mm    Additional Dimension in Millimeters (mm): 17 x 15 mm     Tumor Focality   Single focus of invasive carcinoma     Ductal Carcinoma In Situ (DCIS)   Not identified     Lobular Carcinoma In Situ (LCIS)   Not identified     Tumor Extent    Tumor Extent    Not applicable (skin, nipple, and skeletal muscle are absent)     Lymphovascular Invasion   Not identified     Dermal Lymphovascular Invasion   No skin present     Treatment Effect in the Breast   No known presurgical therapy     Treatment Effect in the Lymph Nodes   Not applicable     MARGINS   Margin Status for Invasive Carcinoma   All margins negative for invasive carcinoma    Distance from Invasive Carcinoma to Closest Margin    Specify in Millimeters (mm)    Exact distance: 1.5 mm      Closest Margin to Invasive Carcinoma    Inferior/caudal      Distance from Invasive Carcinoma to Anterior/Skin Margin    Specify in Millimeters (mm)    Exact distance: 7 mm      Distance from Invasive Carcinoma to Posterior/Deep Margin    Specify in Millimeters (mm)    Exact distance: 10 mm      Distance from Invasive Carcinoma to Superior/Cranial Margin    Specify in Millimeters (mm)    Exact distance: 7 mm      Distance from Invasive Carcinoma to Inferior/Caudal Margin    Specify in Millimeters (mm)    Exact distance: 1.5 mm      Distance from Invasive Carcinoma to Medial Margin    Specify in Millimeters (mm)    Greater than: 10 mm      Distance from Invasive Carcinoma to Lateral Margin    Specify in Millimeters (mm)    Greater than: 10 mm     Margin Status for DCIS   Not applicable (no DCIS in specimen)       REGIONAL LYMPH NODES   Regional Lymph Node Status   Regional lymph nodes present    All regional lymph nodes negative for tumor      Total Number of Lymph Nodes Examined (sentinel and non-sentinel)    Exact number (specify): 4    Number of [place] Nodes Examined    Exact number (specify): 4     Regional Lymph Node Comment: Biopsy site changes are present within one   of the lymph nodes.  In addition, another lymph node demonstrates black   pigment deposition (possibly tattoo or anthracosis). DISTANT METASTASIS   Distant Site   Not applicable     PATHOLOGIC STAGE CLASSIFICATION (pTNM, AJCC 8th Edition)   TNM Descriptors   Not applicable     pT Category   pT2: Tumor greater than 20 mm but less than or equal to 50 mm in   greatest dimension     Regional Lymph Nodes Modifier   The (sn) modifier is added to the N category when a sentinel node   biopsy is performed (using either dye or tracer) and fewer than six   lymph nodes are removed (sentinel and nonsentinel). (sn): [place] nodes evaluated.      pN Category   pN0: No regional lymph node metastasis identified or ITCs only#     pM Category   Not applicable - pM cannot be determined from the submitted specimen(s)       ADDITIONAL FINDINGS   Additional Findings (specify): Changes consistent with previous biopsy   site, usual ductal hyperplasia     SPECIAL STUDIES   Breast Biomarker Testing Performed on Previous Biopsy   Estrogen Receptor (ER)    Estrogen Receptor (ER) Status    Negative     Progesterone Receptor (PgR)    Progesterone Receptor (PgR) Status    Negative     HER2 (by immunohistochemistry)    HER2 (by immunohistochemistry)    Negative (Score 0-1+)     HER2 (by in situ hybridization)    HER2 (by in situ hybridization)    Indeterminate   Case reviewed with Dr. Lacey Maravilla pathology who reassured me that in fact the HER-2 is negative    Ki-67    Ki-67 Percentage of Positive Nuclei: 95%    Testing Performed on Case Number: [id number]     [id number] x 2   09494 x2                                                       <Sign Out Dr. Desi Shelton M.D., F.C.A.P.       [facility] testing done 3/4/2022-negative    MOLECULAR:  Not applicable    ASSESSMENT/PLAN:    1: Diagnosis: 82-year-old postmenopausal female with a palpable lesion noted in 2/8/2022 with subsequent biopsy and definitive lumpectomy showing high risk cancer of the left breast.  Pathologic stage T2N0 triple negative grade 3.    2) Prognosis / Disease Status: High risk/JAVIER    3) Work-up:    Labs: CBC, CMP, CA 27-29, estradiol, FSH and LH labs reviewed   Imaging: Bone scan. Reviewed and shows no bone   Procedures: Left lumpectomy and axillary dissection. Consults: Request port by Dr. Lopez./Completed                               Chemotherapy teaching/completed   Other: Cardiology note reviewed. 4) Symptom Management: Patient is asymptomatic. 5) Supportive care provided. Level of care is appropriate. Teaching done today. Reviewed her high risk disease. Reviewed treatment options including dose dense AC followed by dose dense Taxol. Depending on cardiology input may need to switch to [facility] which is Taxotere with Cytoxan. Germline testing negative therefore no role for a part inhibitor after chemotherapy completed. 6) Treatment goal: Adjuvant      Treatment plan:  On 4/26/2020 began dose dense AC followed by dose dense Taxol  After course 2 is done I will check an echocardiogram.  Reviewed patient's breast cancer history and cardiac history and she agrees with dose dense AC followed by T which is our best regimen for triple negative disease furthermore she has a normal echocardiogram and no recent ischemic cardiac events for many years    7) Medications reviewed. Prescriptions today: None but recently Zofran ODT, Compazine, EMLA cream              No orders of the defined types were placed in this encounter. [place]ARRS:  Controlled Substance Monitoring:    Acute and Chronic Pain Monitoring:   No flowsheet data found.        8) Research Options:      None      9) Other:        none    10) Follow Up:  Return 4/26 for course 1 AC and does not need to see provider that day. Follow-up 2 weeks later with me on May 10 for course to dose dense AC.   Schedule echocardiogram before course 3      Surya Bonilla MD

## 2022-04-19 NOTE — PATIENT INSTRUCTIONS
Patient will return on 4/26 for course 1 of AC adjuvant chemotherapy for breast cancer. Does not need to see provider that day. Follow-up on May 10 for labs and course #2 of AC. See me that day.

## 2022-04-25 ENCOUNTER — OFFICE VISIT (OUTPATIENT)
Dept: SURGERY | Age: 52
End: 2022-04-25

## 2022-04-25 VITALS
HEART RATE: 73 BPM | WEIGHT: 293 LBS | SYSTOLIC BLOOD PRESSURE: 118 MMHG | RESPIRATION RATE: 18 BRPM | HEIGHT: 72 IN | BODY MASS INDEX: 39.68 KG/M2 | OXYGEN SATURATION: 95 % | TEMPERATURE: 95.3 F | DIASTOLIC BLOOD PRESSURE: 60 MMHG

## 2022-04-25 DIAGNOSIS — I25.10 CORONARY ARTERY DISEASE INVOLVING NATIVE CORONARY ARTERY OF NATIVE HEART WITHOUT ANGINA PECTORIS: ICD-10-CM

## 2022-04-25 DIAGNOSIS — C50.312 MALIGNANT NEOPLASM OF LOWER-INNER QUADRANT OF LEFT BREAST IN FEMALE, ESTROGEN RECEPTOR NEGATIVE (HCC): Primary | ICD-10-CM

## 2022-04-25 DIAGNOSIS — Z17.1 MALIGNANT NEOPLASM OF LOWER-INNER QUADRANT OF LEFT BREAST IN FEMALE, ESTROGEN RECEPTOR NEGATIVE (HCC): Primary | ICD-10-CM

## 2022-04-25 DIAGNOSIS — I10 ESSENTIAL HYPERTENSION: ICD-10-CM

## 2022-04-25 PROCEDURE — 99024 POSTOP FOLLOW-UP VISIT: CPT | Performed by: SURGERY

## 2022-04-26 ENCOUNTER — HOSPITAL ENCOUNTER (OUTPATIENT)
Dept: GENERAL RADIOLOGY | Age: 52
Discharge: HOME | End: 2022-04-26
Payer: COMMERCIAL

## 2022-04-26 ENCOUNTER — TELEPHONE (OUTPATIENT)
Dept: SURGERY | Age: 52
End: 2022-04-26

## 2022-04-26 ENCOUNTER — HOSPITAL ENCOUNTER (OUTPATIENT)
Dept: INFUSION THERAPY | Age: 52
Discharge: HOME | End: 2022-04-26
Payer: COMMERCIAL

## 2022-04-26 ENCOUNTER — HOSPITAL ENCOUNTER (OUTPATIENT)
Age: 52
Discharge: HOME | End: 2022-04-26
Payer: COMMERCIAL

## 2022-04-26 VITALS
WEIGHT: 293 LBS | OXYGEN SATURATION: 100 % | TEMPERATURE: 97.9 F | RESPIRATION RATE: 18 BRPM | HEIGHT: 72 IN | DIASTOLIC BLOOD PRESSURE: 68 MMHG | HEART RATE: 63 BPM | BODY MASS INDEX: 39.68 KG/M2 | SYSTOLIC BLOOD PRESSURE: 116 MMHG

## 2022-04-26 DIAGNOSIS — Z95.828 PORT-A-CATH IN PLACE: Primary | ICD-10-CM

## 2022-04-26 DIAGNOSIS — C50.112 MALIGNANT NEOPLASM OF CENTRAL PORTION OF LEFT BREAST IN FEMALE, ESTROGEN RECEPTOR NEGATIVE (HCC): ICD-10-CM

## 2022-04-26 DIAGNOSIS — Z17.1 MALIGNANT NEOPLASM OF LOWER-INNER QUADRANT OF LEFT BREAST IN FEMALE, ESTROGEN RECEPTOR NEGATIVE (HCC): Primary | ICD-10-CM

## 2022-04-26 DIAGNOSIS — C50.312 MALIGNANT NEOPLASM OF LOWER-INNER QUADRANT OF LEFT BREAST IN FEMALE, ESTROGEN RECEPTOR NEGATIVE (HCC): Primary | ICD-10-CM

## 2022-04-26 DIAGNOSIS — Z95.828 PORT-A-CATH IN PLACE: ICD-10-CM

## 2022-04-26 DIAGNOSIS — Z17.1 MALIGNANT NEOPLASM OF CENTRAL PORTION OF LEFT BREAST IN FEMALE, ESTROGEN RECEPTOR NEGATIVE (HCC): ICD-10-CM

## 2022-04-26 PROCEDURE — 71046 X-RAY EXAM CHEST 2 VIEWS: CPT

## 2022-04-26 PROCEDURE — 99211 OFF/OP EST MAY X REQ PHY/QHP: CPT

## 2022-04-26 RX ORDER — SODIUM CHLORIDE 9 MG/ML
25 INJECTION, SOLUTION INTRAVENOUS PRN
Status: DISCONTINUED | OUTPATIENT
Start: 2022-04-26 | End: 2022-04-27 | Stop reason: HOSPADM

## 2022-04-26 RX ORDER — SODIUM CHLORIDE 0.9 % (FLUSH) 0.9 %
5-40 SYRINGE (ML) INJECTION PRN
Status: DISCONTINUED | OUTPATIENT
Start: 2022-04-26 | End: 2022-04-27 | Stop reason: HOSPADM

## 2022-04-26 NOTE — PROGRESS NOTES
RN called Dr. Elisabeth Hyde office and spoke with RN regarding mediport placement and the inability to flush medi-port or to get any blood return. Nurse stated that she would speak with Dr. Judah Gonzales regarding patient. Dr. Brianna Jones cell number left with nurse for  to call Dr. Brianna Jones if necessary.

## 2022-04-26 NOTE — PLAN OF CARE
Problem: Musculor/Skeletal Functional Status  Goal: Absence of falls  Outcome: Adequate for Discharge  Note: Free from falls while in O.P. Oncology. Intervention: Fall precautions  Note: Discussed the need to use the call light for assistance when getting up to ambulate. Problem: Discharge Planning  Goal: Knowledge of discharge instructions  Description: Knowledge of discharge instructions  Outcome: Adequate for Discharge  Note: Verbalize understanding of discharge instructions, follow up appointments, and when to call Physician. Intervention: Interaction with patient/family and care team  Note: Discuss understanding of discharge instructions, follow up appointments and when to call Physician. Care plan reviewed with patient. Patient  verbalize understanding of the plan of care and contribute to goal setting.

## 2022-04-26 NOTE — PROGRESS NOTES
Patient here for lab work and chemotherapy. Unable to access mediport due to mediport being too deep under skin and unable to feel port. Attempted to stick patient multiple times by 3 different nurses with no blood return and unable to flush port. Notified Dr. Samira Christianson and he stated to call  who put in port and have patient seen by surgeon for a recheck off medi-port. Dr. Samira Christianson stated that we would not be able to administer chemotherapy until port is working correctly and blood return is noted. Notified patient regarding above.

## 2022-04-26 NOTE — TELEPHONE ENCOUNTER
Oncology calling unable to access port today for chemo-Dr Loaiza Hands will not infuse chemo through it. Per Dr Eliza Young obtain Chest Xray PA and Lateral. Spoke with patient she will get it done.

## 2022-04-28 NOTE — PROGRESS NOTES
Ynes Freed MD  General Surgery  Postprocedure Evaluation in Office  Pt Name: Francisco Schaeffer  Date of Birth 1970   Today's Date: 4/25/2022  Medical Record Number: [id number]  Primary Care Provider: Tennis Alpha  Chief Complaint   Patient presents with    Post-Op Check     s/p Insertion of central venous catheter with subcutaneous port by right IJ approach under ultrasound and fluoroscopic guidance,Aspiration seroma left axilla-4/13/2022    Post-Op Check     s/p Evacuation left axillary hematoma and closure-3/30/2022    Post-Op Check     s/p Left partial mastectomy with preoperative needle localization, Left axillary sentinel lymph node biopsy with sentinel lymph node mapping with technetium sulfur colloid-3/22/2022     ASSESSMENT      1. Malignant neoplasm of lower-inner quadrant of left breast in female, estrogen receptor negative ([address]    2. Essential hypertension    3. Coronary artery disease involving native coronary artery of native heart without angina pectoris    Pathologic stage IIa triple negative   4. Status post port placement extremely difficult surgical site is healing well the port is deep but I cannot palpate it. PLANS       1. Surgical wounds healing well  2.port site inserted was very difficult and is deep due to body habitus. 3.patient sees medical oncology tomorrow to potentially start chemotherapy. Discussed with them deep port placement. 4.  No evidence of recurrent axillary seroma. Some serous fluid lateral aspect of left axillary wound. Skin glue applied. Vanita Patel is seen today for post-op follow-up. She is status post left partial mastectomy and sentinel lymph node biopsy. She had an axillary hematoma which required evacuation. She had port placed which was very difficult and it is a deep placement. Wounds healing well without signs of infection.   Medications    Current Outpatient Medications:     ondansetron (ZOFRAN-ODT) 8 MG TBDP disintegrating tablet, Place 1 tablet under the tongue every 8 hours as needed for Nausea or Vomiting, Disp: 20 tablet, Rfl: 2    prochlorperazine (COMPAZINE) 10 MG tablet, Take 1 tablet by mouth every 6 hours as needed (nausea), Disp: 30 tablet, Rfl: 1    lidocaine-prilocaine (EMLA) 2.5-2.5 % cream, Apply topically as needed. , Disp: 5 g, Rfl: 1    metoprolol succinate (TOPROL XL) 100 MG extended release tablet, TAKE 1 TABLET BY MOUTH EVERY DAY, Disp: 90 tablet, Rfl: 1    atorvastatin (LIPITOR) 40 MG tablet, TAKE 1 TABLET DAILY, Disp: 90 tablet, Rfl: 3    ELIQUIS 5 MG TABS tablet, TAKE 1 TABLET BY MOUTH TWICE A DAY, Disp: 180 tablet, Rfl: 3    flecainide (TAMBOCOR) 100 MG tablet, TAKE 1 TABLET BY MOUTH TWICE A DAY, Disp: 180 tablet, Rfl: 3    levothyroxine (SYNTHROID) 125 MCG tablet, TAKE 1 TABLET BY MOUTH EVERY DAY IN THE MORNING ON EMPTY STOMACH, Disp: , Rfl:     ZINC PO, Take by mouth daily, Disp: , Rfl:     Ascorbic Acid (VITAMIN C PO), Take by mouth daily, Disp: , Rfl:     ibuprofen (ADVIL;MOTRIN) 800 MG tablet, Take 800 mg by mouth every 6 hours as needed for Pain , Disp: , Rfl:     omeprazole (PRILOSEC) 10 MG delayed release capsule, Take 10 mg by mouth daily, Disp: , Rfl:     nitroGLYCERIN (NITROSTAT) 0.4 MG SL tablet, Place 1 tablet under the tongue every 5 minutes as needed for Chest pain, Disp: 25 tablet, Rfl: 3    aspirin 81 MG chewable tablet, Take 81 mg by mouth daily , Disp: 30 tablet, Rfl: 3  Allergies    Allergies   Allergen Reactions    Codeine Hives and Swelling     And vomiting    Cortisone Swelling     Apparently tolerates topical and IV with benadryl well    Influenza Virus Vaccine Other (See Comments)     Pt states could not walk after getting     Darvocet A500 [Propoxyphene N-Acetaminophen] Nausea And Vomiting    Sulfa Antibiotics Nausea And Vomiting and Swelling     Not throat swelling       Review of Systems  History obtained from the patient.     Constitutional: Denies any fever, chills, fatigue. Wound: Denies any rash, skin color changes or wound problems. Resp: Denies any cough, shortness of breath. CV: Denies any chest pain, orthopnea or syncope. GI: Denies any nausea, vomiting, blood in the stool, constipation or diarrhea. OBJECTIVE     VITALS: /60 (Site: Right Upper Arm, Position: Sitting, Cuff Size: Medium Adult)   Pulse 73   Temp 95.3 °F (35.2 °C) (Temporal)   Resp 18   Ht 6' (1.829 m)   Wt (!) 323 lb (146.5 kg)   SpO2 95%   BMI 43.81 kg/m²     CONSTITUTIONAL: Alert and oriented times 3, no acute distress and cooperative to examination. SKIN: Skin color, texture, turgor normal. No rashes or lesions. INCISION: wound margins intact and healing well. No signs of infection. No drainage. NECK: Soft, trachea midline and straight  BREAST: Axillary left and Lumpectomy left No evidence of [place] has a palpable left axillary seroma  LUNGS:clear  CARDIOVASCULAR: Heart sounds are normal.   NEUROLOGIC: No sensory or motor nerve irritation  EXTREMITIES: no cyanosis, no clubbing and no edema.        Performed by: [address]     Casey Melvin             [id number]   Assoc.                                              Page 1 of 1   [address]   [place], [place] [address]                                                       PROC: 2022   [facility]                                    RECV: 2022   [facility]                                    RPTD: 2022   [place], [place] [address]                       MRN:  [id number]     LOC: [facility]                       ACCT: [de-identified]  SEX: F                       : 1970  AGE: 51 Y                          PATHOLOGY REPORT                       ATTN: ADAM BESS                       REQ: ADAM BESS       Copies To:   SUSANNAH BERRY; Hua Rne; CARLOS MENDENHALL       Clinical Information: LT BREAST MASS 12:00, 9:00, LT ENLARGED AXILLARY   LYMPH NODE     ADDENDUM REPORT 2/28/2022     FINAL DIAGNOSIS:   A.  Left breast mass, 12:00, U-Clip, biopsy:             Fibroglandular breast tissue with stromal fibrosis. B.  Left breast mass, 9:00, barbell clip, biopsy:             Invasive ductal carcinoma, [place] grade 3. C.  Left axillary lymph node, [place] clip, biopsy:    Fragments of lymph node, negative for malignancy. BREAST BIOMARKERS*   Estrogen Receptor: (Clone SP1), [employer]       Negative (<1% of cells staining)     Progesterone Receptor: (Clone 1E2), [employer]       Negative (<1% of cells staining)     Ki-67 (clone 30-9)       Percentage of positive nuclei:  95%               Favorable <10%               Borderline 10-20%               Unfavorable >20%        2018          Inform HER2 Dual CHEPE         HER2 IHCClone 4B5      ASCO/CAP      [employer]      HER2 dual                                  Systems Additional      CHEPE Group #                                Workup   (  Group 4:      HER2/CEP17 Ratio <2.0 and    HER2 NEGATIVE, HER2   X                >=.0 and <6.0 HER2           IHC is 0-1+. See Group   )                signals/cell                  4 comment.                      Number of observers: 3                    (PCF/MTK/ALP)                    Number of invasive tumor                    cells counted: 20                    Using dual probe assay:                       Average number of HER2                    signals per cell:  4.3                                 Average number                     of CEP17 signals per cell:                      3.4                     HER2/CEP17 ratio:  1.3     Group 4 comment: It is uncertain whether patients with an average of >=   4.0 and < 6.0 HER2 signals per cell and a HER2/CEP17 ratio of < 2.0   benefit from HER2 targeted therapy in the absence of protein   overexpression (IHC 3+).  If the specimen test result is close to the   CHEPE ratio threshold for positive, there is a high likelihood that   repeat testing will result in different results by chance alone. Therefore, when [facility] results are not 3+ positive, it is recommended that   the sample be considered HER2 negative without additional testing on   the same specimen. External Controls:  Adequate   Internal Controls:  Adequate   Standard Assay Conditions:  Met     Staining Method Used:    Formalin fixation    Antigen retrieval type:  Cell Conditioning 1, mild merle    Time in antigen retrieval:  30 minutes    Detection system type:   DAB Ultraview kit       Specimen:   A) CORE NEEDLE BREAST BIOPSY, LT MASS 12:00 U CLIP   B) CORE NEEDLE BREAST BIOPSY, LT MASS 9:00 BARBELL   C) CORE NEEDLE BREAST BIOPSY, LT AXILLARY LYMPH NODE TRIBELL       Gross Examination:   A - The container is labeled [place] Form, left breast mass, 12   o'clock, U-clip.  Received in formalin are several cylindrical and   fragmented bits of white tissue aggregating to 0.9 x 0.6 x 0.1 cm.  1   ns. Fixative:  10% neutral buffered formalin   Tissue removal time:  08:31   Tissue fixation time:  08:32   Total fixation time: 11 hours 28 minutes     B - The container is labeled Voncile Form, left breast mass, 9   o'clock, barbell.  Received in formalin are five cylindrical fragments   of yellow-white tissue, each about 0.1 cm in diameter and varying in   length from 0.3 cm up to 1.8 cm.  1 ns.      Fixative:  10% neutral buffered formalin   Tissue removal time:  08:34   Tissue fixation time:  08:35   Total fixation time: 11 hours 25 minutes     C - The container is labeled Voncile Form, left enlarged axillary   lymph node.  Received in formalin are four cylindrical fragments of tan   tissue, each 0.1 cm in diameter and varying in length from 0.4 cm up to   about 0.8 cm.  1 ns.  PCF/DKR:[id number]     Fixative:  10% neutral buffered formalin   Tissue removal time:  08:55   Tissue fixation time:  08:56   Total fixation

## 2022-04-29 ENCOUNTER — APPOINTMENT (OUTPATIENT)
Dept: GENERAL RADIOLOGY | Age: 52
End: 2022-04-29
Attending: SURGERY
Payer: COMMERCIAL

## 2022-04-29 ENCOUNTER — HOSPITAL ENCOUNTER (OUTPATIENT)
Age: 52
Setting detail: OUTPATIENT SURGERY
Discharge: HOME | End: 2022-04-29
Attending: SURGERY | Admitting: SURGERY
Payer: COMMERCIAL

## 2022-04-29 ENCOUNTER — ANESTHESIA (OUTPATIENT)
Dept: OPERATING ROOM | Age: 52
End: 2022-04-29
Payer: COMMERCIAL

## 2022-04-29 ENCOUNTER — ANESTHESIA EVENT (OUTPATIENT)
Dept: OPERATING ROOM | Age: 52
End: 2022-04-29
Payer: COMMERCIAL

## 2022-04-29 VITALS
DIASTOLIC BLOOD PRESSURE: 65 MMHG | OXYGEN SATURATION: 98 % | TEMPERATURE: 97.2 F | RESPIRATION RATE: 20 BRPM | HEART RATE: 68 BPM | SYSTOLIC BLOOD PRESSURE: 121 MMHG

## 2022-04-29 DIAGNOSIS — Z17.1 MALIGNANT NEOPLASM OF CENTRAL PORTION OF LEFT BREAST IN FEMALE, ESTROGEN RECEPTOR NEGATIVE (HCC): Primary | ICD-10-CM

## 2022-04-29 DIAGNOSIS — C50.112 MALIGNANT NEOPLASM OF CENTRAL PORTION OF LEFT BREAST IN FEMALE, ESTROGEN RECEPTOR NEGATIVE (HCC): Primary | ICD-10-CM

## 2022-04-29 PROCEDURE — 6360000002 HC RX W HCPCS: Performed by: SURGERY

## 2022-04-29 PROCEDURE — 3700000001 HC ADD 15 MINUTES (ANESTHESIA): Performed by: SURGERY

## 2022-04-29 PROCEDURE — 10140 I&D HMTMA SEROMA/FLUID COLLJ: CPT | Performed by: SURGERY

## 2022-04-29 PROCEDURE — 7100000000 HC PACU RECOVERY - FIRST 15 MIN: Performed by: SURGERY

## 2022-04-29 PROCEDURE — 7100000011 HC PHASE II RECOVERY - ADDTL 15 MIN: Performed by: SURGERY

## 2022-04-29 PROCEDURE — 77001 FLUOROGUIDE FOR VEIN DEVICE: CPT | Performed by: SURGERY

## 2022-04-29 PROCEDURE — 87070 CULTURE OTHR SPECIMN AEROBIC: CPT

## 2022-04-29 PROCEDURE — 3700000000 HC ANESTHESIA ATTENDED CARE: Performed by: SURGERY

## 2022-04-29 PROCEDURE — 2709999900 HC NON-CHARGEABLE SUPPLY: Performed by: SURGERY

## 2022-04-29 PROCEDURE — 87205 SMEAR GRAM STAIN: CPT

## 2022-04-29 PROCEDURE — 3600000012 HC SURGERY LEVEL 2 ADDTL 15MIN: Performed by: SURGERY

## 2022-04-29 PROCEDURE — 2580000003 HC RX 258: Performed by: SURGERY

## 2022-04-29 PROCEDURE — 7100000001 HC PACU RECOVERY - ADDTL 15 MIN: Performed by: SURGERY

## 2022-04-29 PROCEDURE — 36561 INSERT TUNNELED CV CATH: CPT | Performed by: SURGERY

## 2022-04-29 PROCEDURE — 7100000010 HC PHASE II RECOVERY - FIRST 15 MIN: Performed by: SURGERY

## 2022-04-29 PROCEDURE — 3600000002 HC SURGERY LEVEL 2 BASE: Performed by: SURGERY

## 2022-04-29 PROCEDURE — C1788 PORT, INDWELLING, IMP: HCPCS | Performed by: SURGERY

## 2022-04-29 PROCEDURE — 71045 X-RAY EXAM CHEST 1 VIEW: CPT

## 2022-04-29 PROCEDURE — 77001 FLUOROGUIDE FOR VEIN DEVICE: CPT

## 2022-04-29 PROCEDURE — 87075 CULTR BACTERIA EXCEPT BLOOD: CPT

## 2022-04-29 PROCEDURE — 36590 REMOVAL TUNNELED CV CATH: CPT | Performed by: SURGERY

## 2022-04-29 DEVICE — PORT INFUS 8FR PLAS ATTCH OPN END POLYUR CATH SIL FILL SUT: Type: IMPLANTABLE DEVICE | Site: CHEST | Status: FUNCTIONAL

## 2022-04-29 RX ORDER — SODIUM CHLORIDE 0.9 % (FLUSH) 0.9 %
5-40 SYRINGE (ML) INJECTION PRN
Status: DISCONTINUED | OUTPATIENT
Start: 2022-04-29 | End: 2022-04-29 | Stop reason: HOSPADM

## 2022-04-29 RX ORDER — MORPHINE SULFATE 2 MG/ML
4 INJECTION, SOLUTION INTRAMUSCULAR; INTRAVENOUS
Status: DISCONTINUED | OUTPATIENT
Start: 2022-04-29 | End: 2022-04-29 | Stop reason: HOSPADM

## 2022-04-29 RX ORDER — ACETAMINOPHEN 325 MG/1
650 TABLET ORAL EVERY 4 HOURS PRN
Status: DISCONTINUED | OUTPATIENT
Start: 2022-04-29 | End: 2022-04-29 | Stop reason: HOSPADM

## 2022-04-29 RX ORDER — CLINDAMYCIN HYDROCHLORIDE 300 MG/1
300 CAPSULE ORAL 4 TIMES DAILY
Qty: 40 CAPSULE | Refills: 0 | Status: SHIPPED | OUTPATIENT
Start: 2022-04-29 | End: 2022-05-09

## 2022-04-29 RX ORDER — HEPARIN SODIUM (PORCINE) LOCK FLUSH IV SOLN 100 UNIT/ML 100 UNIT/ML
SOLUTION INTRAVENOUS PRN
Status: DISCONTINUED | OUTPATIENT
Start: 2022-04-29 | End: 2022-04-29 | Stop reason: ALTCHOICE

## 2022-04-29 RX ORDER — HYDROCODONE BITARTRATE AND ACETAMINOPHEN 5; 325 MG/1; MG/1
1 TABLET ORAL EVERY 4 HOURS PRN
Status: DISCONTINUED | OUTPATIENT
Start: 2022-04-29 | End: 2022-04-29 | Stop reason: HOSPADM

## 2022-04-29 RX ORDER — ONDANSETRON 2 MG/ML
4 INJECTION INTRAMUSCULAR; INTRAVENOUS EVERY 6 HOURS PRN
Status: DISCONTINUED | OUTPATIENT
Start: 2022-04-29 | End: 2022-04-29 | Stop reason: HOSPADM

## 2022-04-29 RX ORDER — SODIUM CHLORIDE 9 MG/ML
INJECTION, SOLUTION INTRAVENOUS PRN
Status: DISCONTINUED | OUTPATIENT
Start: 2022-04-29 | End: 2022-04-29 | Stop reason: HOSPADM

## 2022-04-29 RX ORDER — MORPHINE SULFATE 2 MG/ML
2 INJECTION, SOLUTION INTRAMUSCULAR; INTRAVENOUS
Status: DISCONTINUED | OUTPATIENT
Start: 2022-04-29 | End: 2022-04-29 | Stop reason: HOSPADM

## 2022-04-29 RX ORDER — SODIUM CHLORIDE 9 MG/ML
INJECTION, SOLUTION INTRAVENOUS CONTINUOUS
Status: DISCONTINUED | OUTPATIENT
Start: 2022-04-29 | End: 2022-04-29 | Stop reason: HOSPADM

## 2022-04-29 RX ORDER — SODIUM CHLORIDE 0.9 % (FLUSH) 0.9 %
5-40 SYRINGE (ML) INJECTION EVERY 12 HOURS SCHEDULED
Status: DISCONTINUED | OUTPATIENT
Start: 2022-04-29 | End: 2022-04-29 | Stop reason: HOSPADM

## 2022-04-29 RX ORDER — ONDANSETRON 4 MG/1
4 TABLET, ORALLY DISINTEGRATING ORAL EVERY 8 HOURS PRN
Status: DISCONTINUED | OUTPATIENT
Start: 2022-04-29 | End: 2022-04-29 | Stop reason: HOSPADM

## 2022-04-29 RX ORDER — HYDROCODONE BITARTRATE AND ACETAMINOPHEN 5; 325 MG/1; MG/1
2 TABLET ORAL EVERY 4 HOURS PRN
Status: DISCONTINUED | OUTPATIENT
Start: 2022-04-29 | End: 2022-04-29 | Stop reason: HOSPADM

## 2022-04-29 RX ORDER — HYDROCODONE BITARTRATE AND ACETAMINOPHEN 5; 325 MG/1; MG/1
1 TABLET ORAL EVERY 4 HOURS PRN
Qty: 18 TABLET | Refills: 0 | Status: SHIPPED | OUTPATIENT
Start: 2022-04-29 | End: 2022-05-02

## 2022-04-29 RX ADMIN — MORPHINE SULFATE 2 MG: 2 INJECTION, SOLUTION INTRAMUSCULAR; INTRAVENOUS at 12:49

## 2022-04-29 RX ADMIN — SODIUM CHLORIDE: 9 INJECTION, SOLUTION INTRAVENOUS at 11:04

## 2022-04-29 ASSESSMENT — PAIN DESCRIPTION - ORIENTATION
ORIENTATION: RIGHT;UPPER
ORIENTATION: UPPER;RIGHT

## 2022-04-29 ASSESSMENT — PAIN SCALES - GENERAL
PAINLEVEL_OUTOF10: 4
PAINLEVEL_OUTOF10: 5
PAINLEVEL_OUTOF10: 4
PAINLEVEL_OUTOF10: 0
PAINLEVEL_OUTOF10: 4
PAINLEVEL_OUTOF10: 4

## 2022-04-29 ASSESSMENT — PAIN DESCRIPTION - DESCRIPTORS
DESCRIPTORS: ACHING

## 2022-04-29 ASSESSMENT — PAIN DESCRIPTION - PAIN TYPE
TYPE: SURGICAL PAIN
TYPE: SURGICAL PAIN

## 2022-04-29 ASSESSMENT — PAIN DESCRIPTION - LOCATION
LOCATION: BREAST
LOCATION: CHEST

## 2022-04-29 ASSESSMENT — PAIN - FUNCTIONAL ASSESSMENT: PAIN_FUNCTIONAL_ASSESSMENT: ACTIVITIES ARE NOT PREVENTED

## 2022-04-29 NOTE — ANESTHESIA PRE PROCEDURE
Department of Anesthesiology  Preprocedure Note       Name:  Cristian Patel   Age:  54 y.o.  :  1970                                          MRN:  [id number]         Date:  2022      Surgeon: Alivia Bro):  William Epstein MD    Procedure: Procedure(s):  Mediport Revision Right Subclavian    Medications prior to admission:   Prior to Admission medications    Medication Sig Start Date End Date Taking? Authorizing Provider   ondansetron (ZOFRAN-ODT) 8 MG TBDP disintegrating tablet Place 1 tablet under the tongue every 8 hours as needed for Nausea or Vomiting 22   Sravan Iglesias MD   prochlorperazine (COMPAZINE) 10 MG tablet Take 1 tablet by mouth every 6 hours as needed (nausea) 22   Sravan Iglesias MD   lidocaine-prilocaine (EMLA) 2.5-2.5 % cream Apply topically as needed.  22   Sravan Iglesias MD   metoprolol succinate (TOPROL XL) 100 MG extended release tablet TAKE 1 TABLET BY MOUTH EVERY DAY 22   Eric Wallace MD   atorvastatin (LIPITOR) 40 MG tablet TAKE 1 TABLET DAILY 22   Eric Wallace MD   ELIQUIS 5 MG TABS tablet TAKE 1 TABLET BY MOUTH TWICE A DAY 21   Johanna Correa MD   flecainide (TAMBOCOR) 100 MG tablet TAKE 1 TABLET BY MOUTH TWICE A DAY 21   Eric Wallace MD   levothyroxine (SYNTHROID) 125 MCG tablet TAKE 1 TABLET BY MOUTH EVERY DAY IN THE MORNING ON EMPTY STOMACH 21   Historical Provider, MD   ZINC PO Take by mouth daily    Historical Provider, MD   Ascorbic Acid (VITAMIN C PO) Take by mouth daily    Historical Provider, MD   ibuprofen (ADVIL;MOTRIN) 800 MG tablet Take 800 mg by mouth every 6 hours as needed for Pain     Historical Provider, MD   omeprazole (PRILOSEC) 10 MG delayed release capsule Take 10 mg by mouth daily    Historical Provider, MD   nitroGLYCERIN (NITROSTAT) 0.4 MG SL tablet Place 1 tablet under the tongue every 5 minutes as needed for Chest pain 19   Kiran Powell PA-C   aspirin 81 MG chewable tablet Take 81 mg by mouth daily  11/12/14   TOSHIA Guerra CNP       Current medications:    No current facility-administered medications for this visit. No current outpatient medications on file. Facility-Administered Medications Ordered in Other Visits   Medication Dose Route Frequency Provider Last Rate Last Admin    0.9 % sodium chloride infusion   IntraVENous Continuous Christi Gonzalez MD           Allergies: Allergies   Allergen Reactions    Codeine Hives and Swelling     And vomiting    Cortisone Swelling     Apparently tolerates topical and IV with benadryl well    Influenza Virus Vaccine Other (See Comments)     Pt states could not walk after getting     Darvocet A500 [Propoxyphene N-Acetaminophen] Nausea And Vomiting    Sulfa Antibiotics Nausea And Vomiting and Swelling     Not throat swelling       Problem List:    Patient Active Problem List   Diagnosis Code    ACS (acute coronary syndrome) (HCC) I24.9    Chest pain with moderate risk for cardiac etiology R07.9    Essential hypertension I10    HLD (hyperlipidemia) E78.5    Tobacco abuse Z72.0    History of MI (myocardial infarction) I25.2    Acquired hypothyroidism E03.9    Unstable angina ([address] I20.0    Coronary artery disease involving native coronary artery of native heart without angina pectoris I25.10    Paroxysmal atrial fibrillation (HCC) I48.0    Gastroesophageal reflux disease without esophagitis K21.9    Malignant neoplasm of lower-inner quadrant of left breast in female, estrogen receptor negative ([address] C50.312, Z17.1    Malignant neoplasm of central portion of left breast in female, estrogen receptor negative (HCC) C50.112, Z17.1    Hematoma T14. 8XXA       Past Medical History:        Diagnosis Date    Afib ([address]     Baki    Arthritis     CAD (coronary artery disease)     GERD (gastroesophageal reflux disease)     Hyperlipidemia     Hypertension     Hypothyroid     Invasive ductal carcinoma of breast, female, left ([facility] 2022    Malignant neoplasm of lower-inner quadrant of left breast in female, estrogen receptor negative ([facility] 2022    MI (myocardial infarction) ([facility]     Dr. Michael Brown    Pneumonia        Past Surgical History:        Procedure Laterality Date    BREAST LUMPECTOMY Left 3/22/2022    LEFT BREAST LUMPECTOMY PARTIAL MASTECTOMY, SENTINEL LYMPH NODE BIOPSY, PRE OP NEEDLE LOC X 2 performed by Chanelle Forrester MD at [address] [address] [address] Left 3/30/2022    Evacuation hematoma left axilla performed by Chanelle Forrester MD at [address] ARTHROSCOPY Right     08    JENNIFER BIOPSY LYMPH NODE BY NEEDLE SUPERFICIAL  2022    JENNIFER BIOPSY LYMPH NODE BY NEEDLE SUPERFICIAL 2022 Laz Colindres MD [facility]   [employer] JENNIFER US GUID NDL BIOPSY LEFT Left 2022    JENNIFER [address] LEFT 2022 Laz Colindres MD [facility]    MOUTH SURGERY      PORT SURGERY Right 2022    Single Lumen Smartport Right Subclavian, aspiration of left axillary seroma performed by Chanelle Forrester MD at [facility], INCISIONAL Left     excisional bx-H. [place]ol[place] fibrocystic changes    US BREAST BIOPSY NEEDLE ADDITIONAL LEFT Left 2022    US BREAST BIOPSY NEEDLE ADDITIONAL LEFT 2022 Laz Colindres MD [address] GUIDED NEEDLE LOC BREAST ADDL LEFT Left 3/22/2022    US GUIDED NEEDLE LOC BREAST ADDL LEFT 3/22/2022 [facility]       Social History:    Social History     Tobacco Use    Smoking status: Current Every Day Smoker     Packs/day: 0.25     Years: 30.00     Pack years: 7.50     Types: Cigarettes     Start date: 1987    Smokeless tobacco: Never Used    Tobacco comment: 3 cigs/day-pt wants to quit but declines help   Substance Use Topics    Alcohol use: Yes     Comment: rare                                Ready to quit: Not Answered  Counseling given: Not Answered  Comment: 3 cigs/day-pt wants to quit but declines help      Vital Signs (Current): There were no vitals filed for this visit. BP Readings from Last 3 Encounters:   04/29/22 98/61   04/26/22 116/68   04/25/22 118/60       NPO Status:                                                                                 BMI:   Wt Readings from Last 3 Encounters:   04/26/22 (!) 319 lb 6.4 oz (144.9 kg)   04/25/22 (!) 323 lb (146.5 kg)   04/19/22 (!) 323 lb (146.5 kg)     There is no height or weight on file to calculate BMI.    CBC:   Lab Results   Component Value Date    WBC 10.2 04/05/2022    RBC 4.64 04/05/2022    HGB 14.3 04/05/2022    HCT 43.6 04/05/2022    MCV 94 04/05/2022    RDW 13.1 04/05/2022     04/05/2022       CMP:   Lab Results   Component Value Date     04/05/2022     12/21/2021    K 4.0 04/13/2022    K 3.8 12/04/2019     12/21/2021    CO2 24 12/21/2021    BUN 12 12/21/2021    CREATININE 0.5 04/05/2022    CREATININE 0.5 12/21/2021    LABGLOM >90 12/21/2021    GLUCOSE 105 12/21/2021    PROT 7.9 04/05/2022    CALCIUM 9.1 12/21/2021    BILITOT 0.3 04/05/2022    ALKPHOS 119 04/05/2022    AST 12 04/05/2022    ALT 15 04/05/2022       POC Tests: No results for input(s): POCGLU, POCNA, POCK, POCCL, POCBUN, POCHEMO, POCHCT in the last 72 hours.     Coags:   Lab Results   Component Value Date    INR 1.00 04/13/2022    APTT 23.5 04/13/2022       HCG (If Applicable):   Lab Results   Component Value Date    PREGSERUM NEGATIVE 11/10/2014        ABGs: No results found for: PHART, PO2ART, SVL5NIY, CAS8MNS, BEART, Z3ZFLXWH     Type & Screen (If Applicable):  Lab Results   Component Value Date    LABRH POS 11/10/2014       Drug/Infectious Status (If Applicable):  No results found for: HIV, HEPCAB    COVID-19 Screening (If Applicable): No results found for: COVID19        Anesthesia Evaluation  Patient summary reviewed no history of anesthetic complications:   Airway: Mallampati: II  TM distance: >3 FB   Neck ROM: full  Mouth opening: > = 3 FB Dental: normal exam         Pulmonary:normal exam                               Cardiovascular:    (+) hypertension:, angina:, CAD:, hyperlipidemia                  Neuro/Psych:               GI/Hepatic/Renal:   (+) GERD:, morbid obesity          Endo/Other:    (+) hypothyroidism::., .                 Abdominal:   (+) obese,           Vascular: Other Findings:               Anesthesia Plan      general     ASA 3       Induction: intravenous. MIPS: Postoperative opioids intended and Prophylactic antiemetics administered. Anesthetic plan and risks discussed with patient and spouse.       Plan discussed with CRNA and surgical team.                  Shamika Avalos MD   4/29/2022

## 2022-04-29 NOTE — H&P
[address] [place] [address] [address]  History and Physical Update    Pt Name: Candida Monique  MRN: [id number]  YOB: 1970  Date of evaluation: 4/29/2022    [] I have examined the patient and reviewed the H&P/Consult and there are no changes to the patient or plans. [x] I have examined the patient and reviewed the H&P/Consult and have noted the following changes: Port too deep for oncology office to access a requested revision. Patient agreeable to revision and possible replacement. Also plan aspiration of small axillary seroma. Damir Campos MD   Electronically signed 4/29/2022 at 1:24 PM    Damir Campos MD  General Surgery  Postprocedure Evaluation in Office  Pt Name: Candida Monique  Date of Birth 1970   Today's Date: 4/25/2022  Medical Record Number: [id number]  Primary Care Provider: Grisel Live       Chief Complaint   Patient presents with    Post-Op Check       s/p Insertion of central venous catheter with subcutaneous port by right IJ approach under ultrasound and fluoroscopic guidance,Aspiration seroma left axilla-4/13/2022    Post-Op Check       s/p Evacuation left axillary hematoma and closure-3/30/2022    Post-Op Check       s/p Left partial mastectomy with preoperative needle localization, Left axillary sentinel lymph node biopsy with sentinel lymph node mapping with technetium sulfur colloid-3/22/2022      ASSESSMENT   1. Malignant neoplasm of lower-inner quadrant of left breast in female, estrogen receptor negative ([address]    2. Essential hypertension    3. Coronary artery disease involving native coronary artery of native heart without angina pectoris    Pathologic stage IIa triple negative   4. Status post port placement extremely difficult surgical site is healing well the port is deep but I cannot palpate it. PLANS       1. Surgical wounds healing well  2.port site inserted was very difficult and is deep due to body habitus.   3.patient sees medical oncology tomorrow to potentially start chemotherapy. Discussed with them deep port placement. 4.  No evidence of recurrent axillary seroma. Some serous fluid lateral aspect of left axillary wound. Skin glue applied. SUBJECTIVE      Ronald Erm is seen today for post-op follow-up. She is status post left partial mastectomy and sentinel lymph node biopsy. She had an axillary hematoma which required evacuation. She had port placed which was very difficult and it is a deep placement. Wounds healing well without signs of infection. Medications    Current Medication      Current Outpatient Medications:     ondansetron (ZOFRAN-ODT) 8 MG TBDP disintegrating tablet, Place 1 tablet under the tongue every 8 hours as needed for Nausea or Vomiting, Disp: 20 tablet, Rfl: 2    prochlorperazine (COMPAZINE) 10 MG tablet, Take 1 tablet by mouth every 6 hours as needed (nausea), Disp: 30 tablet, Rfl: 1    lidocaine-prilocaine (EMLA) 2.5-2.5 % cream, Apply topically as needed. , Disp: 5 g, Rfl: 1    metoprolol succinate (TOPROL XL) 100 MG extended release tablet, TAKE 1 TABLET BY MOUTH EVERY DAY, Disp: 90 tablet, Rfl: 1    atorvastatin (LIPITOR) 40 MG tablet, TAKE 1 TABLET DAILY, Disp: 90 tablet, Rfl: 3    ELIQUIS 5 MG TABS tablet, TAKE 1 TABLET BY MOUTH TWICE A DAY, Disp: 180 tablet, Rfl: 3    flecainide (TAMBOCOR) 100 MG tablet, TAKE 1 TABLET BY MOUTH TWICE A DAY, Disp: 180 tablet, Rfl: 3    levothyroxine (SYNTHROID) 125 MCG tablet, TAKE 1 TABLET BY MOUTH EVERY DAY IN THE MORNING ON EMPTY STOMACH, Disp: , Rfl:     ZINC PO, Take by mouth daily, Disp: , Rfl:     Ascorbic Acid (VITAMIN C PO), Take by mouth daily, Disp: , Rfl:     ibuprofen (ADVIL;MOTRIN) 800 MG tablet, Take 800 mg by mouth every 6 hours as needed for Pain , Disp: , Rfl:     omeprazole (PRILOSEC) 10 MG delayed release capsule, Take 10 mg by mouth daily, Disp: , Rfl:     nitroGLYCERIN (NITROSTAT) 0.4 MG SL tablet, Place 1 tablet under the tongue every 5 minutes as needed for Chest pain, Disp: 25 tablet, Rfl: 3    aspirin 81 MG chewable tablet, Take 81 mg by mouth daily , Disp: 30 tablet, Rfl: 3     Allergies           Allergies   Allergen Reactions    Codeine Hives and Swelling       And vomiting    Cortisone Swelling       Apparently tolerates topical and IV with benadryl well    Influenza Virus Vaccine Other (See Comments)       Pt states could not walk after getting     Darvocet A500 [Propoxyphene N-Acetaminophen] Nausea And Vomiting    Sulfa Antibiotics Nausea And Vomiting and Swelling       Not throat swelling         Review of Systems  History obtained from the patient.     Constitutional: Denies any fever, chills, fatigue. Wound: Denies any rash, skin color changes or wound problems. Resp: Denies any cough, shortness of breath. CV: Denies any chest pain, orthopnea or syncope. GI: Denies any nausea, vomiting, blood in the stool, constipation or diarrhea.      OBJECTIVE      VITALS: /60 (Site: Right Upper Arm, Position: Sitting, Cuff Size: Medium Adult)   Pulse 73   Temp 95.3 °F (35.2 °C) (Temporal)   Resp 18   Ht 6' (1.829 m)   Wt (!) 323 lb (146.5 kg)   SpO2 95%   BMI 43.81 kg/m²      CONSTITUTIONAL: Alert and oriented times 3, no acute distress and cooperative to examination. SKIN: Skin color, texture, turgor normal. No rashes or lesions. INCISION: wound margins intact and healing well. No signs of infection. No drainage.   NECK: Soft, trachea midline and straight  BREAST: Axillary left and Lumpectomy left No evidence of Nitoman has a palpable left axillary seroma  LUNGS:clear  CARDIOVASCULAR: Heart sounds are normal.   NEUROLOGIC: No sensory or motor nerve irritation  EXTREMITIES: no cyanosis, no clubbing and no edema.        Performed by: [address] [place], [place] [address]maureen. Pathology     [facility]             [id number]   Assoc[facility]                                              Page 1 of 0 [phone], [place], [place] [address]                                                       PROC: 2022   [facility]                                    RECV: 2022   [address]                                    RPTD: 2022   [address] [place], [place] [address]                       MRN:  [id number]     LOC: [facility]                       ACCT: [de-identified]  SEX: F                       : 1970  AGE: 51 Y                          PATHOLOGY REPORT                       ATTN: ADAM BESS                       REQ: Jcarlos Stringer       Copies To:   SUSANNAH BERRY; Nola David; CARLOS JOHNSONRICK       Clinical Information: LT BREAST MASS 12:00, 9:00, LT ENLARGED AXILLARY   LYMPH NODE     ADDENDUM REPORT 2022     FINAL DIAGNOSIS:   A.  Left breast mass, 12:00, U-Clip, biopsy:             Fibroglandular breast tissue with stromal fibrosis. B.  Left breast mass, 9:00, barbell clip, biopsy:             Invasive ductal carcinoma, Alva grade 3. C.  Left axillary lymph node, [place] clip, biopsy:    Fragments of lymph node, negative for malignancy. BREAST BIOMARKERS*   Estrogen Receptor: (Clone SP1), [employer]       Negative (<1% of cells staining)     Progesterone Receptor: (Clone 1E2), Koko       Negative (<1% of cells staining)     Ki-67 (clone 30-9)       Percentage of positive nuclei:  95%               Favorable <10%               Borderline 10-20%               Unfavorable >20%                  Inform HER2 Dual CHEPE         HER2 IHCClone 4B5      ASCO/CAP      Vertro Systems      Vertro      HER2 dual                                  Systems Additional      CHEPE Group #                                Workup   (  Group 4:      HER2/CEP17 Ratio <2.0 and    HER2 NEGATIVE, HER2   X                >=.0 and <6.0 HER2           IHC is 0-1+.  See Group   )                signals/cell                  4 comment.                      Number of observers: 3                    (PCF/MTK/ALP)                    Number of invasive tumor                    cells counted: 20                    Using dual probe assay:                       Average number of HER2                    signals per cell:  4.3                                 Average number                     of CEP17 signals per cell:                      3.4                     HER2/CEP17 ratio:  1.3     Group 4 comment: It is uncertain whether patients with an average of >=   4.0 and < 6.0 HER2 signals per cell and a HER2/CEP17 ratio of < 2.0   benefit from HER2 targeted therapy in the absence of protein   overexpression (IHC 3+). If the specimen test result is close to the   CHEPE ratio threshold for positive, there is a high likelihood that   repeat testing will result in different results by chance alone. Therefore, when [facility]ated [facility] results are not 3+ positive, it is recommended that   the sample be considered HER2 negative without additional testing on   the same specimen. External Controls:  Adequate   Internal Controls:  Adequate   Standard Assay Conditions:  Met     Staining Method Used:    Formalin fixation    Antigen retrieval type:  Cell Conditioning 1, mild merle    Time in antigen retrieval:  30 minutes    Detection system type:   DAB Ultraview kit       Specimen:   A) CORE NEEDLE BREAST BIOPSY, LT MASS 12:00 U CLIP   B) CORE NEEDLE BREAST BIOPSY, LT MASS 9:00 BARBELL   C) CORE NEEDLE BREAST BIOPSY, LT AXILLARY LYMPH NODE TRIBELL       Gross Examination:   A - The container is labeled Guyann Apley, left breast mass, 12   o'clock, U-clip.  Received in formalin are several cylindrical and   fragmented bits of white tissue aggregating to 0.9 x 0.6 x 0.1 cm.  1   ns.      Fixative:  10% neutral buffered formalin   Tissue removal time:  08:31   Tissue fixation time:  08:32   Total fixation time: 11 hours 28 minutes     B - The container is labeled Guyann Apley, left breast mass, 9   o'clock, barbell.  Received in formalin are five cylindrical fragments   of yellow-white tissue, each about 0.1 cm in diameter and varying in   length from 0.3 cm up to 1.8 cm.  1 ns. Fixative:  10% neutral buffered formalin   Tissue removal time:  08:34   Tissue fixation time:  08:35   Total fixation time: 11 hours 25 minutes     C - The container is labeled Ernesto Lazaro, left enlarged axillary   lymph node.  Received in formalin are four cylindrical fragments of tan   tissue, each 0.1 cm in diameter and varying in length from 0.4 cm up to   about 0.8 cm.  1 ns.  PCF/DKR:[id number]     Fixative:  10% neutral buffered formalin   Tissue removal time:  08:55   Tissue fixation time:  08:56   Total fixation time: 11 hours 4 minutes     Microscopic Examination:   A.  Sections show fibroglandular breast tissue with stromal fibrosis. There is no evidence of malignancy. B.  Procedure: Needle biopsy   Specimen laterality: Left   Tumor site: 9:00   Histologic type: Invasive ductal carcinoma     Rekha histologic score   Glandular/tubular differentiation: Score 3   Nuclear pleomorphism: Score 3   Mitotic rate: Score 3   Overall grade: Grade 3     Tumor size: At least 9 mm   Ductal carcinoma in situ: Not identified   Breast biomarker studies: Pending     C.  Sections show multiple fragments of benign lymph node. [address]31[address] x 3   [id number]   [id number]                                                       <Sign Out Dr. Alphonsa Hatchet Vernelle Lawn, M.D., F.C. A. P       [facility]  Printed on:  2/28/2022   [address]ita Wong [address]   [place], [address]   Original print date: 02/28/2022

## 2022-04-29 NOTE — OP NOTE
[facility]  Operative Report    PATIENT NAME: Oz Cash  MEDICAL RECORD NO. [id number]  SURGEON: Delvis Carter MD   Primary Care Physician: Keyana Rosa  Date: 4/29/2022, 12:41 PM     PROCEDURE PERFORMED: 1. Placement of right subclavian Mediport under fluoroscopic guidance. 2.  Removal right IJ MediPort nonfunctional.  3.  Aspiration left axillary seroma  PREOPERATIVE DIAGNOSIS:   Active Hospital Problems    Diagnosis Date Noted    Malignant neoplasm of lower-inner quadrant of left breast in female, estrogen receptor negative ([facility] [C50.312, Z17.1] 03/07/2022   Nonfunctional MediPort  POSTOPERATIVE DIAGNOSIS: Same  SURGEON:  Delvis Carter MD   ANESTHESIA:  General LMA anesthesia and local  ESTIMATED BLOOD LOSS:  20  ml  SPECIMEN: Seroma fluid sent for aerobic anaerobic culture  COMPLICATIONS: none  DRAINS: none  DISPOSITION: Recovery Room  CONDITION: stable    Procedure: Patient was brought to the operating room placed supine on the operating table with pneumatic sequential compression devices on the lower extremities. She is administered general anesthetic by endotracheal intubation. She was given Ancef intravenously. After induction of general anesthesia upper chest and neck were prepped and draped in the usual sterile fashion. Skin incision was made in the infraclavicular position. Dissection was carried down to the old port. The port was deep and had been a very difficult placement. It was elevated upward and attempts to make a more superficial pocket the catheter was then easily irrigated but could not withdraw blood. At that point I decided to place a total new catheter so that would be fully functional.  After subclavian vein was entered and a guidewire placed and confirmed via fluoroscopy in the superior vena cava old catheter was removed. The port was attached. It was removed intact. Introducer sheath was placed over the guidewire with the dilator.   The dilator and wire were then removed. New catheter was placed on the introducer sheath and the tip of the catheter was confirmed at the superior vena cava atrial junction under fluoroscopic guidance. Introducer sheath was peeled apart and removed. Catheter was cut to appropriate length and attached with a locking collar to the port. Port was secured in a subcutaneous position with 2-0 Vicryl suture. It easily withdrew blood and was easily flushed. The wound was then closed in multiple layers of absorbable suture. Skin was closed with running subcuticular 4-0 Monocryl suture. Skin glue was applied. Sponge sharp ridge instrument counts were correct. Post procedure chest x-ray showed the catheter in good position there is no evidence of pneumothorax. .  Patient had a small left axillary seroma. It was aspirated and sent for aerobic anaerobic culture. Superior to her left axillary incision she did have some evidence of some hidradenitis wounds were cleaned. She will be discharged home on oral clindamycin. Patient was extubated in the operating suite. She was transported to the recovery area in stable condition.

## 2022-04-29 NOTE — ANESTHESIA POSTPROCEDURE EVALUATION
Department of Anesthesiology  Postprocedure Note    Patient: Hilda Healy  MRN: [id number]  YOB: 1970  Date of evaluation: 4/29/2022  Time:  2:30 PM     Procedure Summary     Date: 04/29/22 Room / Location: [place] [address] / [facility] [place] [facility] [place]    Anesthesia Start: 1130 Anesthesia Stop: 1240    Procedure: Mediport Right Subclavian Removal and Replacement. (Right Chest) Diagnosis: (Mediport malfunction)    Surgeons: Noé Hickey MD Responsible Provider:     Anesthesia Type: general ASA Status: 3          Anesthesia Type: general    Andre Phase I: Anrde Score: 10    Andre Phase II: Andre Score: 10    Last vitals: Reviewed and per EMR flowsheets. Anesthesia Post Evaluation    Patient location during evaluation: PACU  Patient participation: complete - patient participated  Level of consciousness: awake and alert  Airway patency: patent  Nausea & Vomiting: no nausea and no vomiting  Complications: no  Cardiovascular status: hemodynamically stable  Respiratory status: acceptable  Hydration status: euvolemic      [facility] [place] [facility]  POST-ANESTHESIA NOTE       Name:  Hilda Healy                                         Age:  54 y.o.   MRN:  [id number]      Last Vitals:  /65   Pulse 68   Temp 97.2 °F (36.2 °C)   Resp 20   SpO2 98%   Patient Vitals for the past 4 hrs:   BP Temp Temp src Pulse Resp SpO2   04/29/22 1410 121/65 -- -- 68 20 98 %   04/29/22 1340 112/69 -- -- 66 16 98 %   04/29/22 1313 (!) 113/55 97.2 °F (36.2 °C) -- 68 16 97 %   04/29/22 1305 (!) 122/59 -- -- 66 16 98 %   04/29/22 1300 112/61 -- -- 67 18 96 %   04/29/22 1255 112/66 -- -- 68 13 96 %   04/29/22 1250 112/67 -- -- 69 12 95 %   04/29/22 1245 (!) 109/57 -- -- 70 19 99 %   04/29/22 1240 (!) 122/58 -- -- 73 18 95 %   04/29/22 1236 (!) 122/59 97.4 °F (36.3 °C) Temporal 70 17 97 %   04/29/22 1056 98/61 98.1 °F (36.7 °C) Temporal 76 18 96 %       Level of Consciousness:  Awake    Respiratory:  Stable    Oxygen Saturation:  Stable    Cardiovascular:  Stable    Hydration:  Adequate    PONV:  Stable    Post-op Pain:  Adequate analgesia    Post-op Assessment:  No apparent anesthetic complications    Additional Follow-Up / Treatment / Comment:  None    Paloma Zarate MD  April 29, 2022   2:30 PM

## 2022-04-29 NOTE — BRIEF OP NOTE
Brief Postoperative Note      Patient: Candida Monique  YOB: 1970  MRN: [id number]    Date of Procedure: 4/29/2022    Pre-Op Diagnosis: Mediport malfunction    Post-Op Diagnosis: Same       Procedure(s):  Mediport Right Subclavian Removal and Replacement. Surgeon(s):  Damir Campos MD    Assistant:  * No surgical staff found *    Anesthesia: General    Estimated Blood Loss (mL): Minimal    Complications: None    Specimens:   ID Type Source Tests Collected by Time Destination   1 : Left Axillary Aspiration Body Fluid Chest CULTURE, ANAEROBIC AND AEROBIC Damir Campos MD 4/29/2022 1233        Implants:  Implant Name Type Inv.  Item Serial No.  Lot No. LRB No. Used Action   PORT INFUS 8FR PLAS ATTCH OPN END POLYUR CATH ALEX FILL SUT - [id number]  PORT INFUS 8FR PLAS ATTCH OPN END POLYUR CATH ALEX FILL SUT  [place] [employer] [id number] Right 1 Implanted         Drains: * No LDAs found *        Electronically signed by Damir Campos MD on 4/29/2022 at 12:35 PM

## 2022-05-02 ENCOUNTER — TELEPHONE (OUTPATIENT)
Dept: SURGERY | Age: 52
End: 2022-05-02

## 2022-05-02 RX ORDER — FLECAINIDE ACETATE 100 MG/1
TABLET ORAL
Qty: 180 TABLET | Refills: 0 | Status: SHIPPED | OUTPATIENT
Start: 2022-05-02 | End: 2022-08-12

## 2022-05-02 NOTE — TELEPHONE ENCOUNTER
Called patient to see how she is feeling after her procedure on 4-29-22 with Dr. Lorena Patel. Patient states she is doing well with some mild discomfort. Patient is using ice to the area along with Tramadol to help with discomfort. She is eating, drinking and ambulating without difficult. Patient states she is passing gas and having bowel movements. She is having some difficulty sleeping d/t discomfort. She states she is going to try sleeping in the chair for positional purposes. She is to call the office with any questions or concerns.

## 2022-05-03 LAB
AEROBIC CULTURE: NORMAL
ANAEROBIC CULTURE: NORMAL
GRAM STAIN RESULT: NORMAL

## 2022-05-09 ENCOUNTER — OFFICE VISIT (OUTPATIENT)
Dept: SURGERY | Age: 52
End: 2022-05-09

## 2022-05-09 VITALS
SYSTOLIC BLOOD PRESSURE: 138 MMHG | HEART RATE: 84 BPM | OXYGEN SATURATION: 96 % | BODY MASS INDEX: 39.68 KG/M2 | TEMPERATURE: 96.9 F | HEIGHT: 72 IN | DIASTOLIC BLOOD PRESSURE: 62 MMHG | RESPIRATION RATE: 20 BRPM | WEIGHT: 293 LBS

## 2022-05-09 DIAGNOSIS — I10 ESSENTIAL HYPERTENSION: ICD-10-CM

## 2022-05-09 DIAGNOSIS — Z17.1 MALIGNANT NEOPLASM OF LOWER-INNER QUADRANT OF LEFT BREAST IN FEMALE, ESTROGEN RECEPTOR NEGATIVE (HCC): Primary | ICD-10-CM

## 2022-05-09 DIAGNOSIS — Z72.0 TOBACCO ABUSE: ICD-10-CM

## 2022-05-09 DIAGNOSIS — Z09 POSTOP CHECK: ICD-10-CM

## 2022-05-09 DIAGNOSIS — Z51.89 VISIT FOR WOUND CHECK: ICD-10-CM

## 2022-05-09 DIAGNOSIS — C50.312 MALIGNANT NEOPLASM OF LOWER-INNER QUADRANT OF LEFT BREAST IN FEMALE, ESTROGEN RECEPTOR NEGATIVE (HCC): Primary | ICD-10-CM

## 2022-05-09 PROCEDURE — 99024 POSTOP FOLLOW-UP VISIT: CPT | Performed by: SURGERY

## 2022-05-09 NOTE — PROGRESS NOTES
Gissell Downing MD   General Surgery  Postprocedure Evaluation in Office  Pt Name: Magdile Lancaster  Date of Birth 1970   Today's Date: 5/9/2022  Medical Record Number: [id number]  Primary Care Provider: Albert Rice  Chief Complaint   Patient presents with    Post-Op Check     s/p Placement of right subclavian Mediport under fluoroscopic guidance,  Removal right IJ MediPort nonfunctional,Aspiration left axillary seroma-4/29/2022     ASSESSMENT      1. Malignant neoplasm of lower-inner quadrant of left breast in female, estrogen receptor negative ([address]    2. Essential hypertension    3. Tobacco abuse    4. Visit for wound check    5. Postop check       Pathologic stage II aT2 N0 M0, grade 3, ER negative IA negative, HER2 negative  PLAN       1. Status post removal of nonfunctional port and insertion of port right subclavian. To start chemo this week port site healing well no signs of infection. 2.  Axillary wound no signs of infection she does have hidradenitis in the axilla completed course of antibiotics this is superior to her incision. No active drainage. 3.  Small area of hypertrophic granulation tissue left axilla treated with silver nitrate, topically. 4.  Follow-up surgical clinic in 2 weeks for wound recheck. Rita Crum is seen today for post-op follow-up. She is is post port placement/removal nonfunctional port. She is doing well she has resumed her anticoagulation. Surgical wounds look okay no signs of infection. She sees medical oncology tomorrow. Denies any fever or chills. Can encourage smoke cessation. She states she has cut way back.   Medications    Current Outpatient Medications:     flecainide (TAMBOCOR) 100 MG tablet, TAKE 1 TABLET BY MOUTH TWICE A DAY, Disp: 180 tablet, Rfl: 0    ondansetron (ZOFRAN-ODT) 8 MG TBDP disintegrating tablet, Place 1 tablet under the tongue every 8 hours as needed for Nausea or Vomiting, Disp: 20 tablet, Rfl: 2    metoprolol succinate (TOPROL XL) 100 MG extended release tablet, TAKE 1 TABLET BY MOUTH EVERY DAY, Disp: 90 tablet, Rfl: 1    atorvastatin (LIPITOR) 40 MG tablet, TAKE 1 TABLET DAILY, Disp: 90 tablet, Rfl: 3    ELIQUIS 5 MG TABS tablet, TAKE 1 TABLET BY MOUTH TWICE A DAY, Disp: 180 tablet, Rfl: 3    levothyroxine (SYNTHROID) 125 MCG tablet, TAKE 1 TABLET BY MOUTH EVERY DAY IN THE MORNING ON EMPTY STOMACH, Disp: , Rfl:     ZINC PO, Take by mouth daily, Disp: , Rfl:     Ascorbic Acid (VITAMIN C PO), Take by mouth daily, Disp: , Rfl:     ibuprofen (ADVIL;MOTRIN) 800 MG tablet, Take 800 mg by mouth every 6 hours as needed for Pain , Disp: , Rfl:     omeprazole (PRILOSEC) 10 MG delayed release capsule, Take 10 mg by mouth daily, Disp: , Rfl:     nitroGLYCERIN (NITROSTAT) 0.4 MG SL tablet, Place 1 tablet under the tongue every 5 minutes as needed for Chest pain, Disp: 25 tablet, Rfl: 3    aspirin 81 MG chewable tablet, Take 81 mg by mouth daily , Disp: 30 tablet, Rfl: 3    prochlorperazine (COMPAZINE) 10 MG tablet, Take 1 tablet by mouth every 6 hours as needed (nausea) (Patient not taking: Reported on 5/9/2022), Disp: 30 tablet, Rfl: 1    lidocaine-prilocaine (EMLA) 2.5-2.5 % cream, Apply topically as needed. (Patient not taking: Reported on 5/9/2022), Disp: 5 g, Rfl: 1    Allergies  Allergies   Allergen Reactions    Codeine Hives and Swelling     And vomiting    Cortisone Swelling     Apparently tolerates topical and IV with benadryl well    Influenza Virus Vaccine Other (See Comments)     Pt states could not walk after getting     Darvocet A500 [Propoxyphene N-Acetaminophen] Nausea And Vomiting    Sulfa Antibiotics Nausea And Vomiting and Swelling     Not throat swelling       Review of Systems  History obtained from the patient. Constitutional: Denies any fever, chills, fatigue. Wound: Denies drainage  Resp: Denies any cough, shortness of breath. CV: Denies any chest pain, orthopnea or syncope.      OBJECTIVE VITALS: /62 (Site: Right Upper Arm, Position: Sitting, Cuff Size: Medium Adult)   Pulse 84   Temp 96.9 °F (36.1 °C) (Temporal)   Resp 20   Ht 6' (1.829 m)   Wt (!) 319 lb (144.7 kg)   SpO2 96%   BMI 43.26 kg/m²     CONSTITUTIONAL: Alert and oriented times 3, no acute distress and cooperative to examination. SKIN: warm and dry  INCISION: appears improved compared to last recheck No drainage.   LUNGS: Lungs Clear  CARDIOVASCULAR: Normal Rate   NEUROLOGIC: No sensory or motor nerve irritation

## 2022-05-10 ENCOUNTER — HOSPITAL ENCOUNTER (OUTPATIENT)
Dept: INFUSION THERAPY | Age: 52
Discharge: HOME | End: 2022-05-10
Payer: COMMERCIAL

## 2022-05-10 ENCOUNTER — OFFICE VISIT (OUTPATIENT)
Dept: ONCOLOGY | Age: 52
End: 2022-05-10
Payer: COMMERCIAL

## 2022-05-10 VITALS
HEART RATE: 63 BPM | BODY MASS INDEX: 39.68 KG/M2 | WEIGHT: 293 LBS | SYSTOLIC BLOOD PRESSURE: 97 MMHG | DIASTOLIC BLOOD PRESSURE: 54 MMHG | RESPIRATION RATE: 18 BRPM | HEIGHT: 72 IN | TEMPERATURE: 97.9 F | OXYGEN SATURATION: 96 %

## 2022-05-10 VITALS
DIASTOLIC BLOOD PRESSURE: 61 MMHG | RESPIRATION RATE: 18 BRPM | OXYGEN SATURATION: 96 % | SYSTOLIC BLOOD PRESSURE: 106 MMHG | HEART RATE: 70 BPM | HEIGHT: 72 IN | TEMPERATURE: 97.9 F | WEIGHT: 293 LBS | BODY MASS INDEX: 39.68 KG/M2

## 2022-05-10 DIAGNOSIS — Z17.1 MALIGNANT NEOPLASM OF LOWER-INNER QUADRANT OF LEFT BREAST IN FEMALE, ESTROGEN RECEPTOR NEGATIVE (HCC): Primary | ICD-10-CM

## 2022-05-10 DIAGNOSIS — Z17.1 MALIGNANT NEOPLASM OF CENTRAL PORTION OF LEFT BREAST IN FEMALE, ESTROGEN RECEPTOR NEGATIVE (HCC): Primary | ICD-10-CM

## 2022-05-10 DIAGNOSIS — C50.112 MALIGNANT NEOPLASM OF CENTRAL PORTION OF LEFT BREAST IN FEMALE, ESTROGEN RECEPTOR NEGATIVE (HCC): Primary | ICD-10-CM

## 2022-05-10 DIAGNOSIS — C50.312 MALIGNANT NEOPLASM OF LOWER-INNER QUADRANT OF LEFT BREAST IN FEMALE, ESTROGEN RECEPTOR NEGATIVE (HCC): Primary | ICD-10-CM

## 2022-05-10 LAB
ABSOLUTE IMMATURE GRANULOCYTE: 0.03 THOU/MM3 (ref 0–0.07)
ALBUMIN SERPL-MCNC: 3.8 G/DL (ref 3.5–5.1)
ALP BLD-CCNC: 106 U/L (ref 38–126)
ALT SERPL-CCNC: 22 U/L (ref 11–66)
AST SERPL-CCNC: 19 U/L (ref 5–40)
BASINOPHIL, AUTOMATED: 0 % (ref 0–3)
BASOPHILS ABSOLUTE: 0 THOU/MM3 (ref 0–0.1)
BILIRUB SERPL-MCNC: 0.4 MG/DL (ref 0.3–1.2)
BILIRUBIN DIRECT: < 0.2 MG/DL (ref 0–0.3)
BUN, WHOLE BLOOD: 10 MG/DL (ref 8–26)
CHLORIDE, WHOLE BLOOD: 106 MEQ/L (ref 98–109)
CREATININE, WHOLE BLOOD: 0.6 MG/DL (ref 0.5–1.2)
EOSINOPHILS ABSOLUTE: 0.5 THOU/MM3 (ref 0–0.4)
EOSINOPHILS RELATIVE PERCENT: 5 % (ref 0–4)
GFR, ESTIMATED: > 90 ML/MIN/1.73M2
GLUCOSE, WHOLE BLOOD: 127 MG/DL (ref 70–108)
HCT VFR BLD CALC: 40.4 % (ref 37–47)
HEMOGLOBIN: 13.2 GM/DL (ref 12–16)
IMMATURE GRANULOCYTES: 0 %
IONIZED CALCIUM, WHOLE BLOOD: 1.16 MMOL/L (ref 1.12–1.32)
LYMPHOCYTES # BLD: 17 % (ref 15–47)
LYMPHOCYTES ABSOLUTE: 1.6 THOU/MM3 (ref 1–4.8)
MCH RBC QN AUTO: 30.8 PG (ref 26–33)
MCHC RBC AUTO-ENTMCNC: 32.7 GM/DL (ref 32.2–35.5)
MCV RBC AUTO: 94 FL (ref 81–99)
MONOCYTES ABSOLUTE: 0.6 THOU/MM3 (ref 0.4–1.3)
MONOCYTES: 7 % (ref 0–12)
PDW BLD-RTO: 13 % (ref 11.5–14.5)
PLATELET # BLD: 191 THOU/MM3 (ref 130–400)
PMV BLD AUTO: 9.9 FL (ref 9.4–12.4)
POTASSIUM, WHOLE BLOOD: 4.1 MEQ/L (ref 3.5–4.9)
RBC # BLD: 4.28 MILL/MM3 (ref 4.2–5.4)
SEG NEUTROPHILS: 70 % (ref 43–75)
SEGMENTED NEUTROPHILS ABSOLUTE COUNT: 6.4 THOU/MM3 (ref 1.8–7.7)
SODIUM, WHOLE BLOOD: 142 MEQ/L (ref 138–146)
TOTAL CO2, WHOLE BLOOD: 26 MEQ/L (ref 23–33)
TOTAL PROTEIN: 6.9 G/DL (ref 6.1–8)
WBC # BLD: 9.1 THOU/MM3 (ref 4.8–10.8)

## 2022-05-10 PROCEDURE — 96413 CHEMO IV INFUSION 1 HR: CPT

## 2022-05-10 PROCEDURE — 6360000002 HC RX W HCPCS: Performed by: INTERNAL MEDICINE

## 2022-05-10 PROCEDURE — 80047 BASIC METABLC PNL IONIZED CA: CPT

## 2022-05-10 PROCEDURE — 96411 CHEMO IV PUSH ADDL DRUG: CPT

## 2022-05-10 PROCEDURE — 80076 HEPATIC FUNCTION PANEL: CPT

## 2022-05-10 PROCEDURE — 36591 DRAW BLOOD OFF VENOUS DEVICE: CPT

## 2022-05-10 PROCEDURE — 96367 TX/PROPH/DG ADDL SEQ IV INF: CPT

## 2022-05-10 PROCEDURE — 99211 OFF/OP EST MAY X REQ PHY/QHP: CPT

## 2022-05-10 PROCEDURE — 99214 OFFICE O/P EST MOD 30 MIN: CPT | Performed by: INTERNAL MEDICINE

## 2022-05-10 PROCEDURE — 2580000003 HC RX 258: Performed by: INTERNAL MEDICINE

## 2022-05-10 PROCEDURE — 96375 TX/PRO/DX INJ NEW DRUG ADDON: CPT

## 2022-05-10 PROCEDURE — 85025 COMPLETE CBC W/AUTO DIFF WBC: CPT

## 2022-05-10 RX ORDER — HEPARIN SODIUM (PORCINE) LOCK FLUSH IV SOLN 100 UNIT/ML 100 UNIT/ML
500 SOLUTION INTRAVENOUS PRN
Status: CANCELLED | OUTPATIENT
Start: 2022-05-10

## 2022-05-10 RX ORDER — SODIUM CHLORIDE 9 MG/ML
25 INJECTION, SOLUTION INTRAVENOUS PRN
Status: CANCELLED | OUTPATIENT
Start: 2022-05-10

## 2022-05-10 RX ORDER — SODIUM CHLORIDE 0.9 % (FLUSH) 0.9 %
5-40 SYRINGE (ML) INJECTION PRN
Status: DISCONTINUED | OUTPATIENT
Start: 2022-05-10 | End: 2022-05-11 | Stop reason: HOSPADM

## 2022-05-10 RX ORDER — HEPARIN SODIUM (PORCINE) LOCK FLUSH IV SOLN 100 UNIT/ML 100 UNIT/ML
500 SOLUTION INTRAVENOUS PRN
Status: DISCONTINUED | OUTPATIENT
Start: 2022-05-10 | End: 2022-05-11 | Stop reason: HOSPADM

## 2022-05-10 RX ORDER — SODIUM CHLORIDE 9 MG/ML
20 INJECTION, SOLUTION INTRAVENOUS ONCE
Status: COMPLETED | OUTPATIENT
Start: 2022-05-10 | End: 2022-05-10

## 2022-05-10 RX ORDER — PALONOSETRON 0.05 MG/ML
0.25 INJECTION, SOLUTION INTRAVENOUS ONCE
Status: COMPLETED | OUTPATIENT
Start: 2022-05-10 | End: 2022-05-10

## 2022-05-10 RX ORDER — SODIUM CHLORIDE 0.9 % (FLUSH) 0.9 %
5-40 SYRINGE (ML) INJECTION PRN
Status: CANCELLED | OUTPATIENT
Start: 2022-05-10

## 2022-05-10 RX ORDER — DOXORUBICIN HYDROCHLORIDE 2 MG/ML
60 INJECTION, SOLUTION INTRAVENOUS ONCE
Status: COMPLETED | OUTPATIENT
Start: 2022-05-10 | End: 2022-05-10

## 2022-05-10 RX ADMIN — DEXAMETHASONE SODIUM PHOSPHATE 12 MG: 4 INJECTION, SOLUTION INTRAMUSCULAR; INTRAVENOUS at 11:48

## 2022-05-10 RX ADMIN — PALONOSETRON 0.25 MG: 0.05 INJECTION, SOLUTION INTRAVENOUS at 11:43

## 2022-05-10 RX ADMIN — DOXORUBICIN HYDROCHLORIDE 164 MG: 2 INJECTION, SOLUTION INTRAVENOUS at 12:52

## 2022-05-10 RX ADMIN — SODIUM CHLORIDE, PRESERVATIVE FREE 10 ML: 5 INJECTION INTRAVENOUS at 10:46

## 2022-05-10 RX ADMIN — FOSAPREPITANT 150 MG: 150 INJECTION, POWDER, LYOPHILIZED, FOR SOLUTION INTRAVENOUS at 12:11

## 2022-05-10 RX ADMIN — SODIUM CHLORIDE, PRESERVATIVE FREE 10 ML: 5 INJECTION INTRAVENOUS at 10:45

## 2022-05-10 RX ADMIN — SODIUM CHLORIDE, PRESERVATIVE FREE 10 ML: 5 INJECTION INTRAVENOUS at 14:04

## 2022-05-10 RX ADMIN — SODIUM CHLORIDE, PRESERVATIVE FREE 10 ML: 5 INJECTION INTRAVENOUS at 11:43

## 2022-05-10 RX ADMIN — HEPARIN 500 UNITS: 100 SYRINGE at 14:04

## 2022-05-10 RX ADMIN — SODIUM CHLORIDE 20 ML/HR: 9 INJECTION, SOLUTION INTRAVENOUS at 11:43

## 2022-05-10 RX ADMIN — CYCLOPHOSPHAMIDE 1640 MG: 1 INJECTION, POWDER, FOR SOLUTION INTRAVENOUS; ORAL at 13:11

## 2022-05-10 NOTE — PATIENT INSTRUCTIONS
5/10/2022 start course 1 dose dense AC. Labs drawn.   Follow-up 2 weeks 5/24 with nurse practitioner and course 2 with lab

## 2022-05-10 NOTE — ONCOLOGY
Chemotherapy Administration    Pre-assessment Data: Antineoplastic Agents  Other:   See toxicity flow sheet for assessment [x]     Physician Notification of Concerns Related to Chemotherapy Administration:   Physician Notified Jairo [place] / Time of Notification      Interventions:   Lab work assessed  [x]   Height / Weight verified for dose [x]   Current MAR reviewed [x]   Emergency drugs available as appropriate [x]   Anaphylaxis assessment completed [x]   Pre-medications administered as ordered [x]   Blood return noted upon initiation of chemotherapy [x]   Blood return noted each 1-2ml of a vesicant medication if given IV push [x]   Blood return noted each 2-3ml of a non-vesicant medication if given IV push []   Monitor for signs / symptoms of hypersensitivity reaction [x]   Chemotherapy orders (drug/dose/rate) verified by 2 Chemo certified RNs [x]   Monitor IV site and blood return throughout the infusion of the medication [x]   Document IV site checks on the IV assessment form [x]   Document chemotherapy teaching on the Patient Education tab [x]   Document patient verbalizes understanding of medications being administered [x]   If IV infiltration, see ONS Guidelines []   Other:      []

## 2022-05-10 NOTE — PROGRESS NOTES
Patient assessed for the following post chemotherapy:    Dizziness   No  Lightheadedness  No      Acute nausea/vomiting No  Headache   No  Chest pain/pressure  No  Rash/itching   No  Shortness of breath  No    Patient kept for 20 minutes observation post infusion chemotherapy. Patient tolerated chemotherapy treatment adriamycin and cytoxan without any complications. Last vital signs:   BP (!) 97/54   Pulse 63   Temp 97.9 °F (36.6 °C) (Oral)   Resp 18   Ht 6' (1.829 m)   Wt (!) 321 lb (145.6 kg)   SpO2 96%   BMI 43.54 kg/m²       Patient instructed if experience any of the above symptoms following today's infusion, she is to notify MD immediately or go to the emergency department. Discharge instructions given to patient. Verbalizes understanding. Ambulated off unit per self with belongings.

## 2022-05-10 NOTE — PLAN OF CARE
Problem: Musculor/Skeletal Functional Status  Goal: Absence of falls  Outcome: Adequate for Discharge  Note: Patient free of falls this visit. Intervention: Fall precautions  Note: Fall risks assessed. Precautions discussed. Call light within reach during visit. Problem: Discharge Planning  Goal: Knowledge of discharge instructions  Description: Knowledge of discharge instructions  Outcome: Adequate for Discharge  Note: Patient verbalizes understanding of discharge instructions, follow up appointment, and when to call physician if needed   Intervention: Interaction with patient/family and care team  Note: Discharge instructions given to pt and reviewed. Follow up appointments discussed. Problem: Intellectual/Education/Knowledge Deficit  Goal: Teaching initiated upon admission  Outcome: Adequate for Discharge  Note: Patient verbalizes understanding to verbal information given on adriamycin and cytoxan, including action and possible side effects. Aware to call MD if develop complications.     Intervention: Verbal/written education provided  Note: Chemotherapy Teaching     What is Chemotherapy   Drug action [x]   Method of Administration [x]   Handouts given []     Side Effects  Nausea/vomiting [x]   Diarrhea [x]   Fatigue [x]   Signs / Symptoms of infection [x]   Neutropenia [x]   Thrombocytopenia [x]   Alopecia [x]   neuropathy [x]   [place] diet &  the importance of fluids [x]       Micellaneous  Importance of nutrition [x]   Importance of oral hygiene [x]   When to call the MD [x]   Monitoring labs [x]   Use of supportive services []     Explanation of Drug Regimen / Frequency  Adriamycin and cytoxan     Comments  Verbalized understanding to drug,action,side effects and when to call MD         Problem: Infection - Adult  Goal: Absence of infection at discharge  Outcome: Adequate for Discharge  Flowsheets (Taken 5/10/2022 1940)  Absence of infection at discharge:   Assess and monitor for signs and symptoms of infection   Monitor all insertion sites i.e., indwelling lines, tubes and drains  Note: Mediport site with no redness or warmth. Skin over port site intact with no signs of breakdown noted. Patient verbalizes signs/symptoms of port infection and when to notify the physician. Care plan reviewed with patient. Patient verbalizes understanding of the plan of care and contributes to goal setting.

## 2022-05-10 NOTE — PROGRESS NOTES
[address] CANCER [address] [place] [address]  Dept: [phone]  Loc: [phone]   Hematology/Oncology Consult (Clinic)        5/10/22     Sai Gunderson   1970     No ref. provider found   Broderick Dawn          DIAGNOSIS:  -Invasive ductal carcinoma, left breast, ER negative, TN negative and HER-2 negative, 2.7 cm, grade 3, 0/ 4 sentinel nodes . ps T2N0M0 /IIA. (High risk: triple negative/grade 3). Inner central left breast anteriorly. -Extended panel germline testing - March 2022    TREATMENT:  - Left breast lumpectomy and sentinel node resection 3/22/2022. Dr Rosalva Farrar. - Adjuvant AC dose dense x4 followed by dose dense Taxol every other week. Start 5/10/2022  -After adjuvant chemotherapy, postlumpectomy radiation. PARAMETERS:  -History, exam,  -Bone scan ordered    SUBJECTIVE: Patient is doing well. Her initial port was too deep to access. Im Tyrell 45 returned and she is accessed and ready to proceed with course one of the left adjuvant AC. She has had chemotherapy teaching. Bone scan reviewed and shows no bony mets. Labs reviewed. She has picked up her antiemetics and Emla cream.  All questions answered. Reviewed her cardiac history of an MI in 2014 with paroxysmal A. fib on anticoagulation. Since 2014 she has had no subsequent ischemic events including no congestive heart failure or angina. Cardiac risk reviewed and she agrees to proceed. Left axilla has had infection postsurgical treated with 10 days of antibiotics to resolution. Over the last week she has had nothing but minimal serosanguineous drainage. HPI: Manfred Jacques is a 61-year-old premenopausal female developed a lump in the left breast just medial to the nipple on February 8. Strong family history of malignancy including breast cancer. Last mammogram was in 2015.   Mammogram on 2/18 additional imaging confirmed a approximate 2 cm nodular density inferior central medial left breast.  Initial biopsy revealed invasive ductal cancer triple and grade 3. Definitive left breast lumpectomy and sentinel node resection on 3/22 revealed a 2.7 cm pathologic stage T2N0 triple negative grade 3 breast cancer. Other feeling the lump she is asymptomatic. Specifically denies any headaches bone pain breathing problems or any other focal or systemic complaints. Followed by cardiology with a history of a an MI in 2014 and proximal A. fib for which she is on anticoagulation followed by Dr. Jeannine Coleman. Echocardiogram last year normal.  And recent history no ischemic cardiac events. ROS:  Review of Systems 14 point negative except as above. PMH:   Past Medical History:   Diagnosis Date    Afib ([facility]     Baki    Arthritis     CAD (coronary artery disease)     GERD (gastroesophageal reflux disease)     Hyperlipidemia     Hypertension     Hypothyroid     Invasive ductal carcinoma of breast, female, left ([facility] 02/24/2022    Malignant neoplasm of lower-inner quadrant of left breast in female, estrogen receptor negative ([facility] 03/07/2022    MI (myocardial infarction) ([facility]     Dr. Jeannine Coleman    Pneumonia     Migraines  MI  Nov 2014  C Cath 2014.     Social HX:   Social History     Socioeconomic History    Marital status:      Spouse name: Not on file    Number of children: 1    Years of education: Not on file    Highest education level: Not on file   Occupational History    Occupation: LPN   Tobacco Use    Smoking status: Current Every Day Smoker     Packs/day: 0.25     Years: 30.00     Pack years: 7.50     Types: Cigarettes     Start date: 12/4/1987    Smokeless tobacco: Never Used    Tobacco comment: refused counciling   Vaping Use    Vaping Use: Never used   Substance and Sexual Activity    Alcohol use: Yes     Comment: rare    Drug use: No    Sexual activity: Yes     Partners: Male     Comment: boy friend   Other Topics Concern    Not on file Social History Narrative    Not on file     Social Determinants of Health     Financial Resource Strain:     Difficulty of Paying Living Expenses: Not on file   Food Insecurity:     Worried About Running Out of Food in the Last Year: Not on file    Gaurav of Food in the Last Year: Not on file   Transportation Needs:     Lack of Transportation (Medical): Not on file    Lack of Transportation (Non-Medical): Not on file   Physical Activity:     Days of Exercise per Week: Not on file    Minutes of Exercise per Session: Not on file   Stress:     Feeling of Stress : Not on file   Social Connections:     Frequency of Communication with Friends and Family: Not on file    Frequency of Social Gatherings with Friends and Family: Not on file    Attends Jainism Services: Not on file    Active Member of [address] [place] [address] [employer] or Organizations: Not on file    Attends Club or Organization Meetings: Not on file    Marital Status: Not on file   Intimate Partner Violence:     Fear of Current or Ex-Partner: Not on file    Emotionally Abused: Not on file    Physically Abused: Not on file    Sexually Abused: Not on file   Housing Stability:     Unable to Pay for Housing in the Last Year: Not on file    Number of [place] in the Last Year: Not on file    Unstable Housing in the Last Year: Not on file   Smoking history: 30+ years of smoking currently down to about 3 cigarettes/day average 1 to 1/2 packs/day. Spouse:   1 daughter age 32. 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 Dr. Valarie Martinsin of OB  C-Scope: Not yet      Gyn HX:   GPA:  ROSS/BSO: all present. Last normal menstrual cycle in 6 years  LMP: No LMP recorded.  Patient is postmenopausal.     Health Maintenance Due   Topic Date Due    COVID-19 Vaccine (1) Never done    Pneumococcal 0-64 years Vaccine (1 - PCV) Never done    Depression Screen  Never done    HIV screen  Never done    Hepatitis C screen  Never done    DTaP/Tdap/Td vaccine (1 - Tdap) Never done    Cervical cancer screen  Never done    Colorectal Cancer Screen  Never done    Diabetes screen  02/01/2022        Interests:   4H advisor. Fam HX:   Family History   Problem Relation Age of Onset    Heart Disease Mother     Diabetes Mother     Cancer Mother         liposarcoma    Kidney Disease Mother     Heart Disease Father     Diabetes Father     Kidney Disease Father     Cancer Father         Non-melanoma skin cancer multiple times    No Known Problems Brother     No Known Problems Brother     No Known Problems Brother     No Known Problems Maternal Grandmother     No Known Problems Maternal Grandfather     Breast Cancer Paternal Grandmother 47        reoccurred at 46    Ovarian Cancer Paternal Grandmother 46    Bilateral breast cancer Paternal Grandmother         Opposite breast, age 52's    Breast Cancer Maternal Aunt 48    Breast Cancer Paternal Aunt         breast      Germline genetic testing: Sent and negative 3/4/22. Hospitalizations:   None recent    Allergies:   Allergies   Allergen Reactions    Codeine Hives and Swelling     And vomiting    Cortisone Swelling     Apparently tolerates topical and IV with benadryl well    Influenza Virus Vaccine Other (See Comments)     Pt states could not walk after getting     Darvocet A500 [Propoxyphene N-Acetaminophen] Nausea And Vomiting    Sulfa Antibiotics Nausea And Vomiting and Swelling     Not throat swelling        Adult Illness:  Patient Active Problem List   Diagnosis    ACS (acute coronary syndrome) (HCC)    Chest pain with moderate risk for cardiac etiology    Essential hypertension    HLD (hyperlipidemia)    Tobacco abuse    History of MI (myocardial infarction)    Acquired hypothyroidism    Un[facility] angina ([facility]    Coronary artery disease involving native coronary artery of native heart without angina pectoris    Paroxysmal atrial fibrillation (HCC)    Gastroesophageal reflux disease without esophagitis    Malignant neoplasm of lower-inner quadrant of left breast in female, estrogen receptor negative ([address]    Malignant neoplasm of central portion of left breast in female, estrogen receptor negative ([address]    Hematoma      Patient is on Eliquis and aspirin for PAF. Surgery:  Past Surgical History:   Procedure Laterality Date    BREAST LUMPECTOMY Left 3/22/2022    LEFT BREAST LUMPECTOMY PARTIAL MASTECTOMY, SENTINEL LYMPH NODE BIOPSY, PRE OP NEEDLE LOC X 2 performed by Warren Brooks MD at [address] Left 3/30/2022    Evacuation hematoma left axilla performed by Wraren Brooks MD at [address] [place] [address] ARTHROSCOPY Right     08    JENNIFER BIOPSY LYMPH NODE BY NEEDLE SUPERFICIAL  2022    JENNIFER BIOPSY LYMPH NODE BY NEEDLE SUPERFICIAL 2022 Laz Colindres MD [facility] US GUID NDL BIOPSY LEFT Left 2022    [facility] [address] LEFT 2022 Laz Colindres MD [facility]    MOUTH SURGERY      PORT SURGERY Right 2022    Single Lumen Smartport Right Subclavian, aspiration of left axillary seroma performed by Warren Brooks MD at [address] [place] [address] Right 2022    Mediport Right Subclavian Removal and Replacement. performed by Warren Brooks MD at [facility], INCISIONAL Left     excisional bx-H. Chollet fibrocystic changes    US BREAST BIOPSY NEEDLE ADDITIONAL LEFT Left 2022    US BREAST BIOPSY NEEDLE ADDITIONAL LEFT 2022 Laz Colindres MD [address] GUIDED NEEDLE LOC BREAST ADDL LEFT Left 3/22/2022    US GUIDED NEEDLE LOC BREAST ADDL LEFT 3/22/2022 [facility]        Medications:  Current Outpatient Medications   Medication Sig Dispense Refill    flecainide (TAMBOCOR) 100 MG tablet TAKE 1 TABLET BY MOUTH TWICE A  tablet 0    ondansetron (ZOFRAN-ODT) 8 MG TBDP disintegrating tablet Place 1 tablet under the tongue every 8 hours as needed for Nausea or Vomiting 20 tablet 2    lidocaine-prilocaine (EMLA) 2.5-2.5 % cream Apply topically as needed. 5 g 1    metoprolol succinate (TOPROL XL) 100 MG extended release tablet TAKE 1 TABLET BY MOUTH EVERY DAY 90 tablet 1    atorvastatin (LIPITOR) 40 MG tablet TAKE 1 TABLET DAILY 90 tablet 3    ELIQUIS 5 MG TABS tablet TAKE 1 TABLET BY MOUTH TWICE A  tablet 3    levothyroxine (SYNTHROID) 125 MCG tablet TAKE 1 TABLET BY MOUTH EVERY DAY IN THE MORNING ON EMPTY STOMACH      ZINC PO Take by mouth daily      Ascorbic Acid (VITAMIN C PO) Take by mouth daily      ibuprofen (ADVIL;MOTRIN) 800 MG tablet Take 800 mg by mouth every 6 hours as needed for Pain       omeprazole (PRILOSEC) 10 MG delayed release capsule Take 10 mg by mouth daily      aspirin 81 MG chewable tablet Take 81 mg by mouth daily  30 tablet 3    prochlorperazine (COMPAZINE) 10 MG tablet Take 1 tablet by mouth every 6 hours as needed (nausea) (Patient not taking: Reported on 5/9/2022) 30 tablet 1    nitroGLYCERIN (NITROSTAT) 0.4 MG SL tablet Place 1 tablet under the tongue every 5 minutes as needed for Chest pain (Patient not taking: Reported on 5/10/2022) 25 tablet 3     No current facility-administered medications for this visit. EXAM:   height is 6' (1.829 m) and weight is 321 lb (145.6 kg) (abnormal). Her oral temperature is 97.9 °F (36.6 °C). Her blood pressure is 106/61 and her pulse is 70. Her respiration is 18 and oxygen saturation is 96%. Estimated body surface area is 2.72 meters squared as calculated from the following:    Height as of this encounter: 6' (1.829 m).     Weight as of this encounter: 321 lb (145.6 kg). ECO  General: Non-ill appearing. Very pleasant and upbeat. Morbidly obese. HEENT: NC/AT,nonicteric, perrla,eom intact, no mucosal lesions, dentition in good repair. Neck: normal thyroid, no masses. pulses nl, no bruits,   Nodes: No adenopathy  Lungs/chest: clear, no rales,rhonchi or wheezing, lung bases clear  CV: rrr, no rubs ,gallops or murmurs  Breasts: Postsurgical ecchymosis in the left breast post lumpectomy and recent evacuation of hematoma in the left axilla. No palpable masses bilaterally  Abd/Rectal: soft, non-tender,bowel sounds normal , no HSM,no masses, obese  Back: normal curvature, No midline tenderness. flanks nontender  : Not Examined  Extremities: no cyanosis,clubbing or edema. Skin: unremarkable  Neuro: A and O x 4, CN exam nonfocal, Motor- no deficits, Sensory- no deficits, gait-nl, speech- fluent, no ataxia. Devices: Right chest [id number].       DATA:    LAB:   2022  CA 27-20 9 = 15  Estradiol 15.4/decreased    CBC with Differential:      Lab Results   Component Value Date    WBC 10.2 2022    RBC 4.64 2022    HGB 14.3 2022    HCT 43.6 2022     2022    MCV 94 2022    MCH 30.8 2022    MCHC 32.8 2022    RDW 13.1 2022    NRBC 0 2021    SEGSPCT 65.5 2021    MONOPCT 7.2 2021    MONOSABS 0.5 2022    LYMPHSABS 1.8 2022    EOSABS 0.4 2022    BASOSABS 0.0 2022     Lab Results   Component Value Date/Time    SEGSABS 7.4 2022 03:21 PM       CMP:    Lab Results   Component Value Date     2022     2021    K 4.0 2022    K 3.8 2019     2021    CO2 24 2021    BUN 12 2021    CREATININE 0.5 2022    CREATININE 0.5 2021    LABGLOM >90 2021    GLUCOSE 105 2021    PROT 7.9 2022    LABALBU 4.2 2022    CALCIUM 9.1 2021    BILITOT 0.3 2022    ALKPHOS 119 2022 AST 12 04/05/2022    ALT 15 04/05/2022       BMP:    Lab Results   Component Value Date     04/05/2022     12/21/2021    K 4.0 04/13/2022    K 3.8 12/04/2019     12/21/2021    CO2 24 12/21/2021    BUN 12 12/21/2021    LABALBU 4.2 04/05/2022    CREATININE 0.5 04/05/2022    CREATININE 0.5 12/21/2021    CALCIUM 9.1 12/21/2021    LABGLOM >90 12/21/2021    GLUCOSE 105 12/21/2021       Magnesium:    Lab Results   Component Value Date    MG 2.3 12/05/2019     PT/INR:    Lab Results   Component Value Date    INR 1.00 04/13/2022     TSH:    Lab Results   Component Value Date    TSH 4.690 12/21/2021     VITAMIN B12: No components found for: B12  FOLATE:  No results found for: FOLATE  IRON:  No results found for: IRON  Iron Saturation:  No components found for: PERCENTFE  TIBC:  No results found for: TIBC  FERRITIN:  No results found for: FERRITIN  PSA: No results found for: PSA         IMAGING:  Bone scan 4/11/2022-no evidence of bony metastatic disease. [facility]       PROCEDURE: [facility] MICHAEL DIGITAL DIAGNOSTIC BILATERAL, US BREAST LIMITED LEFT       CLINICAL INFORMATION: Subareolar mass of left breast . Tomosynthesis. Meets criteria for genetic evaluation and has been offered information for possible referral.           PATIENT MEDICAL HISTORY: No relevant medical history has been documented for this patient.       FAMILY HISTORY: Family medical history includes breast cancer in paternal grandmother and ovarian cancer in paternal grandmother.       RISK VALUES: Chiara Romeroa.: 4.2%, Reginald life: 15.3%       COMPARISON: 6/1/2015       [facility] MICHAEL DIGITAL DIAGNOSTIC BILATERAL: 2/18/22       TECHNIQUE: Bilateral CC and MLO views were obtained each breast. Tomosynthesis was used. CAD was used.        BREAST COMPOSITION: The tissue of the breast(s) is heterogeneously dense.  This may lower the sensitivity of mammography.       FINDINGS:        There is a nodular density within the inner central left breast anterior depth which correlates to the palpable abnormality. Further evaluation with targeted ultrasound is reported below.       The additional palpable abnormality within the periareolar region of the left breast does not demonstrate 8 definite mammographic correlate. However, further evaluation with targeted ultrasound is reported below.           US BREAST LIMITED LEFT:       TECHNIQUE: Targeted ultrasound of the left breast was performed. Grayscale images and color images of the real-time examination were reviewed. The axilla was also imaged.       FINDINGS:        There is a hypoechoic lobulated mass within the inner central left breast near the 9:00 position 1 cm from the nipple measuring 1.9 x 1.4 x 1.6 cm. This correlates with the mammographic finding and the palpable abnormality. Recommend ultrasound-guided    biopsy.       There is a small hypoechoic nodular lesion within the upper central left breast 12:00 position 3 cm from the nipple measuring 0.3 x 0.2 x 0.3 cm. This is incidental. However, recommend ultrasound-guided biopsy.       There is an enlarged lymph node demonstrated within the left axilla was cortical thickening measuring 4.9 mm. There is retained fatty hilum. Juvencio Spatz is an additional prominent lymph node within the left axilla with slightly less cortical thickening    measuring 4 mm. Recommend ultrasound-guided biopsy of the largest left axillary lymph node.            2/18/22   Impression   1. There is a hypoechoic lobulated mass within the inner central left breast near the 9:00 position 1 cm from the nipple measuring 1.9 x 1.4 x 1.6 cm. This correlates with the mammographic finding and the palpable abnormality. Recommend ultrasound-guided    biopsy.       2. There is a small hypoechoic nodular lesion within the upper central left breast 12:00 position 3 cm from the nipple measuring 0.3 x 0.2 x 0.3 cm. This is incidental. However, recommend ultrasound-guided biopsy.       3.  There is an enlarged lymph node demonstrated within the left axilla was cortical thickening measuring 4.9 mm. There is retained fatty hilum. Suzanne Maverick is an additional prominent lymph node within the left axilla with slightly less cortical thickening    measuring 4 mm. Recommend ultrasound-guided biopsy of the largest left axillary lymph node.       These results were discussed with the patient. The tech navigator was notified. We will arrange scheduling the patient for her procedure per department protocol.       BI-RADS CATEGORY 4C - Suspicious abnormality - High suspicion for malignancy.       Management: Tissue diagnosis.  Biopsy should be performed in the absence of clinical contraindication.               **This report has been created using voice recognition software. It may contain minor errors which are inherent in voice recognition technology. **       Final report electronically signed by Dr. David Forde on 2/18/2022 4:48 PM          [address] LEFT (Order [id number]) - Reflex for Order [id number]  Narrative   LOCATION: [facility]       EXAM:     1. MAMMOGRAM POST BX CLIP PLACEMENT LEFT, JENNIFER US GUID NDL BIOPSY LEFT, JENNIFER NEEDLE BIOPSY LYMPH NODE SUPERFICIAL, US BREAST BIOPSY W LOC DEVICE EACH ADDL LESION LEFT       1. Biopsy of the left breast, percutaneous, using imaging guidance, 2 sites. 2. Biopsy of the left axilla, axillary lymph node, percutaneous, using imaging guidance. 3. Ultrasound guidance for biopsy, imaging supervision and interpretation. 4. Postprocedural diagnostic left mammogram.           HISTORY: 51 years, Female, Mass overlapping multiple quadrants of left breast, Lymph node enlargement. .       COMPARISON:  2/18/2022 and 6/1/2015.       TECHNIQUE/FINDINGS:        Written, informed consent was obtained, including discussion of the risks, benefits and alternatives. The patient's left breast was marked by the physician with initials.  A timeout was performed including the patient's name, date of birth, procedure and    laterality.       Site 1, 3 mm nodular density, upper central left breast, 12:00, U clip:  An ultrasound-guided biopsy using real-time ultrasound was performed. The nodule in the upper central left breast was identified, localized , and the site was marked. With    ultrasound guidance from a medial approach, the area was prepped and draped in sterile fashion. 2 % lidocaine was used for local anesthesia. 14 ga Sertera coaxial needle was advanced under ultrasound guidance. Once the needle was documented to be in the    correct location, 4 samples were taken from the area of interest. Samples were placed in formalin sent to pathology. A U shaped biopsy marker was placed at the biopsy site.       Site 2, 1.9 cm mass, 9:00, inner central left breast, barbell clip:  An ultrasound-guided biopsy using real-time ultrasound was performed. The mass in the inner central left breast was identified, localized , and the site was marked. With ultrasound    guidance from a medial approach, the area was prepped and draped in sterile fashion. 2 % lidocaine was used for local anesthesia. 14 ga Sertera coaxial needle was advanced under ultrasound guidance. Once the needle was documented to be in the correct    location, 4 samples were taken from the area of interest. Samples were placed in formalin sent to pathology. A barbell biopsy marker was placed at the biopsy site.       Site 3, left axillary lymph node, tribell clip: An ultrasound-guided biopsy using real-time ultrasound was performed. The lymph node in the left axilla was identified, localized , and the site was marked. With ultrasound guidance from a lateral approach,    the area was prepped and draped in sterile fashion. 2 % lidocaine was used for local anesthesia. 14 ga Sertera coaxial needle was advanced under ultrasound guidance.  Once the needle was documented to be in the correct location, 4 samples were taken from the area of interest. Samples were placed in formalin sent to pathology. A tribell biopsy marker was placed at the biopsy site.       Clip placement was confirmed with a left digital diagnostic mammogram. The mammogram was performed as a separate procedure in a separate room with a dedicated machine.       The patient tolerated the procedure well. No evidence of immediate complication. The patient was given post-procedure instructions, including wound care.           POSTPROCEDURE MAMMOGRAM:       Images of the left include a CC view and MLO view. Tomosynthesis. CAD was utilized.       There are scattered fibroglandular elements in the breast(s) that could obscure a lesion on mammography. Post procedure mammograms were taken in a separate room. There is a U shaped biopsy clip at the biopsy site 1, 12:00, anterior depth. There is a    barbell clip in the inner central left breast, within the mammographic mass. This corresponds with the original mammographic density. There is a tribell clip in the left axilla.       There are no other significant findings in the breast.               Impression   1. Successful left breast ultrasound guided core needle biopsy, 2 sites. 2. Successful left axillary lymph node ultrasound guided core needle biopsy. 3. Successful marker clip placements with confirmation by digital diagnostic mammogram.            Waiting for pathology results. A final report will be issued when these become available.           BI-RADS Final Assessment Category: Waiting for pathology.       Management Recommendation: Assess radiologic/pathologic concordance.                   **This report has been created using voice recognition software. It may contain minor errors which are inherent in voice recognition technology. **       Final report electronically signed by Dr. Luan Frey on 2/24/2022 10:00 AM          JENNIFER NEEDLE BIOPSY LYMPH NODE SUPERFICIAL (Order [id number]) - Reflex for Order [id number]  Narrative   LOCATION: [facility]       EXAM:     1. MAMMOGRAM POST BX CLIP PLACEMENT LEFT, JENNIFER US GUID NDL BIOPSY LEFT, JENNIFER NEEDLE BIOPSY LYMPH NODE SUPERFICIAL, US BREAST BIOPSY W LOC DEVICE EACH ADDL LESION LEFT       1. Biopsy of the left breast, percutaneous, using imaging guidance, 2 sites. 2. Biopsy of the left axilla, axillary lymph node, percutaneous, using imaging guidance. 3. Ultrasound guidance for biopsy, imaging supervision and interpretation. 4. Postprocedural diagnostic left mammogram.           HISTORY: 51 years, Female, Mass overlapping multiple quadrants of left breast, Lymph node enlargement. .       COMPARISON:  2/18/2022 and 6/1/2015.       TECHNIQUE/FINDINGS:        Written, informed consent was obtained, including discussion of the risks, benefits and alternatives. The patient's left breast was marked by the physician with initials. A timeout was performed including the patient's name, date of birth, procedure and    laterality.       Site 1, 3 mm nodular density, upper central left breast, 12:00, U clip:  An ultrasound-guided biopsy using real-time ultrasound was performed. The nodule in the upper central left breast was identified, localized , and the site was marked. With    ultrasound guidance from a medial approach, the area was prepped and draped in sterile fashion. 2 % lidocaine was used for local anesthesia. 14 ga Sertera coaxial needle was advanced under ultrasound guidance. Once the needle was documented to be in the    correct location, 4 samples were taken from the area of interest. Samples were placed in formalin sent to pathology. A U shaped biopsy marker was placed at the biopsy site.       Site 2, 1.9 cm mass, 9:00, inner central left breast, barbell clip:  An ultrasound-guided biopsy using real-time ultrasound was performed. The mass in the inner central left breast was identified, localized , and the site was marked.  With ultrasound    guidance from a medial approach, the area was prepped and draped in sterile fashion. 2 % lidocaine was used for local anesthesia. 14 ga Sertera coaxial needle was advanced under ultrasound guidance. Once the needle was documented to be in the correct    location, 4 samples were taken from the area of interest. Samples were placed in formalin sent to pathology. A barbell biopsy marker was placed at the biopsy site.       Site 3, left axillary lymph node, tribell clip: An ultrasound-guided biopsy using real-time ultrasound was performed. The lymph node in the left axilla was identified, localized , and the site was marked. With ultrasound guidance from a lateral approach,    the area was prepped and draped in sterile fashion. 2 % lidocaine was used for local anesthesia. 14 ga Sertera coaxial needle was advanced under ultrasound guidance. Once the needle was documented to be in the correct location, 4 samples were taken from    the area of interest. Samples were placed in formalin sent to pathology. A tribell biopsy marker was placed at the biopsy site.       Clip placement was confirmed with a left digital diagnostic mammogram. The mammogram was performed as a separate procedure in a separate room with a dedicated machine.       The patient tolerated the procedure well. No evidence of immediate complication. The patient was given post-procedure instructions, including wound care.           POSTPROCEDURE MAMMOGRAM:       Images of the left include a CC view and MLO view. Tomosynthesis. CAD was utilized.       There are scattered fibroglandular elements in the breast(s) that could obscure a lesion on mammography. Post procedure mammograms were taken in a separate room. There is a U shaped biopsy clip at the biopsy site 1, 12:00, anterior depth. There is a    barbell clip in the inner central left breast, within the mammographic mass. This corresponds with the original mammographic density.  There is a tribell clip in the left axilla.       There are no other significant findings in the breast.               Impression   1. Successful left breast ultrasound guided core needle biopsy, 2 sites. 2. Successful left axillary lymph node ultrasound guided core needle biopsy. 3. Successful marker clip placements with confirmation by digital diagnostic mammogram.            Waiting for pathology results. A final report will be issued when these become available.           BI-RADS Final Assessment Category: Waiting for pathology.       Management Recommendation: Assess radiologic/pathologic concordance.                   **This report has been created using voice recognition software. It may contain minor errors which are inherent in voice recognition technology. **       Final report electronically signed by Dr. Tracy Raza on 2/24/2022 10:00 AM          [facility] [address] LEFT (Order [id number]) - Reflex for Order [id number]  Narrative   LOCATION: [place]       EXAM:     1. MAMMOGRAM POST BX CLIP PLACEMENT LEFT, [facility] US GUID NDL BIOPSY LEFT, [facility] NEEDLE BIOPSY LYMPH NODE SUPERFICIAL, US BREAST BIOPSY W LOC DEVICE EACH ADDL LESION LEFT       1. Biopsy of the left breast, percutaneous, using imaging guidance, 2 sites. 2. Biopsy of the left axilla, axillary lymph node, percutaneous, using imaging guidance. 3. Ultrasound guidance for biopsy, imaging supervision and interpretation. 4. Postprocedural diagnostic left mammogram.           HISTORY: 51 years, Female, Mass overlapping multiple quadrants of left breast, Lymph node enlargement. .       COMPARISON:  2/18/2022 and 6/1/2015.       TECHNIQUE/FINDINGS:        Written, informed consent was obtained, including discussion of the risks, benefits and alternatives. The patient's left breast was marked by the physician with initials.  A timeout was performed including the patient's name, date of birth, procedure and    laterality.       Site 1, 3 mm nodular density, upper central left breast, 12:00, U clip:  An ultrasound-guided biopsy using real-time ultrasound was performed. The nodule in the upper central left breast was identified, localized , and the site was marked. With    ultrasound guidance from a medial approach, the area was prepped and draped in sterile fashion. 2 % lidocaine was used for local anesthesia. 14 ga Sertera coaxial needle was advanced under ultrasound guidance. Once the needle was documented to be in the    correct location, 4 samples were taken from the area of interest. Samples were placed in formalin sent to pathology. A U shaped biopsy marker was placed at the biopsy site.       Site 2, 1.9 cm mass, 9:00, inner central left breast, barbell clip:  An ultrasound-guided biopsy using real-time ultrasound was performed. The mass in the inner central left breast was identified, localized , and the site was marked. With ultrasound    guidance from a medial approach, the area was prepped and draped in sterile fashion. 2 % lidocaine was used for local anesthesia. 14 ga Sertera coaxial needle was advanced under ultrasound guidance. Once the needle was documented to be in the correct    location, 4 samples were taken from the area of interest. Samples were placed in formalin sent to pathology. A barbell biopsy marker was placed at the biopsy site.       Site 3, left axillary lymph node, tribell clip: An ultrasound-guided biopsy using real-time ultrasound was performed. The lymph node in the left axilla was identified, localized , and the site was marked. With ultrasound guidance from a lateral approach,    the area was prepped and draped in sterile fashion. 2 % lidocaine was used for local anesthesia. 14 ga Sertera coaxial needle was advanced under ultrasound guidance. Once the needle was documented to be in the correct location, 4 samples were taken from    the area of interest. Samples were placed in formalin sent to pathology.  A tribell biopsy marker was placed at the biopsy site.       Clip placement was confirmed with a left digital diagnostic mammogram. The mammogram was performed as a separate procedure in a separate room with a dedicated machine.       The patient tolerated the procedure well. No evidence of immediate complication. The patient was given post-procedure instructions, including wound care.           POSTPROCEDURE MAMMOGRAM:       Images of the left include a CC view and MLO view. Tomosynthesis. CAD was utilized.       There are scattered fibroglandular elements in the breast(s) that could obscure a lesion on mammography. Post procedure mammograms were taken in a separate room. There is a U shaped biopsy clip at the biopsy site 1, 12:00, anterior depth. There is a    barbell clip in the inner central left breast, within the mammographic mass. This corresponds with the original mammographic density. There is a tribell clip in the left axilla.       There are no other significant findings in the breast.               Impression   1. Successful left breast ultrasound guided core needle biopsy, 2 sites. 2. Successful left axillary lymph node ultrasound guided core needle biopsy. 3. Successful marker clip placements with confirmation by digital diagnostic mammogram.            Waiting for pathology results. A final report will be issued when these become available.           BI-RADS Final Assessment Category: Waiting for pathology.       Management Recommendation: Assess radiologic/pathologic concordance.                   **This report has been created using voice recognition software. It may contain minor errors which are inherent in voice recognition technology. **       Final report electronically signed by Dr. Ankur Askew on 2/24/2022 10:00 AM           TTE procedure:ECHOCARDIOGRAM COMPLETE 2D W DOPPLER W COLOR.      Procedure Date  Date: 04/22/2021 Start: 09:44 AM     Study Location: Echo Lab  Technical Quality: Limited visualization due to body habitus.     Indications:Fatigue and Shortness of breath.     Additional Medical History:acute coronary syndrome, chest pain,  hypertension, tobacco abuse, history of myocardial infarction, unstable  angina, coronary artery disease, atrial fibrillation, gastroesophageal  reflux disease, hyperlipidemia, hypothyroid     Patient Status: Routine     Height: 72 inches Weight: 309.01 pounds BSA: 2.56 m^2 BMI: 41.91 kg/m^2     BP: 122/79 mmHg      Conclusions      Summary  21   Ejection fraction is visually estimated at 60%. Overall left ventricular function is normal.      Signature     Followed by Dr. Rubin Galdamez cardiology; history of proximal A. fib. Echocardiogram 2022     Conclusions      Summary   Ejection fraction is visually estimated at 60%. Overall left ventricular function is normal.      Signature      ----------------------------------------------------------------   Electronically signed by Cheryle Rose MD (Interpreting   physician) on 2022 at 04:02 PM   ----------------------------------------------------------------       PROCEDURES:  -See above    PATHOLOGY:              : 1970  AGE: 51 Y                          PATHOLOGY REPORT                       ATTN: ADAM BESS                       REQ: Christine        Copies To:   SUSANNAH BERRY; Hua Ren; CARLOS MENDENHALL       Clinical Information: LT BREAST MASS 12:00, 9:00, LT ENLARGED AXILLARY   LYMPH NODE   22  ADDENDUM REPORT 2022     FINAL DIAGNOSIS:   A.  Left breast mass, 12:00, U-Clip, biopsy:             Fibroglandular breast tissue with stromal fibrosis. B.  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 C.  Left axillary lymph node, [place] clip, biopsy:    Fragments of lymph node, negative for malignancy.      BREAST BIOMARKERS*   Estrogen Receptor: (Clone SP1), [employer]       Negative (<1% of cells staining) Progesterone Receptor: (Clone 1E2), [employer]       Negative (<1% of cells staining)     Ki-67 (clone 30-9)       Percentage of positive nuclei:  95%               Favorable <10%               Borderline 10-20%               Unfavorable >20%        2018          Inform HER2 Dual CHEPE         HER2 IHCClone 4B5      ASCO/CAP      [employer]      HER2 dual                                  Systems Additional      CHEPE Group #                                Workup   (  Group 4:      HER2/CEP17 Ratio <2.0 and    HER2 NEGATIVE, HER2   X                >=.0 and <6.0 HER2           IHC is 0-1+. See Group   )                signals/cell                  4 comment.                      Number of observers: 3                    (PCF/MTK/ALP)                    Number of invasive tumor                    cells counted: 20                    Using dual probe assay:                       Average number of HER2                    signals per cell:  4.3                                 Average number                     of CEP17 signals per cell:                      3.4                     HER2/CEP17 ratio:  1.3 but the latter is considered negative and was reflexed to  [facility] which is negative ,therefore pathology Dr. Modesta Avila reassures me that her  HER-2 negative and that this does not have to be repeated on the definitive surgical specimen. Group 4 comment: It is uncertain whether patients with an average of >=   4.0 and < 6.0 HER2 signals per cell and a HER2/CEP17 ratio of < 2.0   benefit from HER2 targeted therapy in the absence of protein   overexpression (IHC 3+). If the specimen test result is close to the   CHEPE ratio threshold for positive, there is a high likelihood that   repeat testing will result in different results by chance alone.    Therefore, when [facility] results are not 3+ positive, it is recommended that   the sample be considered HER2 negative without additional testing on   the same specimen. External Controls:  Adequate   Internal Controls:  Adequate   Standard Assay Conditions:  Met     Staining Method Used:    Formalin fixation    Antigen retrieval type:  Cell Conditioning 1, mild merle    Time in antigen retrieval:  30 minutes    Detection system type:   DAB Ultraview kit       Specimen:   A) CORE NEEDLE BREAST BIOPSY, LT MASS 12:00 U CLIP   B) CORE NEEDLE BREAST BIOPSY, LT MASS 9:00 BARBELL   C) CORE NEEDLE BREAST BIOPSY, LT AXILLARY LYMPH NODE TRIBELL       Definitive lumpectomy and sentinel node biopsy 3/22/2022  Clinical Information: INVASIVE DUCTAL CARCINOMA LEFT BREAST 3/22/22    FINAL DIAGNOSIS:   A: Left axillary sentinel lymph nodes, resection:     Four lymph nodes with no evidence of malignancy.  (0/4)     One lymph node with previous biopsy site changes.     One lymph node with black pigment deposition.    B: Left breast, lumpectomy specimen:     Invasive ductal carcinoma, Connell grade 3 of 3.     Invasive carcinoma is 2.7 cm in greatest dimension.     Invasive carcinoma is 1.5 mm to the closest margin-inferior/caudal.   Tita Learn carcinoma is > 5 mm to all other margins.     Changes consistent with previous biopsy site.       pT2, pN0(sn)     Specimen:   A) SENTINEL LYMPH NODE(S), LEFT AXILLARY   B) EXCISION OF BREAST, LEFT CANCER LUMP     CASE SUMMARY: (INVASIVE CARCINOMA OF THE BREAST: Resection)   Standard(s): AJCC-UICC 8     SPECIMEN   Procedure   Excision (less than total mastectomy)     Specimen Laterality   Left     TUMOR   Tumor Site   Clock position    Specify Clock Position    9 o'clock     Histologic Type   Invasive carcinoma of no special type (ductal)     Histologic Grade (Rekha Histologic Score)   Connell Score    Glandular (Acinar) / Tubular Differentiation    Score 3 (less than 10% of tumor area forming glandular / tubular   structures)      Nuclear Pleomorphism    Score 3 (Vesicular nuclei, often with prominent nucleoli, exhibiting  marked variation in size and shape, occasionally with very large and    bizarre forms)      Mitotic Rate    See Table 1 in CAP Protocol.    Score 3      Overall Grade    Grade 3 (scores of 8 or 9)     Tumor Size   Greatest dimension of largest invasive focus greater than 1 mm (specify   exact measurement in Millimeters (mm)): 27 mm    Additional Dimension in Millimeters (mm): 17 x 15 mm     Tumor Focality   Single focus of invasive carcinoma     Ductal Carcinoma In Situ (DCIS)   Not identified     Lobular Carcinoma In Situ (LCIS)   Not identified     Tumor Extent    Tumor Extent    Not applicable (skin, nipple, and skeletal muscle are absent)     Lymphovascular Invasion   Not identified     Dermal Lymphovascular Invasion   No skin present     Treatment Effect in the Breast   No known presurgical therapy     Treatment Effect in the Lymph Nodes   Not applicable     MARGINS   Margin Status for Invasive Carcinoma   All margins negative for invasive carcinoma    Distance from Invasive Carcinoma to Closest Margin    Specify in Millimeters (mm)    Exact distance: 1.5 mm      Closest Margin to Invasive Carcinoma    Inferior/caudal      Distance from Invasive Carcinoma to Anterior/Skin Margin    Specify in Millimeters (mm)    Exact distance: 7 mm      Distance from Invasive Carcinoma to Posterior/Deep Margin    Specify in Millimeters (mm)    Exact distance: 10 mm      Distance from Invasive Carcinoma to Superior/Cranial Margin    Specify in Millimeters (mm)    Exact distance: 7 mm      Distance from Invasive Carcinoma to Inferior/Caudal Margin    Specify in Millimeters (mm)    Exact distance: 1.5 mm      Distance from Invasive Carcinoma to Medial Margin    Specify in Millimeters (mm)    Greater than: 10 mm      Distance from Invasive Carcinoma to Lateral Margin    Specify in Millimeters (mm)    Greater than: 10 mm     Margin Status for DCIS   Not applicable (no DCIS in specimen)       REGIONAL LYMPH NODES   Regional Lymph Node Status   Regional lymph nodes present    All regional lymph nodes negative for tumor      Total Number of Lymph Nodes Examined (sentinel and non-sentinel)    Exact number (specify): 4    Number of Rhame Nodes Examined    Exact number (specify): 4     Regional Lymph Node Comment: Biopsy site changes are present within one   of the lymph nodes.  In addition, another lymph node demonstrates black   pigment deposition (possibly tattoo or anthracosis). DISTANT METASTASIS   Distant Site   Not applicable     PATHOLOGIC STAGE CLASSIFICATION (pTNM, AJCC 8th Edition)   TNM Descriptors   Not applicable     pT Category   pT2: Tumor greater than 20 mm but less than or equal to 50 mm in   greatest dimension     Regional Lymph Nodes Modifier   The (sn) modifier is added to the N category when a sentinel node   biopsy is performed (using either dye or tracer) and fewer than six   lymph nodes are removed (sentinel and nonsentinel). (sn): Rhame nodes evaluated.      pN Category   pN0: No regional lymph node metastasis identified or ITCs only#     pM Category   Not applicable - pM cannot be determined from the submitted specimen(s)       ADDITIONAL FINDINGS   Additional Findings (specify): Changes consistent with previous biopsy   site, usual ductal hyperplasia     SPECIAL STUDIES   Breast Biomarker Testing Performed on Previous Biopsy   Estrogen Receptor (ER)    Estrogen Receptor (ER) Status    Negative     Progesterone Receptor (PgR)    Progesterone Receptor (PgR) Status    Negative     HER2 (by immunohistochemistry)    HER2 (by immunohistochemistry)    Negative (Score 0-1+)     HER2 (by in situ hybridization)    HER2 (by in situ hybridization)    Indeterminate   Case reviewed with Dr. Nehemiah Yanes pathology who reassured me that in fact the HER-2 is negative    Ki-67    Ki-67 Percentage of Positive Nuclei: 95%    Testing Performed on Case Number: [id number]     [id number] x 2   15301 x2                                                       <Sign Out Dr. Oswald Franco M.D., F.C.A.P.       GENETICS:  Germline testing done 3/4/2022-negative    MOLECULAR:  Not applicable    ASSESSMENT/PLAN:    1: Diagnosis: 81-year-old postmenopausal female with a palpable lesion noted in 2/8/2022 with subsequent biopsy and definitive lumpectomy showing high risk cancer of the left breast.  Pathologic stage T2N0 triple negative grade 3.    2) Prognosis / Disease Status: High risk/JAVIER    3) Work-up:    Labs: CBC, CMP, CA 27-29, estradiol, FSH and LH labs reviewed   Imaging: Bone scan. Reviewed and shows no bone   Procedures: Left lumpectomy and axillary dissection. Consults: Request port by Dr. Lopez./Completed done. Repeated because of the initial port being too deep to access. Current port is accessible and good. Chemotherapy teaching/completed   Other: Cardiology note reviewed. 4) Symptom Management: Patient is asymptomatic. 5) Supportive care provided. Level of care is appropriate. Teaching done . Gunjan Paul Reviewed her high risk disease. Germline testing negative therefore no role for a parp inhibitor after chemotherapy completed. 6) Treatment goal: Adjuvant      Treatment plan:  On 5/10/2022 start dose dense AC followed by dose dense Taxol  After course 2 is done I will check an echocardiogram.  Reviewed patient's breast cancer history and cardiac history and she agrees with dose dense AC followed by T which is our best regimen for triple negative disease furthermore she has a normal echocardiogram and no recent ischemic cardiac events for many years    7) Medications reviewed. Prescriptions today: None but recently Zofran ODT, Compazine, EMLA cream              No orders of the defined types were placed in this encounter. [place]:  Controlled Substance Monitoring:    Acute and Chronic Pain Monitoring:   No flowsheet data found. 8) Research Options:      None      9) Other:        none    10) Follow Up:   Follow-up 2 weeks  on May 24 for course to dose dense AC and labs      Donya Ludwig MD

## 2022-05-11 ENCOUNTER — HOSPITAL ENCOUNTER (OUTPATIENT)
Dept: INFUSION THERAPY | Age: 52
Discharge: HOME | End: 2022-05-11
Payer: COMMERCIAL

## 2022-05-11 VITALS
SYSTOLIC BLOOD PRESSURE: 127 MMHG | OXYGEN SATURATION: 98 % | RESPIRATION RATE: 18 BRPM | TEMPERATURE: 97.7 F | WEIGHT: 293 LBS | BODY MASS INDEX: 39.68 KG/M2 | HEIGHT: 72 IN | HEART RATE: 70 BPM | DIASTOLIC BLOOD PRESSURE: 66 MMHG

## 2022-05-11 DIAGNOSIS — C50.112 MALIGNANT NEOPLASM OF CENTRAL PORTION OF LEFT BREAST IN FEMALE, ESTROGEN RECEPTOR NEGATIVE (HCC): Primary | ICD-10-CM

## 2022-05-11 DIAGNOSIS — Z17.1 MALIGNANT NEOPLASM OF CENTRAL PORTION OF LEFT BREAST IN FEMALE, ESTROGEN RECEPTOR NEGATIVE (HCC): Primary | ICD-10-CM

## 2022-05-11 PROCEDURE — 96372 THER/PROPH/DIAG INJ SC/IM: CPT

## 2022-05-11 PROCEDURE — 6360000002 HC RX W HCPCS: Performed by: INTERNAL MEDICINE

## 2022-05-11 RX ADMIN — PEGFILGRASTIM-CBQV 6 MG: 6 INJECTION, SOLUTION SUBCUTANEOUS at 14:19

## 2022-05-11 NOTE — PLAN OF CARE
Problem: Musculor/Skeletal Functional Status  Goal: Absence of falls  Outcome: Adequate for Discharge  Note: Free from falls while in [facility] Oncology. Intervention: Fall precautions  Note: Discussed the need to use the call light for assistance when getting up to ambulate. Problem: Intellectual/Education/Knowledge Deficit  Goal: Teaching initiated upon admission  Outcome: Adequate for Discharge  Note: Patient verbalizes understanding to verbal information given on Udenyca,action and possible side effects. Aware to call MD if develop complications. Intervention: Verbal/written education provided  Note: Chemotherapy Teaching     What is Chemotherapy   Drug action [x]   Method of Administration [x]   Handouts given []     Side Effects  Nausea/vomiting [x]   Diarrhea [x]   Fatigue [x]   Signs / Symptoms of infection [x]   Neutropenia [x]   Thrombocytopenia [x]   Alopecia [x]   neuropathy [x]   Dare diet &  the importance of fluids [x]       Micellaneous  Importance of nutrition [x]   Importance of oral hygiene [x]   When to call the MD [x]   Monitoring labs [x]   Use of supportive services []     Explanation of Drug Regimen / Frequency  Udenyca     Comments  Verbalized understanding to drug,action,side effects and when to call MD         Problem: Discharge Planning  Goal: Knowledge of discharge instructions  Description: Knowledge of discharge instructions  Outcome: Adequate for Discharge  Note: Verbalize understanding of discharge instructions, follow up appointments, and when to call Physician. Intervention: Interaction with patient/family and care team  Note: Discuss understanding of discharge instructions, follow up appointments and when to call Physician. Care plan reviewed with patient. Patient verbalize understanding of the plan of care and contribute to goal setting.

## 2022-05-11 NOTE — PROGRESS NOTES
Patient tolerated Udenyca injection without any complications. Patient verbalized understanding of discharge instructions. Ambulated off unit per self with belongings.

## 2022-05-19 DIAGNOSIS — Z17.1 MALIGNANT NEOPLASM OF CENTRAL PORTION OF LEFT BREAST IN FEMALE, ESTROGEN RECEPTOR NEGATIVE (HCC): Primary | ICD-10-CM

## 2022-05-19 DIAGNOSIS — C50.112 MALIGNANT NEOPLASM OF CENTRAL PORTION OF LEFT BREAST IN FEMALE, ESTROGEN RECEPTOR NEGATIVE (HCC): Primary | ICD-10-CM

## 2022-05-19 RX ORDER — ONDANSETRON 2 MG/ML
8 INJECTION INTRAMUSCULAR; INTRAVENOUS
Status: CANCELLED | OUTPATIENT
Start: 2022-05-24

## 2022-05-19 RX ORDER — HEPARIN SODIUM (PORCINE) LOCK FLUSH IV SOLN 100 UNIT/ML 100 UNIT/ML
500 SOLUTION INTRAVENOUS PRN
Status: CANCELLED | OUTPATIENT
Start: 2022-05-24

## 2022-05-19 RX ORDER — PALONOSETRON 0.05 MG/ML
0.25 INJECTION, SOLUTION INTRAVENOUS ONCE
Status: CANCELLED | OUTPATIENT
Start: 2022-05-24 | End: 2022-05-24

## 2022-05-19 RX ORDER — DIPHENHYDRAMINE HYDROCHLORIDE 50 MG/ML
50 INJECTION INTRAMUSCULAR; INTRAVENOUS
Status: CANCELLED | OUTPATIENT
Start: 2022-05-24

## 2022-05-19 RX ORDER — ACETAMINOPHEN 325 MG/1
650 TABLET ORAL
Status: CANCELLED | OUTPATIENT
Start: 2022-05-24

## 2022-05-19 RX ORDER — FAMOTIDINE 10 MG/ML
20 INJECTION, SOLUTION INTRAVENOUS
Status: CANCELLED | OUTPATIENT
Start: 2022-05-24

## 2022-05-19 RX ORDER — SODIUM CHLORIDE 9 MG/ML
25 INJECTION, SOLUTION INTRAVENOUS PRN
Status: CANCELLED | OUTPATIENT
Start: 2022-05-24

## 2022-05-19 RX ORDER — SODIUM CHLORIDE 0.9 % (FLUSH) 0.9 %
5-40 SYRINGE (ML) INJECTION PRN
Status: CANCELLED | OUTPATIENT
Start: 2022-05-24

## 2022-05-19 RX ORDER — SODIUM CHLORIDE 9 MG/ML
INJECTION, SOLUTION INTRAVENOUS CONTINUOUS
Status: CANCELLED | OUTPATIENT
Start: 2022-05-24

## 2022-05-19 RX ORDER — SODIUM CHLORIDE 9 MG/ML
5-40 INJECTION INTRAVENOUS PRN
Status: CANCELLED | OUTPATIENT
Start: 2022-05-24

## 2022-05-19 RX ORDER — ALBUTEROL SULFATE 90 UG/1
4 AEROSOL, METERED RESPIRATORY (INHALATION) PRN
Status: CANCELLED | OUTPATIENT
Start: 2022-05-24

## 2022-05-19 RX ORDER — DOXORUBICIN HYDROCHLORIDE 2 MG/ML
60 INJECTION, SOLUTION INTRAVENOUS ONCE
Status: CANCELLED | OUTPATIENT
Start: 2022-05-24 | End: 2022-05-24

## 2022-05-19 RX ORDER — EPINEPHRINE 1 MG/ML
0.3 INJECTION, SOLUTION, CONCENTRATE INTRAVENOUS PRN
Status: CANCELLED | OUTPATIENT
Start: 2022-05-24

## 2022-05-19 RX ORDER — SODIUM CHLORIDE 9 MG/ML
20 INJECTION, SOLUTION INTRAVENOUS ONCE
Status: CANCELLED | OUTPATIENT
Start: 2022-05-24 | End: 2022-05-24

## 2022-05-19 RX ORDER — MEPERIDINE HYDROCHLORIDE 50 MG/ML
12.5 INJECTION INTRAMUSCULAR; INTRAVENOUS; SUBCUTANEOUS PRN
Status: CANCELLED | OUTPATIENT
Start: 2022-05-24

## 2022-05-23 ENCOUNTER — OFFICE VISIT (OUTPATIENT)
Dept: SURGERY | Age: 52
End: 2022-05-23

## 2022-05-23 VITALS
BODY MASS INDEX: 39.68 KG/M2 | OXYGEN SATURATION: 98 % | TEMPERATURE: 97.1 F | RESPIRATION RATE: 18 BRPM | SYSTOLIC BLOOD PRESSURE: 118 MMHG | WEIGHT: 293 LBS | HEART RATE: 70 BPM | HEIGHT: 72 IN | DIASTOLIC BLOOD PRESSURE: 80 MMHG

## 2022-05-23 DIAGNOSIS — Z51.89 VISIT FOR WOUND CHECK: ICD-10-CM

## 2022-05-23 DIAGNOSIS — C50.312 MALIGNANT NEOPLASM OF LOWER-INNER QUADRANT OF LEFT BREAST IN FEMALE, ESTROGEN RECEPTOR NEGATIVE (HCC): Primary | ICD-10-CM

## 2022-05-23 DIAGNOSIS — Z17.1 MALIGNANT NEOPLASM OF LOWER-INNER QUADRANT OF LEFT BREAST IN FEMALE, ESTROGEN RECEPTOR NEGATIVE (HCC): Primary | ICD-10-CM

## 2022-05-23 PROCEDURE — 99024 POSTOP FOLLOW-UP VISIT: CPT | Performed by: SURGERY

## 2022-05-23 NOTE — PROGRESS NOTES
Bao Simmons MD   General Surgery  Postprocedure Evaluation in Office  Pt Name: Sumi Stewart  Date of Birth 1970   Today's Date: 5/23/2022  Medical Record Number: [id number]  Primary Care Provider: Feroz Willis  Chief Complaint   Patient presents with    Post-Op Check     s/p Placement of right subclavian Mediport under fluoroscopic guidance,  Removal right IJ MediPort nonfunctional,Aspiration left axillary seroma-4/29/2022    Wound Check     check left axillary wound     ASSESSMENT      1. Malignant neoplasm of lower-inner quadrant of left breast in female, estrogen receptor negative ([facility] [address]    2. Visit for wound check       Pathologic stage II aT2 N0 M0, grade 3, ER negative OK negative, HER2 negative  PLAN       1. All surgical incisions are healing well. 2.  Axillary wound no signs of infection able hidradenitis. Wound healed. 3.  Patient undergoing chemotherapy. No port issues with revised port per patient's report  4. Follow-up surgical clinic in 6 months. Call in interval with any questions or wound concerns. Alvin Mark is seen today for post-op follow-up. She is is post port placement/removal nonfunctional port. She is doing well she has resumed her anticoagulation. Surgical wounds look okay no signs of infection. She has started chemotherapy. She has cut way back on her smoking. Port is being utilized she reports without complication. Surgical wounds look good. She denies any wound issues at present.     Medications    Current Outpatient Medications:     flecainide (TAMBOCOR) 100 MG tablet, TAKE 1 TABLET BY MOUTH TWICE A DAY, Disp: 180 tablet, Rfl: 0    ondansetron (ZOFRAN-ODT) 8 MG TBDP disintegrating tablet, Place 1 tablet under the tongue every 8 hours as needed for Nausea or Vomiting (Patient not taking: Reported on 5/24/2022), Disp: 20 tablet, Rfl: 2    prochlorperazine (COMPAZINE) 10 MG tablet, Take 1 tablet by mouth every 6 hours as needed (nausea) (Patient not taking: Reported on 5/24/2022), Disp: 30 tablet, Rfl: 1    lidocaine-prilocaine (EMLA) 2.5-2.5 % cream, Apply topically as needed. , Disp: 5 g, Rfl: 1    metoprolol succinate (TOPROL XL) 100 MG extended release tablet, TAKE 1 TABLET BY MOUTH EVERY DAY, Disp: 90 tablet, Rfl: 1    atorvastatin (LIPITOR) 40 MG tablet, TAKE 1 TABLET DAILY, Disp: 90 tablet, Rfl: 3    ELIQUIS 5 MG TABS tablet, TAKE 1 TABLET BY MOUTH TWICE A DAY, Disp: 180 tablet, Rfl: 3    levothyroxine (SYNTHROID) 125 MCG tablet, TAKE 1 TABLET BY MOUTH EVERY DAY IN THE MORNING ON EMPTY STOMACH, Disp: , Rfl:     ZINC PO, Take by mouth daily, Disp: , Rfl:     Ascorbic Acid (VITAMIN C PO), Take by mouth daily, Disp: , Rfl:     ibuprofen (ADVIL;MOTRIN) 800 MG tablet, Take 800 mg by mouth every 6 hours as needed for Pain , Disp: , Rfl:     omeprazole (PRILOSEC) 10 MG delayed release capsule, Take 10 mg by mouth daily, Disp: , Rfl:     nitroGLYCERIN (NITROSTAT) 0.4 MG SL tablet, Place 1 tablet under the tongue every 5 minutes as needed for Chest pain (Patient not taking: Reported on 5/24/2022), Disp: 25 tablet, Rfl: 3    aspirin 81 MG chewable tablet, Take 81 mg by mouth daily , Disp: 30 tablet, Rfl: 3    Allergies  Allergies   Allergen Reactions    Codeine Hives and Swelling     And vomiting    Cortisone Swelling     Apparently tolerates topical and IV with benadryl well    Influenza Virus Vaccine Other (See Comments)     Pt states could not walk after getting     Darvocet A500 [Propoxyphene N-Acetaminophen] Nausea And Vomiting    Sulfa Antibiotics Nausea And Vomiting and Swelling     Not throat swelling       Review of Systems  History obtained from the patient. Constitutional: Denies any fever, chills, fatigue. Wound: Denies drainage  Resp: Denies any cough, shortness of breath. CV: Denies any chest pain, orthopnea or syncope.      OBJECTIVE     VITALS: /80 (Site: Right Upper Arm, Position: Sitting, Cuff Size: Medium Adult)   Pulse 70   Temp 97.1 °F (36.2 °C) (Temporal)   Resp 18   Ht 6' (1.829 m)   Wt (!) 321 lb (145.6 kg)   SpO2 98%   BMI 43.54 kg/m²     CONSTITUTIONAL: Alert and oriented times 3, no acute distress and cooperative to examination. SKIN: warm and dry  INCISION: Surgical wounds well-healed no signs or symptoms of infection. No axillary seroma.   LUNGS: Lungs Clear  CARDIOVASCULAR: Normal Rate   NEUROLOGIC: No sensory or motor nerve irritation

## 2022-05-24 ENCOUNTER — OFFICE VISIT (OUTPATIENT)
Dept: ONCOLOGY | Age: 52
End: 2022-05-24
Payer: COMMERCIAL

## 2022-05-24 ENCOUNTER — HOSPITAL ENCOUNTER (OUTPATIENT)
Dept: INFUSION THERAPY | Age: 52
Discharge: HOME | End: 2022-05-24
Payer: COMMERCIAL

## 2022-05-24 VITALS
DIASTOLIC BLOOD PRESSURE: 75 MMHG | WEIGHT: 293 LBS | HEART RATE: 69 BPM | SYSTOLIC BLOOD PRESSURE: 129 MMHG | HEIGHT: 72 IN | RESPIRATION RATE: 18 BRPM | TEMPERATURE: 97.9 F | OXYGEN SATURATION: 97 % | BODY MASS INDEX: 39.68 KG/M2

## 2022-05-24 VITALS
WEIGHT: 293 LBS | SYSTOLIC BLOOD PRESSURE: 106 MMHG | TEMPERATURE: 97.7 F | RESPIRATION RATE: 18 BRPM | DIASTOLIC BLOOD PRESSURE: 65 MMHG | HEIGHT: 72 IN | HEART RATE: 71 BPM | OXYGEN SATURATION: 97 % | BODY MASS INDEX: 39.68 KG/M2

## 2022-05-24 DIAGNOSIS — C50.112 MALIGNANT NEOPLASM OF CENTRAL PORTION OF LEFT BREAST IN FEMALE, ESTROGEN RECEPTOR NEGATIVE (HCC): Primary | ICD-10-CM

## 2022-05-24 DIAGNOSIS — Z17.1 MALIGNANT NEOPLASM OF CENTRAL PORTION OF LEFT BREAST IN FEMALE, ESTROGEN RECEPTOR NEGATIVE (HCC): Primary | ICD-10-CM

## 2022-05-24 DIAGNOSIS — Z51.11 ENCOUNTER FOR CHEMOTHERAPY MANAGEMENT: ICD-10-CM

## 2022-05-24 DIAGNOSIS — E87.6 HYPOKALEMIA: ICD-10-CM

## 2022-05-24 LAB
ALBUMIN SERPL-MCNC: 3.8 G/DL (ref 3.5–5.1)
ALP BLD-CCNC: 111 U/L (ref 38–126)
ALT SERPL-CCNC: 29 U/L (ref 11–66)
AST SERPL-CCNC: 26 U/L (ref 5–40)
BASOPHILS # BLD: 0.8 %
BASOPHILS ABSOLUTE: 0.1 THOU/MM3 (ref 0–0.1)
BILIRUB SERPL-MCNC: 0.3 MG/DL (ref 0.3–1.2)
BILIRUBIN DIRECT: < 0.2 MG/DL (ref 0–0.3)
BUN, WHOLE BLOOD: 7 MG/DL (ref 8–26)
CHLORIDE, WHOLE BLOOD: 108 MEQ/L (ref 98–109)
CREATININE, WHOLE BLOOD: 0.4 MG/DL (ref 0.5–1.2)
EOSINOPHIL # BLD: 1.1 %
EOSINOPHILS ABSOLUTE: 0.1 THOU/MM3 (ref 0–0.4)
ERYTHROCYTE [DISTWIDTH] IN BLOOD BY AUTOMATED COUNT: 13.2 % (ref 11.5–14.5)
ERYTHROCYTE [DISTWIDTH] IN BLOOD BY AUTOMATED COUNT: 45.4 FL (ref 35–45)
GFR, ESTIMATED: > 90 ML/MIN/1.73M2
GLUCOSE, WHOLE BLOOD: 115 MG/DL (ref 70–108)
HCT VFR BLD CALC: 41.1 % (ref 37–47)
HEMOGLOBIN: 13 GM/DL (ref 12–16)
IMMATURE GRANS (ABS): 0.65 THOU/MM3 (ref 0–0.07)
IMMATURE GRANULOCYTES: 7 %
IONIZED CALCIUM, WHOLE BLOOD: 1.1 MMOL/L (ref 1.12–1.32)
LYMPHOCYTES # BLD: 13.2 %
LYMPHOCYTES ABSOLUTE: 1.2 THOU/MM3 (ref 1–4.8)
MCH RBC QN AUTO: 30.3 PG (ref 26–33)
MCHC RBC AUTO-ENTMCNC: 31.6 GM/DL (ref 32.2–35.5)
MCV RBC AUTO: 95.8 FL (ref 81–99)
MONOCYTES # BLD: 7.4 %
MONOCYTES ABSOLUTE: 0.7 THOU/MM3 (ref 0.4–1.3)
NUCLEATED RED BLOOD CELLS: 0 /100 WBC
PLATELET # BLD: 169 THOU/MM3 (ref 130–400)
PLATELET ESTIMATE: ADEQUATE
PMV BLD AUTO: 11.1 FL (ref 9.4–12.4)
POTASSIUM, WHOLE BLOOD: 3.4 MEQ/L (ref 3.5–4.9)
RBC # BLD: 4.29 MILL/MM3 (ref 4.2–5.4)
SCAN OF BLOOD SMEAR: NORMAL
SEG NEUTROPHILS: 70.5 %
SEGMENTED NEUTROPHILS ABSOLUTE COUNT: 6.6 THOU/MM3 (ref 1.8–7.7)
SODIUM, WHOLE BLOOD: 145 MEQ/L (ref 138–146)
TOTAL CO2, WHOLE BLOOD: 27 MEQ/L (ref 23–33)
TOTAL PROTEIN: 6.6 G/DL (ref 6.1–8)
WBC # BLD: 9.3 THOU/MM3 (ref 4.8–10.8)

## 2022-05-24 PROCEDURE — 99211 OFF/OP EST MAY X REQ PHY/QHP: CPT

## 2022-05-24 PROCEDURE — 6360000002 HC RX W HCPCS: Performed by: INTERNAL MEDICINE

## 2022-05-24 PROCEDURE — 85025 COMPLETE CBC W/AUTO DIFF WBC: CPT

## 2022-05-24 PROCEDURE — 2580000003 HC RX 258: Performed by: INTERNAL MEDICINE

## 2022-05-24 PROCEDURE — 36591 DRAW BLOOD OFF VENOUS DEVICE: CPT

## 2022-05-24 PROCEDURE — 80047 BASIC METABLC PNL IONIZED CA: CPT

## 2022-05-24 PROCEDURE — 96367 TX/PROPH/DG ADDL SEQ IV INF: CPT

## 2022-05-24 PROCEDURE — 80076 HEPATIC FUNCTION PANEL: CPT

## 2022-05-24 PROCEDURE — 96375 TX/PRO/DX INJ NEW DRUG ADDON: CPT

## 2022-05-24 PROCEDURE — 96416 CHEMO PROLONG INFUSE W/PUMP: CPT

## 2022-05-24 PROCEDURE — 99214 OFFICE O/P EST MOD 30 MIN: CPT | Performed by: NURSE PRACTITIONER

## 2022-05-24 PROCEDURE — 96413 CHEMO IV INFUSION 1 HR: CPT

## 2022-05-24 RX ORDER — SODIUM CHLORIDE 9 MG/ML
20 INJECTION, SOLUTION INTRAVENOUS ONCE
Status: COMPLETED | OUTPATIENT
Start: 2022-05-24 | End: 2022-05-24

## 2022-05-24 RX ORDER — SODIUM CHLORIDE 0.9 % (FLUSH) 0.9 %
5-40 SYRINGE (ML) INJECTION PRN
Status: DISCONTINUED | OUTPATIENT
Start: 2022-05-24 | End: 2022-05-25 | Stop reason: HOSPADM

## 2022-05-24 RX ORDER — HEPARIN SODIUM (PORCINE) LOCK FLUSH IV SOLN 100 UNIT/ML 100 UNIT/ML
500 SOLUTION INTRAVENOUS PRN
Status: DISCONTINUED | OUTPATIENT
Start: 2022-05-24 | End: 2022-05-25 | Stop reason: HOSPADM

## 2022-05-24 RX ORDER — HEPARIN SODIUM (PORCINE) LOCK FLUSH IV SOLN 100 UNIT/ML 100 UNIT/ML
500 SOLUTION INTRAVENOUS PRN
Status: CANCELLED | OUTPATIENT
Start: 2022-05-24

## 2022-05-24 RX ORDER — PALONOSETRON 0.05 MG/ML
0.25 INJECTION, SOLUTION INTRAVENOUS ONCE
Status: COMPLETED | OUTPATIENT
Start: 2022-05-24 | End: 2022-05-24

## 2022-05-24 RX ORDER — DOXORUBICIN HYDROCHLORIDE 2 MG/ML
60 INJECTION, SOLUTION INTRAVENOUS ONCE
Status: COMPLETED | OUTPATIENT
Start: 2022-05-24 | End: 2022-05-24

## 2022-05-24 RX ORDER — SODIUM CHLORIDE 0.9 % (FLUSH) 0.9 %
5-40 SYRINGE (ML) INJECTION PRN
Status: CANCELLED | OUTPATIENT
Start: 2022-05-24

## 2022-05-24 RX ORDER — SODIUM CHLORIDE 9 MG/ML
25 INJECTION, SOLUTION INTRAVENOUS PRN
Status: CANCELLED | OUTPATIENT
Start: 2022-05-24

## 2022-05-24 RX ADMIN — SODIUM CHLORIDE, PRESERVATIVE FREE 10 ML: 5 INJECTION INTRAVENOUS at 09:25

## 2022-05-24 RX ADMIN — CYCLOPHOSPHAMIDE 1640 MG: 1 INJECTION, POWDER, FOR SOLUTION INTRAVENOUS; ORAL at 11:47

## 2022-05-24 RX ADMIN — SODIUM CHLORIDE, PRESERVATIVE FREE 20 ML: 5 INJECTION INTRAVENOUS at 12:46

## 2022-05-24 RX ADMIN — PALONOSETRON 0.25 MG: 0.05 INJECTION, SOLUTION INTRAVENOUS at 11:20

## 2022-05-24 RX ADMIN — DOXORUBICIN HYDROCHLORIDE 164 MG: 2 INJECTION, SOLUTION INTRAVENOUS at 11:26

## 2022-05-24 RX ADMIN — FOSAPREPITANT 150 MG: 150 INJECTION, POWDER, LYOPHILIZED, FOR SOLUTION INTRAVENOUS at 10:48

## 2022-05-24 RX ADMIN — SODIUM CHLORIDE, PRESERVATIVE FREE 20 ML: 5 INJECTION INTRAVENOUS at 09:26

## 2022-05-24 RX ADMIN — SODIUM CHLORIDE 20 ML/HR: 9 INJECTION, SOLUTION INTRAVENOUS at 10:17

## 2022-05-24 RX ADMIN — HEPARIN 500 UNITS: 100 SYRINGE at 12:46

## 2022-05-24 RX ADMIN — DEXAMETHASONE SODIUM PHOSPHATE 12 MG: 4 INJECTION, SOLUTION INTRAMUSCULAR; INTRAVENOUS at 10:18

## 2022-05-24 NOTE — PATIENT INSTRUCTIONS
Monique for treatment today  2. Return to clinic tomorrow for udenyca  3. Return to clinic in 2 weeks to see Dr. Yumiko Davis with labs  4. Next cycle of treatment in 2 weeks    Patient Education        Potassium-Rich Diet: Care Instructions  Your Care Instructions     Potassium is a mineral. It helps keep the right mix of fluids in your body. It also helps your nerves and muscles work as they should. You'll find it in milk and meats. It's also in all fresh foods, including fruits and vegetables. Most adults need about 5 grams of potassium a day. The foods you eat should supplyall that you need. Some health conditions can cause a loss of potassium. For example, kidney problems and stomach problems with vomiting and diarrhea can cause you to lose this mineral. Some medicines, such as water pills (diuretics), can cause lowpotassium. If you can't get enough potassium from what you eat, your doctor may advise youto take supplements. Follow-up care is a key part of your treatment and safety. Be sure to make and go to all appointments, and call your doctor if you are having problems. It's also a good idea to know your test results and keep alist of the medicines you take. How can you care for yourself at home?  Plan your diet around foods that are rich in potassium. Fresh, unprocessed whole foods have the most. These foods include:  ? Milk and other dairy products. ? Vegetables, especially broccoli, cooked dry beans, tomatoes, potatoes, artichokes, winter squash, and spinach. ? Fruits, especially citrus fruits, bananas, and apricots. Dried apricots contain more potassium than fresh apricots. ? Meat, poultry, and fish.  Ask your doctor about using a salt substitute or \"light\" salt. These often contain potassium. Where can you learn more? Go to https://chjreb.health[employer] org and sign in to your [employer] account.  Enter H315 in the [facility] box to learn more about \"Potassium-Rich Diet: Care Instructions. \"     If you do not have an account, please click on the \"Sign Up Now\" link. Current as of: September 8, 2021               Content Version: 13.2  © 6493-1749 Healthwise, Incorporated. Care instructions adapted under license by [facility] ([facility]). If you have questions about a medical condition or this instruction, always ask your healthcare professional. [address] any warranty or liability for your use of this information.

## 2022-05-24 NOTE — PROGRESS NOTES
Patient assessed for the following post chemotherapy:    Dizziness   No  Lightheadedness  No      Acute nausea/vomiting No  Headache   No  Chest pain/pressure  No  Rash/itching   No  Shortness of breath  No    Patient kept for 20 minutes observation post infusion chemotherapy. Patient tolerated chemotherapy treatment Cytoxan and Adriamycin without any complications. Last vital signs:   /75   Pulse 69   Temp 97.9 °F (36.6 °C) (Oral)   Resp 18   Ht 6' (1.829 m)   Wt (!) 323 lb (146.5 kg)   SpO2 97%   BMI 43.81 kg/m²       Patient instructed if experience any of the above symptoms following today's infusion,he/she is to notify MD immediately or go to the emergency department. Discharge instructions given to patient. Verbalizes understanding. Ambulated off unit per self with belongings.

## 2022-05-24 NOTE — PROGRESS NOTES
[facility], [address] [address],[address]  [address] [address]  Dept: [phone]  Dept Fax: [phone]: [phone]       Visit Date:5/24/2022     Concha Barboza is a 54 y.o. female who presents today for:   Chief Complaint   Patient presents with    Follow-up     Malignant neoplasm of lower-inner quadrant of left breast in female, estrogen receptor negative ([facility] [address]        HPI:   Concha Barboza is a 54 y.o. female who follows in our office with Dr. Lindsey Alexandre with left breast cancer. HPI per Dr. Lindsey Alexandre' note on 5/10/2022:  Cassy Quintero is a 53-year-old premenopausal female developed a lump in the left breast just medial to the nipple on February 8. Strong family history of malignancy including breast cancer. Last mammogram was in 2015. Mammogram on 2/18 additional imaging confirmed a approximate 2 cm nodular density inferior central medial left breast.  Initial biopsy revealed invasive ductal cancer triple and grade 3. Definitive left breast lumpectomy and sentinel node resection on 3/22 revealed a 2.7 cm pathologic stage T2N0 triple negative grade 3 breast cancer. Other feeling the lump she is asymptomatic. Specifically denies any headaches bone pain breathing problems or any other focal or systemic complaints. Followed by cardiology with a history of a an MI in 2014 and proximal A. fib for which she is on anticoagulation followed by Dr. Eitan Lopez. Echocardiogram last year normal.  And recent history no ischemic cardiac events. Interval History 5/24/2022:   The patient presents to the office today for follow up and evaluation of left breast cancer, as well as next cycle of treatment. The patient reports chronic cough she attributes to allergies. She reports intermittent heart palpitations with known Afib. He reports intermittent constipation/diarrhea at times.   She denies fever/chills, s/s infections, headaches, dizziness, SOB, CP, abdominal pain, N/V/C/D, peripheral edema, peripheral neuropathy, s/s bleeding. PMH, SH, and FH:  I reviewed the patient's medication and allergy lists as noted on the electronic medical record. The PMH, SH, and FH were also reviewed as noted on the EMR. Past Medical History:   Diagnosis Date    Afib [facility])     Baki    Arthritis     CAD (coronary artery disease)     GERD (gastroesophageal reflux disease)     Hyperlipidemia     Hypertension     Hypothyroid     Invasive ductal carcinoma of breast, female, left ([facility] [address] 02/24/2022    Malignant neoplasm of lower-inner quadrant of left breast in female, [facility] 03/07/2022    MI (myocardial infarction) ([facility]     Dr. Stephany Frost    Pneumonia       Past Surgical History:   Procedure Laterality Date    BREAST LUMPECTOMY Left 3/22/2022    LEFT BREAST LUMPECTOMY PARTIAL MASTECTOMY, SENTINEL LYMPH NODE BIOPSY, PRE OP NEEDLE LOC X 2 performed by Gadiel Hylton MD at [place] [address] Left 3/30/2022    Evacuation hematoma left axilla performed by Gadiel Hylton MD at [address] [place] [address] ARTHROSCOPY Right     08    JENNIFER BIOPSY LYMPH NODE BY NEEDLE SUPERFICIAL  02/24/2022    JENNIFER BIOPSY LYMPH NODE BY NEEDLE SUPERFICIAL 2/24/2022 Ginger Vora MD [facility] US GUID NDL BIOPSY LEFT Left 02/24/2022    [facility] [address] LEFT 2/24/2022 Ginger Vora MD [facility]    MOUTH SURGERY      PORT SURGERY Right 4/13/2022    Single Lumen Smartport Right Subclavian, aspiration of left axillary seroma performed by Gadiel Hylton MD at [address] [place] [address] Right 4/29/2022    Mediport Right Subclavian Removal and Replacement. performed by Gadiel Hylton MD at [address] [facility], INCISIONAL Left 1998    excisional bx-H. Chollet fibrocystic changes    US BREAST BIOPSY NEEDLE ADDITIONAL LEFT Left 02/24/2022    US BREAST BIOPSY NEEDLE ADDITIONAL LEFT 2/24/2022 Richi Gaviria Nino Valenzuela, Nina aLwrence - [facility] GUIDED NEEDLE LOC BREAST ADDL LEFT Left 3/22/2022    US GUIDED NEEDLE LOC BREAST ADDL LEFT 3/22/2022 [facility]      Family History   Problem Relation Age of Onset    Heart Disease Mother     Diabetes Mother     Cancer Mother         liposarcoma    Kidney Disease Mother     Heart Disease Father     Diabetes Father     Kidney Disease Father     Cancer Father         Non-melanoma skin cancer multiple times    No Known Problems Brother     No Known Problems Brother     No Known Problems Brother     No Known Problems Maternal Grandmother     No Known Problems Maternal Grandfather     Breast Cancer Paternal Grandmother 47        reoccurred at 46    Ovarian Cancer Paternal Grandmother 46    Bilateral breast cancer Paternal Grandmother         Opposite breast, age 52's    Breast Cancer Maternal Aunt 48    Breast Cancer Paternal Aunt         breast      Social History     Tobacco Use    Smoking status: Current Every Day Smoker     Packs/day: 0.25     Years: 30.00     Pack years: 7.50     Types: Cigarettes     Start date: 12/4/1987    Smokeless tobacco: Never Used    Tobacco comment: refused counciling, updated   Substance Use Topics    Alcohol use: Yes     Comment: rare      Current Outpatient Medications   Medication Sig Dispense Refill    flecainide (TAMBOCOR) 100 MG tablet TAKE 1 TABLET BY MOUTH TWICE A  tablet 0    lidocaine-prilocaine (EMLA) 2.5-2.5 % cream Apply topically as needed.  5 g 1    metoprolol succinate (TOPROL XL) 100 MG extended release tablet TAKE 1 TABLET BY MOUTH EVERY DAY 90 tablet 1    atorvastatin (LIPITOR) 40 MG tablet TAKE 1 TABLET DAILY 90 tablet 3    ELIQUIS 5 MG TABS tablet TAKE 1 TABLET BY MOUTH TWICE A  tablet 3    levothyroxine (SYNTHROID) 125 MCG tablet TAKE 1 TABLET BY MOUTH EVERY DAY IN THE MORNING ON EMPTY STOMACH      ZINC PO Take by mouth daily      Ascorbic Acid (VITAMIN C PO) Take by mouth daily  ibuprofen (ADVIL;MOTRIN) 800 MG tablet Take 800 mg by mouth every 6 hours as needed for Pain       omeprazole (PRILOSEC) 10 MG delayed release capsule Take 10 mg by mouth daily      aspirin 81 MG chewable tablet Take 81 mg by mouth daily  30 tablet 3    ondansetron (ZOFRAN-ODT) 8 MG TBDP disintegrating tablet Place 1 tablet under the tongue every 8 hours as needed for Nausea or Vomiting (Patient not taking: Reported on 5/24/2022) 20 tablet 2    prochlorperazine (COMPAZINE) 10 MG tablet Take 1 tablet by mouth every 6 hours as needed (nausea) (Patient not taking: Reported on 5/24/2022) 30 tablet 1    nitroGLYCERIN (NITROSTAT) 0.4 MG SL tablet Place 1 tablet under the tongue every 5 minutes as needed for Chest pain (Patient not taking: Reported on 5/24/2022) 25 tablet 3     No current facility-administered medications for this visit. Facility-Administered Medications Ordered in Other Visits   Medication Dose Route Frequency Provider Last Rate Last Admin    sodium chloride flush 0.9 % injection 5-40 mL  5-40 mL IntraVENous PRN Reema Bower MD   20 mL at 05/24/22 1246    heparin flush 100 UNIT/ML injection 500 Units  500 Units IntraCATHeter PRN Reema Bower MD   500 Units at 05/24/22 1246      Allergies   Allergen Reactions    Codeine Hives and Swelling     And vomiting    Cortisone Swelling     Apparently tolerates topical and IV with benadryl well    Influenza Virus Vaccine Other (See Comments)     Pt states could not walk after getting     Darvocet A500 [Propoxyphene N-Acetaminophen] Nausea And Vomiting    Sulfa Antibiotics Nausea And Vomiting and Swelling     Not throat swelling          Review of Systems:   Review of Systems   Pertinent review of systems noted in HPI, all other ROS negative.    Objective:   Physical Exam   /65 (Site: Right Upper Arm, Position: Sitting, Cuff Size: Medium Adult)   Pulse 71   Temp 97.7 °F (36.5 °C)   Resp 18   Ht 6' (1.829 m)   Wt (!) 323 lb (146.5 kg)   SpO2 97%   BMI 43.81 kg/m²    General appearance: No apparent distress, calm and cooperative. HEENT: Pupils equal, round, and reactive to light. Conjunctivae/corneas clear. Oral mucosa moist.  Neck: Supple, with full range of motion. Trachea midline. Respiratory:  Normal respiratory effort. Clear to auscultation all lung fields. Cardiovascular:  RRR, S1/S2. Abdomen: Soft, non-tender, non-distended with active BS x 4. Musculoskeletal: No clubbing, cyanosis or edema bilaterally. She is able to ambulate in office without difficulty or use of assistive device. Skin: Skin color, texture, turgor normal.  No visible rashes or lesions. Neurologic:  Neurovascularly intact without any focal sensory/motor deficits. Cranial nerves: II-XII intact, grossly non-focal.  Psychiatric: Alert and oriented x 3, thought content appropriate, normal insight  Capillary Refill: < 3 seconds   Peripheral Pulses: +2 palpable, equal bilaterally       Imaging Studies and Labs:   CBC:   Lab Results   Component Value Date    WBC 9.3 05/24/2022    HGB 13.0 05/24/2022    HCT 41.1 05/24/2022    MCV 95.8 05/24/2022     05/24/2022     BMP:   Lab Results   Component Value Date     05/24/2022     12/21/2021    K 3.4 05/24/2022    K 4.0 04/13/2022    K 3.8 12/04/2019     12/21/2021    CO2 24 12/21/2021    BUN 12 12/21/2021    CREATININE 0.4 05/24/2022    CREATININE 0.5 12/21/2021    GLUCOSE 105 12/21/2021    CALCIUM 9.1 12/21/2021      LFT:   Lab Results   Component Value Date    ALT 22 05/10/2022    AST 19 05/10/2022    ALKPHOS 106 05/10/2022    BILITOT 0.4 05/10/2022         Assessment and Plan:     1. Malignant neoplasm of central portion of left breast in female, estrogen receptor negative (HCC)  Invasive ductal carcinoma, left breast, ER negative, OH negative and HER-2 negative, 2.7 cm, grade 3, 0/ 4 sentinel nodes . ps T2N0M0 /IIA. (High risk: triple negative/grade 3).   Inner central left breast anteriorly. 2. Encounter for chemotherapy management  Current treatment recommendations include adjuvant dose dense AC x 4 cycles followed by dose dense Taxol every other week. She is tolerating treatment without significant side effects. Labs today:  WBC 9.3. H/H 13/41. 1. Platelets 203. K 3.4, added K rich foods instructions to d/c instructions. Ok to proceed with treatment today. 3. Hypokalemia  K 3.4. Added K rich foods info. To discharge instructions. Trend. No follow-ups on file. All patient questions answered. Pt voiced understanding. Patient agreed with treatment plan. Follow up as directed. Patient instructed to call for questions or concerns.      Electronically signed by   TOSHIA Madden CNP

## 2022-05-24 NOTE — ONCOLOGY
Chemotherapy Administration    Pre-assessment Data: Antineoplastic Agents  See toxicity flow sheet for assessment                                          [x]         Interventions:   Chemotherapy SQ injection given []   Taxol administered-VS per protocol []   Blood pressure meds held 12 hours prior to Rituxan/Ruxience []   Rituxan/Ruxience administered- VS and precautions per guidelines []   Emergency drugs available as appropriate [x]   Anaphylaxis assessment completed [x]   Pre-medications administered as ordered [x]   Blood return noted upon initiation of chemotherapy [x]   Blood return noted each 1-2ml of a vesicant medication if given IV push [x]   Navelbine, Vincristine and Velban given as a monitored wide open drip, blood return noted before during and after infusion.  []   Blood return noted each 2-3ml of a non-vesicant medication if given IV push []   Patient aware of potential Immunotherapy toxicities []   Monitor for signs / symptoms of hypersensitivity reaction [x]   Chemotherapy orders (drug/dose/rate) verified by 2 Chemo certified RNs [x]   Monitor IV site and blood return throughout the infusion of the medication [x]   Document IV site checks on the IV assessment form [x]   Document chemotherapy teaching on the Patient Education tab [x]   Document patient verbalizes understanding of medications being administered [x]   If IV infiltration, see ONS Guidelines []   Other:      []

## 2022-05-24 NOTE — PLAN OF CARE
of infection   Monitor lab/diagnostic results   Monitor all insertion sites i.e., indwelling lines, tubes and drains   Administer medications as ordered  Note: Mediport site with no redness or warmth. Skin over port site intact with no signs of breakdown noted. Patient verbalizes signs/symptoms of port infection and when to notify the physician. Care plan reviewed with patient. Patient  verbalize understanding of the plan of care and contribute to goal setting.

## 2022-05-25 ENCOUNTER — HOSPITAL ENCOUNTER (OUTPATIENT)
Dept: INFUSION THERAPY | Age: 52
Discharge: HOME | End: 2022-05-25
Payer: COMMERCIAL

## 2022-05-25 VITALS
HEART RATE: 71 BPM | RESPIRATION RATE: 18 BRPM | HEIGHT: 72 IN | OXYGEN SATURATION: 97 % | DIASTOLIC BLOOD PRESSURE: 61 MMHG | SYSTOLIC BLOOD PRESSURE: 100 MMHG | WEIGHT: 293 LBS | BODY MASS INDEX: 39.68 KG/M2 | TEMPERATURE: 98.2 F

## 2022-05-25 DIAGNOSIS — Z17.1 MALIGNANT NEOPLASM OF CENTRAL PORTION OF LEFT BREAST IN FEMALE, ESTROGEN RECEPTOR NEGATIVE (HCC): Primary | ICD-10-CM

## 2022-05-25 DIAGNOSIS — C50.112 MALIGNANT NEOPLASM OF CENTRAL PORTION OF LEFT BREAST IN FEMALE, ESTROGEN RECEPTOR NEGATIVE (HCC): Primary | ICD-10-CM

## 2022-05-25 PROCEDURE — 6360000002 HC RX W HCPCS: Performed by: INTERNAL MEDICINE

## 2022-05-25 PROCEDURE — 96372 THER/PROPH/DIAG INJ SC/IM: CPT

## 2022-05-25 RX ADMIN — PEGFILGRASTIM-CBQV 6 MG: 6 INJECTION, SOLUTION SUBCUTANEOUS at 13:58

## 2022-05-25 NOTE — PROGRESS NOTES
Patient tolerated Udencya injection without any complications. Patient verbalized understanding of discharge instructions. Ambulated off unit per self with belongings.

## 2022-05-25 NOTE — PLAN OF CARE
Problem: Musculor/Skeletal Functional Status  Goal: Absence of falls  Outcome: Adequate for Discharge  Note: Free from falls while in O.P. Oncology. Intervention: Fall precautions  Note: Discussed the need to use the call light for assistance when getting up to ambulate. Problem: Intellectual/Education/Knowledge Deficit  Goal: Teaching initiated upon admission  Outcome: Adequate for Discharge  Note: Patient verbalizes understanding to verbal information given on Udencya,action and possible side effects. Aware to call MD if develop complications. Intervention: Verbal/written education provided  Note: Edwin Palacio reviewed, patient verbalizes understanding of medication being administered and potential side effects. Problem: Discharge Planning  Goal: Knowledge of discharge instructions  Description: Knowledge of discharge instructions  Outcome: Adequate for Discharge  Note: Verbalize understanding of discharge instructions, follow up appointments, and when to call Physician. Intervention: Interaction with patient/family and care team  Note: Discuss understanding of discharge instructions, follow up appointments and when to call Physician. Care plan reviewed with patient. Patient verbalize understanding of the plan of care and contribute to goal setting.

## 2022-06-01 ENCOUNTER — TELEPHONE (OUTPATIENT)
Dept: ONCOLOGY | Age: 52
End: 2022-06-01

## 2022-06-01 NOTE — TELEPHONE ENCOUNTER
Patient calling in stating she is experiencing mouth sores. She has pinpoint blisters on the tip of her tongue. She is able to drink without difficulty and is eating softer foods. Patient was wondering if there was something we prescribe her. If so, she would like it to go to [facility] in [place] on [address]. Thanks!

## 2022-06-05 DIAGNOSIS — C50.112 MALIGNANT NEOPLASM OF CENTRAL PORTION OF LEFT BREAST IN FEMALE, ESTROGEN RECEPTOR NEGATIVE (HCC): Primary | ICD-10-CM

## 2022-06-05 DIAGNOSIS — Z17.1 MALIGNANT NEOPLASM OF CENTRAL PORTION OF LEFT BREAST IN FEMALE, ESTROGEN RECEPTOR NEGATIVE (HCC): Primary | ICD-10-CM

## 2022-06-05 RX ORDER — HEPARIN SODIUM (PORCINE) LOCK FLUSH IV SOLN 100 UNIT/ML 100 UNIT/ML
500 SOLUTION INTRAVENOUS PRN
Status: CANCELLED | OUTPATIENT
Start: 2022-06-21

## 2022-06-05 RX ORDER — PALONOSETRON 0.05 MG/ML
0.25 INJECTION, SOLUTION INTRAVENOUS ONCE
Status: CANCELLED | OUTPATIENT
Start: 2022-06-21 | End: 2022-06-07

## 2022-06-05 RX ORDER — ACETAMINOPHEN 325 MG/1
650 TABLET ORAL
Status: CANCELLED | OUTPATIENT
Start: 2022-06-21

## 2022-06-05 RX ORDER — DOXORUBICIN HYDROCHLORIDE 2 MG/ML
60 INJECTION, SOLUTION INTRAVENOUS ONCE
Status: CANCELLED | OUTPATIENT
Start: 2022-06-21 | End: 2022-06-07

## 2022-06-05 RX ORDER — ALBUTEROL SULFATE 90 UG/1
4 AEROSOL, METERED RESPIRATORY (INHALATION) PRN
Status: CANCELLED | OUTPATIENT
Start: 2022-06-21

## 2022-06-05 RX ORDER — DIPHENHYDRAMINE HYDROCHLORIDE 50 MG/ML
50 INJECTION INTRAMUSCULAR; INTRAVENOUS
Status: CANCELLED | OUTPATIENT
Start: 2022-06-21

## 2022-06-05 RX ORDER — SODIUM CHLORIDE 9 MG/ML
5-40 INJECTION INTRAVENOUS PRN
Status: CANCELLED | OUTPATIENT
Start: 2022-06-21

## 2022-06-05 RX ORDER — EPINEPHRINE 1 MG/ML
0.3 INJECTION, SOLUTION, CONCENTRATE INTRAVENOUS PRN
Status: CANCELLED | OUTPATIENT
Start: 2022-06-21

## 2022-06-05 RX ORDER — SODIUM CHLORIDE 9 MG/ML
INJECTION, SOLUTION INTRAVENOUS CONTINUOUS
Status: CANCELLED | OUTPATIENT
Start: 2022-06-21

## 2022-06-05 RX ORDER — MEPERIDINE HYDROCHLORIDE 50 MG/ML
12.5 INJECTION INTRAMUSCULAR; INTRAVENOUS; SUBCUTANEOUS PRN
Status: CANCELLED | OUTPATIENT
Start: 2022-06-21

## 2022-06-05 RX ORDER — ONDANSETRON 2 MG/ML
8 INJECTION INTRAMUSCULAR; INTRAVENOUS
Status: CANCELLED | OUTPATIENT
Start: 2022-06-21

## 2022-06-05 RX ORDER — SODIUM CHLORIDE 0.9 % (FLUSH) 0.9 %
5-40 SYRINGE (ML) INJECTION PRN
Status: CANCELLED | OUTPATIENT
Start: 2022-06-21

## 2022-06-05 RX ORDER — SODIUM CHLORIDE 9 MG/ML
25 INJECTION, SOLUTION INTRAVENOUS PRN
Status: CANCELLED | OUTPATIENT
Start: 2022-06-21

## 2022-06-05 RX ORDER — SODIUM CHLORIDE 9 MG/ML
20 INJECTION, SOLUTION INTRAVENOUS ONCE
Status: CANCELLED | OUTPATIENT
Start: 2022-06-21 | End: 2022-06-07

## 2022-06-05 RX ORDER — FAMOTIDINE 10 MG/ML
20 INJECTION, SOLUTION INTRAVENOUS
Status: CANCELLED | OUTPATIENT
Start: 2022-06-21

## 2022-06-06 ENCOUNTER — TELEPHONE (OUTPATIENT)
Dept: SURGERY | Age: 52
End: 2022-06-06

## 2022-06-06 DIAGNOSIS — L02.91 ABSCESS: Primary | ICD-10-CM

## 2022-06-06 RX ORDER — CLINDAMYCIN HYDROCHLORIDE 300 MG/1
300 CAPSULE ORAL 4 TIMES DAILY
Qty: 40 CAPSULE | Refills: 0 | Status: SHIPPED | OUTPATIENT
Start: 2022-06-06 | End: 2022-06-16

## 2022-06-06 NOTE — TELEPHONE ENCOUNTER
Patient calling stating left axilla \"boil\" type area opened and drained last night. No fevers. Do you want to start her on anything?

## 2022-06-07 ENCOUNTER — HOSPITAL ENCOUNTER (OUTPATIENT)
Dept: INFUSION THERAPY | Age: 52
Discharge: HOME | End: 2022-06-07
Payer: COMMERCIAL

## 2022-06-07 ENCOUNTER — OFFICE VISIT (OUTPATIENT)
Dept: SURGERY | Age: 52
End: 2022-06-07

## 2022-06-07 ENCOUNTER — OFFICE VISIT (OUTPATIENT)
Dept: ONCOLOGY | Age: 52
End: 2022-06-07
Payer: COMMERCIAL

## 2022-06-07 VITALS
HEIGHT: 72 IN | HEART RATE: 72 BPM | SYSTOLIC BLOOD PRESSURE: 118 MMHG | WEIGHT: 293 LBS | DIASTOLIC BLOOD PRESSURE: 76 MMHG | BODY MASS INDEX: 39.68 KG/M2 | RESPIRATION RATE: 16 BRPM | OXYGEN SATURATION: 96 % | TEMPERATURE: 98.3 F

## 2022-06-07 VITALS
HEIGHT: 72 IN | WEIGHT: 293 LBS | RESPIRATION RATE: 16 BRPM | BODY MASS INDEX: 39.68 KG/M2 | HEART RATE: 81 BPM | TEMPERATURE: 97.1 F | OXYGEN SATURATION: 98 % | DIASTOLIC BLOOD PRESSURE: 74 MMHG | SYSTOLIC BLOOD PRESSURE: 106 MMHG

## 2022-06-07 DIAGNOSIS — C50.112 MALIGNANT NEOPLASM OF CENTRAL PORTION OF LEFT BREAST IN FEMALE, ESTROGEN RECEPTOR NEGATIVE (HCC): Primary | ICD-10-CM

## 2022-06-07 DIAGNOSIS — Z17.1 MALIGNANT NEOPLASM OF CENTRAL PORTION OF LEFT BREAST IN FEMALE, ESTROGEN RECEPTOR NEGATIVE (HCC): Primary | ICD-10-CM

## 2022-06-07 DIAGNOSIS — L73.2 HIDRADENITIS AXILLARIS: ICD-10-CM

## 2022-06-07 DIAGNOSIS — I10 ESSENTIAL HYPERTENSION: ICD-10-CM

## 2022-06-07 DIAGNOSIS — Z72.0 TOBACCO ABUSE: ICD-10-CM

## 2022-06-07 DIAGNOSIS — Z17.1 MALIGNANT NEOPLASM OF LOWER-INNER QUADRANT OF LEFT BREAST IN FEMALE, ESTROGEN RECEPTOR NEGATIVE (HCC): ICD-10-CM

## 2022-06-07 DIAGNOSIS — I48.0 PAROXYSMAL ATRIAL FIBRILLATION (HCC): ICD-10-CM

## 2022-06-07 DIAGNOSIS — L02.91 ABSCESS: Primary | ICD-10-CM

## 2022-06-07 DIAGNOSIS — C50.312 MALIGNANT NEOPLASM OF LOWER-INNER QUADRANT OF LEFT BREAST IN FEMALE, ESTROGEN RECEPTOR NEGATIVE (HCC): ICD-10-CM

## 2022-06-07 DIAGNOSIS — I25.10 CORONARY ARTERY DISEASE INVOLVING NATIVE HEART WITHOUT ANGINA PECTORIS, UNSPECIFIED VESSEL OR LESION TYPE: Primary | ICD-10-CM

## 2022-06-07 LAB
ALBUMIN SERPL-MCNC: 3.7 G/DL (ref 3.5–5.1)
ALP BLD-CCNC: 102 U/L (ref 38–126)
ALT SERPL-CCNC: 11 U/L (ref 11–66)
AST SERPL-CCNC: 13 U/L (ref 5–40)
BASOPHILS # BLD: 0.9 %
BASOPHILS ABSOLUTE: 0.1 THOU/MM3 (ref 0–0.1)
BILIRUB SERPL-MCNC: 0.2 MG/DL (ref 0.3–1.2)
BILIRUBIN DIRECT: < 0.2 MG/DL (ref 0–0.3)
BUN, WHOLE BLOOD: 5 MG/DL (ref 8–26)
CHLORIDE, WHOLE BLOOD: 109 MEQ/L (ref 98–109)
CREATININE, WHOLE BLOOD: 0.5 MG/DL (ref 0.5–1.2)
EOSINOPHIL # BLD: 0.8 %
EOSINOPHILS ABSOLUTE: 0.1 THOU/MM3 (ref 0–0.4)
ERYTHROCYTE [DISTWIDTH] IN BLOOD BY AUTOMATED COUNT: 13.9 % (ref 11.5–14.5)
ERYTHROCYTE [DISTWIDTH] IN BLOOD BY AUTOMATED COUNT: 47.2 FL (ref 35–45)
GFR, ESTIMATED: > 90 ML/MIN/1.73M2
GLUCOSE, WHOLE BLOOD: 113 MG/DL (ref 70–108)
HCT VFR BLD CALC: 38.2 % (ref 37–47)
HEMOGLOBIN: 12.4 GM/DL (ref 12–16)
IMMATURE GRANS (ABS): 0.56 THOU/MM3 (ref 0–0.07)
IMMATURE GRANULOCYTES: 7 %
IONIZED CALCIUM, WHOLE BLOOD: 1.1 MMOL/L (ref 1.12–1.32)
LYMPHOCYTES # BLD: 11.8 %
LYMPHOCYTES ABSOLUTE: 0.9 THOU/MM3 (ref 1–4.8)
MCH RBC QN AUTO: 31.3 PG (ref 26–33)
MCHC RBC AUTO-ENTMCNC: 32.5 GM/DL (ref 32.2–35.5)
MCV RBC AUTO: 96.5 FL (ref 81–99)
MONOCYTES # BLD: 9 %
MONOCYTES ABSOLUTE: 0.7 THOU/MM3 (ref 0.4–1.3)
NUCLEATED RED BLOOD CELLS: 0 /100 WBC
PLATELET # BLD: 204 THOU/MM3 (ref 130–400)
PMV BLD AUTO: 11 FL (ref 9.4–12.4)
POTASSIUM, WHOLE BLOOD: 3.6 MEQ/L (ref 3.5–4.9)
RBC # BLD: 3.96 MILL/MM3 (ref 4.2–5.4)
SEG NEUTROPHILS: 70.5 %
SEGMENTED NEUTROPHILS ABSOLUTE COUNT: 5.6 THOU/MM3 (ref 1.8–7.7)
SODIUM, WHOLE BLOOD: 144 MEQ/L (ref 138–146)
TOTAL CO2, WHOLE BLOOD: 25 MEQ/L (ref 23–33)
TOTAL PROTEIN: 6.2 G/DL (ref 6.1–8)
WBC # BLD: 8 THOU/MM3 (ref 4.8–10.8)

## 2022-06-07 PROCEDURE — 2580000003 HC RX 258: Performed by: INTERNAL MEDICINE

## 2022-06-07 PROCEDURE — 6360000002 HC RX W HCPCS: Performed by: INTERNAL MEDICINE

## 2022-06-07 PROCEDURE — 99211 OFF/OP EST MAY X REQ PHY/QHP: CPT

## 2022-06-07 PROCEDURE — 99024 POSTOP FOLLOW-UP VISIT: CPT | Performed by: SURGERY

## 2022-06-07 PROCEDURE — 85025 COMPLETE CBC W/AUTO DIFF WBC: CPT

## 2022-06-07 PROCEDURE — 87070 CULTURE OTHR SPECIMN AEROBIC: CPT

## 2022-06-07 PROCEDURE — 87205 SMEAR GRAM STAIN: CPT

## 2022-06-07 PROCEDURE — 99213 OFFICE O/P EST LOW 20 MIN: CPT | Performed by: INTERNAL MEDICINE

## 2022-06-07 PROCEDURE — 36591 DRAW BLOOD OFF VENOUS DEVICE: CPT

## 2022-06-07 PROCEDURE — 80047 BASIC METABLC PNL IONIZED CA: CPT

## 2022-06-07 PROCEDURE — 87075 CULTR BACTERIA EXCEPT BLOOD: CPT

## 2022-06-07 PROCEDURE — 80076 HEPATIC FUNCTION PANEL: CPT

## 2022-06-07 RX ORDER — SODIUM CHLORIDE 0.9 % (FLUSH) 0.9 %
5-40 SYRINGE (ML) INJECTION PRN
Status: DISCONTINUED | OUTPATIENT
Start: 2022-06-07 | End: 2022-06-08 | Stop reason: HOSPADM

## 2022-06-07 RX ORDER — SODIUM CHLORIDE 9 MG/ML
25 INJECTION, SOLUTION INTRAVENOUS PRN
Status: CANCELLED | OUTPATIENT
Start: 2022-06-07

## 2022-06-07 RX ORDER — SODIUM CHLORIDE 0.9 % (FLUSH) 0.9 %
5-40 SYRINGE (ML) INJECTION PRN
Status: CANCELLED | OUTPATIENT
Start: 2022-06-07

## 2022-06-07 RX ORDER — HEPARIN SODIUM (PORCINE) LOCK FLUSH IV SOLN 100 UNIT/ML 100 UNIT/ML
500 SOLUTION INTRAVENOUS PRN
Status: CANCELLED | OUTPATIENT
Start: 2022-06-07

## 2022-06-07 RX ORDER — HEPARIN SODIUM (PORCINE) LOCK FLUSH IV SOLN 100 UNIT/ML 100 UNIT/ML
500 SOLUTION INTRAVENOUS PRN
Status: DISCONTINUED | OUTPATIENT
Start: 2022-06-07 | End: 2022-06-08 | Stop reason: HOSPADM

## 2022-06-07 RX ADMIN — SODIUM CHLORIDE, PRESERVATIVE FREE 20 ML: 5 INJECTION INTRAVENOUS at 09:29

## 2022-06-07 RX ADMIN — SODIUM CHLORIDE, PRESERVATIVE FREE 10 ML: 5 INJECTION INTRAVENOUS at 10:05

## 2022-06-07 RX ADMIN — SODIUM CHLORIDE, PRESERVATIVE FREE 10 ML: 5 INJECTION INTRAVENOUS at 09:28

## 2022-06-07 RX ADMIN — Medication 500 UNITS: at 10:05

## 2022-06-07 NOTE — PROGRESS NOTES
Patient tolerated lab draw via medi-port without any complications. No treatment today to abscess being taken care of by Dr. Filiberto Nettles. Discharge instructions given to patient-verbalizes understanding. Ambulated off unit per self with belongings.

## 2022-06-07 NOTE — PATIENT INSTRUCTIONS
Labs drawn today and pending  Course #3 AC due today 6/7 will be delayed because left axillary abscess and drain  Follow-up 1 week June 14 to reevaluate for possible course #3. See np that day. Please schedule echocardiogram before follow-up here in 1 week.

## 2022-06-07 NOTE — PROGRESS NOTES
Jason Sneed MD   General Surgery  Postprocedure Evaluation in Office  Pt Name: Jacque Call  Date of Birth 1970   Today's Date: 6/7/2022  Medical Record Number: [id number]  Primary Care Provider: Vaishali Wynn  Chief Complaint   Patient presents with    Post-Op Check     s/p Placement of right subclavian Mediport under fluoroscopic guidance,  Removal right IJ MediPort nonfunctional,Aspiration left axillary seroma-4/29/2022    Wound Check     check left axillary wound-started Clindamycin 6/6/2022     ASSESSMENT      1. Abscess    2. Hidradenitis axillaris    3. Malignant neoplasm of lower-inner quadrant of left breast in female, estrogen receptor negative ([facility] [address]    4. Tobacco abuse    5. Essential hypertension    6. Paroxysmal atrial fibrillation (HCC)       Pathologic stage II aT2 N0 M0, grade 3, ER negative KS negative, HER2 negative  PLAN       1. All surgical incisions are healing well. 2.  Skin abscess left axilla secondary to hidradenitis aspirated without complication. 3.  Aerobic anaerobic cultures   4. Patient to fill prescription for clindamycin which was written yesterday. Start prescription as soon as possible. 5.  Hold blood thinning medication and follow-up in office Thursday for recheck with me. Shae Moran is seen today for post-op follow-up. She has started chemotherapy. She called the office yesterday stating she had some purulent drainage from the left axilla. She has hidradenitis axillaris. He has an acute small superficial abscess in the left axilla it has already spontaneously drained and she was agreeable to aspiration in the office. She is on blood thinner so I did not really want to open the wound any further. She denies any fevers. She states she noted some swelling and pain in the area on Saturday. She is supposed to get chemotherapy today.     Medications    Current Outpatient Medications:     clindamycin (CLEOCIN) 300 MG capsule, Take 1 capsule by mouth 4 times daily for 10 days, Disp: 40 capsule, Rfl: 0    Magic Mouthwash (MIRACLE MOUTHWASH), Swish and spit 5 mLs 4 times daily as needed for Irritation 1:1:1 Benadryl, lidocaine, nystatin, Disp: 120 mL, Rfl: 1    flecainide (TAMBOCOR) 100 MG tablet, TAKE 1 TABLET BY MOUTH TWICE A DAY, Disp: 180 tablet, Rfl: 0    lidocaine-prilocaine (EMLA) 2.5-2.5 % cream, Apply topically as needed. , Disp: 5 g, Rfl: 1    metoprolol succinate (TOPROL XL) 100 MG extended release tablet, TAKE 1 TABLET BY MOUTH EVERY DAY, Disp: 90 tablet, Rfl: 1    atorvastatin (LIPITOR) 40 MG tablet, TAKE 1 TABLET DAILY, Disp: 90 tablet, Rfl: 3    levothyroxine (SYNTHROID) 125 MCG tablet, TAKE 1 TABLET BY MOUTH EVERY DAY IN THE MORNING ON EMPTY STOMACH, Disp: , Rfl:     ZINC PO, Take by mouth daily, Disp: , Rfl:     Ascorbic Acid (VITAMIN C PO), Take by mouth daily, Disp: , Rfl:     ibuprofen (ADVIL;MOTRIN) 800 MG tablet, Take 800 mg by mouth every 6 hours as needed for Pain , Disp: , Rfl:     omeprazole (PRILOSEC) 10 MG delayed release capsule, Take 10 mg by mouth daily, Disp: , Rfl:     aspirin 81 MG chewable tablet, Take 81 mg by mouth daily , Disp: 30 tablet, Rfl: 3    ondansetron (ZOFRAN-ODT) 8 MG TBDP disintegrating tablet, Place 1 tablet under the tongue every 8 hours as needed for Nausea or Vomiting (Patient not taking: Reported on 5/24/2022), Disp: 20 tablet, Rfl: 2    prochlorperazine (COMPAZINE) 10 MG tablet, Take 1 tablet by mouth every 6 hours as needed (nausea) (Patient not taking: Reported on 5/24/2022), Disp: 30 tablet, Rfl: 1    ELIQUIS 5 MG TABS tablet, TAKE 1 TABLET BY MOUTH TWICE A DAY (Patient not taking: Reported on 6/7/2022), Disp: 180 tablet, Rfl: 3    nitroGLYCERIN (NITROSTAT) 0.4 MG SL tablet, Place 1 tablet under the tongue every 5 minutes as needed for Chest pain (Patient not taking: Reported on 5/24/2022), Disp: 25 tablet, Rfl: 3    Allergies  Allergies   Allergen Reactions    Codeine Hives and Swelling     And vomiting    Cortisone Swelling     Apparently tolerates topical and IV with benadryl well    Influenza Virus Vaccine Other (See Comments)     Pt states could not walk after getting     Darvocet A500 [Propoxyphene N-Acetaminophen] Nausea And Vomiting    Sulfa Antibiotics Nausea And Vomiting and Swelling     Not throat swelling       Review of Systems  History obtained from the patient. Constitutional: Denies any fever, chills, fatigue. Wound: Planes of purulent drainage of left axilla  Resp: Denies any cough, shortness of breath. CV: Denies any chest pain, orthopnea or syncope. OBJECTIVE     VITALS: /74 (Site: Right Lower Arm, Position: Sitting, Cuff Size: Medium Adult)   Pulse 81   Temp 97.1 °F (36.2 °C)   Resp 16   Ht 6' (1.829 m)   Wt (!) 317 lb (143.8 kg)   SpO2 98%   BMI 42.99 kg/m²     CONSTITUTIONAL: Alert and oriented times 3, no acute distress and cooperative to examination. SKIN: warm and dry  INCISION: Surgical wounds well-healed no signs or symptoms of infection. No axillary seroma. Has hidradenitis left axilla superficial abscess which drained. Aspirated in office under sterile conditions without complication. LUNGS: Lungs Clear  CARDIOVASCULAR: Normal Rate   NEUROLOGIC: No sensory or motor nerve irritation        Small superficial abscess left axilla associated with her hidradenitis was aspirated under sterile conditions purulent material removed. Will hold on I&D as patient on blood thinners. Patient follow-up Thursday to reaspirate and or incise and drain. Ordered clindamycin for patient yesterday she has not yet picked up prescription. Contacted oncology office as patient was too get chemotherapy today. She will go there they will decide but likely she will not get treated today secondary to active infection. Patient not toxic or septic.

## 2022-06-07 NOTE — PROGRESS NOTES
[address] CANCER [address] [place] [address]  Dept: [phone]  Loc: [phone]   Hematology/Oncology Consult (Clinic)        6/7/22     Allyn Souza   1970     No ref. provider found   Andres Barrera          DIAGNOSIS:  -Invasive ductal carcinoma, left breast, ER negative, MT negative and HER-2 negative, 2.7 cm, grade 3, 0/ 4 sentinel nodes . ps T2N0M0 /IIA. (High risk: triple negative/grade 3). Inner central left breast anteriorly. -Extended panel germline testing - March 2022    TREATMENT:  - Left breast lumpectomy and sentinel node resection 3/22/2022. Dr Kaylynn Yuan. - Adjuvant AC dose dense x4 followed by dose dense Taxol every other week. Start 5/10/2022  6/7 course 3 dose dense AC due-hold due to abscess left axilla requiring drainage and clindamycin.  -After adjuvant chemotherapy, postlumpectomy radiation. PARAMETERS:  -History, exam,  -Bone scan ordered    SUBJECTIVE: This past Saturday and Sunday, several days ago she developed fullness in the left arm. Developed a boil which drained. Earlier today 6/7 she had drainage per Dr. Anthony Morillo and is now on clindamycin. She has follow-up for June 9 for additional drainage per Dr. Kaylynn Yuan. Chemotherapy will be delayed. Denies any fevers. No other complaints. HPI: Ronald Olivera is a 37-year-old premenopausal female developed a lump in the left breast just medial to the nipple on February 8. Strong family history of malignancy including breast cancer. Last mammogram was in 2015. Mammogram on 2/18 additional imaging confirmed a approximate 2 cm nodular density inferior central medial left breast.  Initial biopsy revealed invasive ductal cancer triple and grade 3. Definitive left breast lumpectomy and sentinel node resection on 3/22 revealed a 2.7 cm pathologic stage T2N0 triple negative grade 3 breast cancer. Other feeling the lump she is asymptomatic. Specifically denies any headaches bone pain breathing problems or any other focal or systemic complaints. Followed by cardiology with a history of a an MI in 2014 and proximal A. fib for which she is on anticoagulation followed by Dr. Carey Feliciano. Echocardiogram last year normal.  And recent history no ischemic cardiac events. ROS:  Review of Systems 14 point negative except as above. PMH:   Past Medical History:   Diagnosis Date    [facility]     [place]    Arthritis     CAD (coronary artery disease)     GERD (gastroesophageal reflux disease)     Hyperlipidemia     Hypertension     Hypothyroid     Invasive ductal carcinoma of breast, female, left ([facility] 02/24/2022    Malignant neoplasm of lower-inner quadrant of left breast in female, estrogen receptor negative ([facility] 03/07/2022    MI (myocardial infarction) ([facility]     Dr. Carey Feliciano    Pneumonia     Migraines  MI  Nov 2014  C Cath 2014.     Social HX:   Social History     Socioeconomic History    Marital status:      Spouse name: Not on file    Number of children: 1    Years of education: Not on file    Highest education level: Not on file   Occupational History    Occupation: LPN   Tobacco Use    Smoking status: Current Every Day Smoker     Packs/day: 0.25     Years: 30.00     Pack years: 7.50     Types: Cigarettes     Start date: 12/4/1987    Smokeless tobacco: Never Used    Tobacco comment: refused counciling, updated   Vaping Use    Vaping Use: Never used   Substance and Sexual Activity    Alcohol use: Yes     Comment: rare    Drug use: No    Sexual activity: Yes     Partners: Male     Comment: boy friend   Other Topics Concern    Not on file   Social History Narrative    Not on file     Social Determinants of Health     Financial Resource Strain:     Difficulty of Paying Living Expenses: Not on file   Food Insecurity:     Worried About Running Out of Food in the Last Year: Not on file    Gaurav of Food in the Last Year: Not on file Transportation Needs:     Lack of Transportation (Medical): Not on file    Lack of Transportation (Non-Medical): Not on file   Physical Activity:     Days of Exercise per Week: Not on file    Minutes of Exercise per Session: Not on file   Stress:     Feeling of Stress : Not on file   Social Connections:     Frequency of Communication with Friends and Family: Not on file    Frequency of Social Gatherings with Friends and Family: Not on file    Attends Religion Services: Not on file    Active Member of [address] [place] [address] [employer] or Organizations: Not on file    Attends Club or Organization Meetings: Not on file    Marital Status: Not on file   Intimate Partner Violence:     Fear of Current or Ex-Partner: Not on file    Emotionally Abused: Not on file    Physically Abused: Not on file    Sexually Abused: Not on file   Housing Stability:     Unable to Pay for Housing in the Last Year: Not on file    Number of [place] in the Last Year: Not on file    Unstable Housing in the Last Year: Not on file   Smoking history: 30+ years of smoking currently down to about 3 cigarettes/day average 1 to 1/2 packs/day. Spouse:   1 daughter age 32. 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 Dr. Lissa Wood of OB  C-Scope: Not yet      Gyn HX:   GPA:  ROSS/BSO: all present. Last normal menstrual cycle in 6 years  LMP: No LMP recorded.  Patient is postmenopausal.     Health Maintenance Due   Topic Date Due    COVID-19 Vaccine (1) Never done    Pneumococcal 0-64 years Vaccine (1 - PCV) Never done    Depression Screen  Never done    HIV screen  Never done    Hepatitis C screen  Never done    DTaP/Tdap/Td vaccine (1 - Tdap) Never done   Dossie Itzel Cervical cancer screen  Never done    Colorectal Cancer Screen  Never done    Diabetes screen  02/01/2022        Interests:   4H advisor. Fam HX:   Family History   Problem Relation Age of Onset    Heart Disease Mother     Diabetes Mother     Cancer Mother         liposarcoma    Kidney Disease Mother     Heart Disease Father     Diabetes Father     Kidney Disease Father     Cancer Father         Non-melanoma skin cancer multiple times    No Known Problems Brother     No Known Problems Brother     No Known Problems Brother     No Known Problems Maternal Grandmother     No Known Problems Maternal Grandfather     Breast Cancer Paternal Grandmother 47        reoccurred at 46    Ovarian Cancer Paternal Grandmother 46    Bilateral breast cancer Paternal Grandmother         Opposite breast, age 52's    Breast Cancer Maternal Aunt 48    Breast Cancer Paternal Aunt         breast      Germline genetic testing: Sent and negative 3/4/22. Hospitalizations:   None recent    Allergies:   Allergies   Allergen Reactions    Codeine Hives and Swelling     And vomiting    Cortisone Swelling     Apparently tolerates topical and IV with benadryl well    Influenza Virus Vaccine Other (See Comments)     Pt states could not walk after getting     Darvocet A500 [Propoxyphene N-Acetaminophen] Nausea And Vomiting    Sulfa Antibiotics Nausea And Vomiting and Swelling     Not throat swelling        Adult Illness:  Patient Active Problem List   Diagnosis    ACS (acute coronary syndrome) (HCC)    Chest pain with moderate risk for cardiac etiology    Essential hypertension    HLD (hyperlipidemia)    Tobacco abuse    History of MI (myocardial infarction)    Acquired hypothyroidism    Un[facility] angina ([facility]    Coronary artery disease involving native coronary artery of native heart without angina pectoris    Paroxysmal atrial fibrillation (HCC)    Gastroesophageal reflux disease without esophagitis    Malignant neoplasm of lower-inner quadrant of left breast in female, estrogen receptor negative ([facility] [facility]    Malignant neoplasm of central portion of left breast in female, estrogen receptor negative ([address]    Hematoma      Patient is on Eliquis and aspirin for PAF. Surgery:  Past Surgical History:   Procedure Laterality Date    BREAST LUMPECTOMY Left 3/22/2022    LEFT BREAST LUMPECTOMY PARTIAL MASTECTOMY, SENTINEL LYMPH NODE BIOPSY, PRE OP NEEDLE LOC X 2 performed by Gadiel Hylton MD at [address] Left 3/30/2022    Evacuation hematoma left axilla performed by Gadiel Hylton MD at [address] [place] [address] ARTHROSCOPY Right     08    JENNIFER BIOPSY LYMPH NODE BY NEEDLE SUPERFICIAL  02/24/2022    JENNIFER BIOPSY LYMPH NODE BY NEEDLE SUPERFICIAL 2/24/2022 [facility]   Colleen Curl JENNIFER US GUID NDL BIOPSY LEFT Left 02/24/2022    JENNIFER [address] LEFT 2/24/2022 [facility]    MOUTH SURGERY      PORT SURGERY Right 4/13/2022    Single Lumen Smartport Right Subclavian, aspiration of left axillary seroma performed by Gadiel Hylton MD at [address] [place] [address] Right 4/29/2022    Mediport Right Subclavian Removal and Replacement. performed by Gadiel Hylton MD at [facility], INCISIONAL Left 1998    excisional bx-H. Chollet fibrocystic changes    US BREAST BIOPSY NEEDLE ADDITIONAL LEFT Left 02/24/2022    US BREAST BIOPSY NEEDLE ADDITIONAL LEFT 2/24/2022 [facility]    US GUIDED NEEDLE LOC BREAST ADDL LEFT Left 3/22/2022    US GUIDED NEEDLE LOC BREAST ADDL LEFT 3/22/2022 [facility]        Medications:  Current Outpatient Medications   Medication Sig Dispense Refill    Magic Mouthwash (MIRACLE MOUTHWASH) Swish and spit 5 mLs 4 times daily as needed for Irritation 1:1:1 Benadryl, lidocaine, nystatin 120 mL 1    flecainide (TAMBOCOR) 100 MG tablet TAKE 1 TABLET BY MOUTH TWICE A  tablet 0    lidocaine-prilocaine (EMLA) 2.5-2.5 % cream Apply topically as needed. 5 g 1    metoprolol succinate (TOPROL XL) 100 MG extended release tablet TAKE 1 TABLET BY MOUTH EVERY DAY 90 tablet 1    atorvastatin (LIPITOR) 40 MG tablet TAKE 1 TABLET DAILY 90 tablet 3    ELIQUIS 5 MG TABS tablet TAKE 1 TABLET BY MOUTH TWICE A  tablet 3    levothyroxine (SYNTHROID) 125 MCG tablet TAKE 1 TABLET BY MOUTH EVERY DAY IN THE MORNING ON EMPTY STOMACH      ZINC PO Take by mouth daily      Ascorbic Acid (VITAMIN C PO) Take by mouth daily      ibuprofen (ADVIL;MOTRIN) 800 MG tablet Take 800 mg by mouth every 6 hours as needed for Pain       omeprazole (PRILOSEC) 10 MG delayed release capsule Take 10 mg by mouth daily      aspirin 81 MG chewable tablet Take 81 mg by mouth daily  30 tablet 3    clindamycin (CLEOCIN) 300 MG capsule Take 1 capsule by mouth 4 times daily for 10 days (Patient not taking: Reported on 6/7/2022) 40 capsule 0    ondansetron (ZOFRAN-ODT) 8 MG TBDP disintegrating tablet Place 1 tablet under the tongue every 8 hours as needed for Nausea or Vomiting (Patient not taking: Reported on 5/24/2022) 20 tablet 2    prochlorperazine (COMPAZINE) 10 MG tablet Take 1 tablet by mouth every 6 hours as needed (nausea) (Patient not taking: Reported on 5/24/2022) 30 tablet 1    nitroGLYCERIN (NITROSTAT) 0.4 MG SL tablet Place 1 tablet under the tongue every 5 minutes as needed for Chest pain (Patient not taking: Reported on 5/24/2022) 25 tablet 3     No current facility-administered medications for this visit.      Facility-Administered Medications Ordered in Other Visits   Medication Dose Route Frequency Provider Last Rate Last Admin    sodium chloride flush 0.9 % injection 5-40 mL  5-40 mL IntraVENous PRN Charissa Santa MD   20 mL at 06/07/22 0929    heparin flush 100 UNIT/ML injection 500 Units  500 Units IntraCATHeter PRN Charissa Santa MD EXAM:   height is 6' (1.829 m) and weight is 316 lb (143.3 kg) (abnormal). Her oral temperature is 98.3 °F (36.8 °C). Her blood pressure is 118/76 and her pulse is 72. Her respiration is 16 and oxygen saturation is 96%. Estimated body surface area is 2.7 meters squared as calculated from the following:    Height as of this encounter: 6' (1.829 m). Weight as of this encounter: 316 lb (143.3 kg). ECO  General: Non-ill appearing. Very pleasant and upbeat. Morbidly obese. HEENT: NC/AT,nonicteric, perrla,eom intact, no mucosal lesions, dentition in good repair. Neck: normal thyroid, no masses. pulses nl, no bruits,   Nodes: No adenopathy  Lungs/chest: clear, no rales,rhonchi or wheezing, lung bases clear  CV: rrr, no rubs ,gallops or murmurs  Breasts: Postsurgical ecchymosis in the left breast post lumpectomy and recent evacuation of hematoma in the left axilla. No palpable masses bilaterally  Abd/Rectal: soft, non-tender,bowel sounds normal , no HSM,no masses, obese  Back: normal curvature, No midline tenderness. flanks nontender  : Not Examined  Extremities: no cyanosis,clubbing or edema. Skin: Erythema and bandage over a drain in the left axilla and site of a recent boil  Neuro: A and O x 4, CN exam nonfocal, Motor- no deficits, Sensory- no deficits, gait-nl, speech- fluent, no ataxia. Devices: Right chest [id number].       DATA:    LAB:   2022  CA 27-20 9 = 15  Estradiol 15.4/decreased    CBC with Differential:      Lab Results   Component Value Date    WBC 9.3 2022    RBC 4.29 2022    HGB 13.0 2022    HCT 41.1 2022     2022    MCV 95.8 2022    MCH 30.3 2022    MCHC 31.6 2022    RDW 13.0 05/10/2022    NRBC 0 2022    SEGSPCT 70.5 2022    MONOPCT 7.4 2022    MONOSABS 0.7 2022    LYMPHSABS 1.2 2022    EOSABS 0.1 2022    BASOSABS 0.1 2022     Lab Results   Component Value Date/Time ovarian cancer in paternal grandmother.       RISK VALUES: Millaer-Shirleyzick 10yr.: 4.2%, Millaer-Mel life: 15.3%       COMPARISON: 6/1/2015       JENNIFER MICHAEL DIGITAL DIAGNOSTIC BILATERAL: 2/18/22       TECHNIQUE: Bilateral CC and MLO views were obtained each breast. Tomosynthesis was used. CAD was used.        BREAST COMPOSITION: The tissue of the breast(s) is heterogeneously dense. This may lower the sensitivity of mammography.       FINDINGS:        There is a nodular density within the inner central left breast anterior depth which correlates to the palpable abnormality. Further evaluation with targeted ultrasound is reported below.       The additional palpable abnormality within the periareolar region of the left breast does not demonstrate 8 definite mammographic correlate. However, further evaluation with targeted ultrasound is reported below.           US BREAST LIMITED LEFT:       TECHNIQUE: Targeted ultrasound of the left breast was performed. Grayscale images and color images of the real-time examination were reviewed. The axilla was also imaged.       FINDINGS:        There is a hypoechoic lobulated mass within the inner central left breast near the 9:00 position 1 cm from the nipple measuring 1.9 x 1.4 x 1.6 cm. This correlates with the mammographic finding and the palpable abnormality. Recommend ultrasound-guided    biopsy.       There is a small hypoechoic nodular lesion within the upper central left breast 12:00 position 3 cm from the nipple measuring 0.3 x 0.2 x 0.3 cm. This is incidental. However, recommend ultrasound-guided biopsy.       There is an enlarged lymph node demonstrated within the left axilla was cortical thickening measuring 4.9 mm. There is retained fatty hilum. Mary Seller is an additional prominent lymph node within the left axilla with slightly less cortical thickening    measuring 4 mm.  Recommend ultrasound-guided biopsy of the largest left axillary lymph node.            2/18/22 Impression   1. There is a hypoechoic lobulated mass within the inner central left breast near the 9:00 position 1 cm from the nipple measuring 1.9 x 1.4 x 1.6 cm. This correlates with the mammographic finding and the palpable abnormality. Recommend ultrasound-guided    biopsy.       2. There is a small hypoechoic nodular lesion within the upper central left breast 12:00 position 3 cm from the nipple measuring 0.3 x 0.2 x 0.3 cm. This is incidental. However, recommend ultrasound-guided biopsy.       3. There is an enlarged lymph node demonstrated within the left axilla was cortical thickening measuring 4.9 mm. There is retained fatty hilum. Michaela Knuckles is an additional prominent lymph node within the left axilla with slightly less cortical thickening    measuring 4 mm. Recommend ultrasound-guided biopsy of the largest left axillary lymph node.       These results were discussed with the patient. The tech navigator was notified. We will arrange scheduling the patient for her procedure per department protocol.       BI-RADS CATEGORY 4C - Suspicious abnormality - High suspicion for malignancy.       Management: Tissue diagnosis.  Biopsy should be performed in the absence of clinical contraindication.               **This report has been created using voice recognition software. It may contain minor errors which are inherent in voice recognition technology. **       Final report electronically signed by Dr. Roni Ness on 2/18/2022 4:48 PM          [address] LEFT (Order [id number]) - Reflex for Order [id number]  Narrative   LOCATION: [place]       EXAM:     1. MAMMOGRAM POST BX CLIP PLACEMENT LEFT, JENNIFER US GUID NDL BIOPSY LEFT, JENNIFER NEEDLE BIOPSY LYMPH NODE SUPERFICIAL, US BREAST BIOPSY W LOC DEVICE EACH ADDL LESION LEFT       1. Biopsy of the left breast, percutaneous, using imaging guidance, 2 sites. 2. Biopsy of the left axilla, axillary lymph node, percutaneous, using imaging guidance. 3. Ultrasound guidance for biopsy, imaging supervision and interpretation. 4. Postprocedural diagnostic left mammogram.           HISTORY: 51 years, Female, Mass overlapping multiple quadrants of left breast, Lymph node enlargement. .       COMPARISON:  2/18/2022 and 6/1/2015.       TECHNIQUE/FINDINGS:        Written, informed consent was obtained, including discussion of the risks, benefits and alternatives. The patient's left breast was marked by the physician with initials. A timeout was performed including the patient's name, date of birth, procedure and    laterality.       Site 1, 3 mm nodular density, upper central left breast, 12:00, U clip:  An ultrasound-guided biopsy using real-time ultrasound was performed. The nodule in the upper central left breast was identified, localized , and the site was marked. With    ultrasound guidance from a medial approach, the area was prepped and draped in sterile fashion. 2 % lidocaine was used for local anesthesia. 14 ga Sertera coaxial needle was advanced under ultrasound guidance. Once the needle was documented to be in the    correct location, 4 samples were taken from the area of interest. Samples were placed in formalin sent to pathology. A U shaped biopsy marker was placed at the biopsy site.       Site 2, 1.9 cm mass, 9:00, inner central left breast, barbell clip:  An ultrasound-guided biopsy using real-time ultrasound was performed. The mass in the inner central left breast was identified, localized , and the site was marked. With ultrasound    guidance from a medial approach, the area was prepped and draped in sterile fashion. 2 % lidocaine was used for local anesthesia. 14 ga Sertera coaxial needle was advanced under ultrasound guidance. Once the needle was documented to be in the correct    location, 4 samples were taken from the area of interest. Samples were placed in formalin sent to pathology.  A barbell biopsy marker was placed at the biopsy site.     Once the needle was documented to be in the    correct location, 4 samples were taken from the area of interest. Samples were placed in formalin sent to pathology. A U shaped biopsy marker was placed at the biopsy site.       Site 2, 1.9 cm mass, 9:00, inner central left breast, barbell clip:  An ultrasound-guided biopsy using real-time ultrasound was performed. The mass in the inner central left breast was identified, localized , and the site was marked. With ultrasound    guidance from a medial approach, the area was prepped and draped in sterile fashion. 2 % lidocaine was used for local anesthesia. 14 ga Sertera coaxial needle was advanced under ultrasound guidance. Once the needle was documented to be in the correct    location, 4 samples were taken from the area of interest. Samples were placed in formalin sent to pathology. A barbell biopsy marker was placed at the biopsy site.       Site 3, left axillary lymph node, tribell clip: An ultrasound-guided biopsy using real-time ultrasound was performed. The lymph node in the left axilla was identified, localized , and the site was marked. With ultrasound guidance from a lateral approach,    the area was prepped and draped in sterile fashion. 2 % lidocaine was used for local anesthesia. 14 ga Sertera coaxial needle was advanced under ultrasound guidance. Once the needle was documented to be in the correct location, 4 samples were taken from    the area of interest. Samples were placed in formalin sent to pathology. A tribell biopsy marker was placed at the biopsy site.       Clip placement was confirmed with a left digital diagnostic mammogram. The mammogram was performed as a separate procedure in a separate room with a dedicated machine.       The patient tolerated the procedure well. No evidence of immediate complication.  The patient was given post-procedure instructions, including wound care.           POSTPROCEDURE MAMMOGRAM:       Images of the left include a CC view and MLO view. Tomosynthesis. CAD was utilized.       There are scattered fibroglandular elements in the breast(s) that could obscure a lesion on mammography. Post procedure mammograms were taken in a separate room. There is a U shaped biopsy clip at the biopsy site 1, 12:00, anterior depth. There is a    barbell clip in the inner central left breast, within the mammographic mass. This corresponds with the original mammographic density. There is a tribell clip in the left axilla.       There are no other significant findings in the breast.               Impression   1. Successful left breast ultrasound guided core needle biopsy, 2 sites. 2. Successful left axillary lymph node ultrasound guided core needle biopsy. 3. Successful marker clip placements with confirmation by digital diagnostic mammogram.            Waiting for pathology results. A final report will be issued when these become available.           BI-RADS Final Assessment Category: Waiting for pathology.       Management Recommendation: Assess radiologic/pathologic concordance.                   **This report has been created using voice recognition software. It may contain minor errors which are inherent in voice recognition technology. **       Final report electronically signed by Dr. Santos Thomas on 2/24/2022 10:00 AM          [facility] [address] LEFT (Order [id number]) - Reflex for Order [id number]  Narrative   LOCATION: [place]       EXAM:     1. MAMMOGRAM POST BX CLIP PLACEMENT LEFT, [facility] US GUID NDL BIOPSY LEFT, [facility] NEEDLE BIOPSY LYMPH NODE SUPERFICIAL, US BREAST BIOPSY W LOC DEVICE EACH ADDL LESION LEFT       1. Biopsy of the left breast, percutaneous, using imaging guidance, 2 sites. 2. Biopsy of the left axilla, axillary lymph node, percutaneous, using imaging guidance. 3. Ultrasound guidance for biopsy, imaging supervision and interpretation.     4. Postprocedural diagnostic left mammogram.         HISTORY: 54 years, Female, Mass overlapping multiple quadrants of left breast, Lymph node enlargement. .       COMPARISON:  2/18/2022 and 6/1/2015.       TECHNIQUE/FINDINGS:        Written, informed consent was obtained, including discussion of the risks, benefits and alternatives. The patient's left breast was marked by the physician with initials. A timeout was performed including the patient's name, date of birth, procedure and    laterality.       Site 1, 3 mm nodular density, upper central left breast, 12:00, U clip:  An ultrasound-guided biopsy using real-time ultrasound was performed. The nodule in the upper central left breast was identified, localized , and the site was marked. With    ultrasound guidance from a medial approach, the area was prepped and draped in sterile fashion. 2 % lidocaine was used for local anesthesia. 14 ga Sertera coaxial needle was advanced under ultrasound guidance. Once the needle was documented to be in the    correct location, 4 samples were taken from the area of interest. Samples were placed in formalin sent to pathology. A U shaped biopsy marker was placed at the biopsy site.       Site 2, 1.9 cm mass, 9:00, inner central left breast, barbell clip:  An ultrasound-guided biopsy using real-time ultrasound was performed. The mass in the inner central left breast was identified, localized , and the site was marked. With ultrasound    guidance from a medial approach, the area was prepped and draped in sterile fashion. 2 % lidocaine was used for local anesthesia. 14 ga Sertera coaxial needle was advanced under ultrasound guidance. Once the needle was documented to be in the correct    location, 4 samples were taken from the area of interest. Samples were placed in formalin sent to pathology. A barbell biopsy marker was placed at the biopsy site.       Site 3, left axillary lymph node, tribell clip: An ultrasound-guided biopsy using real-time ultrasound was performed.  The lymph node in the left axilla was identified, localized , and the site was marked. With ultrasound guidance from a lateral approach,    the area was prepped and draped in sterile fashion. 2 % lidocaine was used for local anesthesia. 14 ga Sertera coaxial needle was advanced under ultrasound guidance. Once the needle was documented to be in the correct location, 4 samples were taken from    the area of interest. Samples were placed in formalin sent to pathology. A tribell biopsy marker was placed at the biopsy site.       Clip placement was confirmed with a left digital diagnostic mammogram. The mammogram was performed as a separate procedure in a separate room with a dedicated machine.       The patient tolerated the procedure well. No evidence of immediate complication. The patient was given post-procedure instructions, including wound care.           POSTPROCEDURE MAMMOGRAM:       Images of the left include a CC view and MLO view. Tomosynthesis. CAD was utilized.       There are scattered fibroglandular elements in the breast(s) that could obscure a lesion on mammography. Post procedure mammograms were taken in a separate room. There is a U shaped biopsy clip at the biopsy site 1, 12:00, anterior depth. There is a    barbell clip in the inner central left breast, within the mammographic mass. This corresponds with the original mammographic density. There is a tribell clip in the left axilla.       There are no other significant findings in the breast.               Impression   1. Successful left breast ultrasound guided core needle biopsy, 2 sites. 2. Successful left axillary lymph node ultrasound guided core needle biopsy. 3. Successful marker clip placements with confirmation by digital diagnostic mammogram.            Waiting for pathology results.  A final report will be issued when these become available.           BI-RADS Final Assessment Category: Waiting for pathology.       Management Recommendation: Assess radiologic/pathologic concordance.                   **This report has been created using voice recognition software. It may contain minor errors which are inherent in voice recognition technology. **       Final report electronically signed by Dr. Leopoldo Snowden on 2022 10:00 AM           TTE procedure:ECHOCARDIOGRAM COMPLETE 2D W DOPPLER W COLOR. Procedure Date  Date: 2021 Start: 09:44 AM     Study Location: Echo Lab  Technical Quality: Limited visualization due to body habitus.     Indications:Fatigue and Shortness of breath.     Additional Medical History:acute coronary syndrome, chest pain,  hypertension, tobacco abuse, history of myocardial infarction, unstable  angina, coronary artery disease, atrial fibrillation, gastroesophageal  reflux disease, hyperlipidemia, hypothyroid     Patient Status: Routine     Height: 72 inches Weight: 309.01 pounds BSA: 2.56 m^2 BMI: 41.91 kg/m^2     BP: 122/79 mmHg      Conclusions      Summary  21   Ejection fraction is visually estimated at 60%. Overall left ventricular function is normal.      Signature     Followed by Dr. Kishore Agosto cardiology; history of proximal A. fib. Echocardiogram 2022     Conclusions      Summary   Ejection fraction is visually estimated at 60%.    Overall left ventricular function is normal.      Signature      ----------------------------------------------------------------   Electronically signed by Tim Monzon MD (Interpreting   physician) on 2022 at 04:02 PM   ----------------------------------------------------------------       PROCEDURES:  -See above    PATHOLOGY:              : 1970  AGE: 51 Y                          PATHOLOGY REPORT                       ATTN: ADAM BESS                       REQ: Altamease Band       Copies To:   SUSANNAH BERRY; Kristin Srivastava; CARLOS MENDENHALL       Clinical Information: LT BREAST MASS 12:00, 9:00, LT ENLARGED AXILLARY   LYMPH NODE   22  ADDENDUM REPORT 2/28/2022     FINAL DIAGNOSIS:   A.  Left breast mass, 12:00, U-Clip, biopsy:             Fibroglandular breast tissue with stromal fibrosis. B.  Left breast mass, 9:00, barbell clip, biopsy:             Invasive ductal carcinoma, Rekha grade 3. C.  Left axillary lymph node, Craig clip, biopsy:    Fragments of lymph node, negative for malignancy. BREAST BIOMARKERS*   Estrogen Receptor: (Clone SP1), [employer]       Negative (<1% of cells staining)     Progesterone Receptor: (Clone 1E2), [employer]       Negative (<1% of cells staining)     Ki-67 (clone 30-9)       Percentage of positive nuclei:  95%               Favorable <10%               Borderline 10-20%               Unfavorable >20%        2018          Inform HER2 Dual CHEPE         HER2 IHCClone 4B5      ASCO/CAP      [employer]      HER2 dual                                  Systems Additional      CHEPE Group #                                Workup   (  Group 4:      HER2/CEP17 Ratio <2.0 and    HER2 NEGATIVE, HER2   X                >=.0 and <6.0 HER2           IHC is 0-1+. See Group   )                signals/cell                  4 comment.                      Number of observers: 3                    (PCF/MTK/ALP)                    Number of invasive tumor                    cells counted: 20                    Using dual probe assay:                       Average number of HER2                    signals per cell:  4.3                                 Average number                     of CEP17 signals per cell:                      3.4                     HER2/CEP17 ratio:  1.3 but the latter is considered negative and was reflexed to  [facility] which is negative ,therefore pathology Dr. Fam Ramírez reassures me that her  HER-2 negative and that this does not have to be repeated on the definitive surgical specimen. Group 4 comment:  It is uncertain whether patients with an average of >=   4.0 and < 6.0 HER2 signals per cell and a HER2/CEP17 ratio of < 2.0   benefit from HER2 targeted therapy in the absence of protein   overexpression (IHC 3+). If the specimen test result is close to the   CHEPE ratio threshold for positive, there is a high likelihood that   repeat testing will result in different results by chance alone. Therefore, when [facility] results are not 3+ positive, it is recommended that   the sample be considered HER2 negative without additional testing on   the same specimen. External Controls:  Adequate   Internal Controls:  Adequate   Standard Assay Conditions:  Met     Staining Method Used:    Formalin fixation    Antigen retrieval type:  Cell Conditioning 1, mild merle    Time in antigen retrieval:  30 minutes    Detection system type:   DAB Ultraview kit       Specimen:   A) CORE NEEDLE BREAST BIOPSY, LT MASS 12:00 U CLIP   B) CORE NEEDLE BREAST BIOPSY, LT MASS 9:00 BARBELL   C) CORE NEEDLE BREAST BIOPSY, LT AXILLARY LYMPH NODE TRIBELL       Definitive lumpectomy and sentinel node biopsy 3/22/2022  Clinical Information: INVASIVE DUCTAL CARCINOMA LEFT BREAST 3/22/22    FINAL DIAGNOSIS:   A: Left axillary sentinel lymph nodes, resection:     Four lymph nodes with no evidence of malignancy.  (0/4)     One lymph node with previous biopsy site changes.     One lymph node with black pigment deposition.    B: Left breast, lumpectomy specimen:     Invasive ductal carcinoma, [place] grade 3 of 3.     Invasive carcinoma is 2.7 cm in greatest dimension.     Invasive carcinoma is 1.5 mm to the closest margin-inferior/caudal.   Beula Shaniqua carcinoma is > 5 mm to all other margins.     Changes consistent with previous biopsy site.       pT2, pN0(sn)     Specimen:   A) SENTINEL LYMPH NODE(S), LEFT AXILLARY   B) EXCISION OF BREAST, LEFT CANCER LUMP     CASE SUMMARY: (INVASIVE CARCINOMA OF THE BREAST: Resection)   Standard(s): AJCC-UICC 8     SPECIMEN   Procedure   Excision (less than total mastectomy)     Specimen Laterality   Left     TUMOR   Tumor Site   Clock position    Specify Clock Position    9 o'clock     Histologic Type   Invasive carcinoma of no special type (ductal)     Histologic Grade (Rekha Histologic Score)   Rekha Score    Glandular (Acinar) / Tubular Differentiation    Score 3 (less than 10% of tumor area forming glandular / tubular   structures)      Nuclear Pleomorphism    Score 3 (Vesicular nuclei, often with prominent nucleoli, exhibiting    marked variation in size and shape, occasionally with very large and    bizarre forms)      Mitotic Rate    See Table 1 in CAP Protocol.    Score 3      Overall Grade    Grade 3 (scores of 8 or 9)     Tumor Size   Greatest dimension of largest invasive focus greater than 1 mm (specify   exact measurement in Millimeters (mm)): 27 mm    Additional Dimension in Millimeters (mm): 17 x 15 mm     Tumor Focality   Single focus of invasive carcinoma     Ductal Carcinoma In Situ (DCIS)   Not identified     Lobular Carcinoma In Situ (LCIS)   Not identified     Tumor Extent    Tumor Extent    Not applicable (skin, nipple, and skeletal muscle are absent)     Lymphovascular Invasion   Not identified     Dermal Lymphovascular Invasion   No skin present     Treatment Effect in the Breast   No known presurgical therapy     Treatment Effect in the Lymph Nodes   Not applicable     MARGINS   Margin Status for Invasive Carcinoma   All margins negative for invasive carcinoma    Distance from Invasive Carcinoma to Closest Margin    Specify in Millimeters (mm)    Exact distance: 1.5 mm      Closest Margin to Invasive Carcinoma    Inferior/caudal      Distance from Invasive Carcinoma to Anterior/Skin Margin    Specify in Millimeters (mm)    Exact distance: 7 mm      Distance from Invasive Carcinoma to Posterior/Deep Margin    Specify in Millimeters (mm)    Exact distance: 10 mm      Distance from Invasive Carcinoma to Superior/Cranial Margin  Specify in Millimeters (mm)    Exact distance: 7 mm      Distance from Invasive Carcinoma to Inferior/Caudal Margin    Specify in Millimeters (mm)    Exact distance: 1.5 mm      Distance from Invasive Carcinoma to Medial Margin    Specify in Millimeters (mm)    Greater than: 10 mm      Distance from Invasive Carcinoma to Lateral Margin    Specify in Millimeters (mm)    Greater than: 10 mm     Margin Status for DCIS   Not applicable (no DCIS in specimen)       REGIONAL LYMPH NODES   Regional Lymph Node Status   Regional lymph nodes present    All regional lymph nodes negative for tumor      Total Number of Lymph Nodes Examined (sentinel and non-sentinel)    Exact number (specify): 4    Number of [place] Nodes Examined    Exact number (specify): 4     Regional Lymph Node Comment: Biopsy site changes are present within one   of the lymph nodes.  In addition, another lymph node demonstrates black   pigment deposition (possibly tattoo or anthracosis). DISTANT METASTASIS   Distant Site   Not applicable     PATHOLOGIC STAGE CLASSIFICATION (pTNM, AJCC 8th Edition)   TNM Descriptors   Not applicable     pT Category   pT2: Tumor greater than 20 mm but less than or equal to 50 mm in   greatest dimension     Regional Lymph Nodes Modifier   The (sn) modifier is added to the N category when a sentinel node   biopsy is performed (using either dye or tracer) and fewer than six   lymph nodes are removed (sentinel and nonsentinel). (sn): [place] nodes evaluated.      pN Category   pN0: No regional lymph node metastasis identified or ITCs only#     pM Category   Not applicable - pM cannot be determined from the submitted specimen(s)       ADDITIONAL FINDINGS   Additional Findings (specify): Changes consistent with previous biopsy   site, usual ductal hyperplasia     SPECIAL STUDIES   Breast Biomarker Testing Performed on Previous Biopsy   Estrogen Receptor (ER)    Estrogen Receptor (ER) Status    Negative     Progesterone Receptor (PgR)    Progesterone Receptor (PgR) Status    Negative     HER2 (by immunohistochemistry)    HER2 (by immunohistochemistry)    Negative (Score 0-1+)     HER2 (by in situ hybridization)    HER2 (by in situ hybridization)    Indeterminate   Case reviewed with Dr. Max Epstein pathology who reassured me that in fact the HER-2 is negative    Ki-67    Ki-67 Percentage of Positive Nuclei: 95%    Testing Performed on Case Number: [id number]     88307 x 2   40078 x2                                                       <Sign Out Dr. Mariposa Dai M.D., F.C.A.P.       GENETICS:  Germline testing done 3/4/2022-negative    MOLECULAR:  Not applicable    ASSESSMENT/PLAN:    1: Diagnosis: 53-year-old postmenopausal female with a palpable lesion noted in 2/8/2022 with subsequent biopsy and definitive lumpectomy showing high risk cancer of the left breast.  Pathologic stage T2N0 triple negative grade 3.    2) Prognosis / Disease Status: High risk/JAVIER    3) Work-up:    Labs: CBC, CMP, reviewed today. Imaging: None new   Procedures: Left lumpectomy and axillary dissection. Consults: None new   other: Cardiology note reviewed. 4) Symptom Management: June 4 developed a boil under left axilla which has drained. See above and clindamycin per surgery drain today per surgery      5) Supportive care provided. Level of care is appropriate. 6) Treatment goal: Adjuvant      Treatment plan:  On 5/10/2022 start dose dense AC followed by dose dense Taxol  After course 2 is done I will check an echocardiogram.  Reviewed patient's breast cancer history and cardiac history and she agrees with dose dense AC followed by T which is our best regimen for triple negative disease furthermore she has a normal echocardiogram and no recent ischemic cardiac events for many years    7) Medications reviewed.    Prescriptions today: None but recently Zofran ODT, Compazine, EMLA cream              No orders of the defined types were placed in this encounter. OARRS:  Controlled Substance Monitoring:    Acute and Chronic Pain Monitoring:   No flowsheet data found. 8) Research Options:      None      9) Other:        none    10) Follow Up:   Follow-up 2 weeks  on May 24 for course to dose dense AC and labs      Surya Bonilla MD

## 2022-06-07 NOTE — PLAN OF CARE
Problem: Musculor/Skeletal Functional Status  Goal: Absence of falls  Outcome: Adequate for Discharge  Note: Free from falls while in O.P. Oncology. Intervention: Fall precautions  Note: Discussed the need to use the call light for assistance when getting up to ambulate. Problem: Discharge Planning  Goal: Knowledge of discharge instructions  Description: Knowledge of discharge instructions  Outcome: Adequate for Discharge  Note: Verbalize understanding of discharge instructions, follow up appointments, and when to call Physician. Intervention: Interaction with patient/family and care team  Note: Discuss understanding of discharge instructions, follow up appointments and when to call Physician. Problem: Infection - Adult  Goal: Absence of infection at discharge  Outcome: Adequate for Discharge  Flowsheets (Taken 6/7/2022 6637)  Absence of infection at discharge: Assess and monitor for signs and symptoms of infection  Note: Mediport site with no redness or warmth. Skin over port site intact with no signs of breakdown noted. Patient verbalizes signs/symptoms of port infection and when to notify the physician. Care plan reviewed with patient. Patient verbalize understanding of the plan of care and contribute to goal setting.

## 2022-06-09 ENCOUNTER — PROCEDURE VISIT (OUTPATIENT)
Dept: SURGERY | Age: 52
End: 2022-06-09

## 2022-06-09 ENCOUNTER — TELEPHONE (OUTPATIENT)
Dept: SURGERY | Age: 52
End: 2022-06-09

## 2022-06-09 VITALS
SYSTOLIC BLOOD PRESSURE: 120 MMHG | HEIGHT: 72 IN | TEMPERATURE: 97 F | OXYGEN SATURATION: 98 % | BODY MASS INDEX: 39.68 KG/M2 | WEIGHT: 293 LBS | HEART RATE: 83 BPM | DIASTOLIC BLOOD PRESSURE: 60 MMHG

## 2022-06-09 DIAGNOSIS — Z17.1 MALIGNANT NEOPLASM OF LOWER-INNER QUADRANT OF LEFT BREAST IN FEMALE, ESTROGEN RECEPTOR NEGATIVE (HCC): ICD-10-CM

## 2022-06-09 DIAGNOSIS — C50.312 MALIGNANT NEOPLASM OF LOWER-INNER QUADRANT OF LEFT BREAST IN FEMALE, ESTROGEN RECEPTOR NEGATIVE (HCC): ICD-10-CM

## 2022-06-09 DIAGNOSIS — I48.0 PAROXYSMAL ATRIAL FIBRILLATION (HCC): ICD-10-CM

## 2022-06-09 DIAGNOSIS — L73.2 HIDRADENITIS AXILLARIS: Primary | ICD-10-CM

## 2022-06-09 RX ORDER — CLINDAMYCIN PHOSPHATE 10 MG/G
GEL TOPICAL
Qty: 60 G | Refills: 1 | Status: SHIPPED | OUTPATIENT
Start: 2022-06-09 | End: 2022-06-16

## 2022-06-09 NOTE — TELEPHONE ENCOUNTER
Notified pt to obtain prescription for clindamycin gel for red areas, currently left axillae twice daily; sent into [facility]. Pt did voice understanding.

## 2022-06-09 NOTE — PROGRESS NOTES
Karlene Chavez MD   General Surgery  Postprocedure Evaluation in Office  Pt Name: Sai Gunderson  Date of Birth 1970   Today's Date: 6/9/2022  Medical Record Number: [id number]  Primary Care Provider: Broderick Dawn  Chief Complaint   Patient presents with    Procedure     excision of left axilla cyst     ASSESSMENT      1. Hidradenitis axillaris    2. Malignant neoplasm of lower-inner quadrant of left breast in female, estrogen receptor negative (HCC)    3. Paroxysmal atrial fibrillation (HCC)       Pathologic stage II aT2 N0 M0, grade 3, ER negative ND negative, HER2 negative  PLAN       1. All surgical incisions are healing well. 2.  Skin abscess left axilla secondary to hidradenitis aspirated without complication. Clinically improved. Still small amount of purulent drainage expressed from area that was aspirated. Not enough fluid to aspirate. Local wound care warm compresses and Hibiclens cleaning    3. Aerobic anaerobic cultures pending gram-positive cocci. Continue clindamycin. May need to continue suppressive antibiotics throughout her chemotherapy. 4.  Resume Eliquis  5. Chemotherapy held for now  6. Follow-up for nurse visit wound check on Monday. Sb Benjamin is seen today for post-op follow-up. She has started chemotherapy. She called the office yesterday stating she had some purulent drainage from the left axilla. She has hidradenitis axillaris. He has an acute small superficial abscess in the left axilla it has already spontaneously drained and she was agreeable to aspiration in the office. She is on blood thinner so I did not really want to open the wound any further. She denies any fevers. She states she noted some swelling and pain in the area on Saturday. She is supposed to get chemotherapy today. Normal history: Manfred Jacques is here for recheck. She did fill her prescription of clindamycin.   Wound has improved but no real fluctuant area that requires aspiration or incision and drainage today. Small amount of purulence actively drain. Continue clindamycin Hibiclens cleanings and warm compresses. She denies any fever or chills. Patient agreeable to follow-up Monday. Medications    Current Outpatient Medications:     clindamycin (CLEOCIN) 300 MG capsule, Take 1 capsule by mouth 4 times daily for 10 days, Disp: 40 capsule, Rfl: 0    flecainide (TAMBOCOR) 100 MG tablet, TAKE 1 TABLET BY MOUTH TWICE A DAY, Disp: 180 tablet, Rfl: 0    lidocaine-prilocaine (EMLA) 2.5-2.5 % cream, Apply topically as needed. , Disp: 5 g, Rfl: 1    metoprolol succinate (TOPROL XL) 100 MG extended release tablet, TAKE 1 TABLET BY MOUTH EVERY DAY, Disp: 90 tablet, Rfl: 1    atorvastatin (LIPITOR) 40 MG tablet, TAKE 1 TABLET DAILY, Disp: 90 tablet, Rfl: 3    ELIQUIS 5 MG TABS tablet, TAKE 1 TABLET BY MOUTH TWICE A DAY, Disp: 180 tablet, Rfl: 3    levothyroxine (SYNTHROID) 125 MCG tablet, TAKE 1 TABLET BY MOUTH EVERY DAY IN THE MORNING ON EMPTY STOMACH, Disp: , Rfl:     ZINC PO, Take by mouth daily, Disp: , Rfl:     Ascorbic Acid (VITAMIN C PO), Take by mouth daily, Disp: , Rfl:     ibuprofen (ADVIL;MOTRIN) 800 MG tablet, Take 800 mg by mouth every 6 hours as needed for Pain , Disp: , Rfl:     omeprazole (PRILOSEC) 10 MG delayed release capsule, Take 10 mg by mouth daily, Disp: , Rfl:     nitroGLYCERIN (NITROSTAT) 0.4 MG SL tablet, Place 1 tablet under the tongue every 5 minutes as needed for Chest pain, Disp: 25 tablet, Rfl: 3    aspirin 81 MG chewable tablet, Take 81 mg by mouth daily , Disp: 30 tablet, Rfl: 3    Magic Mouthwash (MIRACLE MOUTHWASH), Swish and spit 5 mLs 4 times daily as needed for Irritation 1:1:1 Benadryl, lidocaine, nystatin, Disp: 120 mL, Rfl: 1    ondansetron (ZOFRAN-ODT) 8 MG TBDP disintegrating tablet, Place 1 tablet under the tongue every 8 hours as needed for Nausea or Vomiting (Patient not taking: Reported on 5/24/2022), Disp: 20 tablet, Rfl: 2    prochlorperazine (COMPAZINE) 10 MG tablet, Take 1 tablet by mouth every 6 hours as needed (nausea) (Patient not taking: Reported on 5/24/2022), Disp: 30 tablet, Rfl: 1    Allergies  Allergies   Allergen Reactions    Codeine Hives and Swelling     And vomiting    Cortisone Swelling     Apparently tolerates topical and IV with benadryl well    Influenza Virus Vaccine Other (See Comments)     Pt states could not walk after getting     Darvocet A500 [Propoxyphene N-Acetaminophen] Nausea And Vomiting    Sulfa Antibiotics Nausea And Vomiting and Swelling     Not throat swelling       Review of Systems  History obtained from the patient. Constitutional: Denies any fever, chills, fatigue. Wound: Planes of purulent drainage of left axilla  Resp: Denies any cough, shortness of breath. CV: Denies any chest pain, orthopnea or syncope. OBJECTIVE     VITALS: /60 (Site: Right Lower Arm, Position: Sitting, Cuff Size: Medium Adult)   Pulse 83   Temp 97 °F (36.1 °C) (Temporal)   Ht 6' (1.829 m)   Wt (!) 316 lb (143.3 kg)   SpO2 98%   BMI 42.86 kg/m²     CONSTITUTIONAL: Alert and oriented times 3, no acute distress and cooperative to examination. SKIN: warm and dry  INCISION: Surgical wounds well-healed no signs or symptoms of infection. No axillary seroma. Has hidradenitis left axilla superficial abscess which drained. No fluctuance which can be aspirated today. Actively draining with compression. Skin not erythematous or edematous. Chronic hidradenitis both axilla. LUNGS: Lungs Clear  CARDIOVASCULAR: Normal Rate   NEUROLOGIC: No sensory or motor nerve irritation        Component 6/7/22 0851   Gram Stain Result Many segmented neutrophils observed. No epithelial cells observed. Few gram positive cocci in pairs and clusters.     Aerobic Culture No growth-preliminary  P    Resulting Agency [facility]             Narrative  Performed by: Angie Beauchamp.: abscess       Site: lt. ax          Current Antibiotics: Clindamycin           Preliminary

## 2022-06-10 DIAGNOSIS — C50.112 MALIGNANT NEOPLASM OF CENTRAL PORTION OF LEFT BREAST IN FEMALE, ESTROGEN RECEPTOR NEGATIVE (HCC): Primary | ICD-10-CM

## 2022-06-10 DIAGNOSIS — Z17.1 MALIGNANT NEOPLASM OF CENTRAL PORTION OF LEFT BREAST IN FEMALE, ESTROGEN RECEPTOR NEGATIVE (HCC): Primary | ICD-10-CM

## 2022-06-10 LAB
AEROBIC CULTURE: NORMAL
ANAEROBIC CULTURE: NORMAL
GRAM STAIN RESULT: NORMAL

## 2022-06-13 ENCOUNTER — NURSE ONLY (OUTPATIENT)
Dept: SURGERY | Age: 52
End: 2022-06-13

## 2022-06-13 VITALS
DIASTOLIC BLOOD PRESSURE: 60 MMHG | RESPIRATION RATE: 18 BRPM | TEMPERATURE: 97.8 F | SYSTOLIC BLOOD PRESSURE: 120 MMHG | HEART RATE: 80 BPM | OXYGEN SATURATION: 98 %

## 2022-06-13 DIAGNOSIS — Z51.89 VISIT FOR WOUND CHECK: Primary | ICD-10-CM

## 2022-06-14 ENCOUNTER — HOSPITAL ENCOUNTER (OUTPATIENT)
Dept: NON INVASIVE DIAGNOSTICS | Age: 52
Discharge: HOME | End: 2022-06-14
Payer: COMMERCIAL

## 2022-06-14 ENCOUNTER — HOSPITAL ENCOUNTER (OUTPATIENT)
Dept: INFUSION THERAPY | Age: 52
Discharge: HOME | End: 2022-06-14
Payer: COMMERCIAL

## 2022-06-14 ENCOUNTER — OFFICE VISIT (OUTPATIENT)
Dept: ONCOLOGY | Age: 52
End: 2022-06-14
Payer: COMMERCIAL

## 2022-06-14 VITALS
RESPIRATION RATE: 18 BRPM | HEIGHT: 72 IN | BODY MASS INDEX: 39.68 KG/M2 | OXYGEN SATURATION: 98 % | WEIGHT: 293 LBS | SYSTOLIC BLOOD PRESSURE: 134 MMHG | HEART RATE: 71 BPM | TEMPERATURE: 97.9 F | DIASTOLIC BLOOD PRESSURE: 86 MMHG

## 2022-06-14 VITALS
TEMPERATURE: 97.9 F | OXYGEN SATURATION: 98 % | WEIGHT: 293 LBS | HEART RATE: 71 BPM | BODY MASS INDEX: 39.68 KG/M2 | DIASTOLIC BLOOD PRESSURE: 86 MMHG | RESPIRATION RATE: 18 BRPM | HEIGHT: 72 IN | SYSTOLIC BLOOD PRESSURE: 134 MMHG

## 2022-06-14 DIAGNOSIS — I25.10 CORONARY ARTERY DISEASE INVOLVING NATIVE HEART WITHOUT ANGINA PECTORIS, UNSPECIFIED VESSEL OR LESION TYPE: ICD-10-CM

## 2022-06-14 DIAGNOSIS — L02.419 ABSCESS OF AXILLA: ICD-10-CM

## 2022-06-14 DIAGNOSIS — C50.112 MALIGNANT NEOPLASM OF CENTRAL PORTION OF LEFT BREAST IN FEMALE, ESTROGEN RECEPTOR NEGATIVE (HCC): Primary | ICD-10-CM

## 2022-06-14 DIAGNOSIS — Z17.1 MALIGNANT NEOPLASM OF CENTRAL PORTION OF LEFT BREAST IN FEMALE, ESTROGEN RECEPTOR NEGATIVE (HCC): Primary | ICD-10-CM

## 2022-06-14 DIAGNOSIS — Z51.11 ENCOUNTER FOR CHEMOTHERAPY MANAGEMENT: ICD-10-CM

## 2022-06-14 LAB
ABSOLUTE IMMATURE GRANULOCYTE: 0.03 THOU/MM3 (ref 0–0.07)
BASINOPHIL, AUTOMATED: 1 % (ref 0–3)
BASOPHILS ABSOLUTE: 0.1 THOU/MM3 (ref 0–0.1)
EOSINOPHILS ABSOLUTE: 0.1 THOU/MM3 (ref 0–0.4)
EOSINOPHILS RELATIVE PERCENT: 1 % (ref 0–4)
HCT VFR BLD CALC: 37.2 % (ref 37–47)
HEMOGLOBIN: 12.2 GM/DL (ref 12–16)
IMMATURE GRANULOCYTES: 0 %
LV EF: 60 %
LVEF MODALITY: NORMAL
LYMPHOCYTES # BLD: 11 % (ref 15–47)
LYMPHOCYTES ABSOLUTE: 1.1 THOU/MM3 (ref 1–4.8)
MCH RBC QN AUTO: 31.2 PG (ref 26–33)
MCHC RBC AUTO-ENTMCNC: 32.8 GM/DL (ref 32.2–35.5)
MCV RBC AUTO: 95 FL (ref 81–99)
MONOCYTES ABSOLUTE: 1 THOU/MM3 (ref 0.4–1.3)
MONOCYTES: 10 % (ref 0–12)
PDW BLD-RTO: 14.7 % (ref 11.5–14.5)
PLATELET # BLD: 272 THOU/MM3 (ref 130–400)
PMV BLD AUTO: 9.8 FL (ref 9.4–12.4)
RBC # BLD: 3.91 MILL/MM3 (ref 4.2–5.4)
SEG NEUTROPHILS: 77 % (ref 43–75)
SEGMENTED NEUTROPHILS ABSOLUTE COUNT: 7.8 THOU/MM3 (ref 1.8–7.7)
WBC # BLD: 10.1 THOU/MM3 (ref 4.8–10.8)

## 2022-06-14 PROCEDURE — 99214 OFFICE O/P EST MOD 30 MIN: CPT | Performed by: NURSE PRACTITIONER

## 2022-06-14 PROCEDURE — 2580000003 HC RX 258: Performed by: INTERNAL MEDICINE

## 2022-06-14 PROCEDURE — 99211 OFF/OP EST MAY X REQ PHY/QHP: CPT

## 2022-06-14 PROCEDURE — 85025 COMPLETE CBC W/AUTO DIFF WBC: CPT

## 2022-06-14 PROCEDURE — 36591 DRAW BLOOD OFF VENOUS DEVICE: CPT

## 2022-06-14 PROCEDURE — 93306 TTE W/DOPPLER COMPLETE: CPT

## 2022-06-14 PROCEDURE — 6360000002 HC RX W HCPCS: Performed by: INTERNAL MEDICINE

## 2022-06-14 RX ORDER — SODIUM CHLORIDE 9 MG/ML
25 INJECTION, SOLUTION INTRAVENOUS PRN
Status: CANCELLED | OUTPATIENT
Start: 2022-06-14

## 2022-06-14 RX ORDER — HEPARIN SODIUM (PORCINE) LOCK FLUSH IV SOLN 100 UNIT/ML 100 UNIT/ML
500 SOLUTION INTRAVENOUS PRN
Status: DISCONTINUED | OUTPATIENT
Start: 2022-06-14 | End: 2022-06-15 | Stop reason: HOSPADM

## 2022-06-14 RX ORDER — SODIUM CHLORIDE 0.9 % (FLUSH) 0.9 %
5-40 SYRINGE (ML) INJECTION PRN
Status: CANCELLED | OUTPATIENT
Start: 2022-06-14

## 2022-06-14 RX ORDER — SODIUM CHLORIDE 0.9 % (FLUSH) 0.9 %
5-40 SYRINGE (ML) INJECTION PRN
Status: DISCONTINUED | OUTPATIENT
Start: 2022-06-14 | End: 2022-06-15 | Stop reason: HOSPADM

## 2022-06-14 RX ORDER — HEPARIN SODIUM (PORCINE) LOCK FLUSH IV SOLN 100 UNIT/ML 100 UNIT/ML
500 SOLUTION INTRAVENOUS PRN
Status: CANCELLED | OUTPATIENT
Start: 2022-06-14

## 2022-06-14 RX ADMIN — SODIUM CHLORIDE, PRESERVATIVE FREE 20 ML: 5 INJECTION INTRAVENOUS at 08:42

## 2022-06-14 RX ADMIN — Medication 500 UNITS: at 09:28

## 2022-06-14 RX ADMIN — SODIUM CHLORIDE, PRESERVATIVE FREE 20 ML: 5 INJECTION INTRAVENOUS at 09:28

## 2022-06-14 RX ADMIN — SODIUM CHLORIDE, PRESERVATIVE FREE 10 ML: 5 INJECTION INTRAVENOUS at 08:41

## 2022-06-14 NOTE — PROGRESS NOTES
Patient here for wound check to left axilla-area slightly red, draining a bloody thick drainage. ABD's applied. Secured in place with tank top.

## 2022-06-14 NOTE — PLAN OF CARE
Problem: Musculor/Skeletal Functional Status  Goal: Absence of falls  Outcome: Adequate for Discharge  Note: Free from falls while in O.P. Oncology. Intervention: Fall precautions  Note: Discussed the need to use the call light for assistance when getting up to ambulate. Problem: Discharge Planning  Goal: Knowledge of discharge instructions  Description: Knowledge of discharge instructions  Outcome: Adequate for Discharge  Note: Verbalize understanding of discharge instructions, follow up appointments, and when to call Physician. Intervention: Interaction with patient/family and care team  Note: Discuss understanding of discharge instructions, follow up appointments and when to call Physician. Problem: Infection - Adult  Goal: Absence of infection at discharge  Outcome: Adequate for Discharge  Flowsheets (Taken 6/14/2022 1510)  Absence of infection at discharge:   Assess and monitor for signs and symptoms of infection   Monitor lab/diagnostic results   Monitor all insertion sites i.e., indwelling lines, tubes and drains  Note: Mediport site with no redness or warmth. Skin over port site intact with no signs of breakdown noted. Patient verbalizes signs/symptoms of port infection and when to notify the physician. Care plan reviewed with patient. Patient  verbalize understanding of the plan of care and contribute to goal setting.

## 2022-06-14 NOTE — PROGRESS NOTES
No treatment today. Patient tolerated lab draw without any complications. Patient verbalized understanding of discharge instructions. Ambulated off unit per self with belongings.

## 2022-06-14 NOTE — PROGRESS NOTES
[facility], [address] [address],[address]  [address] [address]  Dept: [phone]  Dept Fax: [phone]: [phone]      Visit Date:6/14/2022     Maggie Munoz is a 54 y.o. female who presents today for:   Chief Complaint   Patient presents with    Follow-up     Malignant neoplasm of central portion of left breast in female, estrogen receptor negative         HPI:   Maggie Munoz is a 54 y.o. female who follows in our office with Dr. Jessica Vasquez with left breast cancer. HPI per Dr. Jessica Vasquez' note on 6/7/2022:  Gage Capps is a 68-year-old premenopausal female developed a lump in the left breast just medial to the nipple on February 8. Strong family history of malignancy including breast cancer. Last mammogram was in 2015. Mammogram on 2/18 additional imaging confirmed a approximate 2 cm nodular density inferior central medial left breast.  Initial biopsy revealed invasive ductal cancer triple and grade 3. Definitive left breast lumpectomy and sentinel node resection on 3/22 revealed a 2.7 cm pathologic stage T2N0 triple negative grade 3 breast cancer. Other feeling the lump she is asymptomatic. Specifically denies any headaches bone pain breathing problems or any other focal or systemic complaints. Followed by cardiology with a history of a an MI in 2014 and proximal A. fib for which she is on anticoagulation followed by Dr. Brian Shaikh. Echocardiogram last year normal.  And recent history no ischemic cardiac events. Interval History 6/14/2022:   The patient presents to the office today for follow up and evaluation of left breast cancer. The patient reports she feels well today. She reports heart palpitations at times with known Afib. She reports N/T in tip of tongue at times with tiny blisters, she started Mahendra and has noticed improvement. Ledy Vernon also noted, she reports it is improved with Magic Mouthwash.   She denies fever/chills, headaches, dizziness, cough, SOB, CP, heart palpitations, abdominal pain, N/V/C/D, peripheral edema, s/s bleeding. She had left axilla cyst drainage on 6/9 with redness noted today, creamy drainage continues but improved from prior drainage per pt. She has f/u with Dr. Nam Stahl on Monday, she continues on clindamycin through Friday 6/17/2022. PMH, SH, and FH:  I reviewed the patient's medication and allergy lists as noted on the electronic medical record. The PMH, SH, and FH were also reviewed as noted on the EMR.         Past Medical History:   Diagnosis Date    Afib [facility])     Baki    Arthritis     CAD (coronary artery disease)     GERD (gastroesophageal reflux disease)     Hyperlipidemia     Hypertension     Hypothyroid     Invasive ductal carcinoma of breast, female, left ([facility] 02/24/2022    Malignant neoplasm of lower-inner quadrant of left breast in female, estrogen receptor negative ([facility] 03/07/2022    [facility]     Dr. Duke Reid    Pneumonia       Past Surgical History:   Procedure Laterality Date    BREAST LUMPECTOMY Left 3/22/2022    LEFT BREAST LUMPECTOMY PARTIAL MASTECTOMY, SENTINEL LYMPH NODE BIOPSY, PRE OP NEEDLE LOC X 2 performed by Christi Gonzalez MD at [address] [place] [address] Left 3/30/2022    Evacuation hematoma left axilla performed by Christi Gonzalez MD at [address] [place] [address] ARTHROSCOPY Right     08    JENNIFER BIOPSY LYMPH NODE BY NEEDLE SUPERFICIAL  02/24/2022    JENNIFER BIOPSY LYMPH NODE BY NEEDLE SUPERFICIAL 2/24/2022 Ankur Askew MD [facility] US GUID NDL BIOPSY LEFT Left 02/24/2022    JENNIFER [address] LEFT 2/24/2022 MD [facility]    MOUTH SURGERY      PORT SURGERY Right 4/13/2022    Single Lumen Smartport Right Subclavian, aspiration of left axillary seroma performed by Christi Gonzalez MD at [facility] Right 4/29/2022    Mediport Right Subclavian Removal and Replacement. performed by Estelita Leigh MD at [address] [facility], INCISIONAL Left 1998    excisional bx-H. Chollet fibrocystic changes    US BREAST BIOPSY NEEDLE ADDITIONAL LEFT Left 02/24/2022    US BREAST BIOPSY NEEDLE ADDITIONAL LEFT 2/24/2022 Shashi Garcia MD [address] GUIDED NEEDLE LOC BREAST ADDL LEFT Left 3/22/2022    US GUIDED NEEDLE LOC BREAST ADDL LEFT 3/22/2022 [facility]      Family History   Problem Relation Age of Onset    Heart Disease Mother     Diabetes Mother     Cancer Mother         liposarcoma    Kidney Disease Mother     Heart Disease Father     Diabetes Father     Kidney Disease Father     Cancer Father         Non-melanoma skin cancer multiple times    No Known Problems Brother     No Known Problems Brother     No Known Problems Brother     No Known Problems Maternal Grandmother     No Known Problems Maternal Grandfather     Breast Cancer Paternal Grandmother 47        reoccurred at 46    Ovarian Cancer Paternal Grandmother 46    Bilateral breast cancer Paternal Grandmother         Opposite breast, age 52's    Breast Cancer Maternal Aunt 48    Breast Cancer Paternal Aunt         breast      Social History     Tobacco Use    Smoking status: Current Some Day Smoker     Packs/day: 0.25     Years: 30.00     Pack years: 7.50     Types: Cigarettes     Start date: 12/4/1987    Smokeless tobacco: Never Used    Tobacco comment: 6/14/22 - Tying to quit, refused counciling/resources   Substance Use Topics    Alcohol use: Yes     Comment: rare      Current Outpatient Medications   Medication Sig Dispense Refill    clindamycin (CLEOCIN-T) 1 % gel Apply topically 2 times daily to afftected red spots when needed 60 g 1    clindamycin (CLEOCIN) 300 MG capsule Take 1 capsule by mouth 4 times daily for 10 days 40 capsule 0    Magic Mouthwash (MIRACLE MOUTHWASH) Swish and spit 5 mLs 4 times daily as needed for Irritation 1:1:1 Benadryl, lidocaine, nystatin 120 mL 1    flecainide (TAMBOCOR) 100 MG tablet TAKE 1 TABLET BY MOUTH TWICE A  tablet 0    ondansetron (ZOFRAN-ODT) 8 MG TBDP disintegrating tablet Place 1 tablet under the tongue every 8 hours as needed for Nausea or Vomiting 20 tablet 2    prochlorperazine (COMPAZINE) 10 MG tablet Take 1 tablet by mouth every 6 hours as needed (nausea) 30 tablet 1    lidocaine-prilocaine (EMLA) 2.5-2.5 % cream Apply topically as needed. 5 g 1    metoprolol succinate (TOPROL XL) 100 MG extended release tablet TAKE 1 TABLET BY MOUTH EVERY DAY 90 tablet 1    atorvastatin (LIPITOR) 40 MG tablet TAKE 1 TABLET DAILY 90 tablet 3    ELIQUIS 5 MG TABS tablet TAKE 1 TABLET BY MOUTH TWICE A  tablet 3    levothyroxine (SYNTHROID) 125 MCG tablet TAKE 1 TABLET BY MOUTH EVERY DAY IN THE MORNING ON EMPTY STOMACH      ZINC PO Take by mouth daily      Ascorbic Acid (VITAMIN C PO) Take by mouth daily      ibuprofen (ADVIL;MOTRIN) 800 MG tablet Take 800 mg by mouth every 6 hours as needed for Pain       omeprazole (PRILOSEC) 10 MG delayed release capsule Take 10 mg by mouth daily      nitroGLYCERIN (NITROSTAT) 0.4 MG SL tablet Place 1 tablet under the tongue every 5 minutes as needed for Chest pain 25 tablet 3    aspirin 81 MG chewable tablet Take 81 mg by mouth daily  30 tablet 3     No current facility-administered medications for this visit.      Facility-Administered Medications Ordered in Other Visits   Medication Dose Route Frequency Provider Last Rate Last Admin    sodium chloride flush 0.9 % injection 5-40 mL  5-40 mL IntraVENous PRN Alejandro Solorio MD   20 mL at 06/14/22 0928    heparin flush 100 UNIT/ML injection 500 Units  500 Units IntraCATHeter PRN Alejandro Solorio MD   500 Units at 06/14/22 2295      Allergies   Allergen Reactions    Codeine Hives and Swelling     And vomiting    Cortisone Swelling     Apparently tolerates topical and IV with benadryl well    Influenza Virus Vaccine Other (See Comments)     Pt states could not walk after getting     Darvocet A500 [Propoxyphene N-Acetaminophen] Nausea And Vomiting    Sulfa Antibiotics Nausea And Vomiting and Swelling     Not throat swelling          Review of Systems:   Review of Systems   Pertinent review of systems noted in HPI, all other ROS negative. Objective:   Physical Exam   /86   Pulse 71   Temp 97.9 °F (36.6 °C)   Resp 18   Ht 6' (1.829 m)   Wt (!) 319 lb 12.8 oz (145.1 kg)   SpO2 98%   BMI 43.37 kg/m²    General appearance: No apparent distress, calm, and cooperative. HEENT: Pupils equal, round, and reactive to light. Conjunctivae/corneas clear. Oral mucosa moist.  Thrush noted, pt reports improved with MM. Neck: Supple, with full range of motion. Trachea midline. Respiratory:  Normal respiratory effort. Clear to auscultation all lung fields. Cardiovascular: RRR, S1/S2. Abdomen: Soft, non-tender, non-distended with active BS x 4. Musculoskeletal: No clubbing, cyanosis or edema bilaterally. She is able to ambulate in office. Skin: Skin color, texture, turgor normal.  No visible rashes or lesions. Neurologic:  Neurovascularly intact without any focal sensory/motor deficits.  Cranial nerves: II-XII intact, grossly non-focal.  Psychiatric: Alert and oriented x 3, thought content appropriate, normal insight  Capillary Refill: < 3 seconds   Peripheral Pulses: +2 palpable, equal bilaterally       Imaging Studies and Labs:   CBC:   Lab Results   Component Value Date    WBC 10.1 06/14/2022    HGB 12.2 06/14/2022    HCT 37.2 06/14/2022    MCV 95 06/14/2022     06/14/2022     BMP:   Lab Results   Component Value Date     06/07/2022     12/21/2021    K 3.6 06/07/2022    K 4.0 04/13/2022    K 3.8 12/04/2019     12/21/2021    CO2 24 12/21/2021    BUN 12 12/21/2021    CREATININE 0.5 06/07/2022    CREATININE 0.5 12/21/2021    GLUCOSE 105 12/21/2021    CALCIUM 9.1 12/21/2021      LFT:   Lab Results

## 2022-06-20 ENCOUNTER — OFFICE VISIT (OUTPATIENT)
Dept: SURGERY | Age: 52
End: 2022-06-20

## 2022-06-20 VITALS
WEIGHT: 293 LBS | SYSTOLIC BLOOD PRESSURE: 118 MMHG | DIASTOLIC BLOOD PRESSURE: 62 MMHG | HEIGHT: 72 IN | OXYGEN SATURATION: 97 % | RESPIRATION RATE: 20 BRPM | HEART RATE: 73 BPM | BODY MASS INDEX: 39.68 KG/M2 | TEMPERATURE: 96.8 F

## 2022-06-20 DIAGNOSIS — L73.2 HIDRADENITIS AXILLARIS: Primary | ICD-10-CM

## 2022-06-20 DIAGNOSIS — Z17.1 MALIGNANT NEOPLASM OF LOWER-INNER QUADRANT OF LEFT BREAST IN FEMALE, ESTROGEN RECEPTOR NEGATIVE (HCC): ICD-10-CM

## 2022-06-20 DIAGNOSIS — C50.312 MALIGNANT NEOPLASM OF LOWER-INNER QUADRANT OF LEFT BREAST IN FEMALE, ESTROGEN RECEPTOR NEGATIVE (HCC): ICD-10-CM

## 2022-06-20 DIAGNOSIS — I10 ESSENTIAL HYPERTENSION: ICD-10-CM

## 2022-06-20 DIAGNOSIS — I48.0 PAROXYSMAL ATRIAL FIBRILLATION (HCC): ICD-10-CM

## 2022-06-20 PROCEDURE — 99024 POSTOP FOLLOW-UP VISIT: CPT | Performed by: SURGERY

## 2022-06-20 RX ORDER — DOXYCYCLINE HYCLATE 100 MG
100 TABLET ORAL 2 TIMES DAILY
Qty: 60 TABLET | Refills: 1 | Status: SHIPPED | OUTPATIENT
Start: 2022-06-20 | End: 2022-08-02 | Stop reason: ALTCHOICE

## 2022-06-20 NOTE — PROGRESS NOTES
Karlene Chavez MD   General Surgery  Postprocedure Evaluation in Office  Pt Name: Sai Gunderson  Date of Birth 1970   Today's Date: 6/20/2022  Medical Record Number: [id number]  Primary Care Provider: Broderick Dawn  Chief Complaint   Patient presents with    Wound Check     check wound left axilla-treated with Clindamycin-nurse visit on 6/14/2022     ASSESSMENT      1. Hidradenitis axillaris    2. Malignant neoplasm of lower-inner quadrant of left breast in female, estrogen receptor negative (HCC)    3. Paroxysmal atrial fibrillation ([facility]    4. Essential hypertension       Pathologic stage II aT2 N0 M0, grade 3, ER negative MD negative, HER2 negative  PLAN       1. All surgical incisions are healing well. 2.  Skin abscess left axilla resolved    3. Aerobic anaerobic cultures no growth  4. Continue topical clindamycin. I have written a prescription for oral doxycycline which I recommend the patient continues through chemotherapy due to chronic hidradenitis cysts. Sb Benjamin is seen today for post-op follow-up. Hemotherapy was on hold she had a flareup of hidradenitis left axilla. She was treated with oral antibiotics symptomatically improved without symptoms. She is continuing topical clindamycin gel. She denies any fever or chills. She follows up with medical oncology tomorrow. I would recommend oral antibiotic suppression throughout chemotherapy. Prescription for doxycycline given. Patient agreeable. I am okay with resuming chemotherapy from my standpoint.   Medications    Current Outpatient Medications:     doxycycline hyclate (VIBRA-TABS) 100 MG tablet, Take 1 tablet by mouth 2 times daily, Disp: 60 tablet, Rfl: 1    flecainide (TAMBOCOR) 100 MG tablet, TAKE 1 TABLET BY MOUTH TWICE A DAY, Disp: 180 tablet, Rfl: 0    ondansetron (ZOFRAN-ODT) 8 MG TBDP disintegrating tablet, Place 1 tablet under the tongue every 8 hours as needed for Nausea or Vomiting, Disp: 20 tablet, Rfl: 2    prochlorperazine (COMPAZINE) 10 MG tablet, Take 1 tablet by mouth every 6 hours as needed (nausea), Disp: 30 tablet, Rfl: 1    lidocaine-prilocaine (EMLA) 2.5-2.5 % cream, Apply topically as needed. , Disp: 5 g, Rfl: 1    metoprolol succinate (TOPROL XL) 100 MG extended release tablet, TAKE 1 TABLET BY MOUTH EVERY DAY, Disp: 90 tablet, Rfl: 1    atorvastatin (LIPITOR) 40 MG tablet, TAKE 1 TABLET DAILY, Disp: 90 tablet, Rfl: 3    ELIQUIS 5 MG TABS tablet, TAKE 1 TABLET BY MOUTH TWICE A DAY, Disp: 180 tablet, Rfl: 3    levothyroxine (SYNTHROID) 125 MCG tablet, TAKE 1 TABLET BY MOUTH EVERY DAY IN THE MORNING ON EMPTY STOMACH, Disp: , Rfl:     ZINC PO, Take by mouth daily, Disp: , Rfl:     Ascorbic Acid (VITAMIN C PO), Take by mouth daily, Disp: , Rfl:     ibuprofen (ADVIL;MOTRIN) 800 MG tablet, Take 800 mg by mouth every 6 hours as needed for Pain , Disp: , Rfl:     omeprazole (PRILOSEC) 10 MG delayed release capsule, Take 10 mg by mouth daily, Disp: , Rfl:     nitroGLYCERIN (NITROSTAT) 0.4 MG SL tablet, Place 1 tablet under the tongue every 5 minutes as needed for Chest pain, Disp: 25 tablet, Rfl: 3    aspirin 81 MG chewable tablet, Take 81 mg by mouth daily , Disp: 30 tablet, Rfl: 3    Magic Mouthwash (MIRACLE MOUTHWASH), Swish and spit 5 mLs 4 times daily as needed for Irritation 1:1:1 Benadryl, lidocaine, nystatin, Disp: 120 mL, Rfl: 1    Allergies  Allergies   Allergen Reactions    Codeine Hives and Swelling     And vomiting    Cortisone Swelling     Apparently tolerates topical and IV with benadryl well    Influenza Virus Vaccine Other (See Comments)     Pt states could not walk after getting     Darvocet A500 [Propoxyphene N-Acetaminophen] Nausea And Vomiting    Sulfa Antibiotics Nausea And Vomiting and Swelling     Not throat swelling       Review of Systems  History obtained from the patient. Constitutional: Denies any fever, chills, fatigue.   Wound: Planes of purulent drainage of left axilla  Resp: Denies any cough, shortness of breath. CV: Denies any chest pain, orthopnea or syncope. OBJECTIVE     VITALS: /62   Pulse 73   Temp 96.8 °F (36 °C) (Temporal)   Resp 20   Ht 6' (1.829 m)   Wt (!) 319 lb (144.7 kg)   SpO2 97%   BMI 43.26 kg/m²     CONSTITUTIONAL: Alert and oriented times 3, no acute distress and cooperative to examination. SKIN: warm and dry  INCISION: Surgical wounds well-healed no signs or symptoms of infection.  Chronic hidranitis bilateral axilla , without active abcess  LUNGS: Lungs Clear  CARDIOVASCULAR: Normal Rate   NEUROLOGIC: No sensory or motor nerve irritation

## 2022-06-21 ENCOUNTER — OFFICE VISIT (OUTPATIENT)
Dept: ONCOLOGY | Age: 52
End: 2022-06-21
Payer: COMMERCIAL

## 2022-06-21 ENCOUNTER — HOSPITAL ENCOUNTER (OUTPATIENT)
Dept: INFUSION THERAPY | Age: 52
Discharge: HOME | End: 2022-06-21
Payer: COMMERCIAL

## 2022-06-21 VITALS
HEIGHT: 72 IN | SYSTOLIC BLOOD PRESSURE: 132 MMHG | BODY MASS INDEX: 39.68 KG/M2 | OXYGEN SATURATION: 99 % | DIASTOLIC BLOOD PRESSURE: 72 MMHG | TEMPERATURE: 98.4 F | WEIGHT: 293 LBS | HEART RATE: 74 BPM | RESPIRATION RATE: 18 BRPM

## 2022-06-21 VITALS
SYSTOLIC BLOOD PRESSURE: 112 MMHG | HEART RATE: 72 BPM | TEMPERATURE: 98.3 F | RESPIRATION RATE: 18 BRPM | DIASTOLIC BLOOD PRESSURE: 66 MMHG | OXYGEN SATURATION: 95 %

## 2022-06-21 DIAGNOSIS — Z17.1 MALIGNANT NEOPLASM OF CENTRAL PORTION OF LEFT BREAST IN FEMALE, ESTROGEN RECEPTOR NEGATIVE (HCC): Primary | ICD-10-CM

## 2022-06-21 DIAGNOSIS — C50.112 MALIGNANT NEOPLASM OF CENTRAL PORTION OF LEFT BREAST IN FEMALE, ESTROGEN RECEPTOR NEGATIVE (HCC): Primary | ICD-10-CM

## 2022-06-21 DIAGNOSIS — Z51.11 ENCOUNTER FOR CHEMOTHERAPY MANAGEMENT: ICD-10-CM

## 2022-06-21 DIAGNOSIS — L02.419 ABSCESS OF AXILLA: ICD-10-CM

## 2022-06-21 LAB
ABSOLUTE IMMATURE GRANULOCYTE: 0.02 THOU/MM3 (ref 0–0.07)
ALBUMIN SERPL-MCNC: 3.8 G/DL (ref 3.5–5.1)
ALP BLD-CCNC: 91 U/L (ref 38–126)
ALT SERPL-CCNC: 14 U/L (ref 11–66)
AST SERPL-CCNC: 17 U/L (ref 5–40)
BASINOPHIL, AUTOMATED: 1 % (ref 0–3)
BASOPHILS ABSOLUTE: 0.1 THOU/MM3 (ref 0–0.1)
BILIRUB SERPL-MCNC: 0.3 MG/DL (ref 0.3–1.2)
BILIRUBIN DIRECT: < 0.2 MG/DL (ref 0–0.3)
BUN, WHOLE BLOOD: 7 MG/DL (ref 8–26)
CHLORIDE, WHOLE BLOOD: 108 MEQ/L (ref 98–109)
CREATININE, WHOLE BLOOD: 0.4 MG/DL (ref 0.5–1.2)
EOSINOPHILS ABSOLUTE: 0.3 THOU/MM3 (ref 0–0.4)
EOSINOPHILS RELATIVE PERCENT: 3 % (ref 0–4)
GFR, ESTIMATED: > 90 ML/MIN/1.73M2
GLUCOSE, WHOLE BLOOD: 161 MG/DL (ref 70–108)
HCT VFR BLD CALC: 38.2 % (ref 37–47)
HEMOGLOBIN: 12.5 GM/DL (ref 12–16)
IMMATURE GRANULOCYTES: 0 %
IONIZED CALCIUM, WHOLE BLOOD: 1.08 MMOL/L (ref 1.12–1.32)
LYMPHOCYTES # BLD: 12 % (ref 15–47)
LYMPHOCYTES ABSOLUTE: 1.1 THOU/MM3 (ref 1–4.8)
MCH RBC QN AUTO: 31 PG (ref 26–33)
MCHC RBC AUTO-ENTMCNC: 32.7 GM/DL (ref 32.2–35.5)
MCV RBC AUTO: 95 FL (ref 81–99)
MONOCYTES ABSOLUTE: 0.8 THOU/MM3 (ref 0.4–1.3)
MONOCYTES: 9 % (ref 0–12)
PDW BLD-RTO: 15 % (ref 11.5–14.5)
PLATELET # BLD: 225 THOU/MM3 (ref 130–400)
PMV BLD AUTO: 9.6 FL (ref 9.4–12.4)
POTASSIUM, WHOLE BLOOD: 3.7 MEQ/L (ref 3.5–4.9)
RBC # BLD: 4.03 MILL/MM3 (ref 4.2–5.4)
SEG NEUTROPHILS: 75 % (ref 43–75)
SEGMENTED NEUTROPHILS ABSOLUTE COUNT: 6.6 THOU/MM3 (ref 1.8–7.7)
SODIUM, WHOLE BLOOD: 143 MEQ/L (ref 138–146)
TOTAL CO2, WHOLE BLOOD: 25 MEQ/L (ref 23–33)
TOTAL PROTEIN: 6.6 G/DL (ref 6.1–8)
WBC # BLD: 8.9 THOU/MM3 (ref 4.8–10.8)

## 2022-06-21 PROCEDURE — 99214 OFFICE O/P EST MOD 30 MIN: CPT | Performed by: NURSE PRACTITIONER

## 2022-06-21 PROCEDURE — 80047 BASIC METABLC PNL IONIZED CA: CPT

## 2022-06-21 PROCEDURE — 36591 DRAW BLOOD OFF VENOUS DEVICE: CPT

## 2022-06-21 PROCEDURE — 96375 TX/PRO/DX INJ NEW DRUG ADDON: CPT

## 2022-06-21 PROCEDURE — 96411 CHEMO IV PUSH ADDL DRUG: CPT

## 2022-06-21 PROCEDURE — 99211 OFF/OP EST MAY X REQ PHY/QHP: CPT

## 2022-06-21 PROCEDURE — 2580000003 HC RX 258: Performed by: INTERNAL MEDICINE

## 2022-06-21 PROCEDURE — 80076 HEPATIC FUNCTION PANEL: CPT

## 2022-06-21 PROCEDURE — 96367 TX/PROPH/DG ADDL SEQ IV INF: CPT

## 2022-06-21 PROCEDURE — 6360000002 HC RX W HCPCS: Performed by: INTERNAL MEDICINE

## 2022-06-21 PROCEDURE — 85025 COMPLETE CBC W/AUTO DIFF WBC: CPT

## 2022-06-21 PROCEDURE — 96413 CHEMO IV INFUSION 1 HR: CPT

## 2022-06-21 RX ORDER — HEPARIN SODIUM (PORCINE) LOCK FLUSH IV SOLN 100 UNIT/ML 100 UNIT/ML
500 SOLUTION INTRAVENOUS PRN
Status: DISCONTINUED | OUTPATIENT
Start: 2022-06-21 | End: 2022-06-22 | Stop reason: HOSPADM

## 2022-06-21 RX ORDER — SODIUM CHLORIDE 9 MG/ML
25 INJECTION, SOLUTION INTRAVENOUS PRN
Status: CANCELLED | OUTPATIENT
Start: 2022-06-21

## 2022-06-21 RX ORDER — SODIUM CHLORIDE 9 MG/ML
20 INJECTION, SOLUTION INTRAVENOUS ONCE
Status: COMPLETED | OUTPATIENT
Start: 2022-06-21 | End: 2022-06-21

## 2022-06-21 RX ORDER — DOXORUBICIN HYDROCHLORIDE 2 MG/ML
60 INJECTION, SOLUTION INTRAVENOUS ONCE
Status: COMPLETED | OUTPATIENT
Start: 2022-06-21 | End: 2022-06-21

## 2022-06-21 RX ORDER — PALONOSETRON 0.05 MG/ML
0.25 INJECTION, SOLUTION INTRAVENOUS ONCE
Status: COMPLETED | OUTPATIENT
Start: 2022-06-21 | End: 2022-06-21

## 2022-06-21 RX ORDER — SODIUM CHLORIDE 0.9 % (FLUSH) 0.9 %
5-40 SYRINGE (ML) INJECTION PRN
Status: DISCONTINUED | OUTPATIENT
Start: 2022-06-21 | End: 2022-06-22 | Stop reason: HOSPADM

## 2022-06-21 RX ORDER — HEPARIN SODIUM (PORCINE) LOCK FLUSH IV SOLN 100 UNIT/ML 100 UNIT/ML
500 SOLUTION INTRAVENOUS PRN
Status: CANCELLED | OUTPATIENT
Start: 2022-06-21

## 2022-06-21 RX ORDER — SODIUM CHLORIDE 0.9 % (FLUSH) 0.9 %
5-40 SYRINGE (ML) INJECTION PRN
Status: CANCELLED | OUTPATIENT
Start: 2022-06-21

## 2022-06-21 RX ADMIN — SODIUM CHLORIDE, PRESERVATIVE FREE 10 ML: 5 INJECTION INTRAVENOUS at 10:32

## 2022-06-21 RX ADMIN — DEXAMETHASONE SODIUM PHOSPHATE 12 MG: 4 INJECTION, SOLUTION INTRAMUSCULAR; INTRAVENOUS at 11:04

## 2022-06-21 RX ADMIN — DOXORUBICIN HYDROCHLORIDE 164 MG: 2 INJECTION, SOLUTION INTRAVENOUS at 12:25

## 2022-06-21 RX ADMIN — Medication 500 UNITS: at 13:52

## 2022-06-21 RX ADMIN — SODIUM CHLORIDE 20 ML/HR: 9 INJECTION, SOLUTION INTRAVENOUS at 11:00

## 2022-06-21 RX ADMIN — SODIUM CHLORIDE, PRESERVATIVE FREE 20 ML: 5 INJECTION INTRAVENOUS at 10:33

## 2022-06-21 RX ADMIN — CYCLOPHOSPHAMIDE 1640 MG: 1 INJECTION, POWDER, FOR SOLUTION INTRAVENOUS; ORAL at 12:46

## 2022-06-21 RX ADMIN — PALONOSETRON 0.25 MG: 0.05 INJECTION, SOLUTION INTRAVENOUS at 11:03

## 2022-06-21 NOTE — PLAN OF CARE
Problem: Musculor/Skeletal Functional Status  Goal: Absence of falls  Outcome: Adequate for Discharge  Note: Free from falls while in O.P. Oncology. Intervention: Fall precautions  Note: Discussed the need to use the call light for assistance when getting up to ambulate. Problem: Discharge Planning  Goal: Knowledge of discharge instructions  Description: Knowledge of discharge instructions  Outcome: Adequate for Discharge  Note: Verbalize understanding of discharge instructions, follow up appointments, and when to call Physician. Intervention: Interaction with patient/family and care team  Note: Discuss understanding of discharge instructions, follow up appointments and when to call Physician. Problem: Intellectual/Education/Knowledge Deficit  Goal: Teaching initiated upon admission  Outcome: Adequate for Discharge  Note: Patient verbalizes understanding to verbal information given on Adriamycin and cytoxan,action and possible side effects. Aware to call MD if develop complications.     Intervention: Verbal/written education provided  Note: Chemotherapy Teaching     What is Chemotherapy   Drug action [x]   Method of Administration [x]   Handouts given []     Side Effects  Nausea/vomiting [x]   Diarrhea [x]   Fatigue [x]   Signs / Symptoms of infection [x]   Neutropenia [x]   Thrombocytopenia [x]   Alopecia [x]   neuropathy [x]   [place] diet &  the importance of fluids [x]       Micellaneous  Importance of nutrition [x]   Importance of oral hygiene [x]   When to call the MD [x]   Monitoring labs [x]   Use of supportive services []     Explanation of Drug Regimen / Frequency  Adriamycin and cytoxan     Comments  Verbalized understanding to drug,action,side effects and when to call MD         Problem: Infection - Adult  Goal: Absence of infection at discharge  Outcome: Adequate for Discharge  Flowsheets (Taken 6/21/2022 1815)  Absence of infection at discharge: Assess and monitor for signs and symptoms of infection  Note: Mediport site with no redness or warmth. Skin over port site intact with no signs of breakdown noted. Patient verbalizes signs/symptoms of port infection and when to notify the physician. Care plan reviewed with patient. Patient verbalize understanding of the plan of care and contribute to goal setting.

## 2022-06-21 NOTE — PROGRESS NOTES
Patient tolerated Adriamycin and cytoxan without any complications. Patient kept for 20 minuets observation post chemotherapy. Denies dizziness, lightheadedness, acute nausea or vomiting, headache, heart palpitations, rash/itching or increased SOB. Last vital signs  /66   Pulse 72   Temp 98.3 °F (36.8 °C) (Oral)   Resp 18   SpO2 95%     Patient instructed if they experience any of the above symptoms following today's visit, he/she is to notify the Physician or go to the Emergency Dept. Discharge instructions given to patient, Verbalizes understanding. Ambulated off unit per self in stable condition with all belongings.

## 2022-06-21 NOTE — PROGRESS NOTES
[facility], [address] [facility],[address]  [address] [address]  Dept: [phone]  Dept Fax: [phone]: [phone]      Visit Date:6/21/2022     Nuzhat Zimmer is a 54 y.o. female who presents today for:   Chief Complaint   Patient presents with    Follow-up     Malignant neoplasm of central portion of left breast in female, estrogen receptor negative ([facility] [address]        HPI:   Nuzhat Zimmer is a 54 y.o. female who follows in our office with Dr. Kelsey Arias with left breast cancer. HPI per Dr. Kelsey Arias' note on 6/7/2022:  Philip Castleman is a 51-year-old premenopausal female developed a lump in the left breast just medial to the nipple on February 8.  Strong family history of malignancy including breast cancer.  Last mammogram was in 2015.  Mammogram on 2/18 additional imaging confirmed a approximate 2 cm nodular density inferior central medial left breast.  Initial biopsy revealed invasive ductal cancer triple and grade 3.  Definitive left breast lumpectomy and sentinel node resection on 3/22 revealed a 2.7 cm pathologic stage T2N0 triple negative grade 3 breast cancer.  Other feeling the lump she is asymptomatic.  Specifically denies any headaches bone pain breathing problems or any other focal or systemic complaints. Followed by cardiology with a history of a an MI in 2014 and proximal A. fib for which she is on anticoagulation followed by Dr. Song Chacon last year normal.  And recent history no ischemic cardiac events. Interval History 6/21/2022:   The patient presents to the office today for follow up and evaluation of left breast cancer. The patient reports heart palpitations at times. She reports N/T tip of tongue and blisters has resolved. No thrush noted. She reports left axilla cyst no longer draining. Dr. Roseann Butt has placed her on prophylactic antibiotics that she will remain on during chemotherapy course. Discussed with Dr. Kelsey Arias.   She reports chronic fatigue. She reports some numbness/tingling in balls of her feet at times. She has no other complaints. PMH, SH, and FH:  I reviewed the patient's medication and allergy lists as noted on the electronic medical record. The PMH, SH, and FH were also reviewed as noted on the EMR. Past Medical History:   Diagnosis Date    [facility])     Baki    Arthritis     CAD (coronary artery disease)     GERD (gastroesophageal reflux disease)     Hyperlipidemia     Hypertension     Hypothyroid     Invasive ductal carcinoma of breast, female, left ([facility] [address] 02/24/2022    Malignant neoplasm of lower-inner quadrant of left breast in female, estrogen receptor negative ([facility] [address] 03/07/2022    MI (myocardial infarction) ([facility]     Dr. Terrazas Alyson    Pneumonia       Past Surgical History:   Procedure Laterality Date    BREAST LUMPECTOMY Left 3/22/2022    LEFT BREAST LUMPECTOMY PARTIAL MASTECTOMY, SENTINEL LYMPH NODE BIOPSY, PRE OP NEEDLE LOC X 2 performed by Eli Turner MD at [address] Left 3/30/2022    Evacuation hematoma left axilla performed by Eli Turner MD at [address] [place] [address] ARTHROSCOPY Right     08    JENNIFER BIOPSY LYMPH NODE BY NEEDLE SUPERFICIAL  02/24/2022    JENNIFER BIOPSY LYMPH NODE BY NEEDLE SUPERFICIAL 2/24/2022 [facility] US GUID NDL BIOPSY LEFT Left 02/24/2022    JENNIFER [address] LEFT 2/24/2022 Luan Frey MD [facility]    MOUTH SURGERY      PORT SURGERY Right 4/13/2022    Single Lumen Smartport Right Subclavian, aspiration of left axillary seroma performed by Eli Turner MD at [address] [place] [address] Right 4/29/2022    Mediport Right Subclavian Removal and Replacement. performed by Eli Turner MD at [address] [facility], INCISIONAL Left 1998    excisional bx-H. Chollet fibrocystic changes    US BREAST BIOPSY NEEDLE ADDITIONAL LEFT Left 02/24/2022 US BREAST BIOPSY NEEDLE ADDITIONAL LEFT 2/24/2022 Modesta Alvarenga  [facility] NEEDLE LOC BREAST ADDL LEFT Left 3/22/2022    US GUIDED NEEDLE LOC BREAST ADDL LEFT 3/22/2022 [facility]      Family History   Problem Relation Age of Onset    Heart Disease Mother     Diabetes Mother     Cancer Mother         liposarcoma    Kidney Disease Mother     Heart Disease Father     Diabetes Father     Kidney Disease Father     Cancer Father         Non-melanoma skin cancer multiple times    No Known Problems Brother     No Known Problems Brother     No Known Problems Brother     No Known Problems Maternal Grandmother     No Known Problems Maternal Grandfather     Breast Cancer Paternal Grandmother 47        reoccurred at 46    Ovarian Cancer Paternal Grandmother 46    Bilateral breast cancer Paternal Grandmother         Opposite breast, age 52's    Breast Cancer Maternal Aunt 48    Breast Cancer Paternal Aunt         breast      Social History     Tobacco Use    Smoking status: Current Some Day Smoker     Packs/day: 0.25     Years: 30.00     Pack years: 7.50     Types: Cigarettes     Start date: 12/4/1987    Smokeless tobacco: Never Used    Tobacco comment: 6/21/22 - Tying to quit, refused counciling/resources   Substance Use Topics    Alcohol use: Yes     Comment: rare      Current Outpatient Medications   Medication Sig Dispense Refill    flecainide (TAMBOCOR) 100 MG tablet TAKE 1 TABLET BY MOUTH TWICE A  tablet 0    ondansetron (ZOFRAN-ODT) 8 MG TBDP disintegrating tablet Place 1 tablet under the tongue every 8 hours as needed for Nausea or Vomiting 20 tablet 2    prochlorperazine (COMPAZINE) 10 MG tablet Take 1 tablet by mouth every 6 hours as needed (nausea) 30 tablet 1    lidocaine-prilocaine (EMLA) 2.5-2.5 % cream Apply topically as needed.  5 g 1    metoprolol succinate (TOPROL XL) 100 MG extended release tablet TAKE 1 TABLET BY MOUTH EVERY DAY 90 tablet 1    atorvastatin (LIPITOR) 40 MG tablet TAKE 1 TABLET DAILY 90 tablet 3    ELIQUIS 5 MG TABS tablet TAKE 1 TABLET BY MOUTH TWICE A  tablet 3    levothyroxine (SYNTHROID) 125 MCG tablet TAKE 1 TABLET BY MOUTH EVERY DAY IN THE MORNING ON EMPTY STOMACH      ZINC PO Take by mouth daily      Ascorbic Acid (VITAMIN C PO) Take by mouth daily      ibuprofen (ADVIL;MOTRIN) 800 MG tablet Take 800 mg by mouth every 6 hours as needed for Pain       omeprazole (PRILOSEC) 10 MG delayed release capsule Take 10 mg by mouth daily      aspirin 81 MG chewable tablet Take 81 mg by mouth daily  30 tablet 3    doxycycline hyclate (VIBRA-TABS) 100 MG tablet Take 1 tablet by mouth 2 times daily (Patient not taking: Reported on 6/21/2022) 60 tablet 1    Magic Mouthwash (MIRACLE MOUTHWASH) Swish and spit 5 mLs 4 times daily as needed for Irritation 1:1:1 Benadryl, lidocaine, nystatin 120 mL 1    nitroGLYCERIN (NITROSTAT) 0.4 MG SL tablet Place 1 tablet under the tongue every 5 minutes as needed for Chest pain 25 tablet 3     No current facility-administered medications for this visit.      Facility-Administered Medications Ordered in Other Visits   Medication Dose Route Frequency Provider Last Rate Last Admin    sodium chloride flush 0.9 % injection 5-40 mL  5-40 mL IntraVENous PRN Alejandro Solorio MD   20 mL at 06/21/22 1033    heparin flush 100 UNIT/ML injection 500 Units  500 Units IntraCATHeter PRN Alejandro Solorio MD   500 Units at 06/21/22 1352      Allergies   Allergen Reactions    Codeine Hives and Swelling     And vomiting    Cortisone Swelling     Apparently tolerates topical and IV with benadryl well    Influenza Virus Vaccine Other (See Comments)     Pt states could not walk after getting     Darvocet A500 [Propoxyphene N-Acetaminophen] Nausea And Vomiting    Sulfa Antibiotics Nausea And Vomiting and Swelling     Not throat swelling          Review of Systems:   Review of Systems   Pertinent review of systems noted in HPI, all other ROS negative. Objective:   Physical Exam   /72 (Site: Right Lower Arm, Position: Sitting, Cuff Size: Medium Adult)   Pulse 74   Temp 98.4 °F (36.9 °C) (Oral)   Resp 18   Ht 6' (1.829 m)   Wt (!) 317 lb (143.8 kg)   SpO2 99%   BMI 42.99 kg/m²    General appearance: No apparent distress, calm and cooperative. HEENT: Pupils equal, round, and reactive to light. Conjunctivae/corneas clear. Oral mucosa moist.  Neck: Supple, with full range of motion. Trachea midline. Respiratory:  Normal respiratory effort. Clear to auscultation all lung fields. Cardiovascular:  RRR, S1/S2. Abdomen: Soft, non-tender, non-distended with active BS x 4. Musculoskeletal: No clubbing, cyanosis or edema bilaterally. She is able to ambulate in office. Skin: Skin color, texture, turgor normal.  No visible rashes or lesions. Neurologic:  Neurovascularly intact without any focal sensory/motor deficits. Cranial nerves: II-XII intact, grossly non-focal.  Psychiatric: Alert and oriented x 3, thought content appropriate, normal insight  Capillary Refill: < 3 seconds   Peripheral Pulses: +2 palpable, equal bilaterally       Imaging Studies and Labs:   CBC:   Lab Results   Component Value Date    WBC 8.9 06/21/2022    HGB 12.5 06/21/2022    HCT 38.2 06/21/2022    MCV 95 06/21/2022     06/21/2022     BMP:   Lab Results   Component Value Date     06/21/2022     12/21/2021    K 3.7 06/21/2022    K 4.0 04/13/2022    K 3.8 12/04/2019     12/21/2021    CO2 24 12/21/2021    BUN 12 12/21/2021    CREATININE 0.4 06/21/2022    CREATININE 0.5 12/21/2021    GLUCOSE 105 12/21/2021    CALCIUM 9.1 12/21/2021      LFT:   Lab Results   Component Value Date    ALT 11 06/07/2022    AST 13 06/07/2022    ALKPHOS 102 06/07/2022    BILITOT 0.2 (L) 06/07/2022         Assessment and Plan:     1.  Malignant neoplasm of central portion of left breast in female, estrogen receptor negative ([facility] [address]  ER negative, ND negative and HER-2 negative, 2.7 cm, grade 3, 0/ 4 sentinel nodes . ps T2N0M0 /IIA. (High risk: triple negative/grade 3).  Inner central left breast anteriorly. 2. Encounter for chemotherapy management  Current treatment recommendations include adjuvant dose dense AC x 4 cycles followed by dose dense Taxol every other week. Marsha Peters is tolerating treatment without significant side effects.  Labs today:  WBC 8.9, H/H 12.5/38.2, platelets 014. BMP stable, ok for treatment today. 3. Abscess of axilla  Cyst no longer draining. Now on prophylactic antibiotics per Dr. Nam Stahl, discussed with Dr. Naomy Marino. No follow-ups on file. All patient questions answered. Pt voiced understanding. Patient agreed with treatment plan. Follow up as directed. Patient instructed to call for questions or concerns.       Electronically signed by   TOSHIA Rosenthal CNP

## 2022-06-21 NOTE — PATIENT INSTRUCTIONS
Monique for treatment today  2. Return to clinic tomorrow for Udenyca  3. Return to clinic in 2 weeks for nurse toni, treatment and labs:  CBC, CMP  4. Return to clinic in 4 weeks to see Dr. Linette Lobato with labs:  CBC, CMP  5.   Treatment in 4 weeks

## 2022-06-21 NOTE — ONCOLOGY
Chemotherapy Administration    Pre-assessment Data: Antineoplastic Agents  See toxicity flow sheet for assessment                                          [x]         Interventions:   Chemotherapy SQ injection given []   Taxol administered-VS per protocol []   Blood pressure meds held 12 hours prior to Rituxan/Ruxience []   Rituxan/Ruxience administered- VS and precautions per guidelines []   Emergency drugs available as appropriate [x]   Anaphylaxis assessment completed [x]   Pre-medications administered as ordered [x]   Blood return noted upon initiation of chemotherapy [x]   Blood return noted each 1-2ml of a vesicant medication if given IV push [x]   Navelbine, Vincristine and Velban given as a monitored wide open drip, blood return noted before during and after infusion.  []   Blood return noted each 2-3ml of a non-vesicant medication if given IV push []   Patient aware of potential Immunotherapy toxicities []   Monitor for signs / symptoms of hypersensitivity reaction [x]   Chemotherapy orders (drug/dose/rate) verified by 2 Chemo certified RNs [x]   Monitor IV site and blood return throughout the infusion of the medication [x]   Document IV site checks on the IV assessment form [x]   Document chemotherapy teaching on the Patient Education tab [x]   Document patient verbalizes understanding of medications being administered- Adriamycin and cytoxan [x]   If IV infiltration, see ONS Guidelines []   Other:      []

## 2022-06-22 ENCOUNTER — HOSPITAL ENCOUNTER (OUTPATIENT)
Dept: INFUSION THERAPY | Age: 52
Discharge: HOME | End: 2022-06-22
Payer: COMMERCIAL

## 2022-06-22 VITALS
SYSTOLIC BLOOD PRESSURE: 116 MMHG | TEMPERATURE: 98.1 F | HEART RATE: 70 BPM | RESPIRATION RATE: 18 BRPM | OXYGEN SATURATION: 98 % | DIASTOLIC BLOOD PRESSURE: 70 MMHG

## 2022-06-22 DIAGNOSIS — C50.112 MALIGNANT NEOPLASM OF CENTRAL PORTION OF LEFT BREAST IN FEMALE, ESTROGEN RECEPTOR NEGATIVE (HCC): Primary | ICD-10-CM

## 2022-06-22 DIAGNOSIS — Z17.1 MALIGNANT NEOPLASM OF CENTRAL PORTION OF LEFT BREAST IN FEMALE, ESTROGEN RECEPTOR NEGATIVE (HCC): Primary | ICD-10-CM

## 2022-06-22 PROCEDURE — 6360000002 HC RX W HCPCS: Performed by: INTERNAL MEDICINE

## 2022-06-22 PROCEDURE — 96372 THER/PROPH/DIAG INJ SC/IM: CPT

## 2022-06-22 RX ADMIN — PEGFILGRASTIM-CBQV 6 MG: 6 INJECTION, SOLUTION SUBCUTANEOUS at 14:28

## 2022-06-22 NOTE — PLAN OF CARE
Problem: Musculor/Skeletal Functional Status  Goal: Absence of falls  Outcome: Adequate for Discharge  Note: Free from falls while in O.P. Oncology. Intervention: Fall precautions  Note: Discussed the need to use the call light for assistance when getting up to ambulate. Problem: Intellectual/Education/Knowledge Deficit  Goal: Teaching initiated upon admission  Outcome: Adequate for Discharge  Note: Patient verbalizes understanding to verbal information given on Udenyca SQ injection,action and possible side effects. Aware to call MD if develop complications. Intervention: Verbal/written education provided  Note: Discuss understanding to verbal information given on Udenyca SQ injection. Problem: Discharge Planning  Goal: Knowledge of discharge instructions  Description: Knowledge of discharge instructions  Outcome: Adequate for Discharge  Note: Verbalize understanding of discharge instructions, follow up appointments, and when to call Physician. Intervention: Interaction with patient/family and care team  Note: Discuss understanding of discharge instructions, follow up appointments and when to call Physician. Care plan reviewed with patient. Patient verbalize understanding of the plan of care and contribute to goal setting.

## 2022-06-23 NOTE — PROGRESS NOTES
Patient tolerated Udenyca SQ injection without any complications. Discharge instructions given to patient-verbalizes understanding. Ambulated off unit per self with belongings.

## 2022-06-24 DIAGNOSIS — Z17.1 MALIGNANT NEOPLASM OF CENTRAL PORTION OF LEFT BREAST IN FEMALE, ESTROGEN RECEPTOR NEGATIVE (HCC): Primary | ICD-10-CM

## 2022-06-24 DIAGNOSIS — C50.112 MALIGNANT NEOPLASM OF CENTRAL PORTION OF LEFT BREAST IN FEMALE, ESTROGEN RECEPTOR NEGATIVE (HCC): Primary | ICD-10-CM

## 2022-06-24 RX ORDER — ACETAMINOPHEN 325 MG/1
650 TABLET ORAL
Status: CANCELLED | OUTPATIENT
Start: 2022-07-05

## 2022-06-24 RX ORDER — HEPARIN SODIUM (PORCINE) LOCK FLUSH IV SOLN 100 UNIT/ML 100 UNIT/ML
500 SOLUTION INTRAVENOUS PRN
Status: CANCELLED | OUTPATIENT
Start: 2022-07-05

## 2022-06-24 RX ORDER — SODIUM CHLORIDE 9 MG/ML
INJECTION, SOLUTION INTRAVENOUS CONTINUOUS
Status: CANCELLED | OUTPATIENT
Start: 2022-07-05

## 2022-06-24 RX ORDER — PALONOSETRON 0.05 MG/ML
0.25 INJECTION, SOLUTION INTRAVENOUS ONCE
Status: CANCELLED | OUTPATIENT
Start: 2022-07-05 | End: 2022-07-05

## 2022-06-24 RX ORDER — SODIUM CHLORIDE 0.9 % (FLUSH) 0.9 %
5-40 SYRINGE (ML) INJECTION PRN
Status: CANCELLED | OUTPATIENT
Start: 2022-07-05

## 2022-06-24 RX ORDER — SODIUM CHLORIDE 9 MG/ML
5-40 INJECTION INTRAVENOUS PRN
Status: CANCELLED | OUTPATIENT
Start: 2022-07-05

## 2022-06-24 RX ORDER — DIPHENHYDRAMINE HYDROCHLORIDE 50 MG/ML
50 INJECTION INTRAMUSCULAR; INTRAVENOUS
Status: CANCELLED | OUTPATIENT
Start: 2022-07-05

## 2022-06-24 RX ORDER — ONDANSETRON 2 MG/ML
8 INJECTION INTRAMUSCULAR; INTRAVENOUS
Status: CANCELLED | OUTPATIENT
Start: 2022-07-05

## 2022-06-24 RX ORDER — SODIUM CHLORIDE 9 MG/ML
25 INJECTION, SOLUTION INTRAVENOUS PRN
Status: CANCELLED | OUTPATIENT
Start: 2022-07-05

## 2022-06-24 RX ORDER — FAMOTIDINE 10 MG/ML
20 INJECTION, SOLUTION INTRAVENOUS
Status: CANCELLED | OUTPATIENT
Start: 2022-07-05

## 2022-06-24 RX ORDER — SODIUM CHLORIDE 9 MG/ML
20 INJECTION, SOLUTION INTRAVENOUS ONCE
Status: CANCELLED | OUTPATIENT
Start: 2022-07-05 | End: 2022-07-05

## 2022-06-24 RX ORDER — DOXORUBICIN HYDROCHLORIDE 2 MG/ML
60 INJECTION, SOLUTION INTRAVENOUS ONCE
Status: CANCELLED | OUTPATIENT
Start: 2022-07-05 | End: 2022-07-05

## 2022-06-24 RX ORDER — ALBUTEROL SULFATE 90 UG/1
4 AEROSOL, METERED RESPIRATORY (INHALATION) PRN
Status: CANCELLED | OUTPATIENT
Start: 2022-07-05

## 2022-06-24 RX ORDER — EPINEPHRINE 1 MG/ML
0.3 INJECTION, SOLUTION, CONCENTRATE INTRAVENOUS PRN
Status: CANCELLED | OUTPATIENT
Start: 2022-07-05

## 2022-06-24 RX ORDER — MEPERIDINE HYDROCHLORIDE 50 MG/ML
12.5 INJECTION INTRAMUSCULAR; INTRAVENOUS; SUBCUTANEOUS PRN
Status: CANCELLED | OUTPATIENT
Start: 2022-07-05

## 2022-06-28 ENCOUNTER — TELEPHONE (OUTPATIENT)
Dept: ONCOLOGY | Age: 52
End: 2022-06-28

## 2022-06-28 ENCOUNTER — HOSPITAL ENCOUNTER (OUTPATIENT)
Dept: INFUSION THERAPY | Age: 52
Discharge: HOME | End: 2022-06-28
Payer: COMMERCIAL

## 2022-06-28 VITALS
BODY MASS INDEX: 39.68 KG/M2 | OXYGEN SATURATION: 100 % | SYSTOLIC BLOOD PRESSURE: 116 MMHG | WEIGHT: 293 LBS | TEMPERATURE: 97.8 F | DIASTOLIC BLOOD PRESSURE: 60 MMHG | RESPIRATION RATE: 16 BRPM | HEIGHT: 72 IN | HEART RATE: 74 BPM

## 2022-06-28 DIAGNOSIS — C50.112 MALIGNANT NEOPLASM OF CENTRAL PORTION OF LEFT BREAST IN FEMALE, ESTROGEN RECEPTOR NEGATIVE (HCC): Primary | ICD-10-CM

## 2022-06-28 DIAGNOSIS — Z17.1 MALIGNANT NEOPLASM OF CENTRAL PORTION OF LEFT BREAST IN FEMALE, ESTROGEN RECEPTOR NEGATIVE (HCC): Primary | ICD-10-CM

## 2022-06-28 LAB
ALBUMIN SERPL-MCNC: 4 G/DL (ref 3.5–5.1)
ALP BLD-CCNC: 86 U/L (ref 38–126)
ALT SERPL-CCNC: 12 U/L (ref 11–66)
AST SERPL-CCNC: 12 U/L (ref 5–40)
BASOPHILS # BLD: 2.6 %
BASOPHILS ABSOLUTE: 0.1 THOU/MM3 (ref 0–0.1)
BILIRUB SERPL-MCNC: 0.4 MG/DL (ref 0.3–1.2)
BILIRUBIN DIRECT: < 0.2 MG/DL (ref 0–0.3)
BUN, WHOLE BLOOD: 7 MG/DL (ref 8–26)
CHLORIDE, WHOLE BLOOD: 104 MEQ/L (ref 98–109)
CREATININE, WHOLE BLOOD: 0.5 MG/DL (ref 0.5–1.2)
EOSINOPHIL # BLD: 16.3 %
EOSINOPHILS ABSOLUTE: 0.4 THOU/MM3 (ref 0–0.4)
ERYTHROCYTE [DISTWIDTH] IN BLOOD BY AUTOMATED COUNT: 14.5 % (ref 11.5–14.5)
ERYTHROCYTE [DISTWIDTH] IN BLOOD BY AUTOMATED COUNT: 49.4 FL (ref 35–45)
GFR, ESTIMATED: > 90 ML/MIN/1.73M2
GLUCOSE, WHOLE BLOOD: 132 MG/DL (ref 70–108)
HCT VFR BLD CALC: 35.3 % (ref 37–47)
HEMOGLOBIN: 11.7 GM/DL (ref 12–16)
IMMATURE GRANS (ABS): 0.11 THOU/MM3 (ref 0–0.07)
IMMATURE GRANULOCYTES: 4.1 %
IONIZED CALCIUM, WHOLE BLOOD: 1.14 MMOL/L (ref 1.12–1.32)
LYMPHOCYTES # BLD: 25.6 %
LYMPHOCYTES ABSOLUTE: 0.7 THOU/MM3 (ref 1–4.8)
MCH RBC QN AUTO: 31 PG (ref 26–33)
MCHC RBC AUTO-ENTMCNC: 33.1 GM/DL (ref 32.2–35.5)
MCV RBC AUTO: 93.6 FL (ref 81–99)
MONOCYTES # BLD: 4.4 %
MONOCYTES ABSOLUTE: 0.1 THOU/MM3 (ref 0.4–1.3)
NUCLEATED RED BLOOD CELLS: 0 /100 WBC
PLATELET # BLD: 107 THOU/MM3 (ref 130–400)
PLATELET ESTIMATE: ABNORMAL
PMV BLD AUTO: 11.7 FL (ref 9.4–12.4)
POTASSIUM, WHOLE BLOOD: 3.9 MEQ/L (ref 3.5–4.9)
RBC # BLD: 3.77 MILL/MM3 (ref 4.2–5.4)
SCAN OF BLOOD SMEAR: NORMAL
SEG NEUTROPHILS: 47 %
SEGMENTED NEUTROPHILS ABSOLUTE COUNT: 1.3 THOU/MM3 (ref 1.8–7.7)
SODIUM, WHOLE BLOOD: 139 MEQ/L (ref 138–146)
TOTAL CO2, WHOLE BLOOD: 26 MEQ/L (ref 23–33)
TOTAL PROTEIN: 6.5 G/DL (ref 6.1–8)
WBC # BLD: 2.7 THOU/MM3 (ref 4.8–10.8)

## 2022-06-28 PROCEDURE — 6360000002 HC RX W HCPCS: Performed by: INTERNAL MEDICINE

## 2022-06-28 PROCEDURE — 36591 DRAW BLOOD OFF VENOUS DEVICE: CPT

## 2022-06-28 PROCEDURE — 80047 BASIC METABLC PNL IONIZED CA: CPT

## 2022-06-28 PROCEDURE — 2580000003 HC RX 258: Performed by: NURSE PRACTITIONER

## 2022-06-28 PROCEDURE — 99211 OFF/OP EST MAY X REQ PHY/QHP: CPT

## 2022-06-28 PROCEDURE — 2580000003 HC RX 258: Performed by: INTERNAL MEDICINE

## 2022-06-28 PROCEDURE — 80076 HEPATIC FUNCTION PANEL: CPT

## 2022-06-28 PROCEDURE — 85025 COMPLETE CBC W/AUTO DIFF WBC: CPT

## 2022-06-28 PROCEDURE — 96360 HYDRATION IV INFUSION INIT: CPT

## 2022-06-28 RX ORDER — 0.9 % SODIUM CHLORIDE 0.9 %
500 INTRAVENOUS SOLUTION INTRAVENOUS ONCE
Status: COMPLETED | OUTPATIENT
Start: 2022-06-28 | End: 2022-06-28

## 2022-06-28 RX ORDER — HEPARIN SODIUM (PORCINE) LOCK FLUSH IV SOLN 100 UNIT/ML 100 UNIT/ML
500 SOLUTION INTRAVENOUS PRN
Status: DISCONTINUED | OUTPATIENT
Start: 2022-06-28 | End: 2022-06-29 | Stop reason: HOSPADM

## 2022-06-28 RX ORDER — SODIUM CHLORIDE 0.9 % (FLUSH) 0.9 %
5-40 SYRINGE (ML) INJECTION PRN
Status: CANCELLED | OUTPATIENT
Start: 2022-06-28

## 2022-06-28 RX ORDER — SODIUM CHLORIDE 0.9 % (FLUSH) 0.9 %
5-40 SYRINGE (ML) INJECTION PRN
Status: DISCONTINUED | OUTPATIENT
Start: 2022-06-28 | End: 2022-06-29 | Stop reason: HOSPADM

## 2022-06-28 RX ORDER — SODIUM CHLORIDE 9 MG/ML
25 INJECTION, SOLUTION INTRAVENOUS PRN
Status: CANCELLED | OUTPATIENT
Start: 2022-06-28

## 2022-06-28 RX ORDER — HEPARIN SODIUM (PORCINE) LOCK FLUSH IV SOLN 100 UNIT/ML 100 UNIT/ML
500 SOLUTION INTRAVENOUS PRN
Status: CANCELLED | OUTPATIENT
Start: 2022-06-28

## 2022-06-28 RX ADMIN — HEPARIN 500 UNITS: 100 SYRINGE at 16:36

## 2022-06-28 RX ADMIN — SODIUM CHLORIDE 500 ML: 9 INJECTION, SOLUTION INTRAVENOUS at 15:34

## 2022-06-28 RX ADMIN — SODIUM CHLORIDE, PRESERVATIVE FREE 20 ML: 5 INJECTION INTRAVENOUS at 15:01

## 2022-06-28 RX ADMIN — SODIUM CHLORIDE, PRESERVATIVE FREE 10 ML: 5 INJECTION INTRAVENOUS at 16:36

## 2022-06-28 RX ADMIN — SODIUM CHLORIDE, PRESERVATIVE FREE 10 ML: 5 INJECTION INTRAVENOUS at 15:00

## 2022-06-28 ASSESSMENT — PAIN SCALES - GENERAL: PAINLEVEL_OUTOF10: 0

## 2022-06-28 NOTE — PROGRESS NOTES
Patient assessed for the following post IV hydration:    Dizziness   No  Lightheadedness  No      Acute nausea/vomiting No  Headache   No  Chest pain/pressure  No  Rash/itching   No  Shortness of breath  No    Patient tolerated IV hydration without any complications. Last vital signs:   /66   Pulse 76   Temp 98 °F (36.7 °C) (Oral)   Resp 16   Ht 6' (1.829 m)   Wt (!) 317 lb 9.6 oz (144.1 kg)   SpO2 98%   BMI 43.07 kg/m²     Physician instructed patient:  1.  500 ml 0.9NS bolus over 1 hour. 2.  Please call with any concerns. 3.  Keep schedule follow up for next treatment. Patient instructed if experience any of the above symptoms following today's infusion, she is to notify MD immediately or go to the emergency department. Discharge instructions given to patient. Verbalizes understanding. Ambulated off unit per self, with belongings.

## 2022-06-28 NOTE — PLAN OF CARE
Problem: Musculor/Skeletal Functional Status  Goal: Absence of falls  6/28/2022 1547 by Sahil Collins RN  Outcome: Adequate for Discharge  Note: Patient free from falls while in [address] Oncology. 6/28/2022 1546 by Sahil Collins RN  Outcome: Adequate for Discharge  Intervention: Fall precautions  Note: Patient assessed for fall risk on admission to [facility]. Fall band placed on patient. Discussed the need to use the call light for assistance prior to getting up out of chair/bed. Problem: Intellectual/Education/Knowledge Deficit  Goal: Teaching initiated upon admission  6/28/2022 1547 by Sahil Collins RN  Outcome: Adequate for Discharge  Note: Verbalizes understanding to signs and symptoms of dehydration and when to call the Physician.   6/28/2022 1546 by Sahil Collins RN  Outcome: Adequate for Discharge  Intervention: Verbal/written education provided  Note: Patient received 500 IV infusion over 1 hour. Patient drank nothing while in OP oncology. Encouraged patient to drink 48 to 64 ounces of fluids everyday. Problem: Discharge Planning  Goal: Knowledge of discharge instructions  Description: Knowledge of discharge instructions  6/28/2022 1547 by Sahil Collins RN  Outcome: Adequate for Discharge  Note: Verbalized understanding of discharge instructions, follow-up appointments, and when to call the physician.   6/28/2022 1546 by Sahil Collins RN  Outcome: Adequate for Discharge  Intervention: Interaction with patient/family and care team  Note: Discuss understanding of discharge instructions,follow-up appointments, and when to call the physician.       Problem: Infection - Adult  Goal: Absence of infection at discharge  6/28/2022 1547 by Sahil Collins RN  Outcome: Adequate for Discharge  Flowsheets (Taken 6/28/2022 1547)  Absence of infection at discharge: Assess and monitor for signs and symptoms of infection  6/28/2022 1546 by Sahil Collins RN  Outcome: Adequate for Discharge  Flowsheets (Taken 6/28/2022 3745)  Absence of infection at discharge: Assess and monitor for signs and symptoms of infection     Care plan reviewed with patient. Patient verbalize understanding of the plan of care and contribute to goal setting.

## 2022-06-28 NOTE — TELEPHONE ENCOUNTER
Pt calling the office stating she feels \"off\". Her last treatment was 6/21, states she was worn down for a few days after that, then felt better, now feels worn down and fatigued again. She states her vitals are fine, no fever, no shortness of breath, no nausea, good appetite, no bowel issues, no pain, not dehydrated, just feels \"off\". Explained to patient she may just need to give it more time to recover from treatment, told her I would pass along to providers although without specific symptoms there may not be anything to add other than wait it out.

## 2022-06-28 NOTE — PROGRESS NOTES
Patient here for IV hydration of 500 ml of 0.9 NS over 1 hour,  Due to poor oral intake/ tire/fatigue.   Per Yolande Ugalde CNP

## 2022-07-05 ENCOUNTER — HOSPITAL ENCOUNTER (OUTPATIENT)
Dept: INFUSION THERAPY | Age: 52
Discharge: HOME | End: 2022-07-05
Payer: COMMERCIAL

## 2022-07-05 VITALS
TEMPERATURE: 98 F | HEIGHT: 72 IN | RESPIRATION RATE: 16 BRPM | OXYGEN SATURATION: 96 % | SYSTOLIC BLOOD PRESSURE: 98 MMHG | BODY MASS INDEX: 39.68 KG/M2 | WEIGHT: 293 LBS | DIASTOLIC BLOOD PRESSURE: 55 MMHG | HEART RATE: 69 BPM

## 2022-07-05 DIAGNOSIS — C50.112 MALIGNANT NEOPLASM OF CENTRAL PORTION OF LEFT BREAST IN FEMALE, ESTROGEN RECEPTOR NEGATIVE (HCC): Primary | ICD-10-CM

## 2022-07-05 DIAGNOSIS — Z17.1 MALIGNANT NEOPLASM OF CENTRAL PORTION OF LEFT BREAST IN FEMALE, ESTROGEN RECEPTOR NEGATIVE (HCC): Primary | ICD-10-CM

## 2022-07-05 LAB
ABSOLUTE IMMATURE GRANULOCYTE: 0.14 THOU/MM3 (ref 0–0.07)
ALBUMIN SERPL-MCNC: 3.7 G/DL (ref 3.5–5.1)
ALP BLD-CCNC: 87 U/L (ref 38–126)
ALT SERPL-CCNC: 13 U/L (ref 11–66)
AST SERPL-CCNC: 12 U/L (ref 5–40)
BASINOPHIL, AUTOMATED: 1 % (ref 0–3)
BASOPHILS ABSOLUTE: 0.1 THOU/MM3 (ref 0–0.1)
BILIRUB SERPL-MCNC: 0.2 MG/DL (ref 0.3–1.2)
BILIRUBIN DIRECT: < 0.2 MG/DL (ref 0–0.3)
BUN, WHOLE BLOOD: 6 MG/DL (ref 8–26)
CHLORIDE, WHOLE BLOOD: 106 MEQ/L (ref 98–109)
CREATININE, WHOLE BLOOD: 0.6 MG/DL (ref 0.5–1.2)
EOSINOPHILS ABSOLUTE: 0.2 THOU/MM3 (ref 0–0.4)
EOSINOPHILS RELATIVE PERCENT: 2 % (ref 0–4)
GFR, ESTIMATED: > 90 ML/MIN/1.73M2
GLUCOSE, WHOLE BLOOD: 114 MG/DL (ref 70–108)
HCT VFR BLD CALC: 36.3 % (ref 37–47)
HEMOGLOBIN: 12.2 GM/DL (ref 12–16)
IMMATURE GRANULOCYTES: 2 %
IONIZED CALCIUM, WHOLE BLOOD: 1.13 MMOL/L (ref 1.12–1.32)
LYMPHOCYTES # BLD: 13 % (ref 15–47)
LYMPHOCYTES ABSOLUTE: 1 THOU/MM3 (ref 1–4.8)
MCH RBC QN AUTO: 31.4 PG (ref 26–33)
MCHC RBC AUTO-ENTMCNC: 33.6 GM/DL (ref 32.2–35.5)
MCV RBC AUTO: 93 FL (ref 81–99)
MONOCYTES ABSOLUTE: 0.8 THOU/MM3 (ref 0.4–1.3)
MONOCYTES: 11 % (ref 0–12)
PDW BLD-RTO: 15.1 % (ref 11.5–14.5)
PLATELET # BLD: 156 THOU/MM3 (ref 130–400)
PMV BLD AUTO: 10 FL (ref 9.4–12.4)
POTASSIUM, WHOLE BLOOD: 3.4 MEQ/L (ref 3.5–4.9)
RBC # BLD: 3.89 MILL/MM3 (ref 4.2–5.4)
SEG NEUTROPHILS: 71 % (ref 43–75)
SEGMENTED NEUTROPHILS ABSOLUTE COUNT: 5.5 THOU/MM3 (ref 1.8–7.7)
SODIUM, WHOLE BLOOD: 144 MEQ/L (ref 138–146)
TOTAL CO2, WHOLE BLOOD: 27 MEQ/L (ref 23–33)
TOTAL PROTEIN: 6.4 G/DL (ref 6.1–8)
WBC # BLD: 7.6 THOU/MM3 (ref 4.8–10.8)

## 2022-07-05 PROCEDURE — 96367 TX/PROPH/DG ADDL SEQ IV INF: CPT

## 2022-07-05 PROCEDURE — 96411 CHEMO IV PUSH ADDL DRUG: CPT

## 2022-07-05 PROCEDURE — 36591 DRAW BLOOD OFF VENOUS DEVICE: CPT

## 2022-07-05 PROCEDURE — 6360000002 HC RX W HCPCS: Performed by: INTERNAL MEDICINE

## 2022-07-05 PROCEDURE — 96413 CHEMO IV INFUSION 1 HR: CPT

## 2022-07-05 PROCEDURE — 96375 TX/PRO/DX INJ NEW DRUG ADDON: CPT

## 2022-07-05 PROCEDURE — 80047 BASIC METABLC PNL IONIZED CA: CPT

## 2022-07-05 PROCEDURE — 85025 COMPLETE CBC W/AUTO DIFF WBC: CPT

## 2022-07-05 PROCEDURE — 80076 HEPATIC FUNCTION PANEL: CPT

## 2022-07-05 PROCEDURE — 2580000003 HC RX 258: Performed by: INTERNAL MEDICINE

## 2022-07-05 RX ORDER — SODIUM CHLORIDE 0.9 % (FLUSH) 0.9 %
5-40 SYRINGE (ML) INJECTION PRN
Status: CANCELLED | OUTPATIENT
Start: 2022-07-05

## 2022-07-05 RX ORDER — SODIUM CHLORIDE 9 MG/ML
20 INJECTION, SOLUTION INTRAVENOUS ONCE
Status: COMPLETED | OUTPATIENT
Start: 2022-07-05 | End: 2022-07-05

## 2022-07-05 RX ORDER — HEPARIN SODIUM (PORCINE) LOCK FLUSH IV SOLN 100 UNIT/ML 100 UNIT/ML
500 SOLUTION INTRAVENOUS PRN
Status: DISCONTINUED | OUTPATIENT
Start: 2022-07-05 | End: 2022-07-06 | Stop reason: HOSPADM

## 2022-07-05 RX ORDER — SODIUM CHLORIDE 9 MG/ML
25 INJECTION, SOLUTION INTRAVENOUS PRN
Status: CANCELLED | OUTPATIENT
Start: 2022-07-05

## 2022-07-05 RX ORDER — PALONOSETRON 0.05 MG/ML
0.25 INJECTION, SOLUTION INTRAVENOUS ONCE
Status: COMPLETED | OUTPATIENT
Start: 2022-07-05 | End: 2022-07-05

## 2022-07-05 RX ORDER — HEPARIN SODIUM (PORCINE) LOCK FLUSH IV SOLN 100 UNIT/ML 100 UNIT/ML
500 SOLUTION INTRAVENOUS PRN
Status: CANCELLED | OUTPATIENT
Start: 2022-07-05

## 2022-07-05 RX ORDER — DOXORUBICIN HYDROCHLORIDE 2 MG/ML
60 INJECTION, SOLUTION INTRAVENOUS ONCE
Status: COMPLETED | OUTPATIENT
Start: 2022-07-05 | End: 2022-07-05

## 2022-07-05 RX ORDER — SODIUM CHLORIDE 0.9 % (FLUSH) 0.9 %
5-40 SYRINGE (ML) INJECTION PRN
Status: DISCONTINUED | OUTPATIENT
Start: 2022-07-05 | End: 2022-07-06 | Stop reason: HOSPADM

## 2022-07-05 RX ADMIN — PALONOSETRON 0.25 MG: 0.05 INJECTION, SOLUTION INTRAVENOUS at 13:09

## 2022-07-05 RX ADMIN — SODIUM CHLORIDE, PRESERVATIVE FREE 10 ML: 5 INJECTION INTRAVENOUS at 12:20

## 2022-07-05 RX ADMIN — SODIUM CHLORIDE, PRESERVATIVE FREE 20 ML: 5 INJECTION INTRAVENOUS at 12:21

## 2022-07-05 RX ADMIN — SODIUM CHLORIDE 20 ML/HR: 9 INJECTION, SOLUTION INTRAVENOUS at 13:08

## 2022-07-05 RX ADMIN — HEPARIN 500 UNITS: 100 SYRINGE at 15:20

## 2022-07-05 RX ADMIN — DEXAMETHASONE SODIUM PHOSPHATE 12 MG: 4 INJECTION, SOLUTION INTRAMUSCULAR; INTRAVENOUS at 13:11

## 2022-07-05 RX ADMIN — SODIUM CHLORIDE, PRESERVATIVE FREE 10 ML: 5 INJECTION INTRAVENOUS at 13:07

## 2022-07-05 RX ADMIN — FOSAPREPITANT 150 MG: 150 INJECTION, POWDER, LYOPHILIZED, FOR SOLUTION INTRAVENOUS at 13:30

## 2022-07-05 RX ADMIN — DOXORUBICIN HYDROCHLORIDE 164 MG: 2 INJECTION, SOLUTION INTRAVENOUS at 14:20

## 2022-07-05 RX ADMIN — SODIUM CHLORIDE, PRESERVATIVE FREE 10 ML: 5 INJECTION INTRAVENOUS at 15:20

## 2022-07-05 RX ADMIN — CYCLOPHOSPHAMIDE 1640 MG: 1 INJECTION, POWDER, FOR SOLUTION INTRAVENOUS; ORAL at 14:39

## 2022-07-05 NOTE — PROGRESS NOTES
Patient assessed for the following post chemotherapy:    Dizziness   No  Lightheadedness  No      Acute nausea/vomiting No  Headache   No  Chest pain/pressure  No  Rash/itching   No  Shortness of breath  No    Patient kept for 20 minutes observation post infusion chemotherapy. Patient tolerated chemotherapy treatment Adriamycin/Cytoxan without any complications. Last vital signs:   BP (!) 98/55   Pulse 69   Temp 98 °F (36.7 °C) (Oral)   Resp 16   Ht 6' (1.829 m)   Wt (!) 313 lb 12.8 oz (142.3 kg)   SpO2 96%   BMI 42.56 kg/m²         Patient instructed if experience any of the above symptoms following today's infusion,he/she is to notify MD immediately or go to the emergency department. Discharge instructions given to patient. Verbalizes understanding. Ambulated off unit per self with belongings.

## 2022-07-05 NOTE — PLAN OF CARE
Problem: Musculor/Skeletal Functional Status  Goal: Absence of falls  Outcome: Adequate for Discharge  Note: No falls occurred with visit today. Intervention: Fall precautions  Note: Verbalized understanding of fall prevention to ask for assistance with ambulation. Call light within reach. Problem: Intellectual/Education/Knowledge Deficit  Goal: Teaching initiated upon admission  Outcome: Adequate for Discharge  Note: Patient verbalizes understanding to verbal information given on Adriamycin/Cytoxan,action and possible side effects. Aware to call MD if develop complications. Intervention: Verbal/written education provided  Note: Chemotherapy Teaching     What is Chemotherapy   Drug action [x]   Method of Administration [x]   Handouts given []     Side Effects  Nausea/vomiting [x]   Diarrhea [x]   Fatigue [x]   Signs / Symptoms of infection [x]   Neutropenia [x]   Thrombocytopenia [x]   Alopecia [x]   neuropathy [x]   [place] diet &  the importance of fluids [x]       Micellaneous  Importance of nutrition [x]   Importance of oral hygiene [x]   When to call the MD [x]   Monitoring labs [x]   Use of supportive services []     Explanation of Drug Regimen / Frequency  Adriamycin/Cytoxan     Comments  Verbalized understanding to drug,action,side effects and when to call MD         Problem: Discharge Planning  Goal: Knowledge of discharge instructions  Description: Knowledge of discharge instructions  Outcome: Adequate for Discharge  Note: Verbalized understanding of discharge instructions, follow-up appointments, and when to call the physician. Intervention: Interaction with patient/family and care team  Note: Discuss understanding of discharge instructions,follow-up appointments, and when to call the physician.       Problem: Infection - Adult  Goal: Will show no infection signs and symptoms  Description: Will show no infection signs and symptoms  Outcome: Adequate for Discharge  Note: [facility] site with no

## 2022-07-06 ENCOUNTER — HOSPITAL ENCOUNTER (OUTPATIENT)
Dept: INFUSION THERAPY | Age: 52
Discharge: HOME | End: 2022-07-06
Payer: COMMERCIAL

## 2022-07-06 VITALS
HEART RATE: 72 BPM | RESPIRATION RATE: 16 BRPM | SYSTOLIC BLOOD PRESSURE: 115 MMHG | DIASTOLIC BLOOD PRESSURE: 67 MMHG | WEIGHT: 293 LBS | BODY MASS INDEX: 39.68 KG/M2 | HEIGHT: 72 IN | OXYGEN SATURATION: 96 % | TEMPERATURE: 98.2 F

## 2022-07-06 DIAGNOSIS — C50.112 MALIGNANT NEOPLASM OF CENTRAL PORTION OF LEFT BREAST IN FEMALE, ESTROGEN RECEPTOR NEGATIVE (HCC): Primary | ICD-10-CM

## 2022-07-06 DIAGNOSIS — Z17.1 MALIGNANT NEOPLASM OF CENTRAL PORTION OF LEFT BREAST IN FEMALE, ESTROGEN RECEPTOR NEGATIVE (HCC): Primary | ICD-10-CM

## 2022-07-06 PROCEDURE — 96372 THER/PROPH/DIAG INJ SC/IM: CPT

## 2022-07-06 PROCEDURE — 6360000002 HC RX W HCPCS: Performed by: INTERNAL MEDICINE

## 2022-07-06 RX ADMIN — PEGFILGRASTIM-CBQV 6 MG: 6 INJECTION, SOLUTION SUBCUTANEOUS at 15:50

## 2022-07-06 NOTE — PLAN OF CARE
Problem: Musculor/Skeletal Functional Status  Goal: Absence of falls  Outcome: Adequate for Discharge  Note: No falls occurred with visit today. Intervention: Fall precautions  Note: Discussed the need to use the call light for assistance when getting up to ambulate. Problem: Intellectual/Education/Knowledge Deficit  Goal: Teaching initiated upon admission  Outcome: Adequate for Discharge  Note: Patient verbalizes understanding to verbal information given on udenyca,action and possible side effects. Aware to call MD if develop complications. Intervention: Verbal/written education provided  Note: Discussed indications for injection     Problem: Discharge Planning  Goal: Knowledge of discharge instructions  Description: Knowledge of discharge instructions  Outcome: Adequate for Discharge  Note: Verbalize understanding of discharge instructions, follow up appointments, and when to call Physician. Intervention: Interaction with patient/family and care team  Note: Discuss understanding of discharge instructions, follow up appointments and when to call Physician. Care plan reviewed with patient. Patient verbalizes understanding of the plan of care and contributes to goal setting.

## 2022-07-06 NOTE — PROGRESS NOTES
Patient tolerated  injection without any complications. Discharge instructions given to patient-verbalizes understanding. Ambulated off unit per self with belongings.

## 2022-07-15 DIAGNOSIS — C50.112 MALIGNANT NEOPLASM OF CENTRAL PORTION OF LEFT BREAST IN FEMALE, ESTROGEN RECEPTOR NEGATIVE (HCC): Primary | ICD-10-CM

## 2022-07-15 DIAGNOSIS — Z17.1 MALIGNANT NEOPLASM OF CENTRAL PORTION OF LEFT BREAST IN FEMALE, ESTROGEN RECEPTOR NEGATIVE (HCC): Primary | ICD-10-CM

## 2022-07-15 RX ORDER — SODIUM CHLORIDE 9 MG/ML
INJECTION, SOLUTION INTRAVENOUS CONTINUOUS
Status: CANCELLED | OUTPATIENT
Start: 2022-07-19

## 2022-07-15 RX ORDER — SODIUM CHLORIDE 9 MG/ML
20 INJECTION, SOLUTION INTRAVENOUS ONCE
Status: CANCELLED | OUTPATIENT
Start: 2022-07-19 | End: 2022-07-19

## 2022-07-15 RX ORDER — ONDANSETRON 2 MG/ML
8 INJECTION INTRAMUSCULAR; INTRAVENOUS
Status: CANCELLED | OUTPATIENT
Start: 2022-07-19

## 2022-07-15 RX ORDER — FAMOTIDINE 10 MG/ML
20 INJECTION, SOLUTION INTRAVENOUS ONCE
Status: CANCELLED | OUTPATIENT
Start: 2022-07-19 | End: 2022-07-19

## 2022-07-15 RX ORDER — ALBUTEROL SULFATE 90 UG/1
4 AEROSOL, METERED RESPIRATORY (INHALATION) PRN
Status: CANCELLED | OUTPATIENT
Start: 2022-07-19

## 2022-07-15 RX ORDER — ACETAMINOPHEN 325 MG/1
650 TABLET ORAL
Status: CANCELLED | OUTPATIENT
Start: 2022-07-19

## 2022-07-15 RX ORDER — DIPHENHYDRAMINE HYDROCHLORIDE 50 MG/ML
50 INJECTION INTRAMUSCULAR; INTRAVENOUS
Status: CANCELLED | OUTPATIENT
Start: 2022-07-19

## 2022-07-15 RX ORDER — SODIUM CHLORIDE 9 MG/ML
25 INJECTION, SOLUTION INTRAVENOUS PRN
Status: CANCELLED | OUTPATIENT
Start: 2022-07-19

## 2022-07-15 RX ORDER — SODIUM CHLORIDE 0.9 % (FLUSH) 0.9 %
5-40 SYRINGE (ML) INJECTION PRN
Status: CANCELLED | OUTPATIENT
Start: 2022-07-19

## 2022-07-15 RX ORDER — MEPERIDINE HYDROCHLORIDE 50 MG/ML
12.5 INJECTION INTRAMUSCULAR; INTRAVENOUS; SUBCUTANEOUS PRN
Status: CANCELLED | OUTPATIENT
Start: 2022-07-19

## 2022-07-15 RX ORDER — FAMOTIDINE 10 MG/ML
20 INJECTION, SOLUTION INTRAVENOUS
Status: CANCELLED | OUTPATIENT
Start: 2022-07-19

## 2022-07-15 RX ORDER — DIPHENHYDRAMINE HYDROCHLORIDE 50 MG/ML
50 INJECTION INTRAMUSCULAR; INTRAVENOUS ONCE
Status: CANCELLED | OUTPATIENT
Start: 2022-07-19 | End: 2022-07-19

## 2022-07-15 RX ORDER — SODIUM CHLORIDE 9 MG/ML
5-40 INJECTION INTRAVENOUS PRN
Status: CANCELLED | OUTPATIENT
Start: 2022-07-19

## 2022-07-15 RX ORDER — HEPARIN SODIUM (PORCINE) LOCK FLUSH IV SOLN 100 UNIT/ML 100 UNIT/ML
500 SOLUTION INTRAVENOUS PRN
Status: CANCELLED | OUTPATIENT
Start: 2022-07-19

## 2022-07-15 RX ORDER — EPINEPHRINE 1 MG/ML
0.3 INJECTION, SOLUTION, CONCENTRATE INTRAVENOUS PRN
Status: CANCELLED | OUTPATIENT
Start: 2022-07-19

## 2022-07-18 ENCOUNTER — OFFICE VISIT (OUTPATIENT)
Dept: CARDIOLOGY CLINIC | Age: 52
End: 2022-07-18
Payer: COMMERCIAL

## 2022-07-18 VITALS
HEIGHT: 72 IN | BODY MASS INDEX: 39.68 KG/M2 | WEIGHT: 293 LBS | HEART RATE: 80 BPM | SYSTOLIC BLOOD PRESSURE: 108 MMHG | DIASTOLIC BLOOD PRESSURE: 60 MMHG

## 2022-07-18 DIAGNOSIS — I10 PRIMARY HYPERTENSION: ICD-10-CM

## 2022-07-18 DIAGNOSIS — E78.01 FAMILIAL HYPERCHOLESTEROLEMIA: ICD-10-CM

## 2022-07-18 DIAGNOSIS — I25.10 CORONARY ARTERY DISEASE INVOLVING NATIVE CORONARY ARTERY OF NATIVE HEART WITHOUT ANGINA PECTORIS: Primary | ICD-10-CM

## 2022-07-18 PROCEDURE — 99213 OFFICE O/P EST LOW 20 MIN: CPT | Performed by: NUCLEAR MEDICINE

## 2022-07-18 NOTE — PROGRESS NOTES
[address] [address]  [address]  [facility] [address]  Dept: [phone]  Dept Fax: [phone]  Loc: [phone]    Visit Date: 7/18/2022    Yanet Rodríguez is a 50 y.o. female who presents todayfor:  Chief Complaint   Patient presents with    Follow-up    Coronary Artery Disease    Hyperlipidemia    Hypertension   Going for breast cancer therapy   Chemo and radiation   No chest pain   No changes in breathing  Cath 2014   Mild CAD  No changes clinically   BP is stable   No dizziness  No syncope        HPI:  HPI  Past Medical History:   Diagnosis Date    Afib ([facility] [address]     Baki    Arthritis     CAD (coronary artery disease)     GERD (gastroesophageal reflux disease)     Hyperlipidemia     Hypertension     Hypothyroid     Invasive ductal carcinoma of breast, female, left ([facility] [address] 02/24/2022    Malignant neoplasm of lower-inner quadrant of left breast in female, estrogen receptor negative [facility] 03/07/2022    MI (myocardial infarction) ([facility]     Dr. Liz Ramsey    Pneumonia       Past Surgical History:   Procedure Laterality Date    BREAST LUMPECTOMY Left 3/22/2022    LEFT BREAST LUMPECTOMY PARTIAL MASTECTOMY, SENTINEL LYMPH NODE BIOPSY, PRE OP NEEDLE LOC X 2 performed by Ines Degroot MD at [address] [place] [address] Left 3/30/2022    Evacuation hematoma left axilla performed by Ines Degroot MD at [facility] ARTHROSCOPY Right     08    JENNIFER BIOPSY LYMPH NODE BY NEEDLE SUPERFICIAL  02/24/2022    JENNIFER BIOPSY LYMPH NODE BY NEEDLE SUPERFICIAL 2/24/2022 Angela Mayorga MD [facility] US GUID NDL BIOPSY LEFT Left 02/24/2022    JENNIFER [address] LEFT 2/24/2022 Angela Mayorga MD [address] SURGERY      PORT SURGERY Right 4/13/2022    Single Lumen Smartport Right Subclavian, aspiration of left axillary seroma performed by Ines Degroot MD at [facility] PORT SURGERY Right 4/29/2022    Mediport Right Subclavian Removal and Replacement. performed by Benjamin Atwood MD at [address] [facility] INCISIONAL Left 1998    excisional bx-H. Chollet fibrocystic changes    US BREAST BIOPSY NEEDLE ADDITIONAL LEFT Left 02/24/2022    US BREAST BIOPSY NEEDLE ADDITIONAL LEFT 2/24/2022 Benedicto Mtz [place] [address] NEEDLE LOC BREAST ADDL LEFT Left 3/22/2022    US GUIDED NEEDLE LOC BREAST ADDL LEFT 3/22/2022 [facility]     Family History   Problem Relation Age of Onset    Heart Disease Mother     Diabetes Mother     Cancer Mother         liposarcoma    Kidney Disease Mother     Heart Disease Father     Diabetes Father     Kidney Disease Father     Cancer Father         Non-melanoma skin cancer multiple times    No Known Problems Brother     No Known Problems Brother     No Known Problems Brother     No Known Problems Maternal Grandmother     No Known Problems Maternal Grandfather     Breast Cancer Paternal Grandmother 47        reoccurred at 46    Ovarian Cancer Paternal Grandmother 46    Bilateral breast cancer Paternal Grandmother         Opposite breast, age 52's    Breast Cancer Maternal Aunt 48    Breast Cancer Paternal Aunt         breast     Social History     Tobacco Use    Smoking status: Some Days     Packs/day: 0.25     Years: 30.00     Pack years: 7.50     Types: Cigarettes     Start date: 12/4/1987    Smokeless tobacco: Never    Tobacco comments:     07/05/22 - Tying to quit, refused counciling/resources   Substance Use Topics    Alcohol use: Yes     Comment: rare      Current Outpatient Medications   Medication Sig Dispense Refill    doxycycline hyclate (VIBRA-TABS) 100 MG tablet Take 1 tablet by mouth 2 times daily 60 tablet 1    Magic Mouthwash (MIRACLE MOUTHWASH) Swish and spit 5 mLs 4 times daily as needed for Irritation 1:1:1 Benadryl, lidocaine, nystatin 120 mL 1    flecainide (TAMBOCOR) 100 MG tablet TAKE 1 TABLET BY MOUTH TWICE A  tablet 0    ondansetron (ZOFRAN-ODT) 8 MG TBDP disintegrating tablet Place 1 tablet under the tongue every 8 hours as needed for Nausea or Vomiting 20 tablet 2    prochlorperazine (COMPAZINE) 10 MG tablet Take 1 tablet by mouth every 6 hours as needed (nausea) 30 tablet 1    lidocaine-prilocaine (EMLA) 2.5-2.5 % cream Apply topically as needed. 5 g 1    metoprolol succinate (TOPROL XL) 100 MG extended release tablet TAKE 1 TABLET BY MOUTH EVERY DAY 90 tablet 1    atorvastatin (LIPITOR) 40 MG tablet TAKE 1 TABLET DAILY 90 tablet 3    ELIQUIS 5 MG TABS tablet TAKE 1 TABLET BY MOUTH TWICE A  tablet 3    levothyroxine (SYNTHROID) 125 MCG tablet TAKE 1 TABLET BY MOUTH EVERY DAY IN THE MORNING ON EMPTY STOMACH      ZINC PO Take by mouth daily      Ascorbic Acid (VITAMIN C PO) Take by mouth daily      ibuprofen (ADVIL;MOTRIN) 800 MG tablet Take 800 mg by mouth every 6 hours as needed for Pain       omeprazole (PRILOSEC) 10 MG delayed release capsule Take 10 mg by mouth daily      nitroGLYCERIN (NITROSTAT) 0.4 MG SL tablet Place 1 tablet under the tongue every 5 minutes as needed for Chest pain 25 tablet 3    aspirin 81 MG chewable tablet Take 81 mg by mouth daily  30 tablet 3     No current facility-administered medications for this visit.      Allergies   Allergen Reactions    Codeine Hives and Swelling     And vomiting    Cortisone Swelling     Apparently tolerates topical and IV with benadryl well    Influenza Virus Vaccine Other (See Comments)     Pt states could not walk after getting     Brenita Diver [Propoxyphene N-Acetaminophen] Nausea And Vomiting    Sulfa Antibiotics Nausea And Vomiting and Swelling     Not throat swelling     Health Maintenance   Topic Date Due    COVID-19 Vaccine (1) Never done    Pneumococcal 0-64 years Vaccine (1 - PCV) Never done    Depression Screen  Never done    HIV screen  Never done    Hepatitis C screen  Never done    DTaP/Tdap/Td vaccine (1 - Tdap) Never done Cervical cancer screen  Never done    Colorectal Cancer Screen  Never done    Diabetes screen  02/01/2022    Shingles vaccine (1 of 2) Never done    Lipids  12/21/2022    Breast cancer screen  03/22/2023    Hepatitis A vaccine  Aged Out    Hepatitis B vaccine  Aged Out    Hib vaccine  Aged Out    Meningococcal (ACWY) vaccine  Aged Out       Subjective:  Review of Systems  General:   No fever, no chills, No fatigue or weight loss  Pulmonary:    No dyspnea, no wheezing  Cardiac:    Denies recent chest pain,   GI:     No nausea or vomiting, no abdominal pain  Neuro:    No dizziness or light headedness,   Musculoskeletal:  No recent active issues  Extremities:   No edema, no obvious claudication     Objective:  Physical Exam  /60   Pulse 80   Ht 6' (1.829 m)   Wt (!) 311 lb (141.1 kg)   BMI 42.18 kg/m²   General:   Well developed, well nourished  Lungs:   Clear to auscultation  Heart:    Normal S1 S2, Slight murmur. no rubs, no gallops  Abdomen:   Soft, non tender, no organomegalies, positive bowel sounds  Extremities:   No edema, no cyanosis, good peripheral pulses  Neurological:   Awake, alert, oriented. No obvious focal deficits  Musculoskelatal:  No obvious deformities    Assessment:      Diagnosis Orders   1. Coronary artery disease involving native coronary artery of native heart without angina pectoris        2. Primary hypertension        3. Familial hypercholesterolemia        As above  Cardiac fair for now       Plan:  No follow-ups on file. As above  Continue risk factor modification and medical management  Thank you for allowing me to participate in the care of your patient. Please don't hesitate to contact me regarding any further issues related to the patient care    Orders Placed:  No orders of the defined types were placed in this encounter. Medications Prescribed:  No orders of the defined types were placed in this encounter.          Discussed use, benefit, and side effects of prescribed

## 2022-07-19 ENCOUNTER — HOSPITAL ENCOUNTER (OUTPATIENT)
Dept: INFUSION THERAPY | Age: 52
Discharge: HOME | End: 2022-07-19
Payer: COMMERCIAL

## 2022-07-19 ENCOUNTER — OFFICE VISIT (OUTPATIENT)
Dept: ONCOLOGY | Age: 52
End: 2022-07-19
Payer: COMMERCIAL

## 2022-07-19 VITALS
RESPIRATION RATE: 16 BRPM | HEIGHT: 72 IN | DIASTOLIC BLOOD PRESSURE: 64 MMHG | BODY MASS INDEX: 39.68 KG/M2 | TEMPERATURE: 98.8 F | HEART RATE: 73 BPM | WEIGHT: 293 LBS | OXYGEN SATURATION: 95 % | SYSTOLIC BLOOD PRESSURE: 118 MMHG

## 2022-07-19 VITALS
WEIGHT: 293 LBS | DIASTOLIC BLOOD PRESSURE: 77 MMHG | TEMPERATURE: 97.8 F | OXYGEN SATURATION: 96 % | SYSTOLIC BLOOD PRESSURE: 132 MMHG | HEIGHT: 72 IN | RESPIRATION RATE: 16 BRPM | HEART RATE: 76 BPM | BODY MASS INDEX: 39.68 KG/M2

## 2022-07-19 DIAGNOSIS — Z17.1 MALIGNANT NEOPLASM OF CENTRAL PORTION OF LEFT BREAST IN FEMALE, ESTROGEN RECEPTOR NEGATIVE (HCC): ICD-10-CM

## 2022-07-19 DIAGNOSIS — C50.112 MALIGNANT NEOPLASM OF CENTRAL PORTION OF LEFT BREAST IN FEMALE, ESTROGEN RECEPTOR NEGATIVE (HCC): Primary | ICD-10-CM

## 2022-07-19 DIAGNOSIS — E87.6 HYPOKALEMIA: Primary | ICD-10-CM

## 2022-07-19 DIAGNOSIS — C50.112 MALIGNANT NEOPLASM OF CENTRAL PORTION OF LEFT BREAST IN FEMALE, ESTROGEN RECEPTOR NEGATIVE (HCC): ICD-10-CM

## 2022-07-19 DIAGNOSIS — Z17.1 MALIGNANT NEOPLASM OF CENTRAL PORTION OF LEFT BREAST IN FEMALE, ESTROGEN RECEPTOR NEGATIVE (HCC): Primary | ICD-10-CM

## 2022-07-19 LAB
ALBUMIN SERPL-MCNC: 3.7 G/DL (ref 3.5–5.1)
ALP BLD-CCNC: 84 U/L (ref 38–126)
ALT SERPL-CCNC: 16 U/L (ref 11–66)
AST SERPL-CCNC: 14 U/L (ref 5–40)
BILIRUB SERPL-MCNC: 0.3 MG/DL (ref 0.3–1.2)
BILIRUBIN DIRECT: < 0.2 MG/DL (ref 0–0.3)
BUN, WHOLE BLOOD: 7 MG/DL (ref 8–26)
CHLORIDE, WHOLE BLOOD: 106 MEQ/L (ref 98–109)
CREATININE, WHOLE BLOOD: 0.5 MG/DL (ref 0.5–1.2)
GFR, ESTIMATED: > 90 ML/MIN/1.73M2
GLUCOSE, WHOLE BLOOD: 109 MG/DL (ref 70–108)
IONIZED CALCIUM, WHOLE BLOOD: 1.1 MMOL/L (ref 1.12–1.32)
POTASSIUM, WHOLE BLOOD: 3.3 MEQ/L (ref 3.5–4.9)
SCAN OF BLOOD SMEAR: NORMAL
SODIUM, WHOLE BLOOD: 143 MEQ/L (ref 138–146)
TOTAL CO2, WHOLE BLOOD: 26 MEQ/L (ref 23–33)
TOTAL PROTEIN: 6.4 G/DL (ref 6.1–8)

## 2022-07-19 PROCEDURE — 99211 OFF/OP EST MAY X REQ PHY/QHP: CPT

## 2022-07-19 PROCEDURE — 96413 CHEMO IV INFUSION 1 HR: CPT

## 2022-07-19 PROCEDURE — 2500000003 HC RX 250 WO HCPCS: Performed by: INTERNAL MEDICINE

## 2022-07-19 PROCEDURE — 80076 HEPATIC FUNCTION PANEL: CPT

## 2022-07-19 PROCEDURE — 36591 DRAW BLOOD OFF VENOUS DEVICE: CPT

## 2022-07-19 PROCEDURE — 99213 OFFICE O/P EST LOW 20 MIN: CPT | Performed by: INTERNAL MEDICINE

## 2022-07-19 PROCEDURE — 96415 CHEMO IV INFUSION ADDL HR: CPT

## 2022-07-19 PROCEDURE — 85025 COMPLETE CBC W/AUTO DIFF WBC: CPT

## 2022-07-19 PROCEDURE — 80047 BASIC METABLC PNL IONIZED CA: CPT

## 2022-07-19 PROCEDURE — A4216 STERILE WATER/SALINE, 10 ML: HCPCS | Performed by: INTERNAL MEDICINE

## 2022-07-19 PROCEDURE — 6360000002 HC RX W HCPCS: Performed by: INTERNAL MEDICINE

## 2022-07-19 PROCEDURE — 96376 TX/PRO/DX INJ SAME DRUG ADON: CPT

## 2022-07-19 PROCEDURE — 96375 TX/PRO/DX INJ NEW DRUG ADDON: CPT

## 2022-07-19 PROCEDURE — 99212 OFFICE O/P EST SF 10 MIN: CPT

## 2022-07-19 PROCEDURE — 2580000003 HC RX 258: Performed by: INTERNAL MEDICINE

## 2022-07-19 RX ORDER — POTASSIUM CHLORIDE 20 MEQ/1
20 TABLET, EXTENDED RELEASE ORAL DAILY
Qty: 30 TABLET | Refills: 1 | Status: SHIPPED | OUTPATIENT
Start: 2022-07-19 | End: 2022-08-15

## 2022-07-19 RX ORDER — SODIUM CHLORIDE 9 MG/ML
25 INJECTION, SOLUTION INTRAVENOUS PRN
Status: CANCELLED | OUTPATIENT
Start: 2022-07-19

## 2022-07-19 RX ORDER — HEPARIN SODIUM (PORCINE) LOCK FLUSH IV SOLN 100 UNIT/ML 100 UNIT/ML
500 SOLUTION INTRAVENOUS PRN
Status: DISCONTINUED | OUTPATIENT
Start: 2022-07-19 | End: 2022-07-20 | Stop reason: HOSPADM

## 2022-07-19 RX ORDER — HEPARIN SODIUM (PORCINE) LOCK FLUSH IV SOLN 100 UNIT/ML 100 UNIT/ML
500 SOLUTION INTRAVENOUS PRN
Status: CANCELLED | OUTPATIENT
Start: 2022-07-19

## 2022-07-19 RX ORDER — ONDANSETRON 2 MG/ML
8 INJECTION INTRAMUSCULAR; INTRAVENOUS
Status: DISCONTINUED | OUTPATIENT
Start: 2022-07-19 | End: 2022-07-19 | Stop reason: HOSPADM

## 2022-07-19 RX ORDER — SODIUM CHLORIDE 0.9 % (FLUSH) 0.9 %
5-40 SYRINGE (ML) INJECTION PRN
Status: CANCELLED | OUTPATIENT
Start: 2022-07-19

## 2022-07-19 RX ORDER — DIPHENHYDRAMINE HYDROCHLORIDE 50 MG/ML
50 INJECTION INTRAMUSCULAR; INTRAVENOUS ONCE
Status: COMPLETED | OUTPATIENT
Start: 2022-07-19 | End: 2022-07-19

## 2022-07-19 RX ORDER — DIPHENHYDRAMINE HYDROCHLORIDE 50 MG/ML
50 INJECTION INTRAMUSCULAR; INTRAVENOUS
Status: COMPLETED | OUTPATIENT
Start: 2022-07-19 | End: 2022-07-19

## 2022-07-19 RX ORDER — DEXAMETHASONE SODIUM PHOSPHATE 4 MG/ML
8 INJECTION, SOLUTION INTRA-ARTICULAR; INTRALESIONAL; INTRAMUSCULAR; INTRAVENOUS; SOFT TISSUE ONCE
Status: COMPLETED | OUTPATIENT
Start: 2022-07-19 | End: 2022-07-19

## 2022-07-19 RX ORDER — SODIUM CHLORIDE 9 MG/ML
20 INJECTION, SOLUTION INTRAVENOUS ONCE
Status: COMPLETED | OUTPATIENT
Start: 2022-07-19 | End: 2022-07-19

## 2022-07-19 RX ORDER — SODIUM CHLORIDE 0.9 % (FLUSH) 0.9 %
5-40 SYRINGE (ML) INJECTION PRN
Status: DISCONTINUED | OUTPATIENT
Start: 2022-07-19 | End: 2022-07-20 | Stop reason: HOSPADM

## 2022-07-19 RX ADMIN — HYDROCORTISONE SODIUM SUCCINATE 100 MG: 100 INJECTION, POWDER, FOR SOLUTION INTRAMUSCULAR; INTRAVENOUS at 12:31

## 2022-07-19 RX ADMIN — PACLITAXEL 480 MG: 6 INJECTION, SOLUTION, CONCENTRATE INTRAVENOUS at 11:57

## 2022-07-19 RX ADMIN — HEPARIN 500 UNITS: 100 SYRINGE at 16:00

## 2022-07-19 RX ADMIN — FAMOTIDINE 20 MG: 10 INJECTION, SOLUTION INTRAVENOUS at 11:02

## 2022-07-19 RX ADMIN — SODIUM CHLORIDE, PRESERVATIVE FREE 20 ML: 5 INJECTION INTRAVENOUS at 09:45

## 2022-07-19 RX ADMIN — DEXAMETHASONE SODIUM PHOSPHATE 8 MG: 4 INJECTION, SOLUTION INTRAMUSCULAR; INTRAVENOUS at 11:06

## 2022-07-19 RX ADMIN — DIPHENHYDRAMINE HYDROCHLORIDE 50 MG: 50 INJECTION, SOLUTION INTRAMUSCULAR; INTRAVENOUS at 12:30

## 2022-07-19 RX ADMIN — SODIUM CHLORIDE 20 ML/HR: 9 INJECTION, SOLUTION INTRAVENOUS at 10:55

## 2022-07-19 RX ADMIN — SODIUM CHLORIDE, PRESERVATIVE FREE 10 ML: 5 INJECTION INTRAVENOUS at 16:00

## 2022-07-19 RX ADMIN — SODIUM CHLORIDE, PRESERVATIVE FREE 10 ML: 5 INJECTION INTRAVENOUS at 09:44

## 2022-07-19 RX ADMIN — DIPHENHYDRAMINE HYDROCHLORIDE 50 MG: 50 INJECTION, SOLUTION INTRAMUSCULAR; INTRAVENOUS at 10:57

## 2022-07-19 NOTE — PATIENT INSTRUCTIONS
7/19 proceed with course 1 of dose dense Taxol with Neulasta  Follow-up 2 weeks with nurse practitioner or other provider as I will be gone

## 2022-07-19 NOTE — PLAN OF CARE
Problem: Musculor/Skeletal Functional Status  Goal: Absence of falls  Outcome: Adequate for Discharge  Note: Patient free from falls while in O.P. Oncology. Intervention: Fall precautions  Note: [facility] Fall band placed on patient. Discussed the need to use the call light for assistance prior to getting up out of chair/bed. Problem: Intellectual/Education/Knowledge Deficit  Goal: Teaching initiated upon admission  Outcome: Adequate for Discharge  Note: Patient verbalizes understanding to verbal information given on Taxol ,action and possible side effects. Aware to call MD if develop complications. Intervention: Verbal/written education provided  Note: Chemotherapy Teaching     What is Chemotherapy   Drug action [x]   Method of Administration [x]   Handouts given []     Side Effects  Nausea/vomiting [x]   Diarrhea [x]   Fatigue [x]   Signs / Symptoms of infection [x]   Neutropenia [x]   Thrombocytopenia [x]   Alopecia [x]   neuropathy [x]   [place] diet &  the importance of fluids [x]       Micellaneous  Importance of nutrition [x]   Importance of oral hygiene [x]   When to call the MD [x]   Monitoring labs [x]   Use of supportive services []     Explanation of Drug Regimen / Frequency  Taxol:  D1C5     Comments  Verbalized understanding to drug,action,side effects and when to call MD         Problem: Discharge Planning  Goal: Knowledge of discharge instructions  Description: Knowledge of discharge instructions  Outcome: Adequate for Discharge  Note: Verbalized understanding of discharge instructions, follow-up appointments, and when to call the physician. Intervention: Interaction with patient/family and care team  Note: Discuss understanding of discharge instructions,follow-up appointments, and when to call the physician.        Problem: Infection - Adult  Goal: Absence of infection at discharge  Outcome: Adequate for Discharge  Flowsheets (Taken 7/19/2022 1729)  Absence of infection at discharge: Assess and monitor for signs and symptoms of infection  Note:     Goal: Will show no infection signs and symptoms  Description: Will show no infection signs and symptoms  Outcome: Adequate for Discharge  Note: Mediport site with no redness or warmth. Skin over port site intact with no signs of breakdown noted. Patient verbalizes signs/symptoms of port infection and when to notify the physician. Intervention: EDUCATE PATIENT ABOUT SIGNS AND SYMPTOMS OF INFECTION  Note: Discuss port maintenance, infection prevention, signs and when to call Dr Ingris Pierce reviewed with patient. Patient verbalize understanding of the plan of care and contribute to goal setting.

## 2022-07-19 NOTE — PROGRESS NOTES
[address] CANCER [address] [place] [address]  Dept: [phone]  Loc: [phone]   Hematology/Oncology Consult (Clinic)        7/19/22     Dianna Perez   1970     No ref. provider found   Javier Dillon          DIAGNOSIS:  -Invasive ductal carcinoma, left breast, ER negative, IL negative and HER-2 negative, 2.7 cm, grade 3, 0/ 4 sentinel nodes . ps T2N0M0 /IIA. (High risk: triple negative/grade 3). Inner central left breast anteriorly. -Extended panel germline testing - March 2022    TREATMENT:  - Left breast lumpectomy and sentinel node resection 3/22/2022. Dr Peter Nino. - Adjuvant AC dose dense x4 followed by dose dense Taxol every other week. Start 5/10/2022  7/19 course 1 dose dense Taxol due (completed AC x4)  -After adjuvant chemotherapy, postlumpectomy radiation. PARAMETERS:  -History, exam,  -Bone scan ordered    SUBJECTIVE: Here today for her first course of dose dense Taxol having completed AC x4. She is followed by cardiology and had an echocardiogram in mid June that was unremarkable. Other than some increased fatigue and musculoskeletal achiness for several days she is doing well. HPI: Alexei Chappell is a 82-year-old premenopausal female developed a lump in the left breast just medial to the nipple on February 8. Strong family history of malignancy including breast cancer. Last mammogram was in 2015. Mammogram on 2/18 additional imaging confirmed a approximate 2 cm nodular density inferior central medial left breast.  Initial biopsy revealed invasive ductal cancer triple and grade 3. Definitive left breast lumpectomy and sentinel node resection on 3/22 revealed a 2.7 cm pathologic stage T2N0 triple negative grade 3 breast cancer. Other feeling the lump she is asymptomatic. Specifically denies any headaches bone pain breathing problems or any other focal or systemic complaints.   Followed by cardiology with a history of a an MI in 2014 and proximal A. fib for which she is on anticoagulation followed by Dr. Lita Iverson. Echocardiogram last year normal.  And recent history no ischemic cardiac events. ROS:  Review of Systems 14 point negative except as above. PMH:   Past Medical History:   Diagnosis Date    Afib ([facility]     Baki    Arthritis     CAD (coronary artery disease)     GERD (gastroesophageal reflux disease)     Hyperlipidemia     Hypertension     Hypothyroid     Invasive ductal carcinoma of breast, female, left ([facility] 02/24/2022    Malignant neoplasm of lower-inner quadrant of left breast in female, estrogen receptor negative ([facility] 03/07/2022    MI (myocardial infarction) [facility])     Dr. Lita Iverson    Pneumonia     Migraines  MI  Nov 2014  C Cath 2014.     Social HX:   Social History     Socioeconomic History    Marital status:      Spouse name: Not on file    Number of children: 1    Years of education: Not on file    Highest education level: Not on file   Occupational History    Occupation: LPN   Tobacco Use    Smoking status: Some Days     Packs/day: 0.25     Years: 30.00     Pack years: 7.50     Types: Cigarettes     Start date: 12/4/1987    Smokeless tobacco: Never    Tobacco comments:     07/05/22 - Tying to quit, refused counciling/resources   Vaping Use    Vaping Use: Never used   Substance and Sexual Activity    Alcohol use: Yes     Comment: rare    Drug use: No    Sexual activity: Yes     Partners: Male     Comment: boy friend   Other Topics Concern    Not on file   Social History Narrative    Not on file     Social Determinants of Health     Financial Resource Strain: Not on file   Food Insecurity: Not on file   Transportation Needs: Not on file   Physical Activity: Not on file   Stress: Not on file   Social Connections: Not on file   Intimate Partner Violence: Not on file   Housing Stability: Not on file   Smoking history: 30+ years of smoking currently down to about 3 cigarettes/day average 1 to 1/2 packs/day. Spouse:   1 daughter age 32. 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 Dr. Nicolas Oliveira of OB  C-Scope: Not yet      Gyn HX:   GPA:  ROSS/BSO: all present. Last normal menstrual cycle in 6 years  LMP: No LMP recorded. Patient is postmenopausal.     Health Maintenance Due   Topic Date Due    COVID-19 Vaccine (1) Never done    Pneumococcal 0-64 years Vaccine (1 - PCV) Never done    Depression Screen  Never done    HIV screen  Never done    Hepatitis C screen  Never done    DTaP/Tdap/Td vaccine (1 - Tdap) Never done    Cervical cancer screen  Never done    Colorectal Cancer Screen  Never done    Diabetes screen  2022    Shingles vaccine (1 of 2) Never done        Interests:   4H advisor. Fam HX:   Family History   Problem Relation Age of Onset    Heart Disease Mother     Diabetes Mother     Cancer Mother         liposarcoma    Kidney Disease Mother     Heart Disease Father     Diabetes Father     Kidney Disease Father     Cancer Father         Non-melanoma skin cancer multiple times    No Known Problems Brother     No Known Problems Brother     No Known Problems Brother     No Known Problems Maternal Grandmother     No Known Problems Maternal Grandfather     Breast Cancer Paternal Grandmother 47        reoccurred at 46    Ovarian Cancer Paternal Grandmother 46    Bilateral breast cancer Paternal Grandmother         Opposite breast, age 52's    Breast Cancer Maternal Aunt 48    Breast Cancer Paternal Aunt         breast      Germline genetic testing: Sent and negative 3/4/22. Hospitalizations:   None recent    Allergies:   Allergies   Allergen Reactions    Codeine Hives and Swelling aspiration of left axillary seroma performed by Haylie Lucero MD at [address] Right 4/29/2022    Mediport Right Subclavian Removal and Replacement. performed by Haylie Lucero MD at [address] [facility], INCISIONAL Left 1998    excisional bx-H. Chollet fibrocystic changes    US BREAST BIOPSY NEEDLE ADDITIONAL LEFT Left 02/24/2022    US BREAST BIOPSY NEEDLE ADDITIONAL LEFT 2/24/2022 Monica Kumar MD [address] GUIDED NEEDLE LOC BREAST ADDL LEFT Left 3/22/2022    US GUIDED NEEDLE LOC BREAST ADDL LEFT 3/22/2022 DANTE Tapia L[address]        Medications:  Current Outpatient Medications   Medication Sig Dispense Refill    doxycycline hyclate (VIBRA-TABS) 100 MG tablet Take 1 tablet by mouth 2 times daily 60 tablet 1    Magic Mouthwash (MIRACLE MOUTHWASH) Swish and spit 5 mLs 4 times daily as needed for Irritation 1:1:1 Benadryl, lidocaine, nystatin 120 mL 1    flecainide (TAMBOCOR) 100 MG tablet TAKE 1 TABLET BY MOUTH TWICE A  tablet 0    ondansetron (ZOFRAN-ODT) 8 MG TBDP disintegrating tablet Place 1 tablet under the tongue every 8 hours as needed for Nausea or Vomiting 20 tablet 2    prochlorperazine (COMPAZINE) 10 MG tablet Take 1 tablet by mouth every 6 hours as needed (nausea) 30 tablet 1    lidocaine-prilocaine (EMLA) 2.5-2.5 % cream Apply topically as needed.  5 g 1    metoprolol succinate (TOPROL XL) 100 MG extended release tablet TAKE 1 TABLET BY MOUTH EVERY DAY 90 tablet 1    atorvastatin (LIPITOR) 40 MG tablet TAKE 1 TABLET DAILY 90 tablet 3    ELIQUIS 5 MG TABS tablet TAKE 1 TABLET BY MOUTH TWICE A  tablet 3    levothyroxine (SYNTHROID) 125 MCG tablet TAKE 1 TABLET BY MOUTH EVERY DAY IN THE MORNING ON EMPTY STOMACH      ZINC PO Take by mouth daily      Ascorbic Acid (VITAMIN C PO) Take by mouth daily      ibuprofen (ADVIL;MOTRIN) 800 MG tablet Take 800 mg by mouth every 6 hours as needed for Pain       omeprazole (PRILOSEC) 10 MG delayed release capsule Take 10 mg by mouth daily      aspirin 81 MG chewable tablet Take 81 mg by mouth daily  30 tablet 3    nitroGLYCERIN (NITROSTAT) 0.4 MG SL tablet Place 1 tablet under the tongue every 5 minutes as needed for Chest pain (Patient not taking: Reported on 2022) 25 tablet 3     No current facility-administered medications for this visit. Facility-Administered Medications Ordered in Other Visits   Medication Dose Route Frequency Provider Last Rate Last Admin    sodium chloride flush 0.9 % injection 5-40 mL  5-40 mL IntraVENous PRN Mariah Ramon MD   20 mL at 22 0945    heparin flush 100 UNIT/ML injection 500 Units  500 Units IntraCATHeter PRN Mariah Ramon MD             EXAM:   height is 6' (1.829 m) and weight is 313 lb (142 kg) (abnormal). Her oral temperature is 97.8 °F (36.6 °C). Her blood pressure is 132/77 and her pulse is 76. Her respiration is 16 and oxygen saturation is 96%. Estimated body surface area is 2.69 meters squared as calculated from the following:    Height as of this encounter: 6' (1.829 m). Weight as of this encounter: 313 lb (142 kg). ECO  General: Non-ill appearing. Very pleasant and upbeat. Morbidly obese. HEENT: NC/AT,nonicteric, perrla,eom intact, no mucosal lesions, dentition in good repair. Neck: normal thyroid, no masses. pulses nl, no bruits,   Nodes: No adenopathy  Lungs/chest: clear, no rales,rhonchi or wheezing, lung bases clear  CV: rrr, no rubs ,gallops or murmurs  Breasts: Postsurgical ecchymosis in the left breast post lumpectomy and recent evacuation of hematoma in the left axilla. No palpable masses bilaterally  Abd/Rectal: soft, non-tender,bowel sounds normal , no HSM,no masses, obese  Back: normal curvature, No midline tenderness. flanks nontender  : Not Examined  Extremities: no cyanosis,clubbing or edema.   Skin: Erythema and bandage over a drain in the left axilla and site of a recent boil  Neuro: A and O x 4, CN exam nonfocal, Motor- no deficits, Sensory- no deficits, gait-nl, speech- fluent, no ataxia. Devices: Right chest [id number].       DATA:    LAB:   4/5/2022  CA 27-20 9 = 15  Estradiol 15.4/decreased    CBC with Differential:      Lab Results   Component Value Date/Time    WBC 7.6 07/05/2022 12:25 PM    RBC 3.89 07/05/2022 12:25 PM    HGB 12.2 07/05/2022 12:25 PM    HCT 36.3 07/05/2022 12:25 PM     07/05/2022 12:25 PM    MCV 93 07/05/2022 12:25 PM    MCH 31.4 07/05/2022 12:25 PM    MCHC 33.6 07/05/2022 12:25 PM    RDW 15.1 07/05/2022 12:25 PM    NRBC 0 06/28/2022 03:06 PM    SEGSPCT 47.0 06/28/2022 03:06 PM    MONOPCT 4.4 06/28/2022 03:06 PM    MONOSABS 0.8 07/05/2022 12:25 PM    LYMPHSABS 1.0 07/05/2022 12:25 PM    EOSABS 0.2 07/05/2022 12:25 PM    BASOSABS 0.1 07/05/2022 12:25 PM     Lab Results   Component Value Date/Time    SEGSABS 5.5 07/05/2022 12:25 PM       CMP:    Lab Results   Component Value Date/Time     07/19/2022 09:44 AM     12/21/2021 12:28 PM    K 3.3 07/19/2022 09:44 AM    K 4.0 04/13/2022 08:05 AM    K 3.8 12/04/2019 02:29 PM     12/21/2021 12:28 PM    CO2 24 12/21/2021 12:28 PM    BUN 12 12/21/2021 12:28 PM    CREATININE 0.5 07/19/2022 09:44 AM    CREATININE 0.5 12/21/2021 12:28 PM    LABGLOM >90 12/21/2021 12:28 PM    GLUCOSE 105 12/21/2021 12:28 PM    PROT 6.4 07/05/2022 12:25 PM    LABALBU 3.7 07/05/2022 12:25 PM    CALCIUM 9.1 12/21/2021 12:28 PM    BILITOT 0.2 07/05/2022 12:25 PM    ALKPHOS 87 07/05/2022 12:25 PM    AST 12 07/05/2022 12:25 PM    ALT 13 07/05/2022 12:25 PM       BMP:    Lab Results   Component Value Date/Time     07/19/2022 09:44 AM     12/21/2021 12:28 PM    K 3.3 07/19/2022 09:44 AM    K 4.0 04/13/2022 08:05 AM    K 3.8 12/04/2019 02:29 PM     12/21/2021 12:28 PM    CO2 24 12/21/2021 12:28 PM    BUN 12 12/21/2021 12:28 PM    LABALBU 3.7 07/05/2022 12:25 PM    CREATININE 0.5 07/19/2022 09:44 AM    CREATININE 0.5 12/21/2021 12:28 PM    CALCIUM 9.1 12/21/2021 12:28 PM    LABGLOM >90 12/21/2021 12:28 PM    GLUCOSE 105 12/21/2021 12:28 PM       Magnesium:    Lab Results   Component Value Date/Time    MG 2.3 12/05/2019 06:14 AM     PT/INR:    Lab Results   Component Value Date/Time    INR 1.00 04/13/2022 08:05 AM     TSH:    Lab Results   Component Value Date/Time    TSH 4.690 12/21/2021 12:28 PM     VITAMIN B12: No components found for: B12  FOLATE:  No results found for: FOLATE  IRON:  No results found for: IRON  Iron Saturation:  No components found for: PERCENTFE  TIBC:  No results found for: TIBC  FERRITIN:  No results found for: FERRITIN  PSA: No results found for: PSA         IMAGING:    Echocardiogram June 14 per cardiology normal ejection fraction. Bone scan 4/11/2022-no evidence of bony metastatic disease. LOCATION: [place]       PROCEDURE: [facility] DIGITAL DIAGNOSTIC BILATERAL, US BREAST LIMITED LEFT       CLINICAL INFORMATION: Subareolar mass of left breast . Tomosynthesis. Meets criteria for genetic evaluation and has been offered information for possible referral.           PATIENT MEDICAL HISTORY: No relevant medical history has been documented for this patient. FAMILY HISTORY: Family medical history includes breast cancer in paternal grandmother and ovarian cancer in paternal grandmother. RISK VALUES: Tyrer-Cuzick 10yr.: 4.2%, Tyrer-Cuzick life: 15.3%       COMPARISON: 6/1/2015       [facility] MICHAEL DIGITAL DIAGNOSTIC BILATERAL: 2/18/22       TECHNIQUE: Bilateral CC and MLO views were obtained each breast. Tomosynthesis was used. CAD was used. BREAST COMPOSITION: The tissue of the breast(s) is heterogeneously dense. This may lower the sensitivity of mammography. FINDINGS:        There is a nodular density within the inner central left breast anterior depth which correlates to the palpable abnormality. Further evaluation with targeted ultrasound is reported below.        The additional palpable abnormality within the periareolar region of the left breast does not demonstrate 8 definite mammographic correlate. However, further evaluation with targeted ultrasound is reported below. US BREAST LIMITED LEFT:       TECHNIQUE: Targeted ultrasound of the left breast was performed. Grayscale images and color images of the real-time examination were reviewed. The axilla was also imaged. FINDINGS:        There is a hypoechoic lobulated mass within the inner central left breast near the 9:00 position 1 cm from the nipple measuring 1.9 x 1.4 x 1.6 cm. This correlates with the mammographic finding and the palpable abnormality. Recommend ultrasound-guided    biopsy. There is a small hypoechoic nodular lesion within the upper central left breast 12:00 position 3 cm from the nipple measuring 0.3 x 0.2 x 0.3 cm. This is incidental. However, recommend ultrasound-guided biopsy. There is an enlarged lymph node demonstrated within the left axilla was cortical thickening measuring 4.9 mm. There is retained fatty hilum. There is an additional prominent lymph node within the left axilla with slightly less cortical thickening    measuring 4 mm. Recommend ultrasound-guided biopsy of the largest left axillary lymph node. 2/18/22   Impression   1. There is a hypoechoic lobulated mass within the inner central left breast near the 9:00 position 1 cm from the nipple measuring 1.9 x 1.4 x 1.6 cm. This correlates with the mammographic finding and the palpable abnormality. Recommend ultrasound-guided    biopsy. 2. There is a small hypoechoic nodular lesion within the upper central left breast 12:00 position 3 cm from the nipple measuring 0.3 x 0.2 x 0.3 cm. This is incidental. However, recommend ultrasound-guided biopsy. 3. There is an enlarged lymph node demonstrated within the left axilla was cortical thickening measuring 4.9 mm. There is retained fatty hilum.   There is an additional prominent lymph node within the left axilla with slightly less cortical thickening    measuring 4 mm. Recommend ultrasound-guided biopsy of the largest left axillary lymph node. These results were discussed with the patient. The tech navigator was notified. We will arrange scheduling the patient for her procedure per department protocol. BI-RADS CATEGORY 4C - Suspicious abnormality - High suspicion for malignancy. Management: Tissue diagnosis. Biopsy should be performed in the absence of clinical contraindication. **This report has been created using voice recognition software. It may contain minor errors which are inherent in voice recognition technology. **       Final report electronically signed by Dr. oLu Camacho on 2/18/2022 4:48 PM          [address] LEFT (Order [id number]) - Reflex for Order [id number]  Narrative   LOCATION: [facility]       EXAM:     1. MAMMOGRAM POST BX CLIP PLACEMENT LEFT, [facility] US GUID NDL BIOPSY LEFT, [facility] NEEDLE BIOPSY LYMPH NODE SUPERFICIAL, US BREAST BIOPSY W LOC DEVICE EACH ADDL LESION LEFT       1. Biopsy of the left breast, percutaneous, using imaging guidance, 2 sites. 2. Biopsy of the left axilla, axillary lymph node, percutaneous, using imaging guidance. 3. Ultrasound guidance for biopsy, imaging supervision and interpretation. 4. Postprocedural diagnostic left mammogram.           HISTORY: 51 years, Female, Mass overlapping multiple quadrants of left breast, Lymph node enlargement. .       COMPARISON:  2/18/2022 and 6/1/2015. TECHNIQUE/FINDINGS:        Written, informed consent was obtained, including discussion of the risks, benefits and alternatives. The patient's left breast was marked by the physician with initials. A timeout was performed including the patient's name, date of birth, procedure and    laterality.        Site 1, 3 mm nodular density, upper central left breast, 12:00, U clip:  An ultrasound-guided biopsy using real-time ultrasound was performed. The nodule in the upper central left breast was identified, localized , and the site was marked. With    ultrasound guidance from a medial approach, the area was prepped and draped in sterile fashion. 2 % lidocaine was used for local anesthesia. 14 ga Sertera coaxial needle was advanced under ultrasound guidance. Once the needle was documented to be in the    correct location, 4 samples were taken from the area of interest. Samples were placed in formalin sent to pathology. A U shaped biopsy marker was placed at the biopsy site. Site 2, 1.9 cm mass, 9:00, inner central left breast, barbell clip:  An ultrasound-guided biopsy using real-time ultrasound was performed. The mass in the inner central left breast was identified, localized , and the site was marked. With ultrasound    guidance from a medial approach, the area was prepped and draped in sterile fashion. 2 % lidocaine was used for local anesthesia. 14 ga Sertera coaxial needle was advanced under ultrasound guidance. Once the needle was documented to be in the correct    location, 4 samples were taken from the area of interest. Samples were placed in formalin sent to pathology. A barbell biopsy marker was placed at the biopsy site. Site 3, left axillary lymph node, tribell clip: An ultrasound-guided biopsy using real-time ultrasound was performed. The lymph node in the left axilla was identified, localized , and the site was marked. With ultrasound guidance from a lateral approach,    the area was prepped and draped in sterile fashion. 2 % lidocaine was used for local anesthesia. 14 ga Sertera coaxial needle was advanced under ultrasound guidance. Once the needle was documented to be in the correct location, 4 samples were taken from    the area of interest. Samples were placed in formalin sent to pathology. A tribell biopsy marker was placed at the biopsy site.        Clip placement was confirmed with a left digital diagnostic mammogram. The mammogram was performed as a separate procedure in a separate room with a dedicated machine. The patient tolerated the procedure well. No evidence of immediate complication. The patient was given post-procedure instructions, including wound care. POSTPROCEDURE MAMMOGRAM:       Images of the left include a CC view and MLO view. Tomosynthesis. CAD was utilized. There are scattered fibroglandular elements in the breast(s) that could obscure a lesion on mammography. Post procedure mammograms were taken in a separate room. There is a U shaped biopsy clip at the biopsy site 1, 12:00, anterior depth. There is a    barbell clip in the inner central left breast, within the mammographic mass. This corresponds with the original mammographic density. There is a tribell clip in the left axilla. There are no other significant findings in the breast.               Impression   1. Successful left breast ultrasound guided core needle biopsy, 2 sites. 2. Successful left axillary lymph node ultrasound guided core needle biopsy. 3. Successful marker clip placements with confirmation by digital diagnostic mammogram.            Waiting for pathology results. A final report will be issued when these become available. BI-RADS Final Assessment Category: Waiting for pathology. Management Recommendation: Assess radiologic/pathologic concordance. **This report has been created using voice recognition software. It may contain minor errors which are inherent in voice recognition technology. **       Final report electronically signed by Dr. Sue Keller on 2/24/2022 10:00 AM          [facility] NEEDLE BIOPSY LYMPH NODE SUPERFICIAL (Order [id number]) - Reflex for Order [id number]  Narrative   LOCATION: [facility]       EXAM:     1.  MAMMOGRAM POST BX CLIP PLACEMENT LEFT, [facility] US GUID NDL BIOPSY LEFT, [facility] NEEDLE BIOPSY LYMPH NODE SUPERFICIAL, US BREAST BIOPSY W LOC DEVICE EACH ADDL LESION LEFT 1. Biopsy of the left breast, percutaneous, using imaging guidance, 2 sites. 2. Biopsy of the left axilla, axillary lymph node, percutaneous, using imaging guidance. 3. Ultrasound guidance for biopsy, imaging supervision and interpretation. 4. Postprocedural diagnostic left mammogram.           HISTORY: 51 years, Female, Mass overlapping multiple quadrants of left breast, Lymph node enlargement. .       COMPARISON:  2/18/2022 and 6/1/2015. TECHNIQUE/FINDINGS:        Written, informed consent was obtained, including discussion of the risks, benefits and alternatives. The patient's left breast was marked by the physician with initials. A timeout was performed including the patient's name, date of birth, procedure and    laterality. Site 1, 3 mm nodular density, upper central left breast, 12:00, U clip:  An ultrasound-guided biopsy using real-time ultrasound was performed. The nodule in the upper central left breast was identified, localized , and the site was marked. With    ultrasound guidance from a medial approach, the area was prepped and draped in sterile fashion. 2 % lidocaine was used for local anesthesia. 14 ga Sertera coaxial needle was advanced under ultrasound guidance. Once the needle was documented to be in the    correct location, 4 samples were taken from the area of interest. Samples were placed in formalin sent to pathology. A U shaped biopsy marker was placed at the biopsy site. Site 2, 1.9 cm mass, 9:00, inner central left breast, barbell clip:  An ultrasound-guided biopsy using real-time ultrasound was performed. The mass in the inner central left breast was identified, localized , and the site was marked. With ultrasound    guidance from a medial approach, the area was prepped and draped in sterile fashion. 2 % lidocaine was used for local anesthesia. 14 ga Sertera coaxial needle was advanced under ultrasound guidance.  Once the needle was documented to be in the correct location, 4 samples were taken from the area of interest. Samples were placed in formalin sent to pathology. A barbell biopsy marker was placed at the biopsy site. Site 3, left axillary lymph node, tribell clip: An ultrasound-guided biopsy using real-time ultrasound was performed. The lymph node in the left axilla was identified, localized , and the site was marked. With ultrasound guidance from a lateral approach,    the area was prepped and draped in sterile fashion. 2 % lidocaine was used for local anesthesia. 14 ga Sertera coaxial needle was advanced under ultrasound guidance. Once the needle was documented to be in the correct location, 4 samples were taken from    the area of interest. Samples were placed in formalin sent to pathology. A tribell biopsy marker was placed at the biopsy site. Clip placement was confirmed with a left digital diagnostic mammogram. The mammogram was performed as a separate procedure in a separate room with a dedicated machine. The patient tolerated the procedure well. No evidence of immediate complication. The patient was given post-procedure instructions, including wound care. POSTPROCEDURE MAMMOGRAM:       Images of the left include a CC view and MLO view. Tomosynthesis. CAD was utilized. There are scattered fibroglandular elements in the breast(s) that could obscure a lesion on mammography. Post procedure mammograms were taken in a separate room. There is a U shaped biopsy clip at the biopsy site 1, 12:00, anterior depth. There is a    barbell clip in the inner central left breast, within the mammographic mass. This corresponds with the original mammographic density. There is a tribell clip in the left axilla. There are no other significant findings in the breast.               Impression   1. Successful left breast ultrasound guided core needle biopsy, 2 sites.     2. Successful left axillary lymph node ultrasound guided core needle biopsy. 3. Successful marker clip placements with confirmation by digital diagnostic mammogram.            Waiting for pathology results. A final report will be issued when these become available. BI-RADS Final Assessment Category: Waiting for pathology. Management Recommendation: Assess radiologic/pathologic concordance. **This report has been created using voice recognition software. It may contain minor errors which are inherent in voice recognition technology. **       Final report electronically signed by Dr. Savanna Butt on 2/24/2022 10:00 AM          [facility] JOYCELYN/ [address] (Order [id number]) - Reflex for Order [id number]  Narrative   LOCATION: [place]       EXAM:     1. MAMMOGRAM POST BX CLIP PLACEMENT LEFT, [facility] US GUID NDL BIOPSY LEFT, [facility] NEEDLE BIOPSY LYMPH NODE SUPERFICIAL, US BREAST BIOPSY W LOC DEVICE EACH ADDL LESION LEFT       1. Biopsy of the left breast, percutaneous, using imaging guidance, 2 sites. 2. Biopsy of the left axilla, axillary lymph node, percutaneous, using imaging guidance. 3. Ultrasound guidance for biopsy, imaging supervision and interpretation. 4. Postprocedural diagnostic left mammogram.           HISTORY: 51 years, Female, Mass overlapping multiple quadrants of left breast, Lymph node enlargement. .       COMPARISON:  2/18/2022 and 6/1/2015. TECHNIQUE/FINDINGS:        Written, informed consent was obtained, including discussion of the risks, benefits and alternatives. The patient's left breast was marked by the physician with initials. A timeout was performed including the patient's name, date of birth, procedure and    laterality. Site 1, 3 mm nodular density, upper central left breast, 12:00, U clip:  An ultrasound-guided biopsy using real-time ultrasound was performed. The nodule in the upper central left breast was identified, localized , and the site was marked.  With    ultrasound guidance well. No evidence of immediate complication. The patient was given post-procedure instructions, including wound care. POSTPROCEDURE MAMMOGRAM:       Images of the left include a CC view and MLO view. Tomosynthesis. CAD was utilized. There are scattered fibroglandular elements in the breast(s) that could obscure a lesion on mammography. Post procedure mammograms were taken in a separate room. There is a U shaped biopsy clip at the biopsy site 1, 12:00, anterior depth. There is a    barbell clip in the inner central left breast, within the mammographic mass. This corresponds with the original mammographic density. There is a tribell clip in the left axilla. There are no other significant findings in the breast.               Impression   1. Successful left breast ultrasound guided core needle biopsy, 2 sites. 2. Successful left axillary lymph node ultrasound guided core needle biopsy. 3. Successful marker clip placements with confirmation by digital diagnostic mammogram.            Waiting for pathology results. A final report will be issued when these become available. BI-RADS Final Assessment Category: Waiting for pathology. Management Recommendation: Assess radiologic/pathologic concordance. **This report has been created using voice recognition software. It may contain minor errors which are inherent in voice recognition technology. **       Final report electronically signed by Dr. Price Reese on 2/24/2022 10:00 AM           TTE procedure:ECHOCARDIOGRAM COMPLETE 2D W DOPPLER W COLOR. Procedure Date  Date: 04/22/2021 Start: 09:44 AM     Study Location: Echo Lab  Technical Quality: Limited visualization due to body habitus. Indications:Fatigue and Shortness of breath.      Additional Medical History:acute coronary syndrome, chest pain,  hypertension, tobacco abuse, history of myocardial infarction, unstable  angina, coronary artery disease, atrial fibrillation, gastroesophageal  reflux disease, hyperlipidemia, hypothyroid     Patient Status: Routine     Height: 72 inches Weight: 309.01 pounds BSA: 2.56 m^2 BMI: 41.91 kg/m^2     BP: 122/79 mmHg      Conclusions      Summary  21   Ejection fraction is visually estimated at 60%. Overall left ventricular function is normal.      Signature     Followed by Dr. Dejah Johnson cardiology; history of proximal A. fib. Echocardiogram 2022     Conclusions      Summary   Ejection fraction is visually estimated at 60%. Overall left ventricular function is normal.      Signature      ----------------------------------------------------------------   Electronically signed by Danay Knight MD (Interpreting   physician) on 2022 at 04:02 PM   ----------------------------------------------------------------       PROCEDURES:  -See above    PATHOLOGY:              : 1970  AGE: 54 Y                          PATHOLOGY REPORT                       ATTN: ADAM BESS                       REQ: Juan Presser       Copies To:   Farooq Masters; Mack Dawn; CARLOS MENDENHALL       Clinical Information: LT BREAST MASS 12:00, 9:00, LT ENLARGED AXILLARY   LYMPH NODE   22  ADDENDUM REPORT 2022     FINAL DIAGNOSIS:   A. Left breast mass, 12:00, U-Clip, biopsy:             Fibroglandular breast tissue with stromal fibrosis. B.  Left breast mass, 9:00, barbell clip, biopsy:             Invasive ductal carcinoma, Rekha grade 3.     C.  Left axillary lymph node, [place] clip, biopsy:    Fragments of lymph node, negative for malignancy.      BREAST BIOMARKERS*   Estrogen Receptor: (Clone SP1), [employer]       Negative (<1% of cells staining)     Progesterone Receptor: (Clone 1E2), [employer]       Negative (<1% of cells staining)     Ki-67 (clone 30-9)       Percentage of positive nuclei:  95%               Favorable <10%               Borderline 10-20% Unfavorable >20%        2018          Inform HER2 Dual CHEPE         HER2 IHCClone 4B5      ASCO/CAP      [facility]      HER2 dual                                  Systems Additional      CHEPE Group #                                Workup   (  Group 4:      HER2/CEP17 Ratio <2.0 and    HER2 NEGATIVE, HER2   X                >=.0 and <6.0 HER2           IHC is 0-1+. See Group   )                signals/cell                  4 comment. Number of observers: 3                    (PCF/MTK/TERRIE)                    Number of invasive tumor                    cells counted: 20                    Using dual probe assay:                       Average number of HER2                    signals per cell:  4.3                                 Average number                     of CEP17 signals per cell:                      3.4                     HER2/CEP17 ratio:  1.3 but the latter is considered negative and was reflexed to  [facility] which is negative ,therefore pathology Dr. Azra Rios reassures me that her  HER-2 negative and that this does not have to be repeated on the definitive surgical specimen. Group 4 comment: It is uncertain whether patients with an average of >=   4.0 and < 6.0 HER2 signals per cell and a HER2/CEP17 ratio of < 2.0   benefit from HER2 targeted therapy in the absence of protein   overexpression (IHC 3+). If the specimen test result is close to the   CHEPE ratio threshold for positive, there is a high likelihood that   repeat testing will result in different results by chance alone. Therefore, when [facility] results are not 3+ positive, it is recommended that   the sample be considered HER2 negative without additional testing on   the same specimen.      External Controls:  Adequate   Internal Controls:  Adequate   Standard Assay Conditions:  Met     Staining Method Used:    Formalin fixation    Antigen retrieval type:  Cell Conditioning 1, mild merle    Time in antigen retrieval:  30 minutes    Detection system type:   DAB Ultraview kit       Specimen:   A) CORE NEEDLE BREAST BIOPSY, LT MASS 12:00 U CLIP   B) CORE NEEDLE BREAST BIOPSY, LT MASS 9:00 BARBELL   C) CORE NEEDLE BREAST BIOPSY, LT AXILLARY LYMPH NODE TRIBELL       Definitive lumpectomy and sentinel node biopsy 3/22/2022  Clinical Information: INVASIVE DUCTAL CARCINOMA LEFT BREAST 3/22/22    FINAL DIAGNOSIS:   A: Left axillary sentinel lymph nodes, resection:     Four lymph nodes with no evidence of malignancy.  (0/4)     One lymph node with previous biopsy site changes. One lymph node with black pigment deposition. B: Left breast, lumpectomy specimen:     Invasive ductal carcinoma, Rekha grade 3 of 3. Invasive carcinoma is 2.7 cm in greatest dimension. Invasive carcinoma is 1.5 mm to the closest margin-inferior/caudal.     Invasive carcinoma is > 5 mm to all other margins. Changes consistent with previous biopsy site. pT2, pN0(sn)     Specimen:   A) SENTINEL LYMPH NODE(S), LEFT AXILLARY   B) EXCISION OF BREAST, LEFT CANCER LUMP     CASE SUMMARY: (INVASIVE CARCINOMA OF THE BREAST: Resection)   Standard(s): AJCC-UICC 8     SPECIMEN   Procedure   Excision (less than total mastectomy)     Specimen Laterality   Left     TUMOR   Tumor Site   Clock position    Specify Clock Position    9 o'clock     Histologic Type   Invasive carcinoma of no special type (ductal)     Histologic Grade ([place] Histologic Score)   Rekha Score    Glandular (Acinar) / Tubular Differentiation    Score 3 (less than 10% of tumor area forming glandular / tubular   structures)      Nuclear Pleomorphism    Score 3 (Vesicular nuclei, often with prominent nucleoli, exhibiting    marked variation in size and shape, occasionally with very large and    bizarre forms)      Mitotic Rate    See Table 1 in CAP Protocol.     Score 3      Overall Grade    Grade 3 (scores of 8 or 9)     Tumor Size   Greatest dimension of largest invasive focus greater than 1 mm (specify   exact measurement in Millimeters (mm)): 27 mm    Additional Dimension in Millimeters (mm): 17 x 15 mm     Tumor Focality   Single focus of invasive carcinoma     Ductal Carcinoma In Situ (DCIS)   Not identified     Lobular Carcinoma In Situ (LCIS)   Not identified     Tumor Extent    Tumor Extent    Not applicable (skin, nipple, and skeletal muscle are absent)     Lymphovascular Invasion   Not identified     Dermal Lymphovascular Invasion   No skin present     Treatment Effect in the Breast   No known presurgical therapy     Treatment Effect in the Lymph Nodes   Not applicable     MARGINS   Margin Status for Invasive Carcinoma   All margins negative for invasive carcinoma    Distance from Invasive Carcinoma to Closest Margin    Specify in Millimeters (mm)    Exact distance: 1.5 mm      Closest Margin to Invasive Carcinoma    Inferior/caudal      Distance from Invasive Carcinoma to Anterior/Skin Margin    Specify in Millimeters (mm)    Exact distance: 7 mm      Distance from Invasive Carcinoma to Posterior/Deep Margin    Specify in Millimeters (mm)    Exact distance: 10 mm      Distance from Invasive Carcinoma to Superior/Cranial Margin    Specify in Millimeters (mm)    Exact distance: 7 mm      Distance from Invasive Carcinoma to Inferior/Caudal Margin    Specify in Millimeters (mm)    Exact distance: 1.5 mm      Distance from Invasive Carcinoma to Medial Margin    Specify in Millimeters (mm)    Greater than: 10 mm      Distance from Invasive Carcinoma to Lateral Margin    Specify in Millimeters (mm)    Greater than: 10 mm     Margin Status for DCIS   Not applicable (no DCIS in specimen)       REGIONAL LYMPH NODES   Regional Lymph Node Status   Regional lymph nodes present    All regional lymph nodes negative for tumor      Total Number of Lymph Nodes Examined (sentinel and non-sentinel)    Exact number (specify): 4    Number of [place] Nodes Examined    Exact number (specify): 4     Regional Lymph Node Comment: Biopsy site changes are present within one   of the lymph nodes. In addition, another lymph node demonstrates black   pigment deposition (possibly tattoo or anthracosis). DISTANT METASTASIS   Distant Site   Not applicable     PATHOLOGIC STAGE CLASSIFICATION (pTNM, AJCC 8th Edition)   TNM Descriptors   Not applicable     pT Category   pT2: Tumor greater than 20 mm but less than or equal to 50 mm in   greatest dimension     Regional Lymph Nodes Modifier   The (sn) modifier is added to the N category when a sentinel node   biopsy is performed (using either dye or tracer) and fewer than six   lymph nodes are removed (sentinel and nonsentinel). (sn): [place] nodes evaluated.      pN Category   pN0: No regional lymph node metastasis identified or ITCs only#     pM Category   Not applicable - pM cannot be determined from the submitted specimen(s)       ADDITIONAL FINDINGS   Additional Findings (specify): Changes consistent with previous biopsy   site, usual ductal hyperplasia     SPECIAL STUDIES   Breast Biomarker Testing Performed on Previous Biopsy   Estrogen Receptor (ER)    Estrogen Receptor (ER) Status    Negative     Progesterone Receptor (PgR)    Progesterone Receptor (PgR) Status    Negative     HER2 (by immunohistochemistry)    HER2 (by immunohistochemistry)    Negative (Score 0-1+)     HER2 (by in situ hybridization)    HER2 (by in situ hybridization)    Indeterminate   Case reviewed with Dr. Janeth Colindres pathology who reassured me that in fact the HER-2 is negative    Ki-67    Ki-67 Percentage of Positive Nuclei: 95%    Testing Performed on Case Number: [id number]     [id number] x 2   [id number] x2                                                       <Sign Out Dr. Dalia Carpenter M.D., F.C.A.P.       GENETICS:  Germline testing done 3/4/2022-negative    MOLECULAR:  Not applicable    ASSESSMENT/PLAN:    1: Diagnosis: 82-year-old postmenopausal female with a palpable lesion noted in 2/8/2022 with subsequent biopsy and definitive lumpectomy showing high risk cancer of the left breast.  Pathologic stage T2N0 triple negative grade 3.    2) Prognosis / Disease Status: High risk/JAVIER    3) Work-up:    Labs: CBC, CMP, reviewed today. Imaging: None new   Procedures: Left lumpectomy and axillary dissection. Consults: None new   other: Cardiology note reviewed. Echocardiogram June 14 unremarkable    4) Symptom Management: None new needed    5) Supportive care provided. Level of care is appropriate. 6) Treatment goal: Adjuvant      Treatment plan:  On 5/10/2022 start dose dense AC followed by dose dense Taxol  After course 2 mid June and echocardiogram. was done and also unremarkable  Reviewed patient's breast cancer history and cardiac history and she agrees with dose dense AC followed by T which is our best regimen for triple negative disease furthermore she has a normal echocardiogram and no recent ischemic cardiac events for many years    7) Medications reviewed. Prescriptions today: None but recently Zofran ODT, Compazine, EMLA cream              No orders of the defined types were placed in this encounter. [place]ARRS:  Controlled Substance Monitoring:    Acute and Chronic Pain Monitoring:   No flowsheet data found. 8) Research Options:      None      9) Other:        none    10) Follow Up:   Follow-up 2 weeks August 2 for course #2 dose dense Taxol and labs      Edmund Portillo MD

## 2022-07-19 NOTE — PROGRESS NOTES
Patient assessed for the following post chemotherapy:    Dizziness   No  Lightheadedness  No      Acute nausea/vomiting No  Headache   No  Chest pain/pressure  No  Rash/itching   No  Shortness of breath  No    Patient kept for 20 minutes observation post infusion chemotherapy. Patient tolerated chemotherapy treatment Taxol without further complications after Solucortif and additional benadryl given after mild reaction within 30 minutes of medication start. Last vital signs:   /64   Pulse 73   Temp 98.8 °F (37.1 °C) (Oral)   Resp 16   Ht 6' (1.829 m)   Wt (!) 313 lb 3.2 oz (142.1 kg)   SpO2 95%   BMI 42.48 kg/m²     Physician's instructed patient:  7/19 proceed with course 1 of dose dense Taxol with Udenyca on 7/20.  2.  Follow-up 2 weeks with nurse practitioner or other provider as I will be gone    Patient instructed if experience any of the above symptoms following today's infusion, she is to notify MD immediately or go to the emergency department. Discharge instructions given to patient. Verbalizes understanding. Ambulated off unit per self, with belongings.

## 2022-07-19 NOTE — ONCOLOGY
Chemotherapy Administration    Pre-assessment Data: Antineoplastic Agents  See toxicity flow sheet for assessment                                          [x]         Interventions:   Chemotherapy SQ injection given []   Taxol administered-VS per protocol [x]   Blood pressure meds held 12 hours prior to Rituxan/Ruxience []   Rituxan/Ruxience administered- VS and precautions per guidelines []   Emergency drugs available as appropriate [x]   Anaphylaxis assessment completed [x]   Pre-medications administered as ordered [x]   Blood return noted upon initiation of chemotherapy [x]   Blood return noted each 1-2ml of a vesicant medication if given IV push []   Navelbine, Vincristine and Velban given as a monitored wide open drip, blood return noted before during and after infusion.  []   Blood return noted each 2-3ml of a non-vesicant medication if given IV push []   Patient aware of potential Immunotherapy toxicities []   Monitor for signs / symptoms of hypersensitivity reaction [x]   Chemotherapy orders (drug/dose/rate) verified by 2 Chemo certified RNs [x]   Monitor IV site and blood return throughout the infusion of the medication [x]   Document IV site checks on the IV assessment form [x]   Document chemotherapy teaching on the Patient Education tab [x]   Document patient verbalizes understanding of medications being administered [x]   If IV infiltration, see ONS Guidelines []   Other:     Taxol []

## 2022-07-19 NOTE — DISCHARGE INSTRUCTIONS
Physician's instructions:  7/19 proceed with course 1 of dose dense Taxol with Neulasta injection on 7/20. Follow-up 2 weeks with nurse practitioner or other provider as I will be gone    Please contact your Oncologist if you have any questions regarding the Taxol that you received today. You are instructed to call the office or go to the Emergency Dept. If you experience any of the following symptoms:    Dizziness/lightheadedness   Acute nausea or vomiting-not relieved by medications  Headaches-not relieved by medications  New chest pain or pressure  New rash /itching  New shortness of breath  Fever,chills or signs or symptoms of infection    Make sure you are drinking 48 to 64 ounces of water daily-if you are unable to drink fluids let us know right away.

## 2022-07-20 ENCOUNTER — HOSPITAL ENCOUNTER (OUTPATIENT)
Dept: INFUSION THERAPY | Age: 52
Discharge: HOME | End: 2022-07-20
Payer: COMMERCIAL

## 2022-07-20 VITALS
SYSTOLIC BLOOD PRESSURE: 119 MMHG | DIASTOLIC BLOOD PRESSURE: 64 MMHG | TEMPERATURE: 98 F | RESPIRATION RATE: 16 BRPM | OXYGEN SATURATION: 97 % | HEART RATE: 74 BPM

## 2022-07-20 DIAGNOSIS — C50.112 MALIGNANT NEOPLASM OF CENTRAL PORTION OF LEFT BREAST IN FEMALE, ESTROGEN RECEPTOR NEGATIVE (HCC): Primary | ICD-10-CM

## 2022-07-20 DIAGNOSIS — Z17.1 MALIGNANT NEOPLASM OF CENTRAL PORTION OF LEFT BREAST IN FEMALE, ESTROGEN RECEPTOR NEGATIVE (HCC): Primary | ICD-10-CM

## 2022-07-20 LAB
BASOPHILIA: ABNORMAL
BASOPHILS # BLD: 1 %
BASOPHILS ABSOLUTE: 0.1 THOU/MM3 (ref 0–0.1)
DIFFERENTIAL TYPE: ABNORMAL
EOSINOPHIL # BLD: 0.3 %
EOSINOPHILS ABSOLUTE: 0 THOU/MM3 (ref 0–0.4)
ERYTHROCYTE [DISTWIDTH] IN BLOOD BY AUTOMATED COUNT: 16 % (ref 11.5–14.5)
ERYTHROCYTE [DISTWIDTH] IN BLOOD BY AUTOMATED COUNT: 55.1 FL (ref 35–45)
HCT VFR BLD CALC: 35.4 % (ref 37–47)
HEMOGLOBIN: 11.4 GM/DL (ref 12–16)
IMMATURE GRANS (ABS): 0.47 THOU/MM3 (ref 0–0.07)
IMMATURE GRANULOCYTES: 5.3 %
LYMPHOCYTES # BLD: 10.7 %
LYMPHOCYTES ABSOLUTE: 1 THOU/MM3 (ref 1–4.8)
MCH RBC QN AUTO: 31.3 PG (ref 26–33)
MCHC RBC AUTO-ENTMCNC: 32.2 GM/DL (ref 32.2–35.5)
MCV RBC AUTO: 97.3 FL (ref 81–99)
MONOCYTES # BLD: 9.8 %
MONOCYTES ABSOLUTE: 0.9 THOU/MM3 (ref 0.4–1.3)
NUCLEATED RED BLOOD CELLS: 0 /100 WBC
PATHOLOGIST REVIEW: ABNORMAL
PLATELET # BLD: 154 THOU/MM3 (ref 130–400)
PLATELET ESTIMATE: ADEQUATE
PMV BLD AUTO: 11.5 FL (ref 9.4–12.4)
RBC # BLD: 3.64 MILL/MM3 (ref 4.2–5.4)
SEG NEUTROPHILS: 72.9 %
SEGMENTED NEUTROPHILS ABSOLUTE COUNT: 6.5 THOU/MM3 (ref 1.8–7.7)
TOXIC GRANULATION: PRESENT
WBC # BLD: 8.9 THOU/MM3 (ref 4.8–10.8)

## 2022-07-20 PROCEDURE — 96372 THER/PROPH/DIAG INJ SC/IM: CPT

## 2022-07-20 PROCEDURE — 99211 OFF/OP EST MAY X REQ PHY/QHP: CPT

## 2022-07-20 PROCEDURE — 6360000002 HC RX W HCPCS: Performed by: INTERNAL MEDICINE

## 2022-07-20 RX ADMIN — PEGFILGRASTIM-CBQV 6 MG: 6 INJECTION, SOLUTION SUBCUTANEOUS at 14:56

## 2022-07-20 NOTE — PROGRESS NOTES
Udenyca reviewed, patient verbalizes understanding of medication being administered and potential side effects.

## 2022-07-20 NOTE — DISCHARGE INSTRUCTIONS
Please contact your Oncologist if you have any questions regarding the Udenyca injection that you received today. Patient instructed if experience any of the symptoms following today's Udenyca injection to notify MD immediately or go to emergency department. * dizziness/lightheadedness  *acute nausea/vomiting - not relieved with medication  *headache - not relieved from Tylenol/pain medication  *chest pain/pressure  *rash/itching  *shortness of breath        Drink fluids - 48oz fluids daily  Call if develop fever/ chills/ signs or symptoms of infection     Physician instructions:  Confirm patient is still taking her doxycycline. Recommend trying cold/heat intermittently. Patient can try OTC Vitamin E related to breast tenderness. Continue to monitor breast tenderness. Keep appointment with Dr. Yoseph James  Please call with any further concerns.

## 2022-07-20 NOTE — PROGRESS NOTES
Patient tolerated Udenyca injection without any complications. Last vital signs:   /64   Pulse 74   Temp 98 °F (36.7 °C) (Oral)   Resp 16   SpO2 97%     Patient instructed if experience any symptoms following today's injection ,she is to notify MD immediately or go to the emergency department. Discharge instructions given to patient. Verbalizes understanding. Ambulated off unit per self, with belongings.

## 2022-07-20 NOTE — PLAN OF CARE
Problem: Musculor/Skeletal Functional Status  Goal: Absence of falls  Outcome: Adequate for Discharge  Note: Patient free from falls while in O.P. Oncology. Intervention: Fall precautions  Note: [facility] Fall band placed on patient. Discussed the need to use the call light for assistance prior to getting up out of chair/bed. Problem: Intellectual/Education/Knowledge Deficit  Goal: Teaching initiated upon admission  Outcome: Adequate for Discharge  Note: Patient verbalizes understanding to verbal information given on Udenyca ,action and possible side effects. Aware to call MD if develop complications. Intervention: Verbal/written education provided  Note: Kerstin Goldmann reviewed, patient verbalizes understanding of medication being administered and potential side effects. Problem: Discharge Planning  Goal: Knowledge of discharge instructions  Description: Knowledge of discharge instructions  Outcome: Adequate for Discharge  Note: Verbalized understanding of discharge instructions, follow-up appointments, and when to call the physician. Intervention: Interaction with patient/family and care team  Note: Discuss understanding of discharge instructions,follow-up appointments, and when to call the physician. Problem: Infection - Adult  Goal: Absence of infection at discharge  Outcome: Adequate for Discharge  Flowsheets (Taken 7/20/2022 1813)  Absence of infection at discharge: Assess and monitor for signs and symptoms of infection  Goal: Will show no infection signs and symptoms  Description: Will show no infection signs and symptoms  Outcome: Adequate for Discharge     Care plan reviewed with patient. Patient verbalize understanding of the plan of care and contribute to goal setting.

## 2022-07-21 NOTE — DISCHARGE INSTR - COC
Continuity of Care Form    Patient Name: Qiana Leggett   :  1970  MRN:  [id number]    Admit date:  2022  Discharge date:  ***    Code Status Order: Prior   Advance Directives:     Admitting Physician:  No admitting provider for patient encounter. PCP: Semaj Vicente    Discharging Nurse: [facility] Unit/Room#: No information available for this encounter. Discharging Unit Phone Number: ***    Emergency Contact:   Extended Emergency Contact Information  Primary Emergency Contact: [address] Phone: [phone]  Mobile Phone: [phone]  Relation: Parent    Past Surgical History:  Past Surgical History:   Procedure Laterality Date    BREAST LUMPECTOMY Left 3/22/2022    LEFT BREAST LUMPECTOMY PARTIAL MASTECTOMY, SENTINEL LYMPH NODE BIOPSY, PRE OP NEEDLE LOC X 2 performed by Chris Mccann MD at [facility] Left 3/30/2022    Evacuation hematoma left axilla performed by Chris Mccann MD at [facility] ARTHROSCOPY Right     08    [facility] BIOPSY LYMPH NODE BY NEEDLE SUPERFICIAL  2022    JENNIFER BIOPSY LYMPH NODE BY NEEDLE SUPERFICIAL 2022 Kim Foster MD [facility] US GUID NDL BIOPSY LEFT Left 2022    JENNIFER [address] LEFT 2022 [facility]      PORT SURGERY Right 2022    Single Lumen Smartport Right Subclavian, aspiration of left axillary seroma performed by Chris Mccann MD at [address] [place] [address] Right 2022    Mediport Right Subclavian Removal and Replacement. performed by Chris Mccann MD at [facility], INCISIONAL Left     excisional bx-H. Chollet fibrocystic changes    US BREAST BIOPSY NEEDLE ADDITIONAL LEFT Left 2022    US BREAST BIOPSY NEEDLE ADDITIONAL LEFT 2022 Kim Foster [place] [address] [facility] NEEDLE LOC BREAST ADDL LEFT Left 3/22/2022    US GUIDED NEEDLE LOC BREAST ADDL LEFT 3/22/2022 [facility]       Immunization History:   Immunization History   Administered Date(s) Administered    Influenza Virus Vaccine 2018       Active Problems:  Patient Active Problem List   Diagnosis Code    ACS (acute coronary syndrome) ([facility] I24.9    Chest pain with moderate risk for cardiac etiology R07.9    Essential hypertension I10    HLD (hyperlipidemia) E78.5    Tobacco abuse Z72.0    History of MI (myocardial infarction) I25.2    Acquired hypothyroidism E03.9    Unstable angina (HCC) I20.0    Coronary artery disease involving native coronary artery of native heart without angina pectoris I25.10    Paroxysmal atrial fibrillation (HCC) I48.0    Gastroesophageal reflux disease without esophagitis K21.9    Malignant neoplasm of lower-inner quadrant of left breast in female, estrogen receptor negative ([facility] C50.312, Z17.1    Malignant neoplasm of central portion of left breast in female, estrogen receptor negative ([facility] C50.112, Z17.1    Hematoma T14. 8XXA       Isolation/Infection:   Isolation            No Isolation          Patient Infection Status       None to display            Nurse Assessment:  Last Vital Signs: /64   Pulse 74   Temp 98 °F (36.7 °C) (Oral)   Resp 16   SpO2 97%     Last documented pain score (0-10 scale):    Last Weight:   Wt Readings from Last 1 Encounters:   22 (!) 313 lb (142 kg)     Mental Status:  {IP PT MENTAL STATUS:08623}    IV Access:  { SHERIF IV ACCESS:239502896}    Nursing Mobility/ADLs:  Walking   {[facility] MDYA:759119059}  Transfer  {[facility] HQIE:368402683}  Bathing  {[facility] ETGN:890416978}  Dressing  {[facility] CFO}  Toileting  {[facility] AJAF:433775079}  Feeding  {[facility] LPJQ:548548577}  Med Admin  {[facility] NWSW:880078258}  Med Delivery   { SHERIF MED Delivery:805879077}    Wound Care Documentation and Therapy:  Incision 22 Chest Right;Upper (Active)   Number of days: 83       Incision 22 Axilla Left (Active)   Number of days: 99        Elimination:  Continence: Bowel: {YES / NK:87449}  Bladder: {YES / RK:67015}  Urinary Catheter: {Urinary Catheter:103326413}   Colostomy/Ileostomy/Ileal Conduit: {YES / Y}       Date of Last BM: ***  No intake or output data in the 24 hours ending 22 1615  No intake/output data recorded.     Safety Concerns:     [address] SHERIF Safety Concerns:356803572}    Impairments/Disabilities:      [facility] Impairments/Disabilities:104609207}    Nutrition Therapy:  Current Nutrition Therapy:   [facility] Diet List:355147428}    Routes of Feeding: {CHP DME Other Feedings:258783060}  Liquids: {Slp liquid thickness:83642}  Daily Fluid Restriction: {CHP DME Yes amt example:291976879}  Last Modified Barium Swallow with Video (Video Swallowing Test): {Done Not Done AFIL:373839972}    Treatments at the Time of Hospital Discharge:   Respiratory Treatments: ***  Oxygen Therapy:  {Therapy; copd oxygen:97435}  Ventilator:    { CC Vent EUGQ:976765439}    Rehab Therapies: {THERAPEUTIC INTERVENTION:6445276247}  Weight Bearing Status/Restrictions: [address]  Weight Bearin}  Other Medical Equipment (for information only, NOT a DME order):  {EQUIPMENT:900112087}  Other Treatments: ***    Patient's personal belongings (please select all that are sent with patient):  {CHP DME Belongings:205288370}    RN SIGNATURE:  {Esignature:646363537}    CASE MANAGEMENT/SOCIAL WORK SECTION    Inpatient Status Date: ***    Readmission Risk Assessment Score:  Readmission Risk              Risk of Unplanned Readmission:  0           Discharging to Facility/ Agency   Name:   Address:  Phone:  Fax:    Dialysis Facility (if applicable)   Name:  Address:  Dialysis Schedule:  Phone:  Fax:    / signature: {Esignature:399788224}    PHYSICIAN SECTION    Prognosis: {Prognosis:6383888769}    Condition at Discharge: [address] Patient

## 2022-07-26 RX ORDER — APIXABAN 5 MG/1
TABLET, FILM COATED ORAL
Qty: 180 TABLET | Refills: 3 | Status: SHIPPED | OUTPATIENT
Start: 2022-07-26

## 2022-07-27 DIAGNOSIS — C50.112 MALIGNANT NEOPLASM OF CENTRAL PORTION OF LEFT BREAST IN FEMALE, ESTROGEN RECEPTOR NEGATIVE (HCC): Primary | ICD-10-CM

## 2022-07-27 DIAGNOSIS — Z17.1 MALIGNANT NEOPLASM OF CENTRAL PORTION OF LEFT BREAST IN FEMALE, ESTROGEN RECEPTOR NEGATIVE (HCC): Primary | ICD-10-CM

## 2022-07-27 RX ORDER — DIPHENHYDRAMINE HYDROCHLORIDE 50 MG/ML
50 INJECTION INTRAMUSCULAR; INTRAVENOUS ONCE
Status: CANCELLED | OUTPATIENT
Start: 2022-08-02 | End: 2022-08-02

## 2022-07-27 RX ORDER — ONDANSETRON 2 MG/ML
8 INJECTION INTRAMUSCULAR; INTRAVENOUS
Status: CANCELLED | OUTPATIENT
Start: 2022-08-02

## 2022-07-27 RX ORDER — MEPERIDINE HYDROCHLORIDE 50 MG/ML
12.5 INJECTION INTRAMUSCULAR; INTRAVENOUS; SUBCUTANEOUS PRN
Status: CANCELLED | OUTPATIENT
Start: 2022-08-02

## 2022-07-27 RX ORDER — SODIUM CHLORIDE 9 MG/ML
25 INJECTION, SOLUTION INTRAVENOUS PRN
Status: CANCELLED | OUTPATIENT
Start: 2022-08-02

## 2022-07-27 RX ORDER — SODIUM CHLORIDE 9 MG/ML
5-40 INJECTION INTRAVENOUS PRN
Status: CANCELLED | OUTPATIENT
Start: 2022-08-02

## 2022-07-27 RX ORDER — ALBUTEROL SULFATE 90 UG/1
4 AEROSOL, METERED RESPIRATORY (INHALATION) PRN
Status: CANCELLED | OUTPATIENT
Start: 2022-08-02

## 2022-07-27 RX ORDER — FAMOTIDINE 10 MG/ML
20 INJECTION, SOLUTION INTRAVENOUS
Status: CANCELLED | OUTPATIENT
Start: 2022-08-02

## 2022-07-27 RX ORDER — SODIUM CHLORIDE 9 MG/ML
20 INJECTION, SOLUTION INTRAVENOUS ONCE
Status: CANCELLED | OUTPATIENT
Start: 2022-08-02 | End: 2022-08-02

## 2022-07-27 RX ORDER — SODIUM CHLORIDE 0.9 % (FLUSH) 0.9 %
5-40 SYRINGE (ML) INJECTION PRN
Status: CANCELLED | OUTPATIENT
Start: 2022-08-02

## 2022-07-27 RX ORDER — EPINEPHRINE 1 MG/ML
0.3 INJECTION, SOLUTION, CONCENTRATE INTRAVENOUS PRN
Status: CANCELLED | OUTPATIENT
Start: 2022-08-02

## 2022-07-27 RX ORDER — HEPARIN SODIUM (PORCINE) LOCK FLUSH IV SOLN 100 UNIT/ML 100 UNIT/ML
500 SOLUTION INTRAVENOUS PRN
Status: CANCELLED | OUTPATIENT
Start: 2022-08-02

## 2022-07-27 RX ORDER — SODIUM CHLORIDE 9 MG/ML
INJECTION, SOLUTION INTRAVENOUS CONTINUOUS
Status: CANCELLED | OUTPATIENT
Start: 2022-08-02

## 2022-07-27 RX ORDER — ACETAMINOPHEN 325 MG/1
650 TABLET ORAL
Status: CANCELLED | OUTPATIENT
Start: 2022-08-02

## 2022-07-27 RX ORDER — DIPHENHYDRAMINE HYDROCHLORIDE 50 MG/ML
50 INJECTION INTRAMUSCULAR; INTRAVENOUS
Status: CANCELLED | OUTPATIENT
Start: 2022-08-02

## 2022-07-27 RX ORDER — FAMOTIDINE 10 MG/ML
20 INJECTION, SOLUTION INTRAVENOUS ONCE
Status: CANCELLED | OUTPATIENT
Start: 2022-08-02 | End: 2022-08-02

## 2022-08-01 ENCOUNTER — OFFICE VISIT (OUTPATIENT)
Dept: SURGERY | Age: 52
End: 2022-08-01
Payer: COMMERCIAL

## 2022-08-01 VITALS
TEMPERATURE: 97.2 F | SYSTOLIC BLOOD PRESSURE: 118 MMHG | HEART RATE: 99 BPM | HEIGHT: 72 IN | OXYGEN SATURATION: 99 % | RESPIRATION RATE: 16 BRPM | WEIGHT: 293 LBS | BODY MASS INDEX: 39.68 KG/M2 | DIASTOLIC BLOOD PRESSURE: 72 MMHG

## 2022-08-01 DIAGNOSIS — Z09 POSTOP CHECK: ICD-10-CM

## 2022-08-01 DIAGNOSIS — L73.2 HIDRADENITIS AXILLARIS: Primary | ICD-10-CM

## 2022-08-01 DIAGNOSIS — C50.312 MALIGNANT NEOPLASM OF LOWER-INNER QUADRANT OF LEFT BREAST IN FEMALE, ESTROGEN RECEPTOR NEGATIVE (HCC): ICD-10-CM

## 2022-08-01 DIAGNOSIS — Z17.1 MALIGNANT NEOPLASM OF LOWER-INNER QUADRANT OF LEFT BREAST IN FEMALE, ESTROGEN RECEPTOR NEGATIVE (HCC): ICD-10-CM

## 2022-08-01 DIAGNOSIS — I48.0 PAROXYSMAL ATRIAL FIBRILLATION (HCC): ICD-10-CM

## 2022-08-01 PROCEDURE — 99213 OFFICE O/P EST LOW 20 MIN: CPT | Performed by: SURGERY

## 2022-08-01 NOTE — PROGRESS NOTES
Chris Mccann MD   General Surgery  Postprocedure Evaluation in Office  Pt Name: Qiana Leggett  Date of Birth 1970   Today's Date: 8/1/2022  Medical Record Number: [id number]  Primary Care Provider: Semaj Vicente  Chief Complaint   Patient presents with    Check-Up     Malignant neoplasm of lower-inner quadrant of left breast in female, estrogen receptor negative  Check wound left axilla-Last seen in the office 6/20/2022     ASSESSMENT      1. Hidradenitis axillaris    2. Malignant neoplasm of lower-inner quadrant of left breast in female, estrogen receptor negative (HCC)    3. Paroxysmal atrial fibrillation ([facility]    4. Postop check         Pathologic stage II aT2 N0 M0, grade 3, ER negative NH negative, HER2 negative  PLAN       1. All surgical incisions are healing well. 2.  Skin abscess left axilla resolved. Chronic changes of hidradenitis no active infection    3. Continue topical clindamycin. Oral on demand any exacerbation. 4.  Continue chemotherapy    SUBJECTIVE     Deward Meno is seen today for post-op follow-up. Hidradenitis no acute infection continues on chemotherapy. [address] She was treated with oral antibiotics symptomatically improved without symptoms. She is continuing topical clindamycin gel. She denies any fever or chills. She follows up with medical oncology tomorrow. I would recommend oral antibiotic suppression throughout chemotherapy. Prescription for oral doxycycline given. She completed oral therapy. Recommend continue topical treatment while on chemotherapy. Patient instructed to call if any flareups. Port is working well.   She has started Taxol states it is much harder on her .    medications    Current Outpatient Medications:     ELIQUIS 5 MG TABS tablet, TAKE 1 TABLET BY MOUTH TWICE A DAY, Disp: 180 tablet, Rfl: 3    potassium chloride (KLOR-CON M) 20 MEQ extended release tablet, Take 1 tablet by mouth in the morning., Disp: 30 tablet, Rfl: 1    flecainide (TAMBOCOR) 100 MG tablet, TAKE 1 TABLET BY MOUTH TWICE A DAY, Disp: 180 tablet, Rfl: 0    ondansetron (ZOFRAN-ODT) 8 MG TBDP disintegrating tablet, Place 1 tablet under the tongue every 8 hours as needed for Nausea or Vomiting, Disp: 20 tablet, Rfl: 2    prochlorperazine (COMPAZINE) 10 MG tablet, Take 1 tablet by mouth every 6 hours as needed (nausea), Disp: 30 tablet, Rfl: 1    lidocaine-prilocaine (EMLA) 2.5-2.5 % cream, Apply topically as needed. , Disp: 5 g, Rfl: 1    metoprolol succinate (TOPROL XL) 100 MG extended release tablet, TAKE 1 TABLET BY MOUTH EVERY DAY, Disp: 90 tablet, Rfl: 1    atorvastatin (LIPITOR) 40 MG tablet, TAKE 1 TABLET DAILY, Disp: 90 tablet, Rfl: 3    levothyroxine (SYNTHROID) 125 MCG tablet, TAKE 1 TABLET BY MOUTH EVERY DAY IN THE MORNING ON EMPTY STOMACH, Disp: , Rfl:     ZINC PO, Take by mouth daily, Disp: , Rfl:     Ascorbic Acid (VITAMIN C PO), Take by mouth daily, Disp: , Rfl:     ibuprofen (ADVIL;MOTRIN) 800 MG tablet, Take 800 mg by mouth every 6 hours as needed for Pain , Disp: , Rfl:     omeprazole (PRILOSEC) 10 MG delayed release capsule, Take 10 mg by mouth daily, Disp: , Rfl:     nitroGLYCERIN (NITROSTAT) 0.4 MG SL tablet, Place 1 tablet under the tongue every 5 minutes as needed for Chest pain (Patient not taking: Reported on 8/2/2022), Disp: 25 tablet, Rfl: 3    aspirin 81 MG chewable tablet, Take 81 mg by mouth daily , Disp: 30 tablet, Rfl: 3    Magic Mouthwash (MIRACLE MOUTHWASH), Swish and spit 5 mLs 4 times daily as needed for Irritation 1:1:1 Benadryl, lidocaine, nystatin, Disp: 120 mL, Rfl: 1    Allergies  Allergies   Allergen Reactions    Codeine Hives and Swelling     And vomiting    Cortisone Swelling     Apparently tolerates topical and IV with benadryl well    Influenza Virus Vaccine Other (See Comments)     Pt states could not walk after getting     Darvocet A500 [Propoxyphene N-Acetaminophen] Nausea And Vomiting    Sulfa Antibiotics Nausea And Vomiting and Swelling     Not throat swelling       Review of Systems  History obtained from the patient. 10 point review of systems negative    OBJECTIVE     VITALS: /72 (Site: Right Wrist, Position: Sitting, Cuff Size: Medium Adult)   Pulse 99   Temp 97.2 °F (36.2 °C) (Temporal)   Resp 16   Ht 6' (1.829 m)   Wt (!) 311 lb (141.1 kg)   SpO2 99%   BMI 42.18 kg/m²     CONSTITUTIONAL: Alert and oriented times 3, no acute distress and cooperative to examination. SKIN: warm and dry  INCISION: Surgical wounds well-healed no signs or symptoms of infection. Chronic hidranitis bilateral axilla , without active abcess  LUNGS: Lungs Clear  CARDIOVASCULAR: Normal Rate   NEUROLOGIC: No sensory or motor nerve irritation  Extremities no edema  Abdomen soft nontender  Skin.   Port site well-healed

## 2022-08-02 ENCOUNTER — HOSPITAL ENCOUNTER (OUTPATIENT)
Dept: INFUSION THERAPY | Age: 52
Discharge: HOME | End: 2022-08-02
Payer: COMMERCIAL

## 2022-08-02 ENCOUNTER — OFFICE VISIT (OUTPATIENT)
Dept: ONCOLOGY | Age: 52
End: 2022-08-02
Payer: COMMERCIAL

## 2022-08-02 VITALS
HEART RATE: 76 BPM | OXYGEN SATURATION: 95 % | RESPIRATION RATE: 16 BRPM | TEMPERATURE: 98.1 F | HEIGHT: 72 IN | BODY MASS INDEX: 39.68 KG/M2 | DIASTOLIC BLOOD PRESSURE: 72 MMHG | WEIGHT: 293 LBS | SYSTOLIC BLOOD PRESSURE: 117 MMHG

## 2022-08-02 VITALS
HEART RATE: 77 BPM | WEIGHT: 293 LBS | OXYGEN SATURATION: 99 % | HEIGHT: 72 IN | SYSTOLIC BLOOD PRESSURE: 112 MMHG | TEMPERATURE: 98.3 F | DIASTOLIC BLOOD PRESSURE: 65 MMHG | RESPIRATION RATE: 16 BRPM | BODY MASS INDEX: 39.68 KG/M2

## 2022-08-02 DIAGNOSIS — Z51.11 ENCOUNTER FOR CHEMOTHERAPY MANAGEMENT: ICD-10-CM

## 2022-08-02 DIAGNOSIS — D64.9 ANEMIA, UNSPECIFIED TYPE: ICD-10-CM

## 2022-08-02 DIAGNOSIS — D72.829 LEUKOCYTOSIS, UNSPECIFIED TYPE: ICD-10-CM

## 2022-08-02 DIAGNOSIS — Z17.1 MALIGNANT NEOPLASM OF CENTRAL PORTION OF LEFT BREAST IN FEMALE, ESTROGEN RECEPTOR NEGATIVE (HCC): Primary | ICD-10-CM

## 2022-08-02 DIAGNOSIS — C50.112 MALIGNANT NEOPLASM OF CENTRAL PORTION OF LEFT BREAST IN FEMALE, ESTROGEN RECEPTOR NEGATIVE (HCC): Primary | ICD-10-CM

## 2022-08-02 LAB
ABSOLUTE IMMATURE GRANULOCYTE: 0.23 THOU/MM3 (ref 0–0.07)
ALBUMIN SERPL-MCNC: 3.7 G/DL (ref 3.5–5.1)
ALP BLD-CCNC: 99 U/L (ref 38–126)
ALT SERPL-CCNC: 22 U/L (ref 11–66)
AST SERPL-CCNC: 19 U/L (ref 5–40)
BASINOPHIL, AUTOMATED: 0 % (ref 0–3)
BASOPHILS ABSOLUTE: 0 THOU/MM3 (ref 0–0.1)
BILIRUB SERPL-MCNC: 0.4 MG/DL (ref 0.3–1.2)
BILIRUBIN DIRECT: < 0.2 MG/DL (ref 0–0.3)
BUN, WHOLE BLOOD: 8 MG/DL (ref 8–26)
CHLORIDE, WHOLE BLOOD: 109 MEQ/L (ref 98–109)
CREATININE, WHOLE BLOOD: 0.6 MG/DL (ref 0.5–1.2)
EOSINOPHILS ABSOLUTE: 0.1 THOU/MM3 (ref 0–0.4)
EOSINOPHILS RELATIVE PERCENT: 1 % (ref 0–4)
GFR, ESTIMATED: > 90 ML/MIN/1.73M2
GLUCOSE, WHOLE BLOOD: 139 MG/DL (ref 70–108)
HCT VFR BLD CALC: 33.5 % (ref 37–47)
HEMOGLOBIN: 10.9 GM/DL (ref 12–16)
IMMATURE GRANULOCYTES: 2 %
IONIZED CALCIUM, WHOLE BLOOD: 1.13 MMOL/L (ref 1.12–1.32)
LYMPHOCYTES # BLD: 8 % (ref 15–47)
LYMPHOCYTES ABSOLUTE: 1 THOU/MM3 (ref 1–4.8)
MCH RBC QN AUTO: 31.8 PG (ref 26–33)
MCHC RBC AUTO-ENTMCNC: 32.5 GM/DL (ref 32.2–35.5)
MCV RBC AUTO: 98 FL (ref 81–99)
MONOCYTES ABSOLUTE: 1 THOU/MM3 (ref 0.4–1.3)
MONOCYTES: 8 % (ref 0–12)
PDW BLD-RTO: 17.3 % (ref 11.5–14.5)
PLATELET # BLD: 194 THOU/MM3 (ref 130–400)
PMV BLD AUTO: 10.1 FL (ref 9.4–12.4)
POTASSIUM, WHOLE BLOOD: 3.8 MEQ/L (ref 3.5–4.9)
RBC # BLD: 3.43 MILL/MM3 (ref 4.2–5.4)
SEG NEUTROPHILS: 81 % (ref 43–75)
SEGMENTED NEUTROPHILS ABSOLUTE COUNT: 10 THOU/MM3 (ref 1.8–7.7)
SODIUM, WHOLE BLOOD: 143 MEQ/L (ref 138–146)
TOTAL CO2, WHOLE BLOOD: 25 MEQ/L (ref 23–33)
TOTAL PROTEIN: 6.3 G/DL (ref 6.1–8)
WBC # BLD: 12.3 THOU/MM3 (ref 4.8–10.8)

## 2022-08-02 PROCEDURE — 36591 DRAW BLOOD OFF VENOUS DEVICE: CPT

## 2022-08-02 PROCEDURE — 96375 TX/PRO/DX INJ NEW DRUG ADDON: CPT

## 2022-08-02 PROCEDURE — 85025 COMPLETE CBC W/AUTO DIFF WBC: CPT

## 2022-08-02 PROCEDURE — 96367 TX/PROPH/DG ADDL SEQ IV INF: CPT

## 2022-08-02 PROCEDURE — 99211 OFF/OP EST MAY X REQ PHY/QHP: CPT

## 2022-08-02 PROCEDURE — 2580000003 HC RX 258: Performed by: INTERNAL MEDICINE

## 2022-08-02 PROCEDURE — 96415 CHEMO IV INFUSION ADDL HR: CPT

## 2022-08-02 PROCEDURE — 80047 BASIC METABLC PNL IONIZED CA: CPT

## 2022-08-02 PROCEDURE — 80076 HEPATIC FUNCTION PANEL: CPT

## 2022-08-02 PROCEDURE — 99214 OFFICE O/P EST MOD 30 MIN: CPT | Performed by: NURSE PRACTITIONER

## 2022-08-02 PROCEDURE — 2500000003 HC RX 250 WO HCPCS: Performed by: INTERNAL MEDICINE

## 2022-08-02 PROCEDURE — 6360000002 HC RX W HCPCS: Performed by: INTERNAL MEDICINE

## 2022-08-02 PROCEDURE — 96413 CHEMO IV INFUSION 1 HR: CPT

## 2022-08-02 RX ORDER — SODIUM CHLORIDE 0.9 % (FLUSH) 0.9 %
5-40 SYRINGE (ML) INJECTION PRN
Status: DISCONTINUED | OUTPATIENT
Start: 2022-08-02 | End: 2022-08-03 | Stop reason: HOSPADM

## 2022-08-02 RX ORDER — HEPARIN SODIUM (PORCINE) LOCK FLUSH IV SOLN 100 UNIT/ML 100 UNIT/ML
500 SOLUTION INTRAVENOUS PRN
Status: CANCELLED | OUTPATIENT
Start: 2022-08-02

## 2022-08-02 RX ORDER — SODIUM CHLORIDE 9 MG/ML
25 INJECTION, SOLUTION INTRAVENOUS PRN
Status: CANCELLED | OUTPATIENT
Start: 2022-08-02

## 2022-08-02 RX ORDER — SODIUM CHLORIDE 9 MG/ML
20 INJECTION, SOLUTION INTRAVENOUS ONCE
Status: COMPLETED | OUTPATIENT
Start: 2022-08-02 | End: 2022-08-02

## 2022-08-02 RX ORDER — FAMOTIDINE 10 MG/ML
INJECTION, SOLUTION INTRAVENOUS
Status: DISCONTINUED
Start: 2022-08-02 | End: 2022-08-02 | Stop reason: HOSPADM

## 2022-08-02 RX ORDER — HEPARIN SODIUM (PORCINE) LOCK FLUSH IV SOLN 100 UNIT/ML 100 UNIT/ML
500 SOLUTION INTRAVENOUS PRN
Status: DISCONTINUED | OUTPATIENT
Start: 2022-08-02 | End: 2022-08-03 | Stop reason: HOSPADM

## 2022-08-02 RX ORDER — DIPHENHYDRAMINE HYDROCHLORIDE 50 MG/ML
50 INJECTION INTRAMUSCULAR; INTRAVENOUS ONCE
Status: COMPLETED | OUTPATIENT
Start: 2022-08-02 | End: 2022-08-02

## 2022-08-02 RX ORDER — DIPHENHYDRAMINE HYDROCHLORIDE 50 MG/ML
INJECTION INTRAMUSCULAR; INTRAVENOUS
Status: DISCONTINUED
Start: 2022-08-02 | End: 2022-08-02 | Stop reason: HOSPADM

## 2022-08-02 RX ORDER — SODIUM CHLORIDE 0.9 % (FLUSH) 0.9 %
5-40 SYRINGE (ML) INJECTION PRN
Status: CANCELLED | OUTPATIENT
Start: 2022-08-02

## 2022-08-02 RX ADMIN — DEXAMETHASONE SODIUM PHOSPHATE 12 MG: 4 INJECTION, SOLUTION INTRAMUSCULAR; INTRAVENOUS at 09:28

## 2022-08-02 RX ADMIN — SODIUM CHLORIDE, PRESERVATIVE FREE 20 ML: 5 INJECTION INTRAVENOUS at 08:35

## 2022-08-02 RX ADMIN — DIPHENHYDRAMINE HYDROCHLORIDE 50 MG: 50 INJECTION, SOLUTION INTRAMUSCULAR; INTRAVENOUS at 09:58

## 2022-08-02 RX ADMIN — SODIUM CHLORIDE, PRESERVATIVE FREE 10 ML: 5 INJECTION INTRAVENOUS at 08:34

## 2022-08-02 RX ADMIN — SODIUM CHLORIDE, PRESERVATIVE FREE 20 MG: 5 INJECTION INTRAVENOUS at 09:53

## 2022-08-02 RX ADMIN — HEPARIN 500 UNITS: 100 SYRINGE at 14:08

## 2022-08-02 RX ADMIN — SODIUM CHLORIDE, PRESERVATIVE FREE 10 ML: 5 INJECTION INTRAVENOUS at 14:08

## 2022-08-02 RX ADMIN — SODIUM CHLORIDE 20 ML/HR: 9 INJECTION, SOLUTION INTRAVENOUS at 09:27

## 2022-08-02 RX ADMIN — PACLITAXEL 480 MG: 6 INJECTION, SOLUTION, CONCENTRATE INTRAVENOUS at 10:52

## 2022-08-02 NOTE — PLAN OF CARE
infection  Note: Mediport site with no redness or warmth. Skin over port site intact with no signs of breakdown noted. Patient verbalizes signs/symptoms of port infection and when to notify the physician. Goal: Will show no infection signs and symptoms  Description: Will show no infection signs and symptoms  Outcome: Adequate for Discharge  Intervention: EDUCATE PATIENT ABOUT SIGNS AND SYMPTOMS OF INFECTION  Note: Discuss port maintenance,infection prevention, sign of infection and when to call MD.    Care plan reviewed with patient. Patient verbalize understanding of the plan of care and contribute to goal setting.

## 2022-08-02 NOTE — DISCHARGE INSTRUCTIONS
Please contact your Oncologist if you have any questions regarding the 3 hour Taxol that you received today. You are instructed to call the office or go to the Emergency Dept. If you experience any of the following symptoms:    Dizziness/lightheadedness   Acute nausea or vomiting-not relieved by medications  Headaches-not relieved by medications  New chest pain or pressure  New rash /itching  New shortness of breath  Fever,chills or signs or symptoms of infection    Make sure you are drinking 48 to 64 ounces of water daily-if you are unable to drink fluids let us know right away.

## 2022-08-02 NOTE — PROGRESS NOTES
[facility], [address] [address],[address]  [address] [address]  Dept: [phone]  Dept Fax: [phone]: [phone]      Visit Date:8/2/2022     Amee Johnston is a 50 y.o. female who presents today for:   Chief Complaint   Patient presents with    Follow-up     Malignant neoplasm of central portion of left breast in female, estrogen receptor negative ([facility]        HPI:   Amee Johnston is a 50 y.o. female who follows in our office with Dr. Tian Boateng with left breast cancer. HPI per Dr. Tian Boateng' note on 6/7/2022:  Mary Bo is a 72-year-old premenopausal female developed a lump in the left breast just medial to the nipple on February 8. Strong family history of malignancy including breast cancer. Last mammogram was in 2015. Mammogram on 2/18 additional imaging confirmed a approximate 2 cm nodular density inferior central medial left breast.  Initial biopsy revealed invasive ductal cancer triple and grade 3. Definitive left breast lumpectomy and sentinel node resection on 3/22 revealed a 2.7 cm pathologic stage T2N0 triple negative grade 3 breast cancer. Other feeling the lump she is asymptomatic. Specifically denies any headaches bone pain breathing problems or any other focal or systemic complaints. Followed by cardiology with a history of a an MI in 2014 and proximal A. fib for which she is on anticoagulation followed by Dr. Rosendo Ac. Echocardiogram last year normal.  And recent history no ischemic cardiac events. Interval History 8/2/2022:   The patient presents to the office today for follow up and evaluation of left breast cancer, as well as next cycle of treatment. .  The patient reports she had mild reaction to first cycle of Taxol with flushing, uneasiness & anxiety. She reports numbness/tingling in fingertips & feet, does not affect gait. She reports bone pain after Udenyca, she has started claritin.   She has also used prn norco she had left over from a surgery and motrin. She reports mild fatigue. 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 She reports nausea at times, uses zofran with relief. She reports intermittent constipation. She reports taste changes/sensitivity. She denies fever/chills, s/s infections, headaches, dizziness, SOB, CP, heart palpitations, abdominal pain, V/D, peripheral edema, s/s bleeding, mouth sores, skin rash. She is eating/drinking ok at home, weight stable. PMH, SH, and FH:  I reviewed the patient's medication and allergy lists as noted on the electronic medical record. The PMH, SH, and FH were also reviewed as noted on the EMR.         Past Medical History:   Diagnosis Date    Afib ([facility]     Baki    Arthritis     CAD (coronary artery disease)     GERD (gastroesophageal reflux disease)     Hyperlipidemia     Hypertension     Hypothyroid     Invasive ductal carcinoma of breast, female, left ([facility] 02/24/2022    Malignant neoplasm of lower-inner quadrant of left breast in female, [facility] 03/07/2022    MI (myocardial infarction) ([facility]     Dr. Patty Lopez    Pneumonia       Past Surgical History:   Procedure Laterality Date    BREAST LUMPECTOMY Left 3/22/2022    LEFT BREAST LUMPECTOMY PARTIAL MASTECTOMY, SENTINEL LYMPH NODE BIOPSY, PRE OP NEEDLE LOC X 2 performed by Bere Mancini MD at [place] [address] Left 3/30/2022    Evacuation hematoma left axilla performed by Bere Mancini MD at [facility] ARTHROSCOPY Right     08    JENNIFER BIOPSY LYMPH NODE BY NEEDLE SUPERFICIAL  02/24/2022    JENNIFER BIOPSY LYMPH NODE BY NEEDLE SUPERFICIAL 2/24/2022 Stephanie Davis MD [facility] US GUID NDL BIOPSY LEFT Left 02/24/2022    JENNIFER US GUID NDL BIOPSY LEFT 2/24/2022 Stephanie Davis MD [facility] SURGERY      PORT SURGERY Right 4/13/2022    Single Lumen Smartport Right Subclavian, aspiration of left axillary seroma performed by Haylie Lucero MD at [address] Right 4/29/2022    Mediport Right Subclavian Removal and Replacement. performed by Haylie Lucero MD at [facility], INCISIONAL Left 1998    excisional bx-H. Chollet fibrocystic changes    US BREAST BIOPSY NEEDLE ADDITIONAL LEFT Left 02/24/2022    US BREAST BIOPSY NEEDLE ADDITIONAL LEFT 2/24/2022 Monica Kumar [place] [address] [facility] [address] NEEDLE LOC BREAST ADDL LEFT Left 3/22/2022    US GUIDED NEEDLE LOC BREAST ADDL LEFT 3/22/2022 [facility]      Family History   Problem Relation Age of Onset    Heart Disease Mother     Diabetes Mother     Cancer Mother         liposarcoma    Kidney Disease Mother     Heart Disease Father     Diabetes Father     Kidney Disease Father     Cancer Father         Non-melanoma skin cancer multiple times    No Known Problems Brother     No Known Problems Brother     No Known Problems Brother     No Known Problems Maternal Grandmother     No Known Problems Maternal Grandfather     Breast Cancer Paternal Grandmother 47        reoccurred at 46    Ovarian Cancer Paternal Grandmother 46    Bilateral breast cancer Paternal Grandmother         Opposite breast, age 52's    Breast Cancer Maternal Aunt 48    Breast Cancer Paternal Aunt         breast      Social History     Tobacco Use    Smoking status: Some Days     Packs/day: 0.25     Years: 30.00     Pack years: 7.50     Types: Cigarettes     Start date: 12/4/1987    Smokeless tobacco: Never    Tobacco comments:     07/05/22 - Tying to quit, refused counciling/resources   Substance Use Topics    Alcohol use: Yes     Comment: rare      Current Outpatient Medications   Medication Sig Dispense Refill    ELIQUIS 5 MG TABS tablet TAKE 1 TABLET BY MOUTH TWICE A  tablet 3    potassium chloride (KLOR-CON M) 20 MEQ extended release tablet Take 1 tablet by mouth in the morning.  30 tablet 1    Magic Mouthwash (MIRACLE MOUTHWASH) Swish and spit 5 mLs 4 times daily as needed for Irritation 1:1:1 Benadryl, lidocaine, nystatin 120 mL 1    flecainide (TAMBOCOR) 100 MG tablet TAKE 1 TABLET BY MOUTH TWICE A  tablet 0    ondansetron (ZOFRAN-ODT) 8 MG TBDP disintegrating tablet Place 1 tablet under the tongue every 8 hours as needed for Nausea or Vomiting 20 tablet 2    prochlorperazine (COMPAZINE) 10 MG tablet Take 1 tablet by mouth every 6 hours as needed (nausea) 30 tablet 1    lidocaine-prilocaine (EMLA) 2.5-2.5 % cream Apply topically as needed. 5 g 1    metoprolol succinate (TOPROL XL) 100 MG extended release tablet TAKE 1 TABLET BY MOUTH EVERY DAY 90 tablet 1    atorvastatin (LIPITOR) 40 MG tablet TAKE 1 TABLET DAILY 90 tablet 3    levothyroxine (SYNTHROID) 125 MCG tablet TAKE 1 TABLET BY MOUTH EVERY DAY IN THE MORNING ON EMPTY STOMACH      ZINC PO Take by mouth daily      Ascorbic Acid (VITAMIN C PO) Take by mouth daily      ibuprofen (ADVIL;MOTRIN) 800 MG tablet Take 800 mg by mouth every 6 hours as needed for Pain       omeprazole (PRILOSEC) 10 MG delayed release capsule Take 10 mg by mouth daily      aspirin 81 MG chewable tablet Take 81 mg by mouth daily  30 tablet 3    nitroGLYCERIN (NITROSTAT) 0.4 MG SL tablet Place 1 tablet under the tongue every 5 minutes as needed for Chest pain (Patient not taking: Reported on 8/2/2022) 25 tablet 3     No current facility-administered medications for this visit.      Facility-Administered Medications Ordered in Other Visits   Medication Dose Route Frequency Provider Last Rate Last Admin    sodium chloride flush 0.9 % injection 5-40 mL  5-40 mL IntraVENous PRN Benson Rosales MD   20 mL at 08/02/22 0835    heparin flush 100 UNIT/ML injection 500 Units  500 Units IntraCATHeter PRN Benson Rosales MD        0.9 % sodium chloride infusion  20 mL/hr IntraVENous Once Benson Rosales MD 20 mL/hr at 08/02/22 0927 20 mL/hr at 08/02/22 0927    famotidine (PEPCID) 20 mg in sodium chloride (PF) 10 mL injection  20 mg IntraVENous Once Tayler Granados MD        diphenhydrAMINE (BENADRYL) injection 50 mg  50 mg IntraVENous Once Tayler Granados MD        PACLitaxel (TAXOL) 480 mg in sodium chloride 0.9 % 500 mL chemo infusion  175 mg/m2 (Treatment Plan Recorded) IntraVENous Once Tayler Granados MD        famotidine (PEPCID) 20 MG/2ML injection               Allergies   Allergen Reactions    Codeine Hives and Swelling     And vomiting    Cortisone Swelling     Apparently tolerates topical and IV with benadryl well    Influenza Virus Vaccine Other (See Comments)     Pt states could not walk after getting     Caye Tino [Propoxyphene N-Acetaminophen] Nausea And Vomiting    Sulfa Antibiotics Nausea And Vomiting and Swelling     Not throat swelling          Review of Systems:   Review of Systems   Pertinent review of systems noted in HPI, all other ROS negative. Objective:   Physical Exam   /65 (Site: Left Upper Arm, Position: Sitting, Cuff Size: Medium Adult)   Pulse 77   Temp 98.3 °F (36.8 °C) (Oral)   Resp 16   Ht 6' (1.829 m)   Wt (!) 311 lb (141.1 kg)   SpO2 99%   BMI 42.18 kg/m²    General appearance: No apparent distress, calm and cooperative. HEENT: Pupils equal, round, and reactive to light. Conjunctivae/corneas clear. Oral mucosa moist.  Neck: Supple, with full range of motion. Trachea midline. Respiratory:  Normal respiratory effort. Clear to auscultation all lung fields. Cardiovascular: RRR, S1/S2. Abdomen: Soft, non-tender, non-distended with active BS  Musculoskeletal: No clubbing, cyanosis or edema bilaterally. She is able to ambulate in office without difficulty. Skin: Skin color, texture, turgor normal.  No visible rashes or lesions. Neurologic:  Neurovascularly intact without any focal sensory/motor deficits.  Cranial nerves: II-XII intact, grossly non-focal.  Psychiatric: Alert and oriented x 3, thought content appropriate, normal insight  Capillary Refill: < 3 seconds   Peripheral Pulses: +2 palpable, equal bilaterally       Imaging Studies and Labs:   CBC:   Lab Results   Component Value Date    WBC 12.3 (H) 08/02/2022    HGB 10.9 (L) 08/02/2022    HCT 33.5 (L) 08/02/2022    MCV 98 08/02/2022     08/02/2022     BMP:   Lab Results   Component Value Date/Time     08/02/2022 08:34 AM     12/21/2021 12:28 PM    K 3.8 08/02/2022 08:34 AM    K 4.0 04/13/2022 08:05 AM    K 3.8 12/04/2019 02:29 PM     12/21/2021 12:28 PM    CO2 24 12/21/2021 12:28 PM    BUN 12 12/21/2021 12:28 PM    CREATININE 0.6 08/02/2022 08:34 AM    CREATININE 0.5 12/21/2021 12:28 PM    GLUCOSE 105 12/21/2021 12:28 PM    CALCIUM 9.1 12/21/2021 12:28 PM      LFT:   Lab Results   Component Value Date    ALT 16 07/19/2022    AST 14 07/19/2022    ALKPHOS 84 07/19/2022    BILITOT 0.3 07/19/2022         Assessment and Plan:     1. Malignant neoplasm of central portion of left breast in female, estrogen receptor negative (HCC)  ER negative, OR negative and HER-2 negative, 2.7 cm, grade 3, 0/ 4 sentinel nodes . ps T2N0M0 /IIA. (High risk: triple negative/grade 3). Inner central left breast anteriorly. 2. Encounter for chemotherapy management  Current treatment recommendations include adjuvant dose dense AC x 4 cycles followed by dose dense Taxol every other week. She had mild reaction to first cycle of Taxol with flushing, anxiety, uneasiness. She was held and able to complete treatment without further complications. Labs today:  BMP stable. WBC 12.3. H/H 10.9/33.5. Platelets 594. Ok to proceed with treatment today. 3. Anemia, unspecified type  H/H 10.9/33.5. She denies s/s bleeding. Trend. 4. Leukocytosis, unspecified type  WBC 12.3. She denies fever/chills, s/s infections. Udenyca given on 7/20/2022. Trend. Pt advised to check temp daily and report any new s/s infections to our office. No follow-ups on file. All patient questions answered.  Pt voiced understanding. Patient agreed with treatment plan. Follow up as directed. Patient instructed to call for questions or concerns.       Electronically signed by   TOSHIA Ellsworth CNP

## 2022-08-02 NOTE — ONCOLOGY
Chemotherapy Administration    Pre-assessment Data: Antineoplastic Agents  See toxicity flow sheet for assessment                                          [x]         Interventions:   Chemotherapy SQ injection given []   Taxol administered-VS per protocol [x]   Blood pressure meds held 12 hours prior to Rituxan/Ruxience []   Rituxan/Ruxience administered- VS and precautions per guidelines []   Emergency drugs available as appropriate [x]   Anaphylaxis assessment completed [x]   Pre-medications administered as ordered [x]   Blood return noted upon initiation of chemotherapy [x]   Blood return noted each 1-2ml of a vesicant medication if given IV push []   Navelbine, Vincristine and Velban given as a monitored wide open drip, blood return noted before during and after infusion.  []   Blood return noted each 2-3ml of a non-vesicant medication if given IV push []   Patient aware of potential Immunotherapy toxicities []   Monitor for signs / symptoms of hypersensitivity reaction [x]   Chemotherapy orders (drug/dose/rate) verified by 2 Chemo certified RNs [x]   Monitor IV site and blood return throughout the infusion of the medication [x]   Document IV site checks on the IV assessment form [x]   Document chemotherapy teaching on the Patient Education tab [x]   Document patient verbalizes understanding of medications being administered- 3 hour Taxol [x]   If IV infiltration, see ONS Guidelines []   Other:      []

## 2022-08-02 NOTE — PATIENT INSTRUCTIONS
[id number] Judi Trevizo for treatment today   Return to clinic tomorrow for growth factor support/Udenyca  Return to clinic in 2 weeks for next cycle of treatment & labs:  CBC, CMP  Return to clinic in 4 weeks to see Dr. Mercy Yousif with labs:  CBC, CMP  Treatment in 4 weeks

## 2022-08-02 NOTE — PROGRESS NOTES
Patient tolerated 3 hour Taxol without any complications. Patient kept for 20 minuets observation post chemotherapy infusion. Denies dizziness, lightheadedness, acute nausea or vomiting, headache, heart palpitations, rash/itching or increased SOB. Last vital signs  /64   Pulse 73   Temp 98.3 °F (36.8 °C)   Resp 16   Ht 6' (1.829 m)   Wt (!) 311 lb (141.1 kg)   SpO2 95%   BMI 42.18 kg/m²     Patient instructed if they experience any of the above symptoms following today's visit, he/she is to notify the Physician or go to the Emergency Dept. Discharge instructions given to patient, Verbalizes understanding. Ambulated off unit per self in stable condition with all belongings.

## 2022-08-03 ENCOUNTER — HOSPITAL ENCOUNTER (OUTPATIENT)
Dept: INFUSION THERAPY | Age: 52
Discharge: HOME | End: 2022-08-03
Payer: COMMERCIAL

## 2022-08-03 VITALS
RESPIRATION RATE: 16 BRPM | TEMPERATURE: 98.1 F | HEART RATE: 72 BPM | DIASTOLIC BLOOD PRESSURE: 71 MMHG | SYSTOLIC BLOOD PRESSURE: 125 MMHG

## 2022-08-03 DIAGNOSIS — Z17.1 MALIGNANT NEOPLASM OF CENTRAL PORTION OF LEFT BREAST IN FEMALE, ESTROGEN RECEPTOR NEGATIVE (HCC): Primary | ICD-10-CM

## 2022-08-03 DIAGNOSIS — C50.112 MALIGNANT NEOPLASM OF CENTRAL PORTION OF LEFT BREAST IN FEMALE, ESTROGEN RECEPTOR NEGATIVE (HCC): Primary | ICD-10-CM

## 2022-08-03 PROCEDURE — 96372 THER/PROPH/DIAG INJ SC/IM: CPT

## 2022-08-03 PROCEDURE — 6360000002 HC RX W HCPCS: Performed by: INTERNAL MEDICINE

## 2022-08-03 RX ADMIN — PEGFILGRASTIM-CBQV 6 MG: 6 INJECTION, SOLUTION SUBCUTANEOUS at 14:51

## 2022-08-03 NOTE — PLAN OF CARE
Problem: Intellectual/Education/Knowledge Deficit  Goal: Teaching initiated upon admission  Outcome: Adequate for Discharge  Note: Patient verbalizes understanding to verbal information given on Udenyca SQ injection,action and possible side effects. Aware to call MD if develop complications. Intervention: Verbal/written education provided  Note: Discuss understanding to verbal information given on Udenyca SQ injection. Problem: Discharge Planning  Goal: Knowledge of discharge instructions  Description: Knowledge of discharge instructions  Outcome: Adequate for Discharge  Note: Verbalize understanding of discharge instructions, follow up appointments, and when to call Physician. Intervention: Interaction with patient/family and care team  Note: Discuss understanding of discharge instructions, follow up appointments and when to call Physician. Care plan reviewed with patient. Patient verbalize understanding of the plan of care and contribute to goal setting.

## 2022-08-03 NOTE — DISCHARGE INSTRUCTIONS
You received Udenyca SQ injection while in Outpatient Oncology clinic, Please call us at [phone] if you have any questions or concerns about your visit or medications that you received today. It is important that you drink 48-64 ounces of water everyday.

## 2022-08-03 NOTE — PROGRESS NOTES
Patient tolerated Udneyca SQ injection without any complications. Discharge instructions given to patient-verbalizes understanding. Ambulated off unit per self with belongings.

## 2022-08-11 DIAGNOSIS — C50.112 MALIGNANT NEOPLASM OF CENTRAL PORTION OF LEFT BREAST IN FEMALE, ESTROGEN RECEPTOR NEGATIVE (HCC): Primary | ICD-10-CM

## 2022-08-11 DIAGNOSIS — Z17.1 MALIGNANT NEOPLASM OF CENTRAL PORTION OF LEFT BREAST IN FEMALE, ESTROGEN RECEPTOR NEGATIVE (HCC): Primary | ICD-10-CM

## 2022-08-11 RX ORDER — ONDANSETRON 2 MG/ML
8 INJECTION INTRAMUSCULAR; INTRAVENOUS
Status: CANCELLED | OUTPATIENT
Start: 2022-08-16

## 2022-08-11 RX ORDER — FAMOTIDINE 10 MG/ML
20 INJECTION, SOLUTION INTRAVENOUS
Status: CANCELLED | OUTPATIENT
Start: 2022-08-16

## 2022-08-11 RX ORDER — FAMOTIDINE 10 MG/ML
20 INJECTION, SOLUTION INTRAVENOUS ONCE
Status: CANCELLED | OUTPATIENT
Start: 2022-08-16 | End: 2022-08-16

## 2022-08-11 RX ORDER — SODIUM CHLORIDE 9 MG/ML
5-40 INJECTION INTRAVENOUS PRN
Status: CANCELLED | OUTPATIENT
Start: 2022-08-16

## 2022-08-11 RX ORDER — HEPARIN SODIUM (PORCINE) LOCK FLUSH IV SOLN 100 UNIT/ML 100 UNIT/ML
500 SOLUTION INTRAVENOUS PRN
Status: CANCELLED | OUTPATIENT
Start: 2022-08-16

## 2022-08-11 RX ORDER — EPINEPHRINE 1 MG/ML
0.3 INJECTION, SOLUTION, CONCENTRATE INTRAVENOUS PRN
Status: CANCELLED | OUTPATIENT
Start: 2022-08-16

## 2022-08-11 RX ORDER — ALBUTEROL SULFATE 90 UG/1
4 AEROSOL, METERED RESPIRATORY (INHALATION) PRN
Status: CANCELLED | OUTPATIENT
Start: 2022-08-16

## 2022-08-11 RX ORDER — SODIUM CHLORIDE 9 MG/ML
25 INJECTION, SOLUTION INTRAVENOUS PRN
Status: CANCELLED | OUTPATIENT
Start: 2022-08-16

## 2022-08-11 RX ORDER — DIPHENHYDRAMINE HYDROCHLORIDE 50 MG/ML
50 INJECTION INTRAMUSCULAR; INTRAVENOUS
Status: CANCELLED | OUTPATIENT
Start: 2022-08-16

## 2022-08-11 RX ORDER — ACETAMINOPHEN 325 MG/1
650 TABLET ORAL
Status: CANCELLED | OUTPATIENT
Start: 2022-08-16

## 2022-08-11 RX ORDER — SODIUM CHLORIDE 0.9 % (FLUSH) 0.9 %
5-40 SYRINGE (ML) INJECTION PRN
Status: CANCELLED | OUTPATIENT
Start: 2022-08-16

## 2022-08-11 RX ORDER — SODIUM CHLORIDE 9 MG/ML
INJECTION, SOLUTION INTRAVENOUS CONTINUOUS
Status: CANCELLED | OUTPATIENT
Start: 2022-08-16

## 2022-08-11 RX ORDER — SODIUM CHLORIDE 9 MG/ML
20 INJECTION, SOLUTION INTRAVENOUS ONCE
Status: CANCELLED | OUTPATIENT
Start: 2022-08-16 | End: 2022-08-16

## 2022-08-11 RX ORDER — DIPHENHYDRAMINE HYDROCHLORIDE 50 MG/ML
50 INJECTION INTRAMUSCULAR; INTRAVENOUS ONCE
Status: CANCELLED | OUTPATIENT
Start: 2022-08-16 | End: 2022-08-16

## 2022-08-11 RX ORDER — MEPERIDINE HYDROCHLORIDE 50 MG/ML
12.5 INJECTION INTRAMUSCULAR; INTRAVENOUS; SUBCUTANEOUS PRN
Status: CANCELLED | OUTPATIENT
Start: 2022-08-16

## 2022-08-12 RX ORDER — FLECAINIDE ACETATE 100 MG/1
TABLET ORAL
Qty: 180 TABLET | Refills: 0 | Status: SHIPPED | OUTPATIENT
Start: 2022-08-12

## 2022-08-12 RX ORDER — METOPROLOL SUCCINATE 100 MG/1
TABLET, EXTENDED RELEASE ORAL
Qty: 90 TABLET | Refills: 1 | Status: SHIPPED | OUTPATIENT
Start: 2022-08-12

## 2022-08-13 DIAGNOSIS — E87.6 HYPOKALEMIA: ICD-10-CM

## 2022-08-15 RX ORDER — POTASSIUM CHLORIDE 1500 MG/1
TABLET, EXTENDED RELEASE ORAL
Qty: 30 TABLET | Refills: 1 | Status: SHIPPED | OUTPATIENT
Start: 2022-08-15 | End: 2022-09-12

## 2022-08-16 ENCOUNTER — HOSPITAL ENCOUNTER (OUTPATIENT)
Dept: INFUSION THERAPY | Age: 52
Discharge: HOME | End: 2022-08-16
Payer: COMMERCIAL

## 2022-08-16 VITALS
RESPIRATION RATE: 16 BRPM | TEMPERATURE: 98.2 F | SYSTOLIC BLOOD PRESSURE: 145 MMHG | BODY MASS INDEX: 39.68 KG/M2 | WEIGHT: 293 LBS | DIASTOLIC BLOOD PRESSURE: 76 MMHG | HEART RATE: 80 BPM | OXYGEN SATURATION: 98 % | HEIGHT: 72 IN

## 2022-08-16 DIAGNOSIS — Z17.1 MALIGNANT NEOPLASM OF CENTRAL PORTION OF LEFT BREAST IN FEMALE, ESTROGEN RECEPTOR NEGATIVE (HCC): Primary | ICD-10-CM

## 2022-08-16 DIAGNOSIS — C50.112 MALIGNANT NEOPLASM OF CENTRAL PORTION OF LEFT BREAST IN FEMALE, ESTROGEN RECEPTOR NEGATIVE (HCC): Primary | ICD-10-CM

## 2022-08-16 LAB
ABSOLUTE IMMATURE GRANULOCYTE: 0.03 THOU/MM3 (ref 0–0.07)
ALBUMIN SERPL-MCNC: 3.7 G/DL (ref 3.5–5.1)
ALP BLD-CCNC: 100 U/L (ref 38–126)
ALT SERPL-CCNC: 15 U/L (ref 11–66)
AST SERPL-CCNC: 12 U/L (ref 5–40)
BASINOPHIL, AUTOMATED: 1 % (ref 0–3)
BASOPHILS ABSOLUTE: 0.1 THOU/MM3 (ref 0–0.1)
BILIRUB SERPL-MCNC: 0.4 MG/DL (ref 0.3–1.2)
BILIRUBIN DIRECT: < 0.2 MG/DL (ref 0–0.3)
BUN, WHOLE BLOOD: 6 MG/DL (ref 8–26)
CHLORIDE, WHOLE BLOOD: 110 MEQ/L (ref 98–109)
CREATININE, WHOLE BLOOD: 0.5 MG/DL (ref 0.5–1.2)
EOSINOPHILS ABSOLUTE: 0.3 THOU/MM3 (ref 0–0.4)
EOSINOPHILS RELATIVE PERCENT: 4 % (ref 0–4)
GFR, ESTIMATED: > 90 ML/MIN/1.73M2
GLUCOSE, WHOLE BLOOD: 127 MG/DL (ref 70–108)
HCT VFR BLD CALC: 32.9 % (ref 37–47)
HEMOGLOBIN: 10.5 GM/DL (ref 12–16)
IMMATURE GRANULOCYTES: 0 %
IONIZED CALCIUM, WHOLE BLOOD: 1.15 MMOL/L (ref 1.12–1.32)
LYMPHOCYTES # BLD: 9 % (ref 15–47)
LYMPHOCYTES ABSOLUTE: 0.8 THOU/MM3 (ref 1–4.8)
MCH RBC QN AUTO: 32 PG (ref 26–33)
MCHC RBC AUTO-ENTMCNC: 31.9 GM/DL (ref 32.2–35.5)
MCV RBC AUTO: 100 FL (ref 81–99)
MONOCYTES ABSOLUTE: 0.6 THOU/MM3 (ref 0.4–1.3)
MONOCYTES: 7 % (ref 0–12)
PDW BLD-RTO: 17.1 % (ref 11.5–14.5)
PLATELET # BLD: 149 THOU/MM3 (ref 130–400)
PMV BLD AUTO: 10 FL (ref 9.4–12.4)
POTASSIUM, WHOLE BLOOD: 3.6 MEQ/L (ref 3.5–4.9)
RBC # BLD: 3.28 MILL/MM3 (ref 4.2–5.4)
SEG NEUTROPHILS: 79 % (ref 43–75)
SEGMENTED NEUTROPHILS ABSOLUTE COUNT: 6.6 THOU/MM3 (ref 1.8–7.7)
SODIUM, WHOLE BLOOD: 143 MEQ/L (ref 138–146)
TOTAL CO2, WHOLE BLOOD: 24 MEQ/L (ref 23–33)
TOTAL PROTEIN: 6.2 G/DL (ref 6.1–8)
WBC # BLD: 8.3 THOU/MM3 (ref 4.8–10.8)

## 2022-08-16 PROCEDURE — A4216 STERILE WATER/SALINE, 10 ML: HCPCS | Performed by: INTERNAL MEDICINE

## 2022-08-16 PROCEDURE — 85025 COMPLETE CBC W/AUTO DIFF WBC: CPT

## 2022-08-16 PROCEDURE — 96375 TX/PRO/DX INJ NEW DRUG ADDON: CPT

## 2022-08-16 PROCEDURE — 6360000002 HC RX W HCPCS: Performed by: INTERNAL MEDICINE

## 2022-08-16 PROCEDURE — 2580000003 HC RX 258: Performed by: INTERNAL MEDICINE

## 2022-08-16 PROCEDURE — 96415 CHEMO IV INFUSION ADDL HR: CPT

## 2022-08-16 PROCEDURE — 80047 BASIC METABLC PNL IONIZED CA: CPT

## 2022-08-16 PROCEDURE — 80076 HEPATIC FUNCTION PANEL: CPT

## 2022-08-16 PROCEDURE — 2500000003 HC RX 250 WO HCPCS: Performed by: INTERNAL MEDICINE

## 2022-08-16 PROCEDURE — 36591 DRAW BLOOD OFF VENOUS DEVICE: CPT

## 2022-08-16 PROCEDURE — 96367 TX/PROPH/DG ADDL SEQ IV INF: CPT

## 2022-08-16 PROCEDURE — 96413 CHEMO IV INFUSION 1 HR: CPT

## 2022-08-16 RX ORDER — SODIUM CHLORIDE 9 MG/ML
25 INJECTION, SOLUTION INTRAVENOUS PRN
Status: CANCELLED | OUTPATIENT
Start: 2022-08-16

## 2022-08-16 RX ORDER — HEPARIN SODIUM (PORCINE) LOCK FLUSH IV SOLN 100 UNIT/ML 100 UNIT/ML
500 SOLUTION INTRAVENOUS PRN
Status: CANCELLED | OUTPATIENT
Start: 2022-08-16

## 2022-08-16 RX ORDER — SODIUM CHLORIDE 0.9 % (FLUSH) 0.9 %
5-40 SYRINGE (ML) INJECTION PRN
Status: DISCONTINUED | OUTPATIENT
Start: 2022-08-16 | End: 2022-08-17 | Stop reason: HOSPADM

## 2022-08-16 RX ORDER — SODIUM CHLORIDE 9 MG/ML
20 INJECTION, SOLUTION INTRAVENOUS ONCE
Status: COMPLETED | OUTPATIENT
Start: 2022-08-16 | End: 2022-08-17

## 2022-08-16 RX ORDER — DIPHENHYDRAMINE HYDROCHLORIDE 50 MG/ML
50 INJECTION INTRAMUSCULAR; INTRAVENOUS ONCE
Status: COMPLETED | OUTPATIENT
Start: 2022-08-16 | End: 2022-08-16

## 2022-08-16 RX ORDER — SODIUM CHLORIDE 0.9 % (FLUSH) 0.9 %
5-40 SYRINGE (ML) INJECTION PRN
Status: CANCELLED | OUTPATIENT
Start: 2022-08-16

## 2022-08-16 RX ORDER — HEPARIN SODIUM (PORCINE) LOCK FLUSH IV SOLN 100 UNIT/ML 100 UNIT/ML
500 SOLUTION INTRAVENOUS PRN
Status: DISCONTINUED | OUTPATIENT
Start: 2022-08-16 | End: 2022-08-17 | Stop reason: HOSPADM

## 2022-08-16 RX ADMIN — SODIUM CHLORIDE, PRESERVATIVE FREE 20 ML: 5 INJECTION INTRAVENOUS at 07:59

## 2022-08-16 RX ADMIN — PACLITAXEL 480 MG: 6 INJECTION, SOLUTION, CONCENTRATE INTRAVENOUS at 09:47

## 2022-08-16 RX ADMIN — DEXAMETHASONE SODIUM PHOSPHATE 12 MG: 4 INJECTION, SOLUTION INTRAMUSCULAR; INTRAVENOUS at 08:32

## 2022-08-16 RX ADMIN — HEPARIN 500 UNITS: 100 SYRINGE at 13:56

## 2022-08-16 RX ADMIN — HYDROCORTISONE SODIUM SUCCINATE 100 MG: 100 INJECTION, POWDER, FOR SOLUTION INTRAMUSCULAR; INTRAVENOUS at 10:14

## 2022-08-16 RX ADMIN — SODIUM CHLORIDE, PRESERVATIVE FREE 10 ML: 5 INJECTION INTRAVENOUS at 13:56

## 2022-08-16 RX ADMIN — SODIUM CHLORIDE, PRESERVATIVE FREE 10 ML: 5 INJECTION INTRAVENOUS at 07:56

## 2022-08-16 RX ADMIN — SODIUM CHLORIDE 20 ML/HR: 9 INJECTION, SOLUTION INTRAVENOUS at 08:30

## 2022-08-16 RX ADMIN — FAMOTIDINE 20 MG: 10 INJECTION, SOLUTION INTRAVENOUS at 09:19

## 2022-08-16 RX ADMIN — DIPHENHYDRAMINE HYDROCHLORIDE 50 MG: 50 INJECTION INTRAMUSCULAR; INTRAVENOUS at 09:21

## 2022-08-16 NOTE — PLAN OF CARE
Problem: Intellectual/Education/Knowledge Deficit  Goal: Teaching initiated upon admission  Outcome: Adequate for Discharge  Note: Patient verbalizes understanding to verbal information given on TAXOL,action and possible side effects. Aware to call MD if develop complications. Intervention: Verbal/written education provided  Note: Chemotherapy Teaching     What is Chemotherapy   Drug action [x]   Method of Administration [x]   Handouts given []     Side Effects  Nausea/vomiting [x]   Diarrhea [x]   Fatigue [x]   Signs / Symptoms of infection [x]   Neutropenia [x]   Thrombocytopenia [x]   Alopecia [x]   neuropathy [x]   [employer] diet &  the importance of fluids [x]       Micellaneous  Importance of nutrition [x]   Importance of oral hygiene [x]   When to call the MD [x]   Monitoring labs [x]   Use of supportive services []     Explanation of Drug Regimen / Frequency  taxol     Comments  Verbalized understanding to drug,action,side effects and when to call MD         Problem: Discharge Planning  Goal: Knowledge of discharge instructions  Description: Knowledge of discharge instructions  Outcome: Adequate for Discharge  Note: Verbalize understanding of discharge instructions, follow up appointments, and when to call Physician. Intervention: Interaction with patient/family and care team  Note: Discuss understanding of discharge instructions, follow up appointments and when to call Physician. Problem: Infection - Adult  Goal: Absence of infection at discharge  Outcome: Adequate for Discharge  Flowsheets (Taken 8/16/2022 0834)  Absence of infection at discharge: Assess and monitor for signs and symptoms of infection  Note: Mediport site with no redness or warmth. Skin over port site intact with no signs of breakdown noted. Patient verbalizes signs/symptoms of port infection and when to notify the physician.    Goal: Will show no infection signs and symptoms  Description: Will show no infection signs and symptoms  Outcome: Adequate for Discharge  Intervention: EDUCATE PATIENT ABOUT SIGNS AND SYMPTOMS OF INFECTION  Note: Discuss port maintenance,infection prevention, sign of infection and when to call MD.     Care plan reviewed with patient. Patient verbalizes understanding of the plan of care and contributes to goal setting.

## 2022-08-16 NOTE — PROGRESS NOTES
Patient assessed for the following post chemotherapy:    Dizziness   No  Lightheadedness  No      Acute nausea/vomiting No  Headache   No  Chest pain/pressure  No  Rash/itching   No  Shortness of breath  No    Patient kept for 20 minutes observation post infusion chemotherapy. Patient tolerated chemotherapy treatment taxol without any complications. Last vital signs:   BP (!) 145/76   Pulse 80   Temp 98.2 °F (36.8 °C) (Oral)   Resp 16   Ht 6' (1.829 m)   Wt (!) 307 lb 4 oz (139.4 kg)   SpO2 98%   BMI 41.67 kg/m²       Patient instructed if experience any of the above symptoms following today's infusion,he/she is to notify MD immediately or go to the emergency department. Discharge instructions given to patient. Verbalizes understanding. Ambulated off unit per self with belongings.

## 2022-08-16 NOTE — DISCHARGE INSTRUCTIONS
Please contact your Oncologist if you have any questions regarding the taxol that you received today. You are instructed to call the office or go to the Emergency Dept. If you experience any of the following symptoms:    Dizziness/lightheadedness   Acute nausea or vomiting-not relieved by medications  Headaches-not relieved by medications  New chest pain or pressure  New rash /itching  New shortness of breath  Fever,chills or signs or symptoms of infection    Make sure you are drinking 48 to 64 ounces of water daily-if you are unable to drink fluids let us know right away.

## 2022-08-17 ENCOUNTER — HOSPITAL ENCOUNTER (OUTPATIENT)
Dept: INFUSION THERAPY | Age: 52
Discharge: HOME | End: 2022-08-17
Payer: COMMERCIAL

## 2022-08-17 VITALS
TEMPERATURE: 98.4 F | HEART RATE: 70 BPM | HEIGHT: 72 IN | SYSTOLIC BLOOD PRESSURE: 119 MMHG | WEIGHT: 293 LBS | RESPIRATION RATE: 16 BRPM | OXYGEN SATURATION: 98 % | BODY MASS INDEX: 39.68 KG/M2 | DIASTOLIC BLOOD PRESSURE: 68 MMHG

## 2022-08-17 DIAGNOSIS — C50.112 MALIGNANT NEOPLASM OF CENTRAL PORTION OF LEFT BREAST IN FEMALE, ESTROGEN RECEPTOR NEGATIVE (HCC): Primary | ICD-10-CM

## 2022-08-17 DIAGNOSIS — Z17.1 MALIGNANT NEOPLASM OF CENTRAL PORTION OF LEFT BREAST IN FEMALE, ESTROGEN RECEPTOR NEGATIVE (HCC): Primary | ICD-10-CM

## 2022-08-17 PROCEDURE — 96372 THER/PROPH/DIAG INJ SC/IM: CPT

## 2022-08-17 PROCEDURE — 6360000002 HC RX W HCPCS: Performed by: INTERNAL MEDICINE

## 2022-08-17 RX ADMIN — PEGFILGRASTIM-CBQV 6 MG: 6 INJECTION, SOLUTION SUBCUTANEOUS at 14:13

## 2022-08-17 NOTE — PLAN OF CARE
Problem: Intellectual/Education/Knowledge Deficit  Goal: Teaching initiated upon admission  Outcome: Adequate for Discharge  Note: Patient verbalizes understanding to verbal information given on udenyca injection,action and possible side effects. Aware to call MD if develop complications. Intervention: Verbal/written education provided  Note: Discussed indications for injection     Problem: Discharge Planning  Goal: Knowledge of discharge instructions  Description: Knowledge of discharge instructions  Outcome: Adequate for Discharge  Note: Verbalize understanding of discharge instructions, follow up appointments, and when to call Physician. Intervention: Interaction with patient/family and care team  Note: Discuss understanding of discharge instructions, follow up appointments and when to call Physician. Problem: Musculor/Skeletal Functional Status  Goal: Absence of falls  Outcome: Adequate for Discharge  Note: No falls occurred with visit today. Intervention: Fall precautions  Note: Discussed the need to use the call light for assistance when getting up to ambulate. Care plan reviewed with patient. Patient verbalizes understanding of the plan of care and contributes to goal setting.

## 2022-08-17 NOTE — DISCHARGE INSTRUCTIONS
Please contact your Oncologist if you have any questions regarding the udenyca injection that you received today. You are instructed to call the office or go to the Emergency Dept. If you experience any of the following symptoms:    Dizziness/lightheadedness   Acute nausea or vomiting-not relieved by medications  Headaches-not relieved by medications  New chest pain or pressure  New rash /itching  New shortness of breath  Fever,chills or signs or symptoms of infection    Make sure you are drinking 48 to 64 ounces of water daily-if you are unable to drink fluids let us know right away.

## 2022-08-17 NOTE — PROGRESS NOTES
Patient tolerated  udenyca injection without any complications. Discharge instructions given to patient-verbalizes understanding. Ambulated off unit per self with belongings.

## 2022-08-25 DIAGNOSIS — Z17.1 MALIGNANT NEOPLASM OF CENTRAL PORTION OF LEFT BREAST IN FEMALE, ESTROGEN RECEPTOR NEGATIVE (HCC): Primary | ICD-10-CM

## 2022-08-25 DIAGNOSIS — C50.112 MALIGNANT NEOPLASM OF CENTRAL PORTION OF LEFT BREAST IN FEMALE, ESTROGEN RECEPTOR NEGATIVE (HCC): Primary | ICD-10-CM

## 2022-08-25 RX ORDER — ACETAMINOPHEN 325 MG/1
650 TABLET ORAL
Status: CANCELLED | OUTPATIENT
Start: 2022-08-30

## 2022-08-25 RX ORDER — ONDANSETRON 2 MG/ML
8 INJECTION INTRAMUSCULAR; INTRAVENOUS
Status: CANCELLED | OUTPATIENT
Start: 2022-08-30

## 2022-08-25 RX ORDER — FAMOTIDINE 10 MG/ML
20 INJECTION, SOLUTION INTRAVENOUS
Status: CANCELLED | OUTPATIENT
Start: 2022-08-30

## 2022-08-25 RX ORDER — SODIUM CHLORIDE 9 MG/ML
5-40 INJECTION INTRAVENOUS PRN
Status: CANCELLED | OUTPATIENT
Start: 2022-08-30

## 2022-08-25 RX ORDER — ALBUTEROL SULFATE 90 UG/1
4 AEROSOL, METERED RESPIRATORY (INHALATION) PRN
Status: CANCELLED | OUTPATIENT
Start: 2022-08-30

## 2022-08-25 RX ORDER — SODIUM CHLORIDE 9 MG/ML
INJECTION, SOLUTION INTRAVENOUS CONTINUOUS
Status: CANCELLED | OUTPATIENT
Start: 2022-08-30

## 2022-08-25 RX ORDER — SODIUM CHLORIDE 0.9 % (FLUSH) 0.9 %
5-40 SYRINGE (ML) INJECTION PRN
Status: CANCELLED | OUTPATIENT
Start: 2022-08-30

## 2022-08-25 RX ORDER — SODIUM CHLORIDE 9 MG/ML
20 INJECTION, SOLUTION INTRAVENOUS ONCE
Status: CANCELLED | OUTPATIENT
Start: 2022-08-30 | End: 2022-08-30

## 2022-08-25 RX ORDER — DIPHENHYDRAMINE HYDROCHLORIDE 50 MG/ML
50 INJECTION INTRAMUSCULAR; INTRAVENOUS ONCE
Status: CANCELLED | OUTPATIENT
Start: 2022-08-30 | End: 2022-08-30

## 2022-08-25 RX ORDER — HEPARIN SODIUM (PORCINE) LOCK FLUSH IV SOLN 100 UNIT/ML 100 UNIT/ML
500 SOLUTION INTRAVENOUS PRN
Status: CANCELLED | OUTPATIENT
Start: 2022-08-30

## 2022-08-25 RX ORDER — FAMOTIDINE 10 MG/ML
20 INJECTION, SOLUTION INTRAVENOUS ONCE
Status: CANCELLED | OUTPATIENT
Start: 2022-08-30 | End: 2022-08-30

## 2022-08-25 RX ORDER — DIPHENHYDRAMINE HYDROCHLORIDE 50 MG/ML
50 INJECTION INTRAMUSCULAR; INTRAVENOUS
Status: CANCELLED | OUTPATIENT
Start: 2022-08-30

## 2022-08-25 RX ORDER — EPINEPHRINE 1 MG/ML
0.3 INJECTION, SOLUTION, CONCENTRATE INTRAVENOUS PRN
Status: CANCELLED | OUTPATIENT
Start: 2022-08-30

## 2022-08-25 RX ORDER — SODIUM CHLORIDE 9 MG/ML
25 INJECTION, SOLUTION INTRAVENOUS PRN
Status: CANCELLED | OUTPATIENT
Start: 2022-08-30

## 2022-08-25 RX ORDER — MEPERIDINE HYDROCHLORIDE 50 MG/ML
12.5 INJECTION INTRAMUSCULAR; INTRAVENOUS; SUBCUTANEOUS PRN
Status: CANCELLED | OUTPATIENT
Start: 2022-08-30

## 2022-08-30 ENCOUNTER — HOSPITAL ENCOUNTER (OUTPATIENT)
Dept: INFUSION THERAPY | Age: 52
Discharge: HOME | End: 2022-08-30
Payer: COMMERCIAL

## 2022-08-30 ENCOUNTER — OFFICE VISIT (OUTPATIENT)
Dept: ONCOLOGY | Age: 52
End: 2022-08-30
Payer: COMMERCIAL

## 2022-08-30 VITALS
TEMPERATURE: 97.9 F | BODY MASS INDEX: 39.68 KG/M2 | SYSTOLIC BLOOD PRESSURE: 129 MMHG | HEIGHT: 72 IN | DIASTOLIC BLOOD PRESSURE: 71 MMHG | WEIGHT: 293 LBS | RESPIRATION RATE: 18 BRPM | HEART RATE: 73 BPM | OXYGEN SATURATION: 96 %

## 2022-08-30 VITALS
BODY MASS INDEX: 39.68 KG/M2 | TEMPERATURE: 98.1 F | DIASTOLIC BLOOD PRESSURE: 69 MMHG | SYSTOLIC BLOOD PRESSURE: 133 MMHG | RESPIRATION RATE: 16 BRPM | HEART RATE: 70 BPM | OXYGEN SATURATION: 96 % | WEIGHT: 293 LBS | HEIGHT: 72 IN

## 2022-08-30 DIAGNOSIS — C50.112 MALIGNANT NEOPLASM OF CENTRAL PORTION OF LEFT BREAST IN FEMALE, ESTROGEN RECEPTOR NEGATIVE (HCC): Primary | ICD-10-CM

## 2022-08-30 DIAGNOSIS — Z17.1 MALIGNANT NEOPLASM OF CENTRAL PORTION OF LEFT BREAST IN FEMALE, ESTROGEN RECEPTOR NEGATIVE (HCC): Primary | ICD-10-CM

## 2022-08-30 LAB
ABSOLUTE IMMATURE GRANULOCYTE: 0.06 THOU/MM3 (ref 0–0.07)
ALBUMIN SERPL-MCNC: 4 G/DL (ref 3.5–5.1)
ALP BLD-CCNC: 92 U/L (ref 38–126)
ALT SERPL-CCNC: 11 U/L (ref 11–66)
AST SERPL-CCNC: 12 U/L (ref 5–40)
BASINOPHIL, AUTOMATED: 1 % (ref 0–3)
BASOPHILS ABSOLUTE: 0.1 THOU/MM3 (ref 0–0.1)
BILIRUB SERPL-MCNC: 0.3 MG/DL (ref 0.3–1.2)
BILIRUBIN DIRECT: < 0.2 MG/DL (ref 0–0.3)
BUN, WHOLE BLOOD: 6 MG/DL (ref 8–26)
CHLORIDE, WHOLE BLOOD: 108 MEQ/L (ref 98–109)
CREATININE, WHOLE BLOOD: 0.4 MG/DL (ref 0.5–1.2)
EOSINOPHILS ABSOLUTE: 0.2 THOU/MM3 (ref 0–0.4)
EOSINOPHILS RELATIVE PERCENT: 3 % (ref 0–4)
GFR, ESTIMATED: > 90 ML/MIN/1.73M2
GLUCOSE, WHOLE BLOOD: 134 MG/DL (ref 70–108)
HCT VFR BLD CALC: 32.5 % (ref 37–47)
HEMOGLOBIN: 10.3 GM/DL (ref 12–16)
IMMATURE GRANULOCYTES: 1 %
IONIZED CALCIUM, WHOLE BLOOD: 1.11 MMOL/L (ref 1.12–1.32)
LYMPHOCYTES # BLD: 9 % (ref 15–47)
LYMPHOCYTES ABSOLUTE: 0.8 THOU/MM3 (ref 1–4.8)
MCH RBC QN AUTO: 32 PG (ref 26–33)
MCHC RBC AUTO-ENTMCNC: 31.7 GM/DL (ref 32.2–35.5)
MCV RBC AUTO: 101 FL (ref 81–99)
MONOCYTES ABSOLUTE: 0.6 THOU/MM3 (ref 0.4–1.3)
MONOCYTES: 7 % (ref 0–12)
PDW BLD-RTO: 15.5 % (ref 11.5–14.5)
PLATELET # BLD: 161 THOU/MM3 (ref 130–400)
PMV BLD AUTO: 9.9 FL (ref 9.4–12.4)
POTASSIUM, WHOLE BLOOD: 3.4 MEQ/L (ref 3.5–4.9)
RBC # BLD: 3.22 MILL/MM3 (ref 4.2–5.4)
SEG NEUTROPHILS: 81 % (ref 43–75)
SEGMENTED NEUTROPHILS ABSOLUTE COUNT: 6.9 THOU/MM3 (ref 1.8–7.7)
SODIUM, WHOLE BLOOD: 144 MEQ/L (ref 138–146)
TOTAL CO2, WHOLE BLOOD: 25 MEQ/L (ref 23–33)
TOTAL PROTEIN: 6.1 G/DL (ref 6.1–8)
WBC # BLD: 8.5 THOU/MM3 (ref 4.8–10.8)

## 2022-08-30 PROCEDURE — 6360000002 HC RX W HCPCS: Performed by: INTERNAL MEDICINE

## 2022-08-30 PROCEDURE — 80047 BASIC METABLC PNL IONIZED CA: CPT

## 2022-08-30 PROCEDURE — 99211 OFF/OP EST MAY X REQ PHY/QHP: CPT

## 2022-08-30 PROCEDURE — 36591 DRAW BLOOD OFF VENOUS DEVICE: CPT

## 2022-08-30 PROCEDURE — A4216 STERILE WATER/SALINE, 10 ML: HCPCS | Performed by: INTERNAL MEDICINE

## 2022-08-30 PROCEDURE — 99214 OFFICE O/P EST MOD 30 MIN: CPT | Performed by: INTERNAL MEDICINE

## 2022-08-30 PROCEDURE — 85025 COMPLETE CBC W/AUTO DIFF WBC: CPT

## 2022-08-30 PROCEDURE — 80076 HEPATIC FUNCTION PANEL: CPT

## 2022-08-30 PROCEDURE — 96375 TX/PRO/DX INJ NEW DRUG ADDON: CPT

## 2022-08-30 PROCEDURE — 2500000003 HC RX 250 WO HCPCS: Performed by: INTERNAL MEDICINE

## 2022-08-30 PROCEDURE — 2580000003 HC RX 258: Performed by: INTERNAL MEDICINE

## 2022-08-30 PROCEDURE — 96367 TX/PROPH/DG ADDL SEQ IV INF: CPT

## 2022-08-30 PROCEDURE — 96415 CHEMO IV INFUSION ADDL HR: CPT

## 2022-08-30 PROCEDURE — 96413 CHEMO IV INFUSION 1 HR: CPT

## 2022-08-30 RX ORDER — SODIUM CHLORIDE 9 MG/ML
20 INJECTION, SOLUTION INTRAVENOUS ONCE
Status: COMPLETED | OUTPATIENT
Start: 2022-08-30 | End: 2022-08-30

## 2022-08-30 RX ORDER — DIPHENHYDRAMINE HYDROCHLORIDE 50 MG/ML
50 INJECTION INTRAMUSCULAR; INTRAVENOUS ONCE
Status: COMPLETED | OUTPATIENT
Start: 2022-08-30 | End: 2022-08-30

## 2022-08-30 RX ORDER — SODIUM CHLORIDE 0.9 % (FLUSH) 0.9 %
5-40 SYRINGE (ML) INJECTION PRN
Status: DISCONTINUED | OUTPATIENT
Start: 2022-08-30 | End: 2022-08-31 | Stop reason: HOSPADM

## 2022-08-30 RX ORDER — HEPARIN SODIUM (PORCINE) LOCK FLUSH IV SOLN 100 UNIT/ML 100 UNIT/ML
500 SOLUTION INTRAVENOUS PRN
Status: DISCONTINUED | OUTPATIENT
Start: 2022-08-30 | End: 2022-08-31 | Stop reason: HOSPADM

## 2022-08-30 RX ADMIN — PACLITAXEL 480 MG: 6 INJECTION, SOLUTION, CONCENTRATE INTRAVENOUS at 11:26

## 2022-08-30 RX ADMIN — SODIUM CHLORIDE 20 ML/HR: 9 INJECTION, SOLUTION INTRAVENOUS at 10:27

## 2022-08-30 RX ADMIN — DEXAMETHASONE SODIUM PHOSPHATE 12 MG: 4 INJECTION, SOLUTION INTRA-ARTICULAR; INTRALESIONAL; INTRAMUSCULAR; INTRAVENOUS; SOFT TISSUE at 10:37

## 2022-08-30 RX ADMIN — DIPHENHYDRAMINE HYDROCHLORIDE 50 MG: 50 INJECTION, SOLUTION INTRAMUSCULAR; INTRAVENOUS at 10:31

## 2022-08-30 RX ADMIN — SODIUM CHLORIDE, PRESERVATIVE FREE 20 ML: 5 INJECTION INTRAVENOUS at 09:31

## 2022-08-30 RX ADMIN — FAMOTIDINE 20 MG: 10 INJECTION, SOLUTION INTRAVENOUS at 10:27

## 2022-08-30 RX ADMIN — SODIUM CHLORIDE, PRESERVATIVE FREE 10 ML: 5 INJECTION INTRAVENOUS at 14:46

## 2022-08-30 RX ADMIN — SODIUM CHLORIDE, PRESERVATIVE FREE 10 ML: 5 INJECTION INTRAVENOUS at 09:30

## 2022-08-30 RX ADMIN — Medication 500 UNITS: at 14:46

## 2022-08-30 RX ADMIN — SODIUM CHLORIDE, PRESERVATIVE FREE 10 ML: 5 INJECTION INTRAVENOUS at 10:27

## 2022-08-30 NOTE — PROGRESS NOTES
Patient assessed for the following post chemotherapy:    Dizziness   No  Lightheadedness  No      Acute nausea/vomiting No  Headache   No  Chest pain/pressure  No  Rash/itching   No  Shortness of breath  No    Patient kept for 20 minutes observation post infusion chemotherapy. Patient tolerated chemotherapy treatment taxol without any complications. Last vital signs:   /69   Pulse 70   Temp 98.1 °F (36.7 °C) (Oral)   Resp 16   Ht 6' (1.829 m)   Wt 299 lb (135.6 kg)   SpO2 96%   BMI 40.55 kg/m²     Patient instructed if experience any of the above symptoms following today's infusion,she is to notify MD immediately or go to the emergency department. Discharge instructions given to patient. Verbalizes understanding. Ambulated off unit per self with belongings.
[]   Document blood pressure, pulse, respiratory rate at the completion of Taxol [x]   Other:     []

## 2022-08-30 NOTE — PLAN OF CARE
Problem: Musculor/Skeletal Functional Status  Goal: Absence of falls  Outcome: Adequate for Discharge  Note: Patient verbalizes understanding of fall precautions. Patient free from falls this visit. Intervention: Fall precautions  Note: Discussed fall precautions, call light within reach. Problem: Intellectual/Education/Knowledge Deficit  Goal: Teaching initiated upon admission  Outcome: Adequate for Discharge  Note: Patient verbalizes understanding to verbal information given on taxol,action and possible side effects. Aware to call MD if develop complications. Intervention: Verbal/written education provided  Note: Chemotherapy Teaching     What is Chemotherapy   Drug action [x]   Method of Administration [x]   Handouts given []     Side Effects  Nausea/vomiting [x]   Diarrhea [x]   Fatigue [x]   Signs / Symptoms of infection [x]   Neutropenia [x]   Thrombocytopenia [x]   Alopecia [x]   neuropathy [x]   [employer] diet &  the importance of fluids [x]       Micellaneous  Importance of nutrition [x]   Importance of oral hygiene [x]   When to call the MD [x]   Monitoring labs [x]   Use of supportive services []     Explanation of Drug Regimen / Frequency  taxol     Comments  Verbalized understanding to drug,action,side effects and when to call MD        Problem: Discharge Planning  Goal: Knowledge of discharge instructions  Description: Knowledge of discharge instructions  Outcome: Adequate for Discharge  Note: Verbalized understanding of discharge instructions, follow-up appointments, and when to call the physician. Intervention: Interaction with patient/family and care team  Note: Discuss discharge instructions, follow ups, and when to call the doctor.      Problem: Infection - Adult  Goal: Absence of infection at discharge  Outcome: Adequate for Discharge  Flowsheets (Taken 8/30/2022 7924)  Absence of infection at discharge:   Assess and monitor for signs and symptoms of infection   Monitor all insertion sites i.e., indwelling lines, tubes and drains  Note: Mediport site with no redness or warmth. Skin over port intact with no signs of breakdown noted. Patient verbalizes signs/symptoms of port infection and when to notify the physician. Goal: Will show no infection signs and symptoms  Description: Will show no infection signs and symptoms  Outcome: Adequate for Discharge     Care plan reviewed with patient. Patient verbalizes understanding of the plan of care and contribute to goal setting.

## 2022-08-30 NOTE — DISCHARGE INSTRUCTIONS
Please contact your Oncologist if you have any questions regarding the chemotherapy taxol that you received today. Patient instructed if experience any of the symptoms following today's chemotherapy / to notify MD immediately or go to emergency department.     * dizziness/lightheadedness  *acute nausea/vomiting - not relieved with medication  *headache - not relieved from Tylenol/pain medication  *chest pain/pressure  *rash/itching  *shortness of breath        Drink fluids - 48oz fluids daily  Call if develop fever/ chills/ signs or symptoms of infection

## 2022-08-30 NOTE — PATIENT INSTRUCTIONS
Proceed with fourth and last course of dose dense adjuvant Taxol with growth factor  Referral to radiation oncology for postlumpectomy radiation  Routine follow-up with me in 4 weeks.

## 2022-08-30 NOTE — PROGRESS NOTES
[address] [place] [address]  Dept: [phone]  Loc: [phone]   Hematology/Oncology Consult (Clinic)        8/30/22     Bender Charlybowen   1970     No ref. provider found   Dandre Zheng          DIAGNOSIS:  -Invasive ductal carcinoma, left breast, ER negative, W[place] negative and HER-2 negative, 2.7 cm, grade 3, 0/ 4 sentinel nodes . ps T2N0M0 /IIA. (High risk: triple negative/grade 3). Inner central left breast anteriorly. -Extended panel germline testing - March 2022    TREATMENT:  - Left breast lumpectomy and sentinel node resection 3/22/2022. Dr Cathy Robison. - Adjuvant AC dose dense x4 followed by dose dense Taxol every other week. Start 5/10/2022  7/19 course 1 dose dense Taxol . 8/30/2022-4th and the last course of dose dense adjuvant Taxol.  (completed AC x4)  -After adjuvant chemotherapy, postlumpectomy radiation. PARAMETERS:  -History, exam,  -Bone scan ordered    SUBJECTIVE: Here today for her fourth and last course of dose dense Taxol having completed AC x4. She is followed by cardiology and had an echocardiogram in mid June that was unremarkable. Other than some increased fatigue and musculoskeletal achiness for several days she is doing well. No new complaints. She is eager to complete her chemotherapy today. She will need referral to radiation oncology. Plan is to have her port removed at next follow-up in 4 weeks. HPI: Antonio Rodriguez is a 61-year-old premenopausal female developed a lump in the left breast just medial to the nipple on February 8. Strong family history of malignancy including breast cancer. Last mammogram was in 2015. Mammogram on 2/18 additional imaging confirmed a approximate 2 cm nodular density inferior central medial left breast.  Initial biopsy revealed invasive ductal cancer triple and grade 3.   Definitive left breast lumpectomy and sentinel node resection on 3/22 revealed a 2.7 cm pathologic stage T2N0 triple negative grade 3 breast cancer. Other feeling the lump she is asymptomatic. Specifically denies any headaches bone pain breathing problems or any other focal or systemic complaints. Followed by cardiology with a history of a an MI in 2014 and proximal A. fib for which she is on anticoagulation followed by Dr. Jessy Biswas. Echocardiogram last year normal.  And recent history no ischemic cardiac events. ROS:  Review of Systems 14 point negative except as above. PMH:   Past Medical History:   Diagnosis Date    Afib ([facility]     Baki    Arthritis     CAD (coronary artery disease)     GERD (gastroesophageal reflux disease)     Hyperlipidemia     Hypertension     Hypothyroid     Invasive duct[facility] carcinoma of breast, female, [facility] 02/24/2022    Malignant neoplasm of lower-inner quadrant of left breast in female, estrogen receptor negative ([facility] 03/07/2022    MI (myocardial infarction) [facility])     Dr. Jessy Biswas    Pneumonia     Migraines  MI  Nov 2014  C Cath 2014.     Social HX:   Social History     Socioeconomic History    Marital status:      Spouse name: Not on file    Number of children: 1    Years of education: Not on file    Highest education level: Not on file   Occupational History    Occupation: LPN   Tobacco Use    Smoking status: Some Days     Packs/day: 0.25     Years: 30.00     Pack years: 7.50     Types: Cigarettes     Start date: 12/4/1987    Smokeless tobacco: Never    Tobacco comments:     08/30/22 -refused counciling/resources   Vaping Use    Vaping Use: Never used   Substance and Sexual Activity    Alcohol use: Yes     Comment: rare    Drug use: No    Sexual activity: Yes     Partners: Male     Comment: boy friend   Other Topics Concern    Not on file   Social History Narrative    Not on file     Social Determinants of Health     Financial Resource Strain: Not on file   Food Insecurity: Not on file   Transportation Needs: Not on file   Physical Activity: Not on file   Stress: Not on file   Social Connections: Not on file   Intimate Partner Violence: Not on file   Housing Stability: Not on file   Smoking history: 30+ years of smoking currently down to about 3 cigarettes/day average 1 to 1/2 packs/day. Spouse:   1 daughter age 32. 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 Dr. Gilford Jessica of OB  C-Scope: Not yet      Gyn HX:   GPA:  ROSS/BSO: all present. Last normal menstrual cycle in 6 years  LMP: No LMP recorded. Patient is postmenopausal.     Health Maintenance Due   Topic Date Due    COVID-19 Vaccine (1) Never done    Pneumococcal 0-64 years Vaccine (1 - PCV) Never done    Depression Screen  Never done    HIV screen  Never done    Hepatitis C screen  Never done    DTaP/Tdap/Td vaccine (1 - Tdap) Never done    Cervical cancer screen  Never done    Colorectal Cancer Screen  Never done    Diabetes screen  2022    Shingles vaccine (1 of 2) Never done        Interests:   4H advisor.      Fam HX:   Family History   Problem Relation Age of Onset    Heart Disease Mother     Diabetes Mother     Cancer Mother         liposarcoma    Kidney Disease Mother     Heart Disease Father     Diabetes Father     Kidney Disease Father     Cancer Father         Non-melanoma skin cancer multiple times    No Known Problems Brother     No Known Problems Brother     No Known Problems Brother     No Known Problems Maternal Grandmother     No Known Problems Maternal Grandfather     Breast Cancer Paternal Grandmother 47        reoccurred at 46    Ovarian Cancer Paternal Grandmother 46    Bilateral breast cancer Paternal Grandmother         Opposite breast, age 52's    Breast Cancer Maternal Aunt 50    Breast Cancer Paternal Aunt         breast      Germline genetic testing: Sent and negative 3/4/22. Hospitalizations:   None recent    Allergies: Allergies   Allergen Reactions    Codeine Hives and Swelling     And vomiting    Cortisone Swelling     Apparently tolerates topical and IV with benadryl well    Influenza Virus Vaccine Other (See Comments)     Pt states could not walk after getting     [address] [Propoxyphene N-Acetaminophen] Nausea And Vomiting    Sulfa Antibiotics Nausea And Vomiting and Swelling     Not throat swelling        Adult Illness:  Patient Active Problem List   Diagnosis    ACS (acute coronary syndrome) (HCC)    Chest pain with moderate risk for cardiac etiology    Essential hypertension    HLD (hyperlipidemia)    Tobacco abuse    History of MI (myocardial infarction)    Acquired hypothyroidism    Unstable angina (HCC)    Coronary artery disease involving native coronary artery of native heart without angina pectoris    Paroxysmal atrial fibrillation (HCC)    Gastroesophageal reflux disease without esophagitis    Malignant neoplasm of lower-inner quadrant of left breast in female, estrogen receptor negative ([facility]    Malignant neoplasm of central portion of left breast in female, estrogen receptor negative ([facility]    Hematoma      Patient is on Eliquis and aspirin for PAF.       Surgery:  Past Surgical History:   Procedure Laterality Date    BREAST LUMPECTOMY Left 3/22/2022    LEFT BREAST LUMPECTOMY PARTIAL MASTECTOMY, SENTINEL LYMPH NODE BIOPSY, PRE OP NEEDLE LOC X 2 performed by Manon Duane, MD at [place] [id number] Left 3/30/2022    Evacuation hematoma left axilla performed by Manon Duane, MD at [facility] ARTHROSCOPY Right     08    JENNIFER BIOPSY LYMPH NODE BY NEEDLE SUPERFICIAL  02/24/2022    JENNIFER BIOPSY LYMPH NODE BY NEEDLE SUPERFICIAL 2/24/2022 MD Ester De Paz aspirin 81 MG chewable tablet Take 81 mg by mouth daily  30 tablet 3    Magic Mouthwash (MIRACLE MOUTHWASH) Swish and spit 5 mLs 4 times daily as needed for Irritation 1:1:1 Benadryl, lidocaine, nystatin (Patient not taking: Reported on 2022) 120 mL 1    prochlorperazine (COMPAZINE) 10 MG tablet Take 1 tablet by mouth every 6 hours as needed (nausea) (Patient not taking: Reported on 2022) 30 tablet 1    nitroGLYCERIN (NITROSTAT) 0.4 MG SL tablet Place 1 tablet under the tongue every 5 minutes as needed for Chest pain (Patient not taking: No sig reported) 25 tablet 3     No current facility-administered medications for this visit. EXAM:   height is 6' (1.829 m) and weight is 299 lb (135.6 kg). Her oral temperature is 97.9 °F (36.6 °C). Her blood pressure is 129/71 and her pulse is 73. Her respiration is 18 and oxygen saturation is 96%. Estimated body surface area is 2.62 meters squared as calculated from the following:    Height as of this encounter: 6' (1.829 m). Weight as of this encounter: 299 lb (135.6 kg). ECO  General: Non-ill appearing. Very pleasant and upbeat. Morbidly obese. HEENT: NC/AT,nonicteric, perrla,eom intact, no mucosal lesions, dentition in good repair. Neck: normal thyroid, no masses. pulses nl, no bruits,   Nodes: No adenopathy  Lungs/chest: clear, no rales,rhonchi or wheezing, lung bases clear  CV: rrr, no rubs ,gallops or murmurs  Breasts: Postsurgical ecchymosis in the left breast post lumpectomy and recent evacuation of hematoma in the left axilla. No palpable masses bilaterally  Abd/Rectal: soft, non-tender,bowel sounds normal , no HSM,no masses, obese  Back: normal curvature, No midline tenderness. flanks nontender  : Not Examined  Extremities: no cyanosis,clubbing or edema.   Skin: Erythema and bandage over a drain in the left axilla and site of a recent boil  Neuro: A and O x 4, CN exam nonfocal, Motor- no deficits, Sensory- no deficits, gait-nl, speech- fluent, no ataxia. Devices: Right chest Chwbey-d-Eyah.       DATA:    LAB:   4/5/2022  CA 27-20 9 = 15  Estradiol 15.4/decreased    CBC with Differential:      Lab Results   Component Value Date/Time    WBC 8.5 08/30/2022 09:36 AM    RBC 3.22 08/30/2022 09:36 AM    HGB 10.3 08/30/2022 09:36 AM    HCT 32.5 08/30/2022 09:36 AM     08/30/2022 09:36 AM     08/30/2022 09:36 AM    MCH 32.0 08/30/2022 09:36 AM    MCHC 31.7 08/30/2022 09:36 AM    RDW 15.5 08/30/2022 09:36 AM    NRBC 0 07/19/2022 09:44 AM    SEGSPCT 72.9 07/19/2022 09:44 AM    MONOPCT 9.8 07/19/2022 09:44 AM    MONOSABS 0.6 08/30/2022 09:36 AM    LYMPHSABS 0.8 08/30/2022 09:36 AM    EOSABS 0.2 08/30/2022 09:36 AM    BASOSABS 0.1 08/30/2022 09:36 AM    DIFFTYPE see below 07/19/2022 09:44 AM     Lab Results   Component Value Date/Time    SEGSABS 6.9 08/30/2022 09:36 AM       CMP:    Lab Results   Component Value Date/Time     08/30/2022 09:36 AM     12/21/2021 12:28 PM    K 3.4 08/30/2022 09:36 AM    K 4.0 04/13/2022 08:05 AM    K 3.8 12/04/2019 02:29 PM     12/21/2021 12:28 PM    CO2 24 12/21/2021 12:28 PM    BUN 12 12/21/2021 12:28 PM    CREATININE 0.4 08/30/2022 09:36 AM    CREATININE 0.5 12/21/2021 12:28 PM    LABGLOM >90 12/21/2021 12:28 PM    GLUCOSE 105 12/21/2021 12:28 PM    PROT 6.2 08/16/2022 07:58 AM    LABALBU 3.7 08/16/2022 07:58 AM    CALCIUM 9.1 12/21/2021 12:28 PM    BILITOT 0.4 08/16/2022 07:58 AM    ALKPHOS 100 08/16/2022 07:58 AM    AST 12 08/16/2022 07:58 AM    ALT 15 08/16/2022 07:58 AM       BMP:    Lab Results   Component Value Date/Time     08/30/2022 09:36 AM     12/21/2021 12:28 PM    K 3.4 08/30/2022 09:36 AM    K 4.0 04/13/2022 08:05 AM    K 3.8 12/04/2019 02:29 PM     12/21/2021 12:28 PM    CO2 24 12/21/2021 12:28 PM    BUN 12 12/21/2021 12:28 PM    LABALBU 3.7 08/16/2022 07:58 AM    CREATININE 0.4 08/30/2022 09:36 AM    CREATININE 0.5 12/21/2021 12:28 PM    CALCIUM 9.1 12/21/2021 12:28 PM    LABGLOM >90 12/21/2021 12:28 PM    GLUCOSE 105 12/21/2021 12:28 PM       Magnesium:    Lab Results   Component Value Date/Time    MG 2.3 12/05/2019 06:14 AM     PT/INR:    Lab Results   Component Value Date/Time    INR 1.00 04/13/2022 08:05 AM     TSH:    Lab Results   Component Value Date/Time    TSH 4.690 12/21/2021 12:28 PM     VITAMIN B12: No components found for: B12  FOLATE:  No results found for: FOLATE  IRON:  No results found for: IRON  Iron Saturation:  No components found for: PERCENTFE  TIBC:  No results found for: TIBC  FERRITIN:  No results found for: FERRITIN  PSA: No results found for: PSA         IMAGING:    Echocardiogram June 14 per cardiology normal ejection fraction. Bone scan 4/11/2022-no evidence of bony metastatic disease. [facility]       PROCEDURE: [facility]HAEL DIGITAL DIAGNOSTIC BILATERAL, US BREAST LIMITED LEFT       CLINICAL INFORMATION: Subareolar mass of left breast . Tomosynthesis. Meets criteria for genetic evaluation and has been offered information for possible referral.           PATIENT MEDICAL HISTORY: No relevant medical history has been documented for this patient. FAMILY HISTORY: Family medical history includes breast cancer in paternal grandmother and ovarian cancer in paternal grandmother. RISK VALUES: Tyrer-Cuzick 10yr.: 4.2%, Tyrer-Cuzick life: 15.3%       COMPARISON: 6/1/2015       [facility]HAEL DIGITAL DIAGNOSTIC BILATERAL: 2/18/22       TECHNIQUE: Bilateral CC and MLO views were obtained each breast. Tomosynthesis was used. CAD was used. BREAST COMPOSITION: The tissue of the breast(s) is heterogeneously dense. This may lower the sensitivity of mammography. FINDINGS:        There is a nodular density within the inner central left breast anterior depth which correlates to the palpable abnormality. Further evaluation with targeted ultrasound is reported below.        The additional palpable abnormality within the periareolar region of the left breast does not demonstrate 8 definite mammographic correlate. However, further evaluation with targeted ultrasound is reported below. US BREAST LIMITED LEFT:       TECHNIQUE: Targeted ultrasound of the left breast was performed. Grayscale images and color images of the real-time examination were reviewed. The axilla was also imaged. FINDINGS:        There is a hypoechoic lobulated mass within the inner central left breast near the 9:00 position 1 cm from the nipple measuring 1.9 x 1.4 x 1.6 cm. This correlates with the mammographic finding and the palpable abnormality. Recommend ultrasound-guided    biopsy. There is a small hypoechoic nodular lesion within the upper central left breast 12:00 position 3 cm from the nipple measuring 0.3 x 0.2 x 0.3 cm. This is incidental. However, recommend ultrasound-guided biopsy. There is an enlarged lymph node demonstrated within the left axilla was cortical thickening measuring 4.9 mm. There is retained fatty hilum. There is an additional prominent lymph node within the left axilla with slightly less cortical thickening    measuring 4 mm. Recommend ultrasound-guided biopsy of the largest left axillary lymph node. 2/18/22   Impression   1. There is a hypoechoic lobulated mass within the inner central left breast near the 9:00 position 1 cm from the nipple measuring 1.9 x 1.4 x 1.6 cm. This correlates with the mammographic finding and the palpable abnormality. Recommend ultrasound-guided    biopsy. 2. There is a small hypoechoic nodular lesion within the upper central left breast 12:00 position 3 cm from the nipple measuring 0.3 x 0.2 x 0.3 cm. This is incidental. However, recommend ultrasound-guided biopsy. 3. There is an enlarged lymph node demonstrated within the left axilla was cortical thickening measuring 4.9 mm. There is retained fatty hilum.   There is an additional prominent lymph node within the left mammogram. The mammogram was performed as a separate procedure in a separate room with a dedicated machine. The patient tolerated the procedure well. No evidence of immediate complication. The patient was given post-procedure instructions, including wound care. POSTPROCEDURE MAMMOGRAM:       Images of the left include a CC view and MLO view. Tomosynthesis. CAD was utilized. There are scattered fibroglandular elements in the breast(s) that could obscure a lesion on mammography. Post procedure mammograms were taken in a separate room. There is a U shaped biopsy clip at the biopsy site 1, 12:00, anterior depth. There is a    barbell clip in the inner central left breast, within the mammographic mass. This corresponds with the original mammographic density. There is a tribell clip in the left axilla. There are no other significant findings in the breast.               Impression   1. Successful left breast ultrasound guided core needle biopsy, 2 sites. 2. Successful left axillary lymph node ultrasound guided core needle biopsy. 3. Successful marker clip placements with confirmation by digital diagnostic mammogram.            Waiting for pathology results. A final report will be issued when these become available. BI-RADS Final Assessment Category: Waiting for pathology. Management Recommendation: Assess radiologic/pathologic concordance. **This report has been created using voice recognition software. It may contain minor errors which are inherent in voice recognition technology. **       Final report electronically signed by Dr. Monica Kumar on 2/24/2022 10:00 AM          [facility] NEEDLE BIOPSY LYMPH NODE SUPERFICIAL (Order [id number]) - Reflex for Order [id number]  Narrative   LOCATION: [place]       EXAM:     1.  MAMMOGRAM POST BX CLIP PLACEMENT LEFT, [facility] US GUID NDL BIOPSY LEFT, [facility] NEEDLE BIOPSY LYMPH NODE SUPERFICIAL, US BREAST BIOPSY W LOC DEVICE EACH ADDL in the correct    location, 4 samples were taken from the area of interest. Samples were placed in formalin sent to pathology. A barbell biopsy marker was placed at the biopsy site. Site 3, left axillary lymph node, tribell clip: An ultrasound-guided biopsy using real-time ultrasound was performed. The lymph node in the left axilla was identified, localized , and the site was marked. With ultrasound guidance from a lateral approach,    the area was prepped and draped in sterile fashion. 2 % lidocaine was used for local anesthesia. 14 ga Sertera coaxial needle was advanced under ultrasound guidance. Once the needle was documented to be in the correct location, 4 samples were taken from    the area of interest. Samples were placed in formalin sent to pathology. A tribell biopsy marker was placed at the biopsy site. Clip placement was confirmed with a left digital diagnostic mammogram. The mammogram was performed as a separate procedure in a separate room with a dedicated machine. The patient tolerated the procedure well. No evidence of immediate complication. The patient was given post-procedure instructions, including wound care. POSTPROCEDURE MAMMOGRAM:       Images of the left include a CC view and MLO view. Tomosynthesis. CAD was utilized. There are scattered fibroglandular elements in the breast(s) that could obscure a lesion on mammography. Post procedure mammograms were taken in a separate room. There is a U shaped biopsy clip at the biopsy site 1, 12:00, anterior depth. There is a    barbell clip in the inner central left breast, within the mammographic mass. This corresponds with the original mammographic density. There is a tribell clip in the left axilla. There are no other significant findings in the breast.               Impression   1. Successful left breast ultrasound guided core needle biopsy, 2 sites.     2. Successful left axillary lymph node ultrasound guided ultrasound guidance from a medial approach, the area was prepped and draped in sterile fashion. 2 % lidocaine was used for local anesthesia. 14 ga Sertera coaxial needle was advanced under ultrasound guidance. Once the needle was documented to be in the    correct location, 4 samples were taken from the area of interest. Samples were placed in formalin sent to pathology. A U shaped biopsy marker was placed at the biopsy site. Site 2, 1.9 cm mass, 9:00, inner central left breast, barbell clip:  An ultrasound-guided biopsy using real-time ultrasound was performed. The mass in the inner central left breast was identified, localized , and the site was marked. With ultrasound    guidance from a medial approach, the area was prepped and draped in sterile fashion. 2 % lidocaine was used for local anesthesia. 14 ga Sertera coaxial needle was advanced under ultrasound guidance. Once the needle was documented to be in the correct    location, 4 samples were taken from the area of interest. Samples were placed in formalin sent to pathology. A barbell biopsy marker was placed at the biopsy site. Site 3, left axillary lymph node, tribell clip: An ultrasound-guided biopsy using real-time ultrasound was performed. The lymph node in the left axilla was identified, localized , and the site was marked. With ultrasound guidance from a lateral approach,    the area was prepped and draped in sterile fashion. 2 % lidocaine was used for local anesthesia. 14 ga Sertera coaxial needle was advanced under ultrasound guidance. Once the needle was documented to be in the correct location, 4 samples were taken from    the area of interest. Samples were placed in formalin sent to pathology. A tribell biopsy marker was placed at the biopsy site. Clip placement was confirmed with a left digital diagnostic mammogram. The mammogram was performed as a separate procedure in a separate room with a dedicated machine.        The patient tolerated the procedure well. No evidence of immediate complication. The patient was given post-procedure instructions, including wound care. POSTPROCEDURE MAMMOGRAM:       Images of the left include a CC view and MLO view. Tomosynthesis. CAD was utilized. There are scattered fibroglandular elements in the breast(s) that could obscure a lesion on mammography. Post procedure mammograms were taken in a separate room. There is a U shaped biopsy clip at the biopsy site 1, 12:00, anterior depth. There is a    barbell clip in the inner central left breast, within the mammographic mass. This corresponds with the original mammographic density. There is a tribell clip in the left axilla. There are no other significant findings in the breast.               Impression   1. Successful left breast ultrasound guided core needle biopsy, 2 sites. 2. Successful left axillary lymph node ultrasound guided core needle biopsy. 3. Successful marker clip placements with confirmation by digital diagnostic mammogram.            Waiting for pathology results. A final report will be issued when these become available. BI-RADS Final Assessment Category: Waiting for pathology. Management Recommendation: Assess radiologic/pathologic concordance. **This report has been created using voice recognition software. It may contain minor errors which are inherent in voice recognition technology. **       Final report electronically signed by Dr. Sue Keller on 2/24/2022 10:00 AM           TTE procedure:ECHOCARDIOGRAM COMPLETE 2D W DOPPLER W COLOR. Procedure Date  Date: 04/22/2021 Start: 09:44 AM     Study Location: Echo Lab  Technical Quality: Limited visualization due to body habitus. Indications:Fatigue and Shortness of breath.      Additional Medical History:acute coronary syndrome, chest pain,  hypertension, tobacco abuse, history of myocardial infarction, unstable  angina, coronary artery disease, atrial fibrillation, gastroesophageal  reflux disease, hyperlipidemia, hypothyroid     Patient Status: Routine     Height: 72 inches Weight: 309.01 pounds BSA: 2.56 m^2 BMI: 41.91 kg/m^2     BP: 122/79 mmHg      Conclusions      Summary  21   Ejection fraction is visually estimated at 60%. Overall left ventricular function is normal.      Signature     Followed by Dr. Sidney Limon cardiology; history of proximal A. fib. Echocardiogram 2022     Conclusions      Summary   Ejection fraction is visually estimated at 60%. Overall left ventricular function is normal.      Signature      ----------------------------------------------------------------   Electronically signed by Sunni Jack MD (Interpreting   physician) on 2022 at 04:02 PM   ----------------------------------------------------------------       PROCEDURES:  -See above    PATHOLOGY:              : 1970  AGE: 54 Y                          PATHOLOGY REPORT                       ATTN: ADAM BESS                       REQ: Nanette Pettit       Copies To:   Sea [facility]; Laney Arroyo; CARLOS MENDENHALL       Clinical Information: LT BREAST MASS 12:00, 9:00, LT ENLARGED AXILLARY   LYMPH NODE   22  ADDENDUM REPORT 2022     FINAL DIAGNOSIS:   A. Left breast mass, 12:00, U-Clip, biopsy:             Fibroglandular breast tissue with stromal fibrosis. B.  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     C.  Left axillary lymph node, [place] clip, biopsy:    Fragments of lymph node, negative for malignancy.      BREAST BIOMARKERS*   Estrogen Receptor: (Clone SP1), [employer]       Negative (<1% of cells staining)     Progesterone Receptor: (Clone 1E2), [employer]       Negative (<1% of cells staining)     Ki-67 (clone 30-9)       Percentage of positive nuclei:  95%               Favorable <10% Borderline 10-20%               Unfavorable >20%        2018          Inform HER2 Dual CHEPE         HER2 IHCClone 4B5      ASCO/CAP      [facility]      HER2 dual                                  Systems Additional      CHEPE Group #                                Workup   (  Group 4:      HER2/CEP17 Ratio <2.0 and    HER2 NEGATIVE, HER2   X                >=.0 and <6.0 HER2           IHC is 0-1+. See Group   )                signals/cell                  4 comment. Number of observers: 3                    (PCF/MTK/TERRIE)                    Number of invasive tumor                    cells counted: 20                    Using dual probe assay:                       Average number of HER2                    signals per cell:  4.3                                 Average number                     of CEP17 signals per cell:                      3.4                     HER2/CEP17 ratio:  1.3 but the latter is considered negative and was reflexed to  [facility] which is negative ,therefore pathology Dr. Jeancarlos Mosher reassures me that her  HER-2 negative and that this does not have to be repeated on the definitive surgical specimen. Group 4 comment: It is uncertain whether patients with an average of >=   4.0 and < 6.0 HER2 signals per cell and a HER2/CEP17 ratio of < 2.0   benefit from HER2 targeted therapy in the absence of protein   overexpression (IHC 3+). If the specimen test result is close to the   CHEPE ratio threshold for positive, there is a high likelihood that   repeat testing will result in different results by chance alone. Therefore, when [facility] results are not 3+ positive, it is recommended that   the sample be considered HER2 negative without additional testing on   the same specimen.      External Controls:  Adequate   Internal Controls:  Adequate   Standard Assay Conditions:  Met     Staining Method Used:    Formalin fixation    Antigen retrieval type:  Cell Conditioning 1, mild merle    Time in antigen retrieval:  30 minutes    Detection system type:   DAB Ultraview kit       Specimen:   A) CORE NEEDLE BREAST BIOPSY, LT MASS 12:00 U CLIP   B) CORE NEEDLE BREAST BIOPSY, LT MASS 9:00 BARBELL   C) CORE NEEDLE BREAST BIOPSY, LT AXILLARY LYMPH NODE TRIBELL       Definitive lumpectomy and sentinel node biopsy 3/22/2022  Clinical Information: INVASIVE DUCTAL CARCINOMA LEFT BREAST 3/22/22    FINAL DIAGNOSIS:   A: Left axillary sentinel lymph nodes, resection:     Four lymph nodes with no evidence of malignancy.  (0/4)     One lymph node with previous biopsy site changes. One lymph node with black pigment deposition. B: Left breast, lumpectomy specimen:     Invasive ductal carcinoma, Skipwith grade 3 of 3. Invasive carcinoma is 2.7 cm in greatest dimension. Invasive carcinoma is 1.5 mm to the closest margin-inferior/caudal.     Invasive carcinoma is > 5 mm to all other margins. Changes consistent with previous biopsy site. pT2, pN0(sn)     Specimen:   A) SENTINEL LYMPH NODE(S), LEFT AXILLARY   B) EXCISION OF BREAST, LEFT CANCER LUMP     CASE SUMMARY: (INVASIVE CARCINOMA OF THE BREAST: Resection)   Standard(s): AJCC-UICC 8     SPECIMEN   Procedure   Excision (less than total mastectomy)     Specimen Laterality   Left     TUMOR   Tumor Site   Clock position    Specify Clock Position    9 o'clock     Histologic Type   Invasive carcinoma of no special type (ductal)     Histologic Grade (Rekha Histologic Score)   Skipwith Score    Glandular (Acinar) / Tubular Differentiation    Score 3 (less than 10% of tumor area forming glandular / tubular   structures)      Nuclear Pleomorphism    Score 3 (Vesicular nuclei, often with prominent nucleoli, exhibiting    marked variation in size and shape, occasionally with very large and    bizarre forms)      Mitotic Rate    See Table 1 in CAP Protocol.     Score 3      Overall Grade    Grade 3 (scores of 8 or 9) Tumor Size   Greatest dimension of largest invasive focus greater than 1 mm (specify   exact measurement in Millimeters (mm)): 27 mm    Additional Dimension in Millimeters (mm): 17 x 15 mm     Tumor Focality   Single focus of invasive carcinoma     Ductal Carcinoma In Situ (DCIS)   Not identified     Lobular Carcinoma In Situ (LCIS)   Not identified     Tumor Extent    Tumor Extent    Not applicable (skin, nipple, and skeletal muscle are absent)     Lymphovascular Invasion   Not identified     Dermal Lymphovascular Invasion   No skin present     Treatment Effect in the Breast   No known presurgical therapy     Treatment Effect in the Lymph Nodes   Not applicable     MARGINS   Margin Status for Invasive Carcinoma   All margins negative for invasive carcinoma    Distance from Invasive Carcinoma to Closest Margin    Specify in Millimeters (mm)    Exact distance: 1.5 mm      Closest Margin to Invasive Carcinoma    Inferior/caudal      Distance from Invasive Carcinoma to Anterior/Skin Margin    Specify in Millimeters (mm)    Exact distance: 7 mm      Distance from Invasive Carcinoma to Posterior/Deep Margin    Specify in Millimeters (mm)    Exact distance: 10 mm      Distance from Invasive Carcinoma to Superior/Cranial Margin    Specify in Millimeters (mm)    Exact distance: 7 mm      Distance from Invasive Carcinoma to Inferior/Caudal Margin    Specify in Millimeters (mm)    Exact distance: 1.5 mm      Distance from Invasive Carcinoma to Medial Margin    Specify in Millimeters (mm)    Greater than: 10 mm      Distance from Invasive Carcinoma to Lateral Margin    Specify in Millimeters (mm)    Greater than: 10 mm     Margin Status for DCIS   Not applicable (no DCIS in specimen)       REGIONAL LYMPH NODES   Regional Lymph Node Status   Regional lymph nodes present    All regional lymph nodes negative for tumor      Total Number of Lymph Nodes Examined (sentinel and non-sentinel)    Exact number (specify): 4    Number of [place] Nodes Examined    Exact number (specify): 4     Regional Lymph Node Comment: Biopsy site changes are present within one   of the lymph nodes. In addition, another lymph node demonstrates black   pigment deposition (possibly tattoo or anthracosis). DISTANT METASTASIS   Distant Site   Not applicable     PATHOLOGIC STAGE CLASSIFICATION (pTNM, AJCC 8th Edition)   TNM Descriptors   Not applicable     pT Category   pT2: Tumor greater than 20 mm but less than or equal to 50 mm in   greatest dimension     Regional Lymph Nodes Modifier   The (sn) modifier is added to the N category when a sentinel node   biopsy is performed (using either dye or tracer) and fewer than six   lymph nodes are removed (sentinel and nonsentinel). (sn): [place] nodes evaluated.      pN Category   pN0: No regional lymph node metastasis identified or ITCs only#     pM Category   Not applicable - pM cannot be determined from the submitted specimen(s)       ADDITIONAL FINDINGS   Additional Findings (specify): Changes consistent with previous biopsy   site, usual ductal hyperplasia     SPECIAL STUDIES   Breast Biomarker Testing Performed on Previous Biopsy   Estrogen Receptor (ER)    Estrogen Receptor (ER) Status    Negative     Progesterone Receptor (PgR)    Progesterone Receptor (PgR) Status    Negative     HER2 (by immunohistochemistry)    HER2 (by immunohistochemistry)    Negative (Score 0-1+)     HER2 (by in situ hybridization)    HER2 (by in situ hybridization)    Indeterminate   Case reviewed with Dr. Tiana Alvares pathology who reassured me that in fact the HER-2 is negative    Ki-67    Ki-67 Percentage of Positive Nuclei: 95%    Testing Performed on Case Number: [id number]     [id number] x 2   [id number] x2                                                       <Sign Out Dr. Chloe Aldridge M.D., F.C.A.P.       GENETICS:  Germline testing done 3/4/2022-negative    MOLECULAR:  Not applicable    ASSESSMENT/PLAN:    1: Diagnosis: 82-year-old postmenopausal female with a palpable lesion noted in 2/8/2022 with subsequent biopsy and definitive lumpectomy showing high risk cancer of the left breast.  Pathologic stage T2N0 triple negative grade 3.    2) Prognosis / Disease Status: High risk/JAVIER    3) Work-up:    Labs: CBC, CMP, reviewed today. Imaging: None new   Procedures: Left lumpectomy and axillary dissection. Consults: Radiation oncology consult (sent 8/30/2022) for postlumpectomy radiation. other: Cardiology note reviewed. Echocardiogram June 14 unremarkable    4) Symptom Management: None new needed    5) Supportive care provided. Level of care is appropriate. 6) Treatment goal: Adjuvant      Treatment plan:  See above. 8/30 due for her fourth and last course of dose dense Taxol with Neulasta    7) Medications reviewed. Prescriptions today: None but recently Zofran ODT, Compazine, EMLA cream              No orders of the defined types were placed in this encounter. [place]AR[place]:  Controlled Substance Monitoring:    Acute and Chronic Pain Monitoring:   No flowsheet data found. 8) Research Options:      None      9) Other:        none    10) Follow Up:  1 month with me check labs and arrange to have port removed.       Jesse Giang MD

## 2022-08-31 ENCOUNTER — HOSPITAL ENCOUNTER (OUTPATIENT)
Dept: INFUSION THERAPY | Age: 52
Discharge: HOME | End: 2022-08-31
Payer: COMMERCIAL

## 2022-08-31 VITALS
BODY MASS INDEX: 39.68 KG/M2 | TEMPERATURE: 98.3 F | SYSTOLIC BLOOD PRESSURE: 134 MMHG | DIASTOLIC BLOOD PRESSURE: 66 MMHG | HEART RATE: 68 BPM | RESPIRATION RATE: 16 BRPM | OXYGEN SATURATION: 96 % | HEIGHT: 72 IN | WEIGHT: 293 LBS

## 2022-08-31 DIAGNOSIS — Z17.1 MALIGNANT NEOPLASM OF CENTRAL PORTION OF LEFT BREAST IN FEMALE, ESTROGEN RECEPTOR NEGATIVE (HCC): Primary | ICD-10-CM

## 2022-08-31 DIAGNOSIS — C50.112 MALIGNANT NEOPLASM OF CENTRAL PORTION OF LEFT BREAST IN FEMALE, ESTROGEN RECEPTOR NEGATIVE (HCC): Primary | ICD-10-CM

## 2022-08-31 PROCEDURE — 6360000002 HC RX W HCPCS: Performed by: INTERNAL MEDICINE

## 2022-08-31 PROCEDURE — 96372 THER/PROPH/DIAG INJ SC/IM: CPT

## 2022-08-31 RX ADMIN — PEGFILGRASTIM-CBQV 6 MG: 6 INJECTION, SOLUTION SUBCUTANEOUS at 15:33

## 2022-08-31 NOTE — PLAN OF CARE
Problem: Musculor/Skeletal Functional Status  Goal: Absence of falls  Outcome: Adequate for Discharge  Note: No falls occurred with visit today. Intervention: Fall precautions  Note: Discussed the need to use the call light for assistance when getting up to ambulate. Problem: Discharge Planning  Goal: Knowledge of discharge instructions  Description: Knowledge of discharge instructions  Outcome: Adequate for Discharge  Note: Verbalize understanding of discharge instructions, follow up appointments, and when to call Physician. Intervention: Interaction with patient/family and care team  Note: Discuss understanding of discharge instructions, follow up appointments and when to call Physician. Care plan reviewed with patient. Patient verbalizes understanding of the plan of care and contributes to goal setting.

## 2022-09-01 ENCOUNTER — HOSPITAL ENCOUNTER (OUTPATIENT)
Dept: PHYSICAL THERAPY | Age: 52
Setting detail: THERAPIES SERIES
Discharge: HOME | End: 2022-09-01

## 2022-09-06 ENCOUNTER — TELEPHONE (OUTPATIENT)
Dept: ONCOLOGY | Age: 52
End: 2022-09-06

## 2022-09-06 NOTE — TELEPHONE ENCOUNTER
Recommend patient tests for covid. Continue to monitor symptoms closely, monitor for fever. She can use Tylenol as needed for joint pain. She should take claritin 10 mg daily to help with potential growth factor support induced joint pain. If symptoms do not improve over the next day recommend coming in for labs and possible hydration.

## 2022-09-06 NOTE — TELEPHONE ENCOUNTER
Patient states that she tested for COVID and it was negative. Patient updated on plan of care- she verbalized understanding. Patient states she will keep in touch and call us tomorrow with how she is feeling.

## 2022-09-06 NOTE — TELEPHONE ENCOUNTER
Patient is calling in stating that she has not felt well the last two days. She said that yesterday she had the chills, no fever- temperature was 98.7, not eating well but is drinking and that every joint feels like it is on fire. Today she cannot cool off, again no fever- 98.8. She states it hurts to walk, her mouth is very dry and she has been sleeping a lot. She stated she has not been around anyone. Patient's last treatment was 8/31. Please advise.

## 2022-09-07 DIAGNOSIS — Z51.11 ENCOUNTER FOR CHEMOTHERAPY MANAGEMENT: Primary | ICD-10-CM

## 2022-09-07 NOTE — TELEPHONE ENCOUNTER
Spoke with Anu Stratton and scheduled patient for fluids and labs tomorrow at 10 am. Patient called and notified- she verbalized understanding.

## 2022-09-07 NOTE — TELEPHONE ENCOUNTER
Patient calling back in stating that her joint pain is better but she is very weak and unsteady- she had to borrow her mom's cane. She continues to not have a fever and her appetite continues to be poor. Please advise.

## 2022-09-08 ENCOUNTER — HOSPITAL ENCOUNTER (OUTPATIENT)
Dept: INFUSION THERAPY | Age: 52
Discharge: HOME | End: 2022-09-08
Payer: COMMERCIAL

## 2022-09-08 VITALS
TEMPERATURE: 98.9 F | SYSTOLIC BLOOD PRESSURE: 102 MMHG | BODY MASS INDEX: 38.19 KG/M2 | HEART RATE: 86 BPM | HEIGHT: 72 IN | DIASTOLIC BLOOD PRESSURE: 69 MMHG | WEIGHT: 282 LBS | RESPIRATION RATE: 16 BRPM | OXYGEN SATURATION: 96 %

## 2022-09-08 DIAGNOSIS — Z51.11 ENCOUNTER FOR CHEMOTHERAPY MANAGEMENT: Primary | ICD-10-CM

## 2022-09-08 DIAGNOSIS — Z17.1 MALIGNANT NEOPLASM OF CENTRAL PORTION OF LEFT BREAST IN FEMALE, ESTROGEN RECEPTOR NEGATIVE (HCC): ICD-10-CM

## 2022-09-08 DIAGNOSIS — C50.112 MALIGNANT NEOPLASM OF CENTRAL PORTION OF LEFT BREAST IN FEMALE, ESTROGEN RECEPTOR NEGATIVE (HCC): ICD-10-CM

## 2022-09-08 LAB
ABSOLUTE IMMATURE GRANULOCYTE: 0.06 THOU/MM3 (ref 0–0.07)
ALBUMIN SERPL-MCNC: 3.9 G/DL (ref 3.5–5.1)
ALP BLD-CCNC: 103 U/L (ref 38–126)
ALT SERPL-CCNC: 26 U/L (ref 11–66)
AST SERPL-CCNC: 28 U/L (ref 5–40)
BASINOPHIL, AUTOMATED: 0 % (ref 0–3)
BASOPHILS ABSOLUTE: 0 THOU/MM3 (ref 0–0.1)
BILIRUB SERPL-MCNC: 0.5 MG/DL (ref 0.3–1.2)
BILIRUBIN DIRECT: < 0.2 MG/DL (ref 0–0.3)
BUN, WHOLE BLOOD: 9 MG/DL (ref 8–26)
CHLORIDE, WHOLE BLOOD: 107 MEQ/L (ref 98–109)
CREATININE, WHOLE BLOOD: 0.6 MG/DL (ref 0.5–1.2)
EOSINOPHILS ABSOLUTE: 0 THOU/MM3 (ref 0–0.4)
EOSINOPHILS RELATIVE PERCENT: 1 % (ref 0–4)
GFR, ESTIMATED: > 90 ML/MIN/1.73M2
GLUCOSE, WHOLE BLOOD: 102 MG/DL (ref 70–108)
HCT VFR BLD CALC: 34.4 % (ref 37–47)
HEMOGLOBIN: 11.1 GM/DL (ref 12–16)
IMMATURE GRANULOCYTES: 1 %
IONIZED CALCIUM, WHOLE BLOOD: 1.11 MMOL/L (ref 1.12–1.32)
LYMPHOCYTES # BLD: 12 % (ref 15–47)
LYMPHOCYTES ABSOLUTE: 0.7 THOU/MM3 (ref 1–4.8)
MCH RBC QN AUTO: 31.8 PG (ref 26–33)
MCHC RBC AUTO-ENTMCNC: 32.3 GM/DL (ref 32.2–35.5)
MCV RBC AUTO: 99 FL (ref 81–99)
MONOCYTES ABSOLUTE: 0.7 THOU/MM3 (ref 0.4–1.3)
MONOCYTES: 13 % (ref 0–12)
PDW BLD-RTO: 15.1 % (ref 11.5–14.5)
PLATELET # BLD: 171 THOU/MM3 (ref 130–400)
PMV BLD AUTO: 10.6 FL (ref 9.4–12.4)
POTASSIUM, WHOLE BLOOD: 3.5 MEQ/L (ref 3.5–4.9)
RBC # BLD: 3.49 MILL/MM3 (ref 4.2–5.4)
SEG NEUTROPHILS: 73 % (ref 43–75)
SEGMENTED NEUTROPHILS ABSOLUTE COUNT: 4.3 THOU/MM3 (ref 1.8–7.7)
SODIUM, WHOLE BLOOD: 140 MEQ/L (ref 138–146)
TOTAL CO2, WHOLE BLOOD: 22 MEQ/L (ref 23–33)
TOTAL PROTEIN: 6.4 G/DL (ref 6.1–8)
WBC # BLD: 5.8 THOU/MM3 (ref 4.8–10.8)

## 2022-09-08 PROCEDURE — 96360 HYDRATION IV INFUSION INIT: CPT

## 2022-09-08 PROCEDURE — 2580000003 HC RX 258: Performed by: INTERNAL MEDICINE

## 2022-09-08 PROCEDURE — 6360000002 HC RX W HCPCS: Performed by: INTERNAL MEDICINE

## 2022-09-08 PROCEDURE — 80047 BASIC METABLC PNL IONIZED CA: CPT

## 2022-09-08 PROCEDURE — 2580000003 HC RX 258: Performed by: NURSE PRACTITIONER

## 2022-09-08 PROCEDURE — 85025 COMPLETE CBC W/AUTO DIFF WBC: CPT

## 2022-09-08 PROCEDURE — 80076 HEPATIC FUNCTION PANEL: CPT

## 2022-09-08 PROCEDURE — 36591 DRAW BLOOD OFF VENOUS DEVICE: CPT

## 2022-09-08 PROCEDURE — 96361 HYDRATE IV INFUSION ADD-ON: CPT

## 2022-09-08 RX ORDER — HEPARIN SODIUM (PORCINE) LOCK FLUSH IV SOLN 100 UNIT/ML 100 UNIT/ML
500 SOLUTION INTRAVENOUS PRN
Status: CANCELLED | OUTPATIENT
Start: 2022-09-08

## 2022-09-08 RX ORDER — 0.9 % SODIUM CHLORIDE 0.9 %
1000 INTRAVENOUS SOLUTION INTRAVENOUS ONCE
Status: COMPLETED | OUTPATIENT
Start: 2022-09-08 | End: 2022-09-08

## 2022-09-08 RX ORDER — SODIUM CHLORIDE 9 MG/ML
25 INJECTION, SOLUTION INTRAVENOUS PRN
Status: CANCELLED | OUTPATIENT
Start: 2022-09-08

## 2022-09-08 RX ORDER — HEPARIN SODIUM (PORCINE) LOCK FLUSH IV SOLN 100 UNIT/ML 100 UNIT/ML
500 SOLUTION INTRAVENOUS PRN
Status: DISCONTINUED | OUTPATIENT
Start: 2022-09-08 | End: 2022-09-09 | Stop reason: HOSPADM

## 2022-09-08 RX ORDER — SODIUM CHLORIDE 0.9 % (FLUSH) 0.9 %
5-40 SYRINGE (ML) INJECTION PRN
Status: DISCONTINUED | OUTPATIENT
Start: 2022-09-08 | End: 2022-09-09 | Stop reason: HOSPADM

## 2022-09-08 RX ORDER — SODIUM CHLORIDE 0.9 % (FLUSH) 0.9 %
5-40 SYRINGE (ML) INJECTION PRN
Status: CANCELLED | OUTPATIENT
Start: 2022-09-08

## 2022-09-08 RX ADMIN — SODIUM CHLORIDE, PRESERVATIVE FREE 10 ML: 5 INJECTION INTRAVENOUS at 10:32

## 2022-09-08 RX ADMIN — HEPARIN 500 UNITS: 100 SYRINGE at 13:07

## 2022-09-08 RX ADMIN — SODIUM CHLORIDE, PRESERVATIVE FREE 20 ML: 5 INJECTION INTRAVENOUS at 10:33

## 2022-09-08 RX ADMIN — SODIUM CHLORIDE 1000 ML: 9 INJECTION, SOLUTION INTRAVENOUS at 10:58

## 2022-09-08 NOTE — PROGRESS NOTES
Patient assessed for the following post fluid hydration - 1000 ml over 2 hours    Dizziness   No  Lightheadedness  No      Acute nausea/vomiting No  Headache   No  Chest pain/pressure  No  Rash/itching   No  Shortness of breath  No    Patient kept for 20 minutes observation post infusion    Patient tolerated  fluid bolus without any complications. Last vital signs:   /69   Pulse 86   Temp 98.9 °F (37.2 °C) (Oral)   Resp 16   Ht 6' (1.829 m)   Wt 282 lb (127.9 kg)   SpO2 96%   BMI 38.25 kg/m²       Patient instructed if experience any of the above symptoms following today's infusion,he/she is to notify MD immediately or go to the emergency department. Discharge instructions given to patient. Verbalizes understanding. Ambulated off unit per self with belongings.

## 2022-09-10 DIAGNOSIS — E87.6 HYPOKALEMIA: ICD-10-CM

## 2022-09-12 RX ORDER — POTASSIUM CHLORIDE 1500 MG/1
TABLET, EXTENDED RELEASE ORAL
Qty: 30 TABLET | Refills: 1 | Status: SHIPPED | OUTPATIENT
Start: 2022-09-12 | End: 2022-10-06

## 2022-09-19 ENCOUNTER — HOSPITAL ENCOUNTER (OUTPATIENT)
Dept: RADIATION ONCOLOGY | Age: 52
Discharge: HOME | End: 2022-09-19
Payer: COMMERCIAL

## 2022-09-19 VITALS
HEART RATE: 68 BPM | HEIGHT: 72 IN | OXYGEN SATURATION: 97 % | DIASTOLIC BLOOD PRESSURE: 68 MMHG | TEMPERATURE: 97.2 F | WEIGHT: 287.2 LBS | SYSTOLIC BLOOD PRESSURE: 113 MMHG | RESPIRATION RATE: 18 BRPM | BODY MASS INDEX: 38.9 KG/M2

## 2022-09-19 PROCEDURE — 99202 OFFICE O/P NEW SF 15 MIN: CPT | Performed by: RADIOLOGY

## 2022-09-19 PROCEDURE — 99204 OFFICE O/P NEW MOD 45 MIN: CPT | Performed by: RADIOLOGY

## 2022-09-19 ASSESSMENT — ENCOUNTER SYMPTOMS
ABDOMINAL PAIN: 0
COUGH: 0
BACK PAIN: 1
DIARRHEA: 0
BLOOD IN STOOL: 0
TROUBLE SWALLOWING: 0
NAUSEA: 0
SHORTNESS OF BREATH: 0
RECTAL PAIN: 0

## 2022-09-19 ASSESSMENT — PAIN SCALES - GENERAL: PAINLEVEL_OUTOF10: 2

## 2022-09-19 ASSESSMENT — PAIN DESCRIPTION - LOCATION: LOCATION: BACK

## 2022-09-19 NOTE — PROGRESS NOTES
[address] [facility] [address], [address]        ANJEL PHIPPSGRACIELA BOSCH [address] [facility]        [place]: [phone]        F: [phone]       [email or web address]            INITIAL CONSULTATION    Date of Service: 2022  Patient ID: Qiana Leggett   : 1970  MRN: [id number]   Acct Number: [de-identified]         Requesting Provider: Dr. Basilio Dupont [facility])  Reason for request: Evaluation for the potential role of adjuvant radiation therapy. CONSULTANT: Jesustia Giang MD MS    CHIEF COMPLAINT: Evaluation for the potential role of adjuvant radiation therapy. ASSESSMENT:  Cancer Staging  Malignant neoplasm of lower-inner quadrant of left breast in female, estrogen receptor negative ([facility] [address]  Staging form: Breast, AJCC 8th Edition  - Clinical stage from 3/22/2022: Stage IB (cT1c, cN0(f), cM0, G3, ER-, UT-, HER2-) - Signed by Armando Goldberg MD on 3/22/2022  - Pathologic stage from 3/29/2022: Stage IIA (pT2, pN0(sn), cM0, G3, ER-, UT-, HER2-) - Signed by Armando Goldberg MD on 3/29/2022      PLAN:  With regards to radiation to the left breast, I discussed the possible short-term side effects of skin irritation (causing redness, dryness, or peeling), swelling and tenderness of the left breast, tiredness. Possible long-term side effects discussed included change in the cosmetic appearance of the left breast (change in shape, size, and texture of the breast), hyper/hypo-pigmentation of the skin, scarring of the lung (causing shortness of breath and cough), damage to the heart, swelling of the left arm i.e. lymphedema (causing pain), damage to the nerves (causing numbness, weakness, or paralysis) and rib fracture. Qiana Leggett presents today for consultation regarding her left breast cancer.  She underwent lumpectomy and sentinel lymph node biopsy, followed by adjuvant chemotherapy which finished at the end of August. Since then she has been feeling better but notes continued fatigue. She has no issues with arm range of motion or swelling. She has however recently had a flare of hidradenitis suppurativa, which was improved with antibiotics. Imaging, notes, and surgical reports were reviewed. Given the patient's high risk features, we would recommend whole breast radiation therapy to the left breast and comprehensive regional richardson irradiation and boost. We will plan for deep inspiration breath hold, especially given the patient's history of myocardial infarction. Of note, the patient reports that her last menstrual period was 4-5 years ago. The patient has a port, with a plan to remove after radiation. We will plan to start radiation after her daughter's wedding on . We discussed the risks, benefits, and rationale for proceeding with radiation therapy and all of their questions and concerns were answered and addressed to their satisfaction. Consent was signed at today's visit with CT simulation for treatment planning to be performed in the next 1-2 weeks. They have our clinic number to call with any questions or concerns if they were to have any prior to next visit. Thank you for allowing my assistance in the care of your patient.     HISTORY OF PRESENT ILLNESS:  Oncology History Overview Note   Dania Gonsalez is a 50 y.o. female    HISTORY:    22 As per Dr. Eduarda Gu (medical oncology) note,          IMAGIN/18/22 JENNIFER DIAGNOSTIC BILATERAL, US BREAST LIMITED LEFT        22 NM BONE SCAN WHOLE BODY       Malignant neoplasm of lower-inner quadrant of left breast in female, estrogen receptor negative ([facility] [address]   2022 Surgery    Breast mass and axillary lymph node biopsy, Dr. Damir Foster           3/7/2022 Initial Diagnosis    Malignant neoplasm of lower-inner quadrant of left breast in female, estrogen receptor negative ([address]     3/22/2022 Surgery    Lumpectomy and axillary sentinel lymph node resection, Dr. Damir Foster                       Ms. Shauna Arriaza is a 50 y.o. female with above mentioned oncologic history presenting today for initial consultation regarding the potential role for radiation therapy. Review of Systems   Constitutional:  Negative for activity change, appetite change, fatigue and unexpected weight change. HENT:  Negative for ear pain and trouble swallowing. Respiratory:  Negative for cough and shortness of breath. Cardiovascular:  Negative for chest pain and leg swelling. Gastrointestinal:  Negative for abdominal pain, blood in stool, diarrhea, nausea and rectal pain. Genitourinary:  Negative for dysuria, frequency, hematuria and urgency. Musculoskeletal:  Positive for back pain. Negative for arthralgias and myalgias. Skin:  Negative for rash and wound. Neurological:  Positive for numbness (hands and feet since chemo). Negative for dizziness, weakness, light-headedness and headaches. Psychiatric/Behavioral:  Negative for confusion. A complete review of systems was performed and found to be negative except as presented above. Advance Directives       Power of  Living Will ACP-Advance Directive ACP-Power of     Not on File Not on File Not on File Not on File            Chaperone: No    Mediport: yes, Dr. Carlos Mitchell manages, Right Side    Pacemaker/ICD: no    Previous XRT: no    PAIN: 2/10    ADDITIONAL COMMENTS:       PHYSICAL EXAMINATION:     VITAL SIGNS: /68   Pulse 68   Temp 97.2 °F (36.2 °C) (Infrared)   Resp 18   Ht 6' 0.01\" (1.829 m)   Wt 287 lb 3.2 oz (130.3 kg)   SpO2 97%   BMI 38.94 kg/m²     ECO - Symptomatic but completely ambulatory (Restricted in physically strenuous activity but ambulatory and able to carry out work of a light or sedentary nature. For example, light housework, office work)    Physical Exam  Constitutional:       General: She is not in acute distress. Appearance: Normal appearance. HENT:      Head: Normocephalic and atraumatic. Comments: Alopecia of scalp  Cardiovascular:      Rate and Rhythm: Normal rate and regular rhythm. Pulmonary:      Effort: Pulmonary effort is normal. No respiratory distress. Abdominal:      General: Abdomen is flat. There is no distension. Palpations: Abdomen is soft. Musculoskeletal:      Comments: Full ROM of left shoulder   Skin:     Comments: Port in place on right upper chest. Hidradenitis suppurativa of bilateral axillae   Neurological:      Mental Status: She is alert and oriented to person, place, and time.        Past Medical History:   Diagnosis Date    Afib ([facility] [address]     Baki    Arthritis     CAD (coronary artery disease)     GERD (gastroesophageal reflux disease)     Hyperlipidemia     Hypertension     Hypothyroid     Invasive ductal carcinoma of breast, female, left ([facility] [address] 02/24/2022    Malignant neoplasm of lower-inner quadrant of left breast in female, estrogen receptor negative ([facility] 03/07/2022    MI (myocardial infarction) ([facility]     Dr. Henley Deacon    Pneumonia        Past Surgical History:   Procedure Laterality Date    BREAST LUMPECTOMY Left 3/22/2022    LEFT BREAST LUMPECTOMY PARTIAL MASTECTOMY, SENTINEL LYMPH NODE BIOPSY, PRE OP NEEDLE LOC X 2 performed by Bk Arredondo MD at [address] [place] [address] Left 3/30/2022    Evacuation hematoma left axilla performed by Bk Arredondo MD at [facility] ARTHROSCOPY Right     08    JENNIFER BIOPSY LYMPH NODE BY NEEDLE SUPERFICIAL  02/24/2022    JENNIFER BIOPSY LYMPH NODE BY NEEDLE SUPERFICIAL 2/24/2022 Chel Dominguez MD [facility] US GUID NDL BIOPSY LEFT Left 02/24/2022    [facility] [address] LEFT 2/24/2022 Chel Dominguez MD [address] SURGERY      PORT SURGERY Right 4/13/2022    Single Lumen Smartport Right Subclavian, aspiration of left axillary seroma performed by Bk Arredondo MD at [facility] Right 4/29/2022    [facility]. performed by Chris Mccann MD at [address] [facility], INCISIONAL Left 1998    excisional bx-H. Chollet fibrocystic changes    US BREAST BIOPSY NEEDLE ADDITIONAL LEFT Left 02/24/2022    US BREAST BIOPSY NEEDLE ADDITIONAL LEFT 2/24/2022 Kim Foster [place] [address] NEEDLE LOC BREAST ADDL LEFT Left 3/22/2022    US GUIDED NEEDLE LOC BREAST ADDL LEFT 3/22/2022 [facility]       Family History   Problem Relation Age of Onset    Heart Disease Mother     Diabetes Mother     Cancer Mother         liposarcoma    Kidney Disease Mother     Heart Disease Father     Diabetes Father     Kidney Disease Father     Cancer Father         Non-melanoma skin cancer multiple times    No Known Problems Brother     No Known Problems Brother     No Known Problems Brother     No Known Problems Maternal Grandmother     No Known Problems Maternal Grandfather     Breast Cancer Paternal Grandmother 47        reoccurred at 46    Ovarian Cancer Paternal Grandmother 46    Bilateral breast cancer Paternal Grandmother         Opposite breast, age 52's    Breast Cancer Maternal Aunt 48    Breast Cancer Paternal Aunt         breast       Social History     Socioeconomic History    Marital status:      Spouse name: Not on file    Number of children: 1    Years of education: Not on file    Highest education level: Not on file   Occupational History    Occupation: LPN   Tobacco Use    Smoking status: Some Days     Packs/day: 0.25     Years: 30.00     Pack years: 7.50     Types: Cigarettes     Start date: 12/4/1987    Smokeless tobacco: Never    Tobacco comments:     09/19/22 -refused counciling/resources   Vaping Use    Vaping Use: Never used   Substance and Sexual Activity    Alcohol use: Not Currently     Comment: rare    Drug use: No    Sexual activity: Yes     Partners: Male     Comment: boy friend   Other Topics Concern    Not on file   Social History Narrative    Not on file     Social Determinants of Health     Financial Resource Strain: Not on file   Food Insecurity: Not on file   Transportation Needs: Not on file   Physical Activity: Not on file   Stress: Not on file   Social Connections: Not on file   Intimate Partner Violence: Not on file   Housing Stability: Not on file       Allergies   Allergen Reactions    Codeine Hives and Swelling     And vomiting    Cortisone Swelling     Apparently tolerates topical and IV with benadryl well    Influenza Virus Vaccine Other (See Comments)     Pt states could not walk after getting     Darvocet A500 [Propoxyphene N-Acetaminophen] Nausea And Vomiting    Sulfa Antibiotics Nausea And Vomiting and Swelling     Not throat swelling        Current Outpatient Medications   Medication Sig Dispense Refill    KLOR-CON M20 20 MEQ extended release tablet TAKE 1 TABLET BY MOUTH EVERY DAY IN THE MORNING 30 tablet 1    metoprolol succinate (TOPROL XL) 100 MG extended release tablet TAKE 1 TABLET BY MOUTH EVERY DAY 90 tablet 1    flecainide (TAMBOCOR) 100 MG tablet TAKE 1 TABLET BY MOUTH TWICE A  tablet 0    ELIQUIS 5 MG TABS tablet TAKE 1 TABLET BY MOUTH TWICE A  tablet 3    Magic Mouthwash (MIRACLE MOUTHWASH) Swish and spit 5 mLs 4 times daily as needed for Irritation 1:1:1 Benadryl, lidocaine, nystatin (Patient not taking: Reported on 8/30/2022) 120 mL 1    ondansetron (ZOFRAN-ODT) 8 MG TBDP disintegrating tablet Place 1 tablet under the tongue every 8 hours as needed for Nausea or Vomiting 20 tablet 2    prochlorperazine (COMPAZINE) 10 MG tablet Take 1 tablet by mouth every 6 hours as needed (nausea) (Patient not taking: Reported on 8/30/2022) 30 tablet 1    lidocaine-prilocaine (EMLA) 2.5-2.5 % cream Apply topically as needed.  5 g 1    atorvastatin (LIPITOR) 40 MG tablet TAKE 1 TABLET DAILY 90 tablet 3    levothyroxine (SYNTHROID) 125 MCG tablet TAKE 1 TABLET BY MOUTH EVERY DAY IN THE MORNING ON EMPTY STOMACH      ZINC PO Take by mouth daily      Ascorbic Acid (VITAMIN C PO) Take by mouth daily      ibuprofen (ADVIL;MOTRIN) 800 MG tablet Take 800 mg by mouth every 6 hours as needed for Pain       omeprazole (PRILOSEC) 10 MG delayed release capsule Take 10 mg by mouth daily      nitroGLYCERIN (NITROSTAT) 0.4 MG SL tablet Place 1 tablet under the tongue every 5 minutes as needed for Chest pain (Patient not taking: No sig reported) 25 tablet 3    aspirin 81 MG chewable tablet Take 81 mg by mouth daily  30 tablet 3     No current facility-administered medications for this encounter. No outpatient medications have been marked as taking for the 9/19/22 encounter [facility] Encounter) with Hawa Crandall MD.       LABORATORY STUDIES:   Onc labs: No results found for: PSA, CEA, LDH, AFP    Lab Results   Component Value Date    CREATININE 0.6 09/08/2022     Lab Results   Component Value Date    BUN 12 12/21/2021       PATHOLOGY: As per HPI above. RADIOLOGIC STUDIES: As per HPI above. Electronically signed by Syed Cortez MD MS on 9/19/22 at 10:00 AM EDT     ATTESTATION: 45 minutes were spent with the patient at today's visit reviewing pertinent information related to their oncologic diagnosis, including any recent labs, imaging, follow ups and plan of care going forward.     CC:Dr. Krishan Chapa ([facility]) Dr. Nancy Stewart (Surgery) Dr. Maren Coles (Cardiology), Dr. Sonja Mcknight (PCP)    ACC:[facility] Registry

## 2022-09-27 ENCOUNTER — HOSPITAL ENCOUNTER (OUTPATIENT)
Dept: INFUSION THERAPY | Age: 52
Discharge: HOME | End: 2022-09-27
Payer: COMMERCIAL

## 2022-09-27 ENCOUNTER — OFFICE VISIT (OUTPATIENT)
Dept: ONCOLOGY | Age: 52
End: 2022-09-27
Payer: COMMERCIAL

## 2022-09-27 VITALS
WEIGHT: 287 LBS | TEMPERATURE: 98.5 F | DIASTOLIC BLOOD PRESSURE: 62 MMHG | OXYGEN SATURATION: 97 % | RESPIRATION RATE: 16 BRPM | SYSTOLIC BLOOD PRESSURE: 110 MMHG | BODY MASS INDEX: 38.87 KG/M2 | HEART RATE: 75 BPM | HEIGHT: 72 IN

## 2022-09-27 VITALS
HEART RATE: 75 BPM | RESPIRATION RATE: 16 BRPM | BODY MASS INDEX: 38.93 KG/M2 | WEIGHT: 287.4 LBS | TEMPERATURE: 98.5 F | HEIGHT: 72 IN | SYSTOLIC BLOOD PRESSURE: 110 MMHG | OXYGEN SATURATION: 97 % | DIASTOLIC BLOOD PRESSURE: 62 MMHG

## 2022-09-27 DIAGNOSIS — C50.112 MALIGNANT NEOPLASM OF CENTRAL PORTION OF LEFT BREAST IN FEMALE, ESTROGEN RECEPTOR NEGATIVE (HCC): Primary | ICD-10-CM

## 2022-09-27 DIAGNOSIS — Z51.11 ENCOUNTER FOR CHEMOTHERAPY MANAGEMENT: Primary | ICD-10-CM

## 2022-09-27 DIAGNOSIS — Z17.1 MALIGNANT NEOPLASM OF CENTRAL PORTION OF LEFT BREAST IN FEMALE, ESTROGEN RECEPTOR NEGATIVE (HCC): Primary | ICD-10-CM

## 2022-09-27 LAB
ABSOLUTE IMMATURE GRANULOCYTE: 0 THOU/MM3 (ref 0–0.07)
ALBUMIN SERPL-MCNC: 3.4 G/DL (ref 3.5–5.1)
ALP BLD-CCNC: 79 U/L (ref 38–126)
ALT SERPL-CCNC: 10 U/L (ref 11–66)
AST SERPL-CCNC: 13 U/L (ref 5–40)
BASINOPHIL, AUTOMATED: 0 % (ref 0–3)
BASOPHILS ABSOLUTE: 0 THOU/MM3 (ref 0–0.1)
BILIRUB SERPL-MCNC: 0.5 MG/DL (ref 0.3–1.2)
BILIRUBIN DIRECT: < 0.2 MG/DL (ref 0–0.3)
BUN, WHOLE BLOOD: 15 MG/DL (ref 8–26)
CHLORIDE, WHOLE BLOOD: 106 MEQ/L (ref 98–109)
CREATININE, WHOLE BLOOD: 0.4 MG/DL (ref 0.5–1.2)
EOSINOPHILS ABSOLUTE: 0.2 THOU/MM3 (ref 0–0.4)
EOSINOPHILS RELATIVE PERCENT: 4 % (ref 0–4)
GFR, ESTIMATED: > 90 ML/MIN/1.73M2
GLUCOSE, WHOLE BLOOD: 111 MG/DL (ref 70–108)
HCT VFR BLD CALC: 34.9 % (ref 37–47)
HEMOGLOBIN: 11 GM/DL (ref 12–16)
IMMATURE GRANULOCYTES: 0 %
IONIZED CALCIUM, WHOLE BLOOD: 1.16 MMOL/L (ref 1.12–1.32)
LYMPHOCYTES # BLD: 16 % (ref 15–47)
LYMPHOCYTES ABSOLUTE: 0.9 THOU/MM3 (ref 1–4.8)
MCH RBC QN AUTO: 31.4 PG (ref 26–33)
MCHC RBC AUTO-ENTMCNC: 31.5 GM/DL (ref 32.2–35.5)
MCV RBC AUTO: 100 FL (ref 81–99)
MONOCYTES ABSOLUTE: 0.5 THOU/MM3 (ref 0.4–1.3)
MONOCYTES: 9 % (ref 0–12)
PDW BLD-RTO: 14 % (ref 11.5–14.5)
PLATELET # BLD: 159 THOU/MM3 (ref 130–400)
PMV BLD AUTO: 10.2 FL (ref 9.4–12.4)
POTASSIUM, WHOLE BLOOD: 3.7 MEQ/L (ref 3.5–4.9)
RBC # BLD: 3.5 MILL/MM3 (ref 4.2–5.4)
SEG NEUTROPHILS: 70 % (ref 43–75)
SEGMENTED NEUTROPHILS ABSOLUTE COUNT: 3.9 THOU/MM3 (ref 1.8–7.7)
SODIUM, WHOLE BLOOD: 141 MEQ/L (ref 138–146)
TOTAL CO2, WHOLE BLOOD: 26 MEQ/L (ref 23–33)
TOTAL PROTEIN: 6.4 G/DL (ref 6.1–8)
WBC # BLD: 5.5 THOU/MM3 (ref 4.8–10.8)

## 2022-09-27 PROCEDURE — 6360000002 HC RX W HCPCS: Performed by: INTERNAL MEDICINE

## 2022-09-27 PROCEDURE — 99213 OFFICE O/P EST LOW 20 MIN: CPT | Performed by: INTERNAL MEDICINE

## 2022-09-27 PROCEDURE — 85025 COMPLETE CBC W/AUTO DIFF WBC: CPT

## 2022-09-27 PROCEDURE — 80047 BASIC METABLC PNL IONIZED CA: CPT

## 2022-09-27 PROCEDURE — 36591 DRAW BLOOD OFF VENOUS DEVICE: CPT

## 2022-09-27 PROCEDURE — 80076 HEPATIC FUNCTION PANEL: CPT

## 2022-09-27 PROCEDURE — 99211 OFF/OP EST MAY X REQ PHY/QHP: CPT

## 2022-09-27 PROCEDURE — 2580000003 HC RX 258: Performed by: INTERNAL MEDICINE

## 2022-09-27 RX ORDER — HEPARIN SODIUM (PORCINE) LOCK FLUSH IV SOLN 100 UNIT/ML 100 UNIT/ML
500 SOLUTION INTRAVENOUS PRN
Status: DISCONTINUED | OUTPATIENT
Start: 2022-09-27 | End: 2022-09-28 | Stop reason: HOSPADM

## 2022-09-27 RX ORDER — HEPARIN SODIUM (PORCINE) LOCK FLUSH IV SOLN 100 UNIT/ML 100 UNIT/ML
500 SOLUTION INTRAVENOUS PRN
OUTPATIENT
Start: 2022-09-27

## 2022-09-27 RX ORDER — SODIUM CHLORIDE 9 MG/ML
25 INJECTION, SOLUTION INTRAVENOUS PRN
OUTPATIENT
Start: 2022-09-27

## 2022-09-27 RX ORDER — SODIUM CHLORIDE 0.9 % (FLUSH) 0.9 %
5-40 SYRINGE (ML) INJECTION PRN
OUTPATIENT
Start: 2022-09-27

## 2022-09-27 RX ORDER — SODIUM CHLORIDE 0.9 % (FLUSH) 0.9 %
5-40 SYRINGE (ML) INJECTION PRN
Status: DISCONTINUED | OUTPATIENT
Start: 2022-09-27 | End: 2022-09-28 | Stop reason: HOSPADM

## 2022-09-27 RX ADMIN — SODIUM CHLORIDE, PRESERVATIVE FREE 10 ML: 5 INJECTION INTRAVENOUS at 13:20

## 2022-09-27 RX ADMIN — SODIUM CHLORIDE, PRESERVATIVE FREE 10 ML: 5 INJECTION INTRAVENOUS at 12:30

## 2022-09-27 RX ADMIN — Medication 500 UNITS: at 13:20

## 2022-09-27 RX ADMIN — SODIUM CHLORIDE, PRESERVATIVE FREE 20 ML: 5 INJECTION INTRAVENOUS at 12:31

## 2022-09-27 NOTE — PATIENT INSTRUCTIONS
Patient to follow-up with Dr. Lima Laws to have her port removed after radiation is done  Patient scheduled to start postlumpectomy radiation sometime around October 10  Routine follow-up in 4 months w NP

## 2022-09-27 NOTE — PROGRESS NOTES
You had labs drawn via [employer] while in Outpatient Oncology clinic, Please call us at [phone] if you have any questions or concerns about your visit or medications that you received today. It is important that you drink 48-64 ounces of water everyday.

## 2022-09-27 NOTE — PROGRESS NOTES
[address] CANCER [address] [place] [address]  Dept: [phone]  Loc: [phone]   Hematology/Oncology Consult (Clinic)        9/27/22     Melo Hairchris   1970     No ref. provider found   Lainey Gonzalez          DIAGNOSIS:  -Invasive ductal carcinoma, left breast, ER negative, MI negative and HER-2 negative, 2.7 cm, grade 3, 0/ 4 sentinel nodes . ps T2N0M0 /IIA. (High risk: triple negative/grade 3). Inner central left breast anteriorly. 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 Dr Aminta Magdaleno. - Adjuvant AC dose dense x4 followed by dose dense Taxol every other week. Start 5/10/2022  7/19 course 1 dose dense Taxol . 8/30/2022-4th and the last course of dose dense adjuvant Taxol.  (completed AC x4)  - postlumpectomy radiation. To begin October 2022    PARAMETERS:  -History, exam,  -Bone scan ordered    SUBJECTIVE: Patient completed her fourth and last dose dense Taxol in late August.  She does have a grade 1 numbness and tingling sensation in her fingers and the balls of her feet. No functional difficulties other than just very fine motor skills of her fingers. This does not keep her awake at night and is not painful. Does not feel she needs any gabapentin. She will have her port removed after radiation is done. No other complaints. HPI: Dominick Nieto is a 61-year-old premenopausal female developed a lump in the left breast just medial to the nipple on February 8. Strong family history of malignancy including breast cancer. Last mammogram was in 2015. Mammogram on 2/18 additional imaging confirmed a approximate 2 cm nodular density inferior central medial left breast.  Initial biopsy revealed invasive ductal cancer triple and grade 3.   Definitive left breast lumpectomy and sentinel node resection on 3/22 revealed a 2.7 cm pathologic stage T2N0 triple negative grade 3 breast cancer. Other feeling the lump she is asymptomatic. Specifically denies any headaches bone pain breathing problems or any other focal or systemic complaints. Followed by cardiology with a history of a an MI in 2014 and proximal A. fib for which she is on anticoagulation followed by Dr. Andreea Rodas. Echocardiogram last year normal.  And recent history no ischemic cardiac events. ROS:  Review of Systems 14 point negative except as above. PMH:   Past Medical History:   Diagnosis Date    Afib ([facility] [address]     Baki    Arthritis     CAD (coronary artery disease)     GERD (gastroesophageal reflux disease)     Hyperlipidemia     Hypertension     Hypothyroid     Invasive ductal carcinoma of breast, female, left ([facility] 02/24/2022    Malignant neoplasm of lower-inner quadrant of left breast in female, estrogen receptor negative ([facility] 03/07/2022    MI (myocardial infarction) [facility])     Dr. Andreea Rodas    Pneumonia     Migraines  MI  Nov 2014  C Cath 2014.     Social HX:   Social History     Socioeconomic History    Marital status:      Spouse name: Not on file    Number of children: 1    Years of education: Not on file    Highest education level: Not on file   Occupational History    Occupation: LPN   Tobacco Use    Smoking status: Some Days     Packs/day: 0.25     Years: 30.00     Pack years: 7.50     Types: Cigarettes     Start date: 12/4/1987    Smokeless tobacco: Never    Tobacco comments:     09/27/22 -refused counciling/resources   Vaping Use    Vaping Use: Never used   Substance and Sexual Activity    Alcohol use: Not Currently     Comment: rare    Drug use: No    Sexual activity: Yes     Partners: Male     Comment: boy friend   Other Topics Concern    Not on file   Social History Narrative    Not on file     Social Determinants of Health     Financial Resource Strain: Not on file   Food Insecurity: Not on file Transportation Needs: Not on file   Physical Activity: Not on file   Stress: Not on file   Social Connections: Not on file   Intimate Partner Violence: Not on file   Housing Stability: Not on file   Smoking history: 30+ years of smoking currently down to about 3 cigarettes/day average 1 to 1/2 packs/day. Spouse:   1 daughter age 32. 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 Dr. Raina Carrel of OB  C-Scope: Not yet      Gyn HX:   GPA:  ROSS/BSO: all present. Last normal menstrual cycle in 6 years  LMP: No LMP recorded. Patient is postmenopausal.     Health Maintenance Due   Topic Date Due    COVID-19 Vaccine (1) Never done    Pneumococcal 0-64 years Vaccine (1 - PCV) Never done    Depression Screen  Never done    HIV screen  Never done    Hepatitis C screen  Never done    DTaP/Tdap/Td vaccine (1 - Tdap) Never done    Cervical cancer screen  Never done    Colorectal Cancer Screen  Never done    Diabetes screen  2022    Shingles vaccine (1 of 2) Never done        Interests:   4H advisor.      Fam HX:   Family History   Problem Relation Age of Onset    Heart Disease Mother     Diabetes Mother     Cancer Mother         liposarcoma    Kidney Disease Mother     Heart Disease Father     Diabetes Father     Kidney Disease Father     Cancer Father         Non-melanoma skin cancer multiple times    No Known Problems Brother     No Known Problems Brother     No Known Problems Brother     No Known Problems Maternal Grandmother     No Known Problems Maternal Grandfather     Breast Cancer Paternal Grandmother 48        reoccurred at 46    Ovarian Cancer Paternal Grandmother 46    Bilateral breast cancer Paternal Grandmother Opposite breast, age 52's    Breast Cancer Maternal Aunt 48    Breast Cancer Paternal Aunt         breast      Germline genetic testing: Sent and negative 3/4/22. Hospitalizations:   None recent    Allergies: Allergies   Allergen Reactions    Codeine Hives and Swelling     And vomiting    Cortisone Swelling     Apparently tolerates topical and IV with benadryl well    Influenza Virus Vaccine Other (See Comments)     Pt states could not walk after getting     [address] [Propoxyphene N-Acetaminophen] Nausea And Vomiting    Sulfa Antibiotics Nausea And Vomiting and Swelling     Not throat swelling        Adult Illness:  Patient Active Problem List   Diagnosis    ACS (acute coronary syndrome) (HCC)    Chest pain with moderate risk for cardiac etiology    Essential hypertension    HLD (hyperlipidemia)    Tobacco abuse    History of MI (myocardial infarction)    Acquired hypothyroidism    Unstable angina (HCC)    Coronary artery disease involving native coronary artery of native heart without angina pectoris    Paroxysmal atrial fibrillation (HCC)    Gastroesophageal reflux disease without esophagitis    Malignant neoplasm of lower-inner quadrant of left breast in female, estrogen receptor negative ([address]    Malignant neoplasm of central portion of left breast in female, estrogen receptor negative ([address]    Hematoma      Patient is on Eliquis and aspirin for PAF.       Surgery:  Past Surgical History:   Procedure Laterality Date    BREAST LUMPECTOMY Left 3/22/2022    LEFT BREAST LUMPECTOMY PARTIAL MASTECTOMY, SENTINEL LYMPH NODE BIOPSY, PRE OP NEEDLE LOC X 2 performed by Richard Laurent MD at [place] [address] Left 3/30/2022    Evacuation hematoma left axilla performed by Richard Laurent MD at [facility] ARTHROSCOPY Right     08    JENNIFER BIOPSY LYMPH NODE BY NEEDLE SUPERFICIAL  02/24/2022    JENNIFER BIOPSY LYMPH NODE BY NEEDLE SUPERFICIAL 2/24/2022 Annamaria Tang MD [facility] US GUID NDL BIOPSY LEFT Left 02/24/2022    [facility] US GUID NDL BIOPSY LEFT 2/24/2022 Annamaria Tang MD [address] SURGERY      PORT SURGERY Right 4/13/2022    Single Lumen Smartport Right Subclavian, aspiration of left axillary seroma performed by Arcelia Hester MD at [facility] Right 4/29/2022    Mediport Right Subclavian Removal and Replacement. performed by Arcelia Hester MD at [facility], INCISIONAL Left 1998    excisional bx-H. Chollet fibrocystic changes    US BREAST BIOPSY NEEDLE ADDITIONAL LEFT Left 02/24/2022    US BREAST BIOPSY NEEDLE ADDITIONAL LEFT 2/24/2022 Annamaria Tang MD [address] GUIDED NEEDLE LOC BREAST ADDL LEFT Left 3/22/2022    US GUIDED NEEDLE LOC BREAST ADDL LEFT 3/22/2022 [facility]        Medications:  Current Outpatient Medications   Medication Sig Dispense Refill    KLOR-CON M20 20 MEQ extended release tablet TAKE 1 TABLET BY MOUTH EVERY DAY IN THE MORNING 30 tablet 1    metoprolol succinate (TOPROL XL) 100 MG extended release tablet TAKE 1 TABLET BY MOUTH EVERY DAY 90 tablet 1    flecainide (TAMBOCOR) 100 MG tablet TAKE 1 TABLET BY MOUTH TWICE A  tablet 0    ELIQUIS 5 MG TABS tablet TAKE 1 TABLET BY MOUTH TWICE A  tablet 3    Magic Mouthwash (MIRACLE MOUTHWASH) Swish and spit 5 mLs 4 times daily as needed for Irritation 1:1:1 Benadryl, lidocaine, nystatin (Patient not taking: Reported on 8/30/2022) 120 mL 1    ondansetron (ZOFRAN-ODT) 8 MG TBDP disintegrating tablet Place 1 tablet under the tongue every 8 hours as needed for Nausea or Vomiting 20 tablet 2    prochlorperazine (COMPAZINE) 10 MG tablet Take 1 tablet by mouth every 6 hours as needed (nausea) (Patient not taking: Reported on 8/30/2022) 30 tablet 1    lidocaine-prilocaine (EMLA) 2.5-2.5 % cream Apply topically as needed.  5 g 1    atorvastatin (LIPITOR) 40 MG tablet TAKE 1 TABLET DAILY 90 tablet 3    levothyroxine (SYNTHROID) 125 MCG tablet TAKE 1 TABLET BY MOUTH EVERY DAY IN THE MORNING ON EMPTY STOMACH      ZINC PO Take by mouth daily      Ascorbic Acid (VITAMIN C PO) Take by mouth daily      ibuprofen (ADVIL;MOTRIN) 800 MG tablet Take 800 mg by mouth every 6 hours as needed for Pain       omeprazole (PRILOSEC) 10 MG delayed release capsule Take 10 mg by mouth daily      nitroGLYCERIN (NITROSTAT) 0.4 MG SL tablet Place 1 tablet under the tongue every 5 minutes as needed for Chest pain (Patient not taking: No sig reported) 25 tablet 3    aspirin 81 MG chewable tablet Take 81 mg by mouth daily  30 tablet 3     No current facility-administered medications for this visit. Facility-Administered Medications Ordered in Other Visits   Medication Dose Route Frequency Provider Last Rate Last Admin    sodium chloride flush 0.9 % injection 5-40 mL  5-40 mL IntraVENous PRN Nguyen Cole MD   20 mL at 22 1231    heparin flush 100 UNIT/ML injection 500 Units  500 Units IntraCATHeter PRN Nguyen Cole MD             EXAM:   height is 6' (1.829 m) and weight is 287 lb (130.2 kg). Her oral temperature is 98.5 °F (36.9 °C). Her blood pressure is 110/62 and her pulse is 75. Her respiration is 16 and oxygen saturation is 97%. Estimated body surface area is 2.57 meters squared as calculated from the following:    Height as of this encounter: 6' (1.829 m). Weight as of this encounter: 287 lb (130.2 kg). ECO  General: Non-ill appearing. Very pleasant and upbeat. HEENT: NC/AT,nonicteric,   Neck: normal thyroid, no masses. pulses nl, no bruits,   Nodes: No adenopathy  Lungs/chest: clear, no rales,rhonchi or wheezing, lung bases clear  CV: rrr, no rubs ,gallops or murmurs  Breasts: Not reexamined today 2022. Postsurgical ecchymosis in the left breast post lumpectomy and recent evacuation of hematoma in the left axilla.   No palpable masses bilaterally  Abd/Rectal: soft, the sensitivity of mammography. FINDINGS:        There is a nodular density within the inner central left breast anterior depth which correlates to the palpable abnormality. Further evaluation with targeted ultrasound is reported below. The additional palpable abnormality within the periareolar region of the left breast does not demonstrate 8 definite mammographic correlate. However, further evaluation with targeted ultrasound is reported below. US BREAST LIMITED LEFT:       TECHNIQUE: Targeted ultrasound of the left breast was performed. Grayscale images and color images of the real-time examination were reviewed. The axilla was also imaged. FINDINGS:        There is a hypoechoic lobulated mass within the inner central left breast near the 9:00 position 1 cm from the nipple measuring 1.9 x 1.4 x 1.6 cm. This correlates with the mammographic finding and the palpable abnormality. Recommend ultrasound-guided    biopsy. There is a small hypoechoic nodular lesion within the upper central left breast 12:00 position 3 cm from the nipple measuring 0.3 x 0.2 x 0.3 cm. This is incidental. However, recommend ultrasound-guided biopsy. There is an enlarged lymph node demonstrated within the left axilla was cortical thickening measuring 4.9 mm. There is retained fatty hilum. There is an additional prominent lymph node within the left axilla with slightly less cortical thickening    measuring 4 mm. Recommend ultrasound-guided biopsy of the largest left axillary lymph node. 2/18/22   Impression   1. There is a hypoechoic lobulated mass within the inner central left breast near the 9:00 position 1 cm from the nipple measuring 1.9 x 1.4 x 1.6 cm. This correlates with the mammographic finding and the palpable abnormality. Recommend ultrasound-guided    biopsy.        2. There is a small hypoechoic nodular lesion within the upper central left breast 12:00 position 3 cm from the nipple measuring 0.3 x 0.2 x 0.3 cm. This is incidental. However, recommend ultrasound-guided biopsy. 3. There is an enlarged lymph node demonstrated within the left axilla was cortical thickening measuring 4.9 mm. There is retained fatty hilum. There is an additional prominent lymph node within the left axilla with slightly less cortical thickening    measuring 4 mm. Recommend ultrasound-guided biopsy of the largest left axillary lymph node. These results were discussed with the patient. The tech navigator was notified. We will arrange scheduling the patient for her procedure per department protocol. BI-RADS CATEGORY 4C - Suspicious abnormality - High suspicion for malignancy. Management: Tissue diagnosis. Biopsy should be performed in the absence of clinical contraindication. **This report has been created using voice recognition software. It may contain minor errors which are inherent in voice recognition technology. **       Final report electronically signed by Dr. Cindy Joy on 2/18/2022 4:48 PM          [address] LEFT (Order [id number]) - Reflex for Order [id number]  Narrative   LOCATION: [place]       EXAM:     1. MAMMOGRAM POST BX CLIP PLACEMENT LEFT, [facility] US GUID NDL BIOPSY LEFT, [facility] NEEDLE BIOPSY LYMPH NODE SUPERFICIAL, US BREAST BIOPSY W LOC DEVICE EACH ADDL LESION LEFT       1. Biopsy of the left breast, percutaneous, using imaging guidance, 2 sites. 2. Biopsy of the left axilla, axillary lymph node, percutaneous, using imaging guidance. 3. Ultrasound guidance for biopsy, imaging supervision and interpretation. 4. Postprocedural diagnostic left mammogram.           HISTORY: 51 years, Female, Mass overlapping multiple quadrants of left breast, Lymph node enlargement. .       COMPARISON:  2/18/2022 and 6/1/2015.        TECHNIQUE/FINDINGS:        Written, informed consent was obtained, including discussion of the risks, benefits and alternatives. The patient's left breast was marked by the physician with initials. A timeout was performed including the patient's name, date of birth, procedure and    laterality. Site 1, 3 mm nodular density, upper central left breast, 12:00, U clip:  An ultrasound-guided biopsy using real-time ultrasound was performed. The nodule in the upper central left breast was identified, localized , and the site was marked. With    ultrasound guidance from a medial approach, the area was prepped and draped in sterile fashion. 2 % lidocaine was used for local anesthesia. 14 ga Sertera coaxial needle was advanced under ultrasound guidance. Once the needle was documented to be in the    correct location, 4 samples were taken from the area of interest. Samples were placed in formalin sent to pathology. A U shaped biopsy marker was placed at the biopsy site. Site 2, 1.9 cm mass, 9:00, inner central left breast, barbell clip:  An ultrasound-guided biopsy using real-time ultrasound was performed. The mass in the inner central left breast was identified, localized , and the site was marked. With ultrasound    guidance from a medial approach, the area was prepped and draped in sterile fashion. 2 % lidocaine was used for local anesthesia. 14 ga Sertera coaxial needle was advanced under ultrasound guidance. Once the needle was documented to be in the correct    location, 4 samples were taken from the area of interest. Samples were placed in formalin sent to pathology. A barbell biopsy marker was placed at the biopsy site. Site 3, left axillary lymph node, tribell clip: An ultrasound-guided biopsy using real-time ultrasound was performed. The lymph node in the left axilla was identified, localized , and the site was marked. With ultrasound guidance from a lateral approach,    the area was prepped and draped in sterile fashion. 2 % lidocaine was used for local anesthesia. 14 ga Sertera coaxial needle was advanced under 2/24/2022 10:00 AM          [facility] NEEDLE BIOPSY LYMPH NODE SUPERFICIAL (Order [id number]) - Reflex for Order [id number]  Narrative   LOCATION: [place]       EXAM:     1. MAMMOGRAM POST BX CLIP PLACEMENT LEFT, JENN[facility]ER US GUID NDL BIOPSY LEFT, [facility] NEEDLE BIOPSY LYMPH NODE SUPERFICIAL, US BREAST BIOPSY W LOC DEVICE EACH ADDL LESION LEFT       1. Biopsy of the left breast, percutaneous, using imaging guidance, 2 sites. 2. Biopsy of the left axilla, axillary lymph node, percutaneous, using imaging guidance. 3. Ultrasound guidance for biopsy, imaging supervision and interpretation. 4. Postprocedural diagnostic left mammogram.           HISTORY: 51 years, Female, Mass overlapping multiple quadrants of left breast, Lymph node enlargement. .       COMPARISON:  2/18/2022 and 6/1/2015. TECHNIQUE/FINDINGS:        Written, informed consent was obtained, including discussion of the risks, benefits and alternatives. The patient's left breast was marked by the physician with initials. A timeout was performed including the patient's name, date of birth, procedure and    laterality. Site 1, 3 mm nodular density, upper central left breast, 12:00, U clip:  An ultrasound-guided biopsy using real-time ultrasound was performed. The nodule in the upper central left breast was identified, localized , and the site was marked. With    ultrasound guidance from a medial approach, the area was prepped and draped in sterile fashion. 2 % lidocaine was used for local anesthesia. 14 ga Sertera coaxial needle was advanced under ultrasound guidance. Once the needle was documented to be in the    correct location, 4 samples were taken from the area of interest. Samples were placed in formalin sent to pathology. A U shaped biopsy marker was placed at the biopsy site. Site 2, 1.9 cm mass, 9:00, inner central left breast, barbell clip:  An ultrasound-guided biopsy using real-time ultrasound was performed.  The mass in the inner central left breast was identified, localized , and the site was marked. With ultrasound    guidance from a medial approach, the area was prepped and draped in sterile fashion. 2 % lidocaine was used for local anesthesia. 14 ga Sertera coaxial needle was advanced under ultrasound guidance. Once the needle was documented to be in the correct    location, 4 samples were taken from the area of interest. Samples were placed in formalin sent to pathology. A barbell biopsy marker was placed at the biopsy site. Site 3, left axillary lymph node, tribell clip: An ultrasound-guided biopsy using real-time ultrasound was performed. The lymph node in the left axilla was identified, localized , and the site was marked. With ultrasound guidance from a lateral approach,    the area was prepped and draped in sterile fashion. 2 % lidocaine was used for local anesthesia. 14 ga Sertera coaxial needle was advanced under ultrasound guidance. Once the needle was documented to be in the correct location, 4 samples were taken from    the area of interest. Samples were placed in formalin sent to pathology. A tribell biopsy marker was placed at the biopsy site. Clip placement was confirmed with a left digital diagnostic mammogram. The mammogram was performed as a separate procedure in a separate room with a dedicated machine. The patient tolerated the procedure well. No evidence of immediate complication. The patient was given post-procedure instructions, including wound care. POSTPROCEDURE MAMMOGRAM:       Images of the left include a CC view and MLO view. Tomosynthesis. CAD was utilized. There are scattered fibroglandular elements in the breast(s) that could obscure a lesion on mammography. Post procedure mammograms were taken in a separate room. There is a U shaped biopsy clip at the biopsy site 1, 12:00, anterior depth. There is a    barbell clip in the inner central left breast, within the mammographic mass.  This corresponds with the original mammographic density. There is a tribell clip in the left axilla. There are no other significant findings in the breast.               Impression   1. Successful left breast ultrasound guided core needle biopsy, 2 sites. 2. Successful left axillary lymph node ultrasound guided core needle biopsy. 3. Successful marker clip placements with confirmation by digital diagnostic mammogram.            Waiting for pathology results. A final report will be issued when these become available. BI-RADS Final Assessment Category: Waiting for pathology. Management Recommendation: Assess radiologic/pathologic concordance. **This report has been created using voice recognition software. It may contain minor errors which are inherent in voice recognition technology. **       Final report electronically signed by Dr. Jack Rios on 2/24/2022 10:00 AM          [facility] [address] (Order [id number]) - Reflex for Order [id number]  Narrative   LOCATION: [place]       EXAM:     1. MAMMOGRAM POST BX CLIP PLACEMENT LEFT, [facility] US GUID NDL BIOPSY LEFT, [facility] NEEDLE BIOPSY LYMPH NODE SUPERFICIAL, US BREAST BIOPSY W LOC DEVICE EACH ADDL LESION LEFT       1. Biopsy of the left breast, percutaneous, using imaging guidance, 2 sites. 2. Biopsy of the left axilla, axillary lymph node, percutaneous, using imaging guidance. 3. Ultrasound guidance for biopsy, imaging supervision and interpretation. 4. Postprocedural diagnostic left mammogram.           HISTORY: 51 years, Female, Mass overlapping multiple quadrants of left breast, Lymph node enlargement. .       COMPARISON:  2/18/2022 and 6/1/2015. TECHNIQUE/FINDINGS:        Written, informed consent was obtained, including discussion of the risks, benefits and alternatives. The patient's left breast was marked by the physician with initials.  A timeout was performed including the patient's name, date of birth, procedure and    laterality. Site 1, 3 mm nodular density, upper central left breast, 12:00, U clip:  An ultrasound-guided biopsy using real-time ultrasound was performed. The nodule in the upper central left breast was identified, localized , and the site was marked. With    ultrasound guidance from a medial approach, the area was prepped and draped in sterile fashion. 2 % lidocaine was used for local anesthesia. 14 ga Sertera coaxial needle was advanced under ultrasound guidance. Once the needle was documented to be in the    correct location, 4 samples were taken from the area of interest. Samples were placed in formalin sent to pathology. A U shaped biopsy marker was placed at the biopsy site. Site 2, 1.9 cm mass, 9:00, inner central left breast, barbell clip:  An ultrasound-guided biopsy using real-time ultrasound was performed. The mass in the inner central left breast was identified, localized , and the site was marked. With ultrasound    guidance from a medial approach, the area was prepped and draped in sterile fashion. 2 % lidocaine was used for local anesthesia. 14 ga Sertera coaxial needle was advanced under ultrasound guidance. Once the needle was documented to be in the correct    location, 4 samples were taken from the area of interest. Samples were placed in formalin sent to pathology. A barbell biopsy marker was placed at the biopsy site. Site 3, left axillary lymph node, tribell clip: An ultrasound-guided biopsy using real-time ultrasound was performed. The lymph node in the left axilla was identified, localized , and the site was marked. With ultrasound guidance from a lateral approach,    the area was prepped and draped in sterile fashion. 2 % lidocaine was used for local anesthesia. 14 ga Sertera coaxial needle was advanced under ultrasound guidance.  Once the needle was documented to be in the correct location, 4 samples were taken from    the area of interest. Samples were placed in formalin sent to pathology. A tribell biopsy marker was placed at the biopsy site. Clip placement was confirmed with a left digital diagnostic mammogram. The mammogram was performed as a separate procedure in a separate room with a dedicated machine. The patient tolerated the procedure well. No evidence of immediate complication. The patient was given post-procedure instructions, including wound care. POSTPROCEDURE MAMMOGRAM:       Images of the left include a CC view and MLO view. Tomosynthesis. CAD was utilized. There are scattered fibroglandular elements in the breast(s) that could obscure a lesion on mammography. Post procedure mammograms were taken in a separate room. There is a U shaped biopsy clip at the biopsy site 1, 12:00, anterior depth. There is a    barbell clip in the inner central left breast, within the mammographic mass. This corresponds with the original mammographic density. There is a tribell clip in the left axilla. There are no other significant findings in the breast.               Impression   1. Successful left breast ultrasound guided core needle biopsy, 2 sites. 2. Successful left axillary lymph node ultrasound guided core needle biopsy. 3. Successful marker clip placements with confirmation by digital diagnostic mammogram.            Waiting for pathology results. A final report will be issued when these become available. BI-RADS Final Assessment Category: Waiting for pathology. Management Recommendation: Assess radiologic/pathologic concordance. **This report has been created using voice recognition software. It may contain minor errors which are inherent in voice recognition technology. **       Final report electronically signed by Dr. Talia Springer on 2/24/2022 10:00 AM           TTE procedure:ECHOCARDIOGRAM COMPLETE 2D W DOPPLER W COLOR.      Procedure Date  Date: 04/22/2021 Estrogen Receptor: (Clone SP1), [employer] [employer] [facility]       Negative (<1% of cells staining)     Progesterone Receptor: (Clone 1E2), [facility]       Negative (<1% of cells staining)     Ki-67 (clone 30-9)       Percentage of positive nuclei:  95%               Favorable <10%               Borderline 10-20%               Unfavorable >20%        2018          Inform HER2 Dual CHEPE         HER2 IHCClone 4B5      ASCO/CAP      [facility]      HER2 dual                                  Systems Additional      CHEPE Group #                                Workup   (  Group 4:      HER2/CEP17 Ratio <2.0 and    HER2 NEGATIVE, HER2   X                >=.0 and <6.0 HER2           IHC is 0-1+. See Group   )                signals/cell                  4 comment. Number of observers: 3                    (PCF/MTK/ALP)                    Number of invasive tumor                    cells counted: 20                    Using dual probe assay:                       Average number of HER2                    signals per cell:  4.3                                 Average number                     of CEP17 signals per cell:                      3.4                     HER2/CEP17 ratio:  1.3 but the latter is considered negative and was reflexed to  [facility] which is negative ,therefore pathology Dr. Ethan Eng reassures me that her  HER-2 negative and that this does not have to be repeated on the definitive surgical specimen. Group 4 comment: It is uncertain whether patients with an average of >=   4.0 and < 6.0 HER2 signals per cell and a HER2/CEP17 ratio of < 2.0   benefit from HER2 targeted therapy in the absence of protein   overexpression (IHC 3+). If the specimen test result is close to the   CHEPE ratio threshold for positive, there is a high likelihood that   repeat testing will result in different results by chance alone.    Therefore, when [facility] results are not 3+ positive, it is recommended that   the sample be considered HER2 negative without additional testing on   the same specimen. External Controls:  Adequate   Internal Controls:  Adequate   Standard Assay Conditions:  Met     Staining Method Used:    Formalin fixation    Antigen retrieval type:  Cell Conditioning 1, mild merle    Time in antigen retrieval:  30 minutes    Detection system type:   DAB Ultraview kit       Specimen:   A) CORE NEEDLE BREAST BIOPSY, LT MASS 12:00 U CLIP   B) CORE NEEDLE BREAST BIOPSY, LT MASS 9:00 BARBELL   C) CORE NEEDLE BREAST BIOPSY, LT AXILLARY LYMPH NODE TRIBELL       Definitive lumpectomy and sentinel node biopsy 3/22/2022  Clinical Information: INVASIVE DUCTAL CARCINOMA LEFT BREAST 3/22/22    FINAL DIAGNOSIS:   A: Left axillary sentinel lymph nodes, resection:     Four lymph nodes with no evidence of malignancy.  (0/4)     One lymph node with previous biopsy site changes. One lymph node with black pigment deposition. B: Left breast, lumpectomy specimen:     Invasive ductal carcinoma, [place] grade 3 of 3. Invasive carcinoma is 2.7 cm in greatest dimension. Invasive carcinoma is 1.5 mm to the closest margin-inferior/caudal.     Invasive carcinoma is > 5 mm to all other margins. Changes consistent with previous biopsy site.        pT2, pN0(sn)     Specimen:   A) SENTINEL LYMPH NODE(S), LEFT AXILLARY   B) EXCISION OF BREAST, LEFT CANCER LUMP     CASE SUMMARY: (INVASIVE CARCINOMA OF THE BREAST: Resection)   Standard(s): AJCC-UICC 8     SPECIMEN   Procedure   Excision (less than total mastectomy)     Specimen Laterality   Left     TUMOR   Tumor Site   Clock position    Specify Clock Position    9 o'clock     Histologic Type   Invasive carcinoma of no special type (ductal)     Histologic Grade ([place] Histologic Score)   [place] Score    Glandular (Acinar) / Tubular Differentiation    Score 3 (less than 10% of tumor area forming glandular / tubular mm     Margin Status for DCIS   Not applicable (no DCIS in specimen)       REGIONAL LYMPH NODES   Regional Lymph Node Status   Regional lymph nodes present    All regional lymph nodes negative for tumor      Total Number of Lymph Nodes Examined (sentinel and non-sentinel)    Exact number (specify): 4    Number of [place] Nodes Examined    Exact number (specify): 4     Regional Lymph Node Comment: Biopsy site changes are present within one   of the lymph nodes. In addition, another lymph node demonstrates black   pigment deposition (possibly tattoo or anthracosis). DISTANT METASTASIS   Distant Site   Not applicable     PATHOLOGIC STAGE CLASSIFICATION (pTNM, AJCC 8th Edition)   TNM Descriptors   Not applicable     pT Category   pT2: Tumor greater than 20 mm but less than or equal to 50 mm in   greatest dimension     Regional Lymph Nodes Modifier   The (sn) modifier is added to the N category when a sentinel node   biopsy is performed (using either dye or tracer) and fewer than six   lymph nodes are removed (sentinel and nonsentinel). (sn): [place] nodes evaluated.      pN Category   pN0: No regional lymph node metastasis identified or ITCs only#     pM Category   Not applicable - pM cannot be determined from the submitted specimen(s)       ADDITIONAL FINDINGS   Additional Findings (specify): Changes consistent with previous biopsy   site, usual ductal hyperplasia     SPECIAL STUDIES   Breast Biomarker Testing Performed on Previous Biopsy   Estrogen Receptor (ER)    Estrogen Receptor (ER) Status    Negative     Progesterone Receptor (PgR)    Progesterone Receptor (PgR) Status    Negative     HER2 (by immunohistochemistry)    HER2 (by immunohistochemistry)    Negative (Score 0-1+)     HER2 (by in situ hybridization)    HER2 (by in situ hybridization)    Indeterminate   Case reviewed with Dr. Vlad Deleon pathology who reassured me that in fact the HER-2 is negative    Ki-67    Ki-67 Percentage of Positive Nuclei: 95% Testing Performed on Case Number: [id number]     [id number] x 2   [id number] x2                                                       <Sign Out Dr. Saloni Myrick M.D., F.C.A.P.       GENETICS:  Germline testing done 3/4/2022-negative    MOLECULAR:  Not applicable    ASSESSMENT/PLAN:    1: Diagnosis: 82-year-old postmenopausal female with a palpable lesion noted in 2/8/2022 with subsequent biopsy and definitive lumpectomy showing high risk cancer of the left breast.  Pathologic stage T2N0 triple negative grade 3.    2) Prognosis / Disease Status: High risk/JAVIER    3) Work-up:    Labs: CBC, CMP, reviewed today. Imaging: None new   Procedures: Left lumpectomy and axillary dissection. Consults: Radiation oncology consult (sent 8/30/2022) for postlumpectomy radiation. other: Cardiology note reviewed. Echocardiogram June 14 unremarkable    4) Symptom Management: Grade 1 neuropathy hands and feet. Declined gabapentin    5) Supportive care provided. Level of care is appropriate. 6) Treatment goal: Adjuvant      Treatment plan:  See above. 8/30 due for her fourth and last course of dose dense Taxol with Neulasta    7) Medications reviewed. Prescriptions today: None but recently Zofran ODT, Compazine, EMLA cream              No orders of the defined types were placed in this encounter. [place]ARRS:  Controlled Substance Monitoring:    Acute and Chronic Pain Monitoring:   No flowsheet data found. 8) Research Options:      None      9) Other:        none    10) Follow Up:  4 months for routine follow-up. After radiation completed she can have her port removed.       Yadira London MD

## 2022-09-27 NOTE — PLAN OF CARE
Problem: Musculor/Skeletal Functional Status  Goal: Absence of falls  Outcome: Adequate for Discharge  Note: Free from falls while in O.P. Oncology. Intervention: Fall precautions  Note: Discussed the need to use the call light for assistance when getting up to ambulate. Problem: Discharge Planning  Goal: Knowledge of discharge instructions  Description: Knowledge of discharge instructions  Outcome: Adequate for Discharge  Note: Verbalize understanding of discharge instructions, follow up appointments, and when to call Physician. Intervention: Interaction with patient/family and care team  Note: Discuss understanding of discharge instructions, follow up appointments and when to call Physician. Problem: Infection - Adult  Goal: Absence of infection at discharge  Outcome: Adequate for Discharge  Flowsheets (Taken 9/27/2022 1504)  Absence of infection at discharge:   Assess and monitor for signs and symptoms of infection   Monitor all insertion sites i.e., indwelling lines, tubes and drains  Note: Mediport site with no redness or warmth. Skin over port site intact with no signs of breakdown noted. Patient verbalizes signs/symptoms of port infection and when to notify the physician. Intervention: EDUCATE PATIENT ABOUT SIGNS AND SYMPTOMS OF INFECTION  Note: Discuss port maintenance,infection prevention, sign of infection and when to call MD.     Care plan reviewed with patient. Patient verbalize understanding of the plan of care and contribute to goal setting.

## 2022-10-03 ENCOUNTER — HOSPITAL ENCOUNTER (OUTPATIENT)
Dept: RADIATION ONCOLOGY | Age: 52
Discharge: HOME | End: 2022-10-03
Payer: COMMERCIAL

## 2022-10-03 ENCOUNTER — HOSPITAL ENCOUNTER (OUTPATIENT)
Dept: CT IMAGING | Age: 52
Discharge: HOME | End: 2022-10-03

## 2022-10-03 DIAGNOSIS — Z17.1 MALIGNANT NEOPLASM OF LOWER-INNER QUADRANT OF LEFT BREAST IN FEMALE, ESTROGEN RECEPTOR NEGATIVE (HCC): ICD-10-CM

## 2022-10-03 DIAGNOSIS — C50.312 MALIGNANT NEOPLASM OF LOWER-INNER QUADRANT OF LEFT BREAST IN FEMALE, ESTROGEN RECEPTOR NEGATIVE (HCC): ICD-10-CM

## 2022-10-03 PROCEDURE — 3209999900 CT GUIDE RADIATION THERAPY NO CHARGE

## 2022-10-03 PROCEDURE — 77334 RADIATION TREATMENT AID(S): CPT | Performed by: RADIOLOGY

## 2022-10-03 PROCEDURE — 77263 THER RADIOLOGY TX PLNG CPLX: CPT | Performed by: RADIOLOGY

## 2022-10-03 PROCEDURE — 77290 THER RAD SIMULAJ FIELD CPLX: CPT | Performed by: RADIOLOGY

## 2022-10-05 ENCOUNTER — TELEPHONE (OUTPATIENT)
Dept: CASE MANAGEMENT | Age: 52
End: 2022-10-05

## 2022-10-05 DIAGNOSIS — E87.6 HYPOKALEMIA: ICD-10-CM

## 2022-10-05 NOTE — TELEPHONE ENCOUNTER
Name: Pilar Jackson  : 1970  MRN: [id number]    Oncology Navigation Follow-Up Note    Contact Type:  Telephone    Notes: Julia Schreiber PA-C ok with including survivorship visit into 22 at 920 am follow-up as already scheduled. Patient updated. Verbalizes understanding.     Electronically signed by Maple Phalen, RN on 10/5/2022 at 8:28 AM

## 2022-10-06 RX ORDER — POTASSIUM CHLORIDE 1500 MG/1
TABLET, EXTENDED RELEASE ORAL
Qty: 30 TABLET | Refills: 1 | Status: SHIPPED | OUTPATIENT
Start: 2022-10-06 | End: 2022-11-04

## 2022-10-10 ENCOUNTER — HOSPITAL ENCOUNTER (OUTPATIENT)
Dept: RADIATION ONCOLOGY | Age: 52
End: 2022-10-10
Payer: COMMERCIAL

## 2022-10-10 ENCOUNTER — OFFICE VISIT (OUTPATIENT)
Dept: SURGERY | Age: 52
End: 2022-10-10
Payer: COMMERCIAL

## 2022-10-10 VITALS
HEART RATE: 84 BPM | TEMPERATURE: 97.2 F | WEIGHT: 289 LBS | SYSTOLIC BLOOD PRESSURE: 112 MMHG | BODY MASS INDEX: 39.14 KG/M2 | DIASTOLIC BLOOD PRESSURE: 78 MMHG | OXYGEN SATURATION: 97 % | HEIGHT: 72 IN

## 2022-10-10 DIAGNOSIS — I10 ESSENTIAL HYPERTENSION: ICD-10-CM

## 2022-10-10 DIAGNOSIS — I48.0 PAROXYSMAL ATRIAL FIBRILLATION (HCC): ICD-10-CM

## 2022-10-10 DIAGNOSIS — Z17.1 MALIGNANT NEOPLASM OF LOWER-INNER QUADRANT OF LEFT BREAST IN FEMALE, ESTROGEN RECEPTOR NEGATIVE (HCC): Primary | ICD-10-CM

## 2022-10-10 DIAGNOSIS — C50.312 MALIGNANT NEOPLASM OF LOWER-INNER QUADRANT OF LEFT BREAST IN FEMALE, ESTROGEN RECEPTOR NEGATIVE (HCC): Primary | ICD-10-CM

## 2022-10-10 DIAGNOSIS — L73.2 HIDRADENITIS AXILLARIS: ICD-10-CM

## 2022-10-10 PROCEDURE — 77338 DESIGN MLC DEVICE FOR IMRT: CPT | Performed by: RADIOLOGY

## 2022-10-10 PROCEDURE — 77301 RADIOTHERAPY DOSE PLAN IMRT: CPT | Performed by: RADIOLOGY

## 2022-10-10 PROCEDURE — 99214 OFFICE O/P EST MOD 30 MIN: CPT | Performed by: SURGERY

## 2022-10-10 PROCEDURE — 77300 RADIATION THERAPY DOSE PLAN: CPT | Performed by: RADIOLOGY

## 2022-10-10 ASSESSMENT — ENCOUNTER SYMPTOMS
TROUBLE SWALLOWING: 0
ABDOMINAL PAIN: 0
VOMITING: 0
COLOR CHANGE: 0
COUGH: 0
SHORTNESS OF BREATH: 0
VOICE CHANGE: 0
CHEST TIGHTNESS: 0
WHEEZING: 0
SORE THROAT: 0
BLOOD IN STOOL: 0
NAUSEA: 0

## 2022-10-10 NOTE — PROGRESS NOTES
Chiki Hutchins MD   General Surgery  Follow up Patient Evaluation in Office  Pt Name: Karlo Smith  Date of Birth 1970   Today's Date: 10/10/2022  Medical Record Number: [id number]  Referring Provider:RODRIGO Freedman  Primary Care Provider: Jessica Estrella  Chief Complaint:  Chief Complaint   Patient presents with    Follow-up     1. Malignant neoplasm of lower-inner quadrant of left breast in female, estrogen receptor negative. Mammo 2/18/22  2. Hidradenitis left axilla. Last office visit 8/1/22           ASSESSMENT      1. Malignant neoplasm of lower-inner quadrant of left breast in female, estrogen receptor negative (HCC)    2. Paroxysmal atrial fibrillation ([address]    3. Essential hypertension    4. Hidradenitis axillaris    5. No evidence recurrent diseas  6. Cutting back on smoking   7. Starting radiation therapy soon  8. Completed chemotherapy. 9.  Neuropathy secondary to chemotherapy    Pathologic stage II aT2 N0 M0, grade 3, triple negative  PLANS   Clinical breast examination today is benign. Medical and radiation oncology notes reviewed  Survivorship plan reviewed  Bone scan reviewed April 2022 no evidence of bony metastatic disease  Breast imaging 6 months following completion of radiation therapy. Port removal after completion of radiation therapy. Hold Eliquis 48 hours prior. Encourage self breast examinations. Patient to call for any changes. Follow-up surgical clinic in 6 months. 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   Abril Ramirez had a prior biopsy of the left breast in 1998 which showed benign fibrocystic changes. She presented with palpable changes in the periareolar area of the left breast at about the 9 o'clock position.   She underwent diagnostic imaging followed by ultrasound and history of estrogen replacement therapy. Family history paternal grandmother history of breast ovarian and uterine cancer. Paternal aunt history of breast cancer, maternal aunt history of breast cancer, mother history of liposarcoma. Past Medical History  Past Medical History:   Diagnosis Date    Afib ([facility]     Baki    Arthritis     CAD (coronary artery disease)     GERD (gastroesophageal reflux disease)     Hyperlipidemia     Hypertension     Hypothyroid     Invasive ductal carcinoma of breast, female, left ([facility] 02/24/2022    Malignant neoplasm of lower-inner quadrant of left breast in female, estrogen receptor negative [facility] 03/07/2022    MI (myocardial infarction) ([facility]     Dr. Ramires Lyme    Pneumonia        Past Surgical History  Past Surgical History:   Procedure Laterality Date    BREAST LUMPECTOMY Left 3/22/2022    LEFT BREAST LUMPECTOMY PARTIAL MASTECTOMY, SENTINEL LYMPH NODE BIOPSY, PRE OP NEEDLE LOC X 2 performed by Rand Martin MD at [facility] Left 3/30/2022    Evacuation hematoma left axilla performed by Rand Martin MD at [facility] ARTHROSCOPY Right     08    JENNIFER BIOPSY LYMPH NODE BY NEEDLE SUPERFICIAL  02/24/2022    JENNIFER BIOPSY LYMPH NODE BY NEEDLE SUPERFICIAL 2/24/2022 Karen Renee MD [address] [facility] US GUID NDL BIOPSY LEFT Left 02/24/2022    JENNIFER [address] LEFT 2/24/2022 Karen Renee MD [address] SURGERY      PORT SURGERY Right 4/13/2022    Single Lumen Smartport Right Subclavian, aspiration of left axillary seroma performed by Rand Martin MD at [address] [place] [address] Right 4/29/2022    Mediport Right Subclavian Removal and Replacement. performed by Rand Martin MD at [address] [facility], INCISIONAL Left 1998    excisional bx-H. Chollet fibrocystic changes    US BREAST BIOPSY NEEDLE ADDITIONAL LEFT Left 02/24/2022    US BREAST BIOPSY NEEDLE ADDITIONAL LEFT 2/24/2022 Cira Wilson MD [address] GUIDED NEEDLE LOC BREAST ADDL LEFT Left 3/22/2022    US GUIDED NEEDLE LOC BREAST ADDL LEFT 3/22/2022 [facility]       Medications  Current Outpatient Medications   Medication Sig Dispense Refill    KLOR-CON M20 20 MEQ extended release tablet TAKE 1 TABLET BY MOUTH EVERY DAY IN THE MORNING 30 tablet 1    metoprolol succinate (TOPROL XL) 100 MG extended release tablet TAKE 1 TABLET BY MOUTH EVERY DAY 90 tablet 1    flecainide (TAMBOCOR) 100 MG tablet TAKE 1 TABLET BY MOUTH TWICE A  tablet 0    ELIQUIS 5 MG TABS tablet TAKE 1 TABLET BY MOUTH TWICE A  tablet 3    Magic Mouthwash (MIRACLE MOUTHWASH) Swish and spit 5 mLs 4 times daily as needed for Irritation 1:1:1 Benadryl, lidocaine, nystatin 120 mL 1    ondansetron (ZOFRAN-ODT) 8 MG TBDP disintegrating tablet Place 1 tablet under the tongue every 8 hours as needed for Nausea or Vomiting 20 tablet 2    prochlorperazine (COMPAZINE) 10 MG tablet Take 1 tablet by mouth every 6 hours as needed (nausea) 30 tablet 1    lidocaine-prilocaine (EMLA) 2.5-2.5 % cream Apply topically as needed. 5 g 1    atorvastatin (LIPITOR) 40 MG tablet TAKE 1 TABLET DAILY 90 tablet 3    levothyroxine (SYNTHROID) 125 MCG tablet TAKE 1 TABLET BY MOUTH EVERY DAY IN THE MORNING ON EMPTY STOMACH      ZINC PO Take by mouth daily      Ascorbic Acid (VITAMIN C PO) Take by mouth daily      ibuprofen (ADVIL;MOTRIN) 800 MG tablet Take 800 mg by mouth every 6 hours as needed for Pain       omeprazole (PRILOSEC) 10 MG delayed release capsule Take 10 mg by mouth daily      nitroGLYCERIN (NITROSTAT) 0.4 MG SL tablet Place 1 tablet under the tongue every 5 minutes as needed for Chest pain 25 tablet 3    aspirin 81 MG chewable tablet Take 81 mg by mouth daily  30 tablet 3     No current facility-administered medications for this visit.      Allergies     Allergies   Allergen Reactions    Codeine Hives and Swelling     And vomiting    Cortisone Swelling     Apparently tolerates topical and IV with benadryl well    Influenza Virus Vaccine Other (See Comments)     Pt states could not walk after getting     [facility] [Propoxyphene N-Acetaminophen] Nausea And Vomiting    Sulfa Antibiotics Nausea And Vomiting and Swelling     Not throat swelling       Family History  Family History   Problem Relation Age of Onset    Heart Disease Mother     Diabetes Mother     Cancer Mother         liposarcoma    Kidney Disease Mother     Heart Disease Father     Diabetes Father     Kidney Disease Father     Cancer Father         Non-melanoma skin cancer multiple times    No Known Problems Brother     No Known Problems Brother     No Known Problems Brother     No Known Problems Maternal Grandmother     No Known Problems Maternal Grandfather     Breast Cancer Paternal Grandmother 48        reoccurred at 46    Ovarian Cancer Paternal Grandmother 46    Bilateral breast cancer Paternal Grandmother         Opposite breast, age 52's    Breast Cancer Maternal Aunt 48    Breast Cancer Paternal Aunt         breast       SocialHistory  Social History     Socioeconomic History    Marital status:      Spouse name: Not on file    Number of children: 1    Years of education: Not on file    Highest education level: Not on file   Occupational History    Occupation: LPN   Tobacco Use    Smoking status: Some Days     Packs/day: 0.25     Years: 30.00     Pack years: 7.50     Types: Cigarettes     Start date: 12/4/1987    Smokeless tobacco: Never    Tobacco comments:     09/27/22 -refused counciling/resources   Vaping Use    Vaping Use: Never used   Substance and Sexual Activity    Alcohol use: Not Currently     Comment: rare    Drug use: No    Sexual activity: Yes     Partners: Male     Comment: boy friend   Other Topics Concern    Not on file   Social History Narrative    Not on file     Social Determinants of Health     Financial Resource Strain: Not on file Food Insecurity: Not on file   Transportation Needs: Not on file   Physical Activity: Not on file   Stress: Not on file   Social Connections: Not on file   Intimate Partner Violence: Not on file   Housing Stability: Not on file           Review of Systems  Review of Systems   Constitutional:  Negative for chills, fatigue, fever and unexpected weight change. HENT:  Negative for congestion, sore throat, trouble swallowing and voice change. Eyes:  Negative for visual disturbance. Respiratory:  Negative for cough, chest tightness, shortness of breath and wheezing. Cardiovascular:  Negative for chest pain and palpitations. Gastrointestinal:  Negative for abdominal pain, blood in stool, nausea and vomiting. Endocrine: Negative for cold intolerance, heat intolerance and polydipsia. Genitourinary:  Negative for dysuria, flank pain, hematuria and vaginal bleeding. Musculoskeletal:  Negative for gait problem, joint swelling and myalgias. Skin:  Negative for color change and rash. Allergic/Immunologic: Negative for immunocompromised state. Neurological:  Positive for numbness. Negative for dizziness, tremors, light-headedness and headaches. Neuropathy hands and feet   Hematological:  Does not bruise/bleed easily. Psychiatric/Behavioral:  Negative for behavioral problems and confusion. The patient is not nervous/anxious. OBJECTIVE     /78 (Site: Right Upper Arm, Position: Sitting, Cuff Size: Medium Adult)   Pulse 84   Temp 97.2 °F (36.2 °C)   Ht 6' (1.829 m)   Wt 289 lb (131.1 kg)   SpO2 97%   BMI 39.20 kg/m²      Physical Exam  Constitutional:       General: She is not in acute distress. Appearance: Normal appearance. She is well-developed. She is obese. She is not ill-appearing or diaphoretic. HENT:      Head: Normocephalic and atraumatic. Comments: Alopecic     Nose: Nose normal. No congestion or rhinorrhea. Eyes:      General: No scleral icterus. Extraocular Movements: Extraocular movements intact. Pupils: Pupils are equal, round, and reactive to light. Comments: glasses   Neck:      Vascular: No JVD. Trachea: No tracheal deviation. Cardiovascular:      Rate and Rhythm: Normal rate and regular rhythm. Heart sounds: Normal heart sounds. No murmur heard. Pulmonary:      Effort: Pulmonary effort is normal. No respiratory distress. Breath sounds: Normal breath sounds. No wheezing. Chest:      Chest wall: No mass, swelling or tenderness. Breasts:     Right: No swelling, bleeding, inverted nipple, mass, nipple discharge, skin change or tenderness. Left: Tenderness present. No swelling, bleeding, inverted nipple, mass, nipple discharge or skin change. Comments: Quiescent hidradenitis bilateral axilla  Abdominal:      General: There is no distension. Palpations: There is no mass. Tenderness: There is no abdominal tenderness. Musculoskeletal:         General: No deformity. Cervical back: Neck supple. No tenderness. Right lower leg: No edema. Left lower leg: No edema. Lymphadenopathy:      Cervical: No cervical adenopathy. Right cervical: No superficial, deep or posterior cervical adenopathy. Left cervical: No superficial, deep or posterior cervical adenopathy. Upper Body:      Right upper body: No supraclavicular, axillary or pectoral adenopathy. Left upper body: No supraclavicular, axillary or pectoral adenopathy. Skin:     General: Skin is warm and dry. Coloration: Skin is not jaundiced or pale. Findings: No rash. Neurological:      General: No focal deficit present. Mental Status: She is alert and oriented to person, place, and time. Cranial Nerves: No cranial nerve deficit. Psychiatric:         Mood and Affect: Mood normal.         Behavior: Behavior normal.         Thought Content:  Thought content normal.       Lab Results   Component Value Date    WBC 5.5 2022    HGB 11.0 (L) 2022    HCT 34.9 (L) 2022     2022    CHOL 167 2021    TRIG 174 2021    HDL 35 2021    ALT 10 (L) 2022    AST 13 2022     2022    K 3.7 2022     2021    CREATININE 0.4 (L) 2022    BUN 12 2021    CO2 24 2021    TSH 4.690 (H) 2021    INR 1.00 2022    LABA1C 5.5 2019        Narrative  Performed by: Jennifer Hendricks. Pathology      [facility]             [id number]   [address], [address]                                                       PROC: 2022   [facility]                                    RECV: 2022   [address]                                    RPTD: 2022   ANJEL BOSCH II.VIERTEL, [address]                       MRN:  [id number]     LOC: OR                       ACCT: [de-identified]  SEX: F                       : 1970  AGE: 54 Y                          PATHOLOGY REPORT                       ATTN: Myrtle Camarena KELLI Worley OLT       Copies To:   Azra [facility]amo       Clinical Information: INVASIVE DUCTAL CARCINOMA LEFT BREAST     FINAL DIAGNOSIS:   A: Left axillary sentinel lymph nodes, resection:     Four lymph nodes with no evidence of malignancy.  (0/4)     One lymph node with previous biopsy site changes. One lymph node with black pigment deposition. B: Left breast, lumpectomy specimen:     Invasive ductal carcinoma, Rekha grade 3 of 3. Invasive carcinoma is 2.7 cm in greatest dimension. Invasive carcinoma is 1.5 mm to the closest margin-inferior/caudal.     Invasive carcinoma is > 5 mm to all other margins. Changes consistent with previous biopsy site.        pT2, pN0(sn)     Specimen:   A) SENTINEL LYMPH NODE(S), LEFT AXILLARY   B) EXCISION OF BREAST, LEFT CANCER LUMP     Gross Examination: A - The container is labeled Castillo Dubon, right axillary   sentinel lymph node. Received fresh on an x-ray grid are two   unoriented fragments of yellow adipose tissue. The fragments aggregate   to about 7 x 5.5 x 2.7 cm. Accompanying x-ray indicates three lymph   nodes present. There is a wire. Sections through the fragments reveal   four lymph nodes varying in size from 0.5 cm up to 3 cm. Sections   through the largest node reveal focal pigment. The three larger lymph   nodes are serially sectioned and entirely submitted. The smaller node   is submitted as received. Cassettes #1 through #4 - one node;   cassettes #5 through #8 - second node; cassettes #9 and #10 - third   node; and cassette #11 - the smallest node. ss.     Fixative:  10% neutral buffered formalin   Tissue removal time:  1451   Tissue fixation time:  1552   Total fixation time:  28 hours 8 minutes     B - The container is labeled Castillo Dubon, left breast cancer   lump. Received fresh on an x-ray grid is an oriented lumpectomy   specimen. Accompanying x-ray indicates a mass with clip at grid   coordinates D/2-3. The specimen measures 7 cm from the medial to   lateral margins, 5 cm from the skin to deep margins, and 2.5 cm from   the cranial to caudal margins. The specimen includes a wire. The   caudal margin is inked blue, the cranial margin is inked yellow, the   deep and medial margins are inked black, and the skin and lateral   margins are inked green. Sections through the specimen reveal a yellow   fatty cut surface. There is a mildly circumscribed, white mass with   central biopsy site. A barbell clip is identified within the biopsy   site. The lesion grossly measures 1.7 x 1.5 x about 2.7 cm. No   additional lesions are identified. Representative sections are   submitted.   Cassette #1 - medial margin; cassette #2 - caudal margin;   cassette #3 - caudal margin; cassette #4 - cranial margin; cassette #5   - deep margin; cassette #6 - skin margin; cassette #7 - skin and   cranial margins; cassette #8 - skin and caudal margins; cassette #9 -   deep margin; cassette #10 - cranial and caudal margins; and cassette   #11 - latera margin. sri VERAS/LAWANDA:[id number]     Fixative:  10% neutral buffered formalin   Tissue removal time:  1516   Tissue fixation time:  1548   Total fixation time: 28 hours 12 minutes     Microscopic Examination:   A and B: Reporting Template   Protocol Posting Date: March 2022     CASE SUMMARY: (INVASIVE CARCINOMA OF THE BREAST: Resection)   Standard(s): AJCC-UICC 8     SPECIMEN   Procedure   Excision (less than total mastectomy)     Specimen Laterality   Left     TUMOR   Tumor Site   Clock position    Specify Clock Position    9 o'clock     Histologic Type   Invasive carcinoma of no special type (ductal)     Histologic Grade ([place] Histologic Score)   [place] Score    Glandular (Acinar) / Tubular Differentiation    Score 3 (less than 10% of tumor area forming glandular / tubular   structures)      Nuclear Pleomorphism    Score 3 (Vesicular nuclei, often with prominent nucleoli, exhibiting    marked variation in size and shape, occasionally with very large and    bizarre forms)      Mitotic Rate    See Table 1 in CAP Protocol.     Score 3      Overall Grade    Grade 3 (scores of 8 or 9)     Tumor Size   Greatest dimension of largest invasive focus greater than 1 mm (specify   exact measurement in Millimeters (mm)): 27 mm    Additional Dimension in Millimeters (mm): 17 x 15 mm     Tumor Focality   Single focus of invasive carcinoma     Ductal Carcinoma In Situ (DCIS)   Not identified     Lobular Carcinoma In Situ (LCIS)   Not identified     Tumor Extent    Tumor Extent    Not applicable (skin, nipple, and skeletal muscle are absent)     Lymphovascular Invasion   Not identified     Dermal Lymphovascular Invasion   No skin present     Treatment Effect in the Breast   No known presurgical therapy Treatment Effect in the Lymph Nodes   Not applicable     MARGINS   Margin Status for Invasive Carcinoma   All margins negative for invasive carcinoma    Distance from Invasive Carcinoma to Closest Margin    Specify in Millimeters (mm)    Exact distance: 1.5 mm      Closest Margin to Invasive Carcinoma    Inferior/caudal      Distance from Invasive Carcinoma to Anterior/Skin Margin    Specify in Millimeters (mm)    Exact distance: 7 mm      Distance from Invasive Carcinoma to Posterior/Deep Margin    Specify in Millimeters (mm)    Exact distance: 10 mm      Distance from Invasive Carcinoma to Superior/Cranial Margin    Specify in Millimeters (mm)    Exact distance: 7 mm      Distance from Invasive Carcinoma to Inferior/Caudal Margin    Specify in Millimeters (mm)    Exact distance: 1.5 mm      Distance from Invasive Carcinoma to Medial Margin    Specify in Millimeters (mm)    Greater than: 10 mm      Distance from Invasive Carcinoma to Lateral Margin    Specify in Millimeters (mm)    Greater than: 10 mm     Margin Status for DCIS   Not applicable (no DCIS in specimen)       REGIONAL LYMPH NODES   Regional Lymph Node Status   Regional lymph nodes present    All regional lymph nodes negative for tumor      Total Number of Lymph Nodes Examined (sentinel and non-sentinel)    Exact number (specify): 4    Number of [place] Nodes Examined    Exact number (specify): 4     Regional Lymph Node Comment: Biopsy site changes are present within one   of the lymph nodes. In addition, another lymph node demonstrates black   pigment deposition (possibly tattoo or anthracosis).      DISTANT METASTASIS   Distant Site   Not applicable     PATHOLOGIC STAGE CLASSIFICATION (pTNM, AJCC 8th Edition)   TNM Descriptors   Not applicable     pT Category   pT2: Tumor greater than 20 mm but less than or equal to 50 mm in   greatest dimension     Regional Lymph Nodes Modifier   The (sn) modifier is added to the N category when a sentinel node biopsy is performed (using either dye or tracer) and fewer than six   lymph nodes are removed (sentinel and nonsentinel). (sn): [place] nodes evaluated. pN Category   pN0: No regional lymph node metastasis identified or ITCs only#     pM Category   Not applicable - pM cannot be determined from the submitted specimen(s)       ADDITIONAL FINDINGS   Additional Findings (specify): Changes consistent with previous biopsy   site, usual ductal hyperplasia     SPECIAL STUDIES   Breast Biomarker Testing Performed on Previous Biopsy   Estrogen Receptor (ER)    Estrogen Receptor (ER) Status    Negative     Progesterone Receptor (PgR)    Progesterone Receptor (PgR) Status    Negative     HER2 (by immunohistochemistry)    HER2 (by immunohistochemistry)    Negative (Score 0-1+)     HER2 (by in situ hybridization)    HER2 (by in situ hybridization)    Indeterminate     Ki-67    Ki-67 Percentage of Positive Nuclei: 95%    Testing Performed on Case Number: [id number]     [id number] x 2   33980 x2                                                       <Sign Out Dr. Bronwen Councilman, M.D., F.C.A.P. [facility]/ [facility]  Printed on:  3/24/2022   Jesusita Murillo[address] JOSE BOSCH II.VIERTEL, [facility] [employer]   Original print date: 03/24/2022      Specimen Collected: 03/22/22 07:06 EDT Last Resulted: 03/24/22 16:58 EDT           Narrative   PROCEDURE: NM BONE SCAN WHOLE BODY       CLINICAL INFORMATION: Malignant neoplasm of lower-inner quadrant of left breast in female, estrogen receptor negative ([facility] Malignant neoplasm of lower-inner quadrant of left breast in female, estrogen receptor negative ([facility] . TECHNIQUE: Radiopharmaceutical: 26.7 mCi technetium 99m MDP. Delayed whole body anterior and posterior planar images were obtained after IV administration of radiopharmaceutical, at the right hand. Additional images of the pelvis were obtained in multiple projections. COMPARISON: No prior study. FINDINGS: Soft tissue activity left breast likely related to combination of primary malignancy and recent surgery. This does obscure detail of the anterior aspect of the left ribs at that site. Other areas of increased activity include the bilateral shoulders and acromioclavicular joints bilateral knees and feet and ankles. Increased activity in the nasopharyngeal and maxillary space   Physiologic activity kidneys and bladder           Impression   1. No evidence of osseous metastatic disease. 2. Soft tissue activity left breast secondary to recent surgery and primary malignancy. 3. Other areas of increased activity are likely on the basis of degenerative osteoarthritis possibly sinus disease. **This report has been created using voice recognition software. It may contain minor errors which are inherent in voice recognition technology. **       Final report electronically signed by Dr. Jhon Douglas on 4/11/2022 4:13 PM

## 2022-10-17 ENCOUNTER — HOSPITAL ENCOUNTER (OUTPATIENT)
Dept: RADIATION ONCOLOGY | Age: 52
Discharge: HOME | End: 2022-10-17
Payer: COMMERCIAL

## 2022-10-17 DIAGNOSIS — C50.312 MALIGNANT NEOPLASM OF LOWER-INNER QUADRANT OF LEFT BREAST IN FEMALE, ESTROGEN RECEPTOR NEGATIVE (HCC): Primary | ICD-10-CM

## 2022-10-17 DIAGNOSIS — Z17.1 MALIGNANT NEOPLASM OF LOWER-INNER QUADRANT OF LEFT BREAST IN FEMALE, ESTROGEN RECEPTOR NEGATIVE (HCC): Primary | ICD-10-CM

## 2022-10-17 PROCEDURE — 77385 HC NTSTY MODUL RAD TX DLVR SMPL: CPT | Performed by: RADIOLOGY

## 2022-10-17 PROCEDURE — 77014 PR CT GUIDANCE PLACEMENT RAD THERAPY FIELDS: CPT | Performed by: RADIOLOGY

## 2022-10-17 NOTE — PROGRESS NOTES
[facility] KEVIN [address], [address]  Phone: [phone]   Toll Free: [phone]   Fax: [phone]    RADIATION ONCOLOGY EDUCATION    CHIEF COMPLAINT: Josh Aburto presents to radiation oncology today for education regarding treatment to the left breast.      PLAN:   Expected and potential side effects were discussed in detail, along with written handouts. Skin care and moisturization instructions were discussed in detail. Patient was agreeable to physical therapy referral.  Patient was informed of dietician that is available weekly and as needed. Educated on weekly on treatment visit to meet with Physician to monitor side effects and treatment course. Informed patient that Physician is available at any time to discuss radiation side effects/concerns through out treatment course. Josh Aburto had the opportunity to ask questions, and indicated that all questions were satisfactorily addressed.

## 2022-10-18 ENCOUNTER — HOSPITAL ENCOUNTER (OUTPATIENT)
Dept: RADIATION ONCOLOGY | Age: 52
Discharge: HOME | End: 2022-10-18
Payer: COMMERCIAL

## 2022-10-18 PROCEDURE — 77385 HC NTSTY MODUL RAD TX DLVR SMPL: CPT | Performed by: RADIOLOGY

## 2022-10-18 PROCEDURE — 77014 PR CT GUIDANCE PLACEMENT RAD THERAPY FIELDS: CPT | Performed by: RADIOLOGY

## 2022-10-19 ENCOUNTER — HOSPITAL ENCOUNTER (OUTPATIENT)
Dept: RADIATION ONCOLOGY | Age: 52
Discharge: HOME | End: 2022-10-19
Payer: COMMERCIAL

## 2022-10-19 PROCEDURE — 77014 PR CT GUIDANCE PLACEMENT RAD THERAPY FIELDS: CPT | Performed by: RADIOLOGY

## 2022-10-19 PROCEDURE — 77385 HC NTSTY MODUL RAD TX DLVR SMPL: CPT | Performed by: RADIOLOGY

## 2022-10-20 ENCOUNTER — HOSPITAL ENCOUNTER (OUTPATIENT)
Dept: RADIATION ONCOLOGY | Age: 52
Discharge: HOME | End: 2022-10-20
Payer: COMMERCIAL

## 2022-10-20 VITALS
TEMPERATURE: 98.2 F | DIASTOLIC BLOOD PRESSURE: 69 MMHG | WEIGHT: 282.19 LBS | OXYGEN SATURATION: 99 % | SYSTOLIC BLOOD PRESSURE: 119 MMHG | BODY MASS INDEX: 38.27 KG/M2 | HEART RATE: 77 BPM | RESPIRATION RATE: 16 BRPM

## 2022-10-20 PROCEDURE — 77385 HC NTSTY MODUL RAD TX DLVR SMPL: CPT | Performed by: RADIOLOGY

## 2022-10-20 PROCEDURE — 77014 PR CT GUIDANCE PLACEMENT RAD THERAPY FIELDS: CPT | Performed by: RADIOLOGY

## 2022-10-20 ASSESSMENT — PAIN DESCRIPTION - LOCATION: LOCATION: BACK

## 2022-10-20 ASSESSMENT — PAIN SCALES - GENERAL: PAINLEVEL_OUTOF10: 3

## 2022-10-20 NOTE — PROGRESS NOTES
[address] [facility] [address], [address]        ANJEL PHIPPSGRACIELA BOSCH II.VIERTEL,  [facility] Jeff: [phone]        F: [phone]       [email or web address]            Dr. Hyacinth Mcdaniels MD MS          Dr. Cisco Morel MD PhD    ON TREATMENT VISIT (OTV) NOTE     Date of Service: 10/20/2022  Patient ID: Latoya Nuñez   : 1970  MRN: [id number]   Acct Number: [de-identified]     RADIATION ONCOLOGY ATTENDING:  Eliazar Flores MD MS    DIAGNOSIS:  Cancer Staging  Malignant neoplasm of lower-inner quadrant of left breast in female, estrogen receptor negative ([facility] [address]  Staging form: Breast, AJCC 8th Edition  - Clinical stage from 3/22/2022: Stage IB (cT1c, cN0(f), cM0, G3, ER-, [place]-, HER2-) - Signed by Jorge A Kaur MD on 3/22/2022  - Pathologic stage from 3/29/2022: Stage IIA (pT2, pN0(sn), cM0, G3, ER-, [place]-, HER2-) - Signed by Jorge A Kaur MD on 3/29/2022      Treatment Area: Breast     Current Total Dose(cGy): 800  Current Fraction:   Final/Cumulative Rx. Dose (cGy): 6000    Patient was seen today for weekly visit. Wt Readings from Last 3 Encounters:   10/20/22 282 lb 3 oz (128 kg)   10/10/22 289 lb (131.1 kg)   22 287 lb (130.2 kg)       /69   Pulse 77   Temp 98.2 °F (36.8 °C) (Infrared)   Resp 16   Wt 282 lb 3 oz (128 kg)   SpO2 99%   BMI 38.27 kg/m²     Lab Results   Component Value Date    WBC 5.5 2022     2022       Comfort Alteration  Fatigue:Able to perform daily activities with rest periods    Pain Location: Back  Pain Intensity (Current): 3 Between mild and moderate pain  Pain Treatment: OTC Medications  Pain Relief: Pain relieved 75%  Hot Flashes/Flushes:  Moderate and more than 1 per day    Emotional Alteration:   Coping: effective    Nutritional Alteration  Anorexia: none   Nausea: No nausea noted  Vomiting: No vomiting   Dyspepsia/Heartburn: None    Skin Alteration   Skin reaction: No changes noted    Ventilation Alteration  Cough: None  Hemoptysis: None  Dyspnea: Normal  Mucous Quantity/Quality: n/a    Additional Comments: Using CeraVe BID and Aquaphor at night. MEDICATIONS:     Current Outpatient Medications   Medication Sig Dispense Refill    KLOR-CON M20 20 MEQ extended release tablet TAKE 1 TABLET BY MOUTH EVERY DAY IN THE MORNING 30 tablet 1    metoprolol succinate (TOPROL XL) 100 MG extended release tablet TAKE 1 TABLET BY MOUTH EVERY DAY 90 tablet 1    flecainide (TAMBOCOR) 100 MG tablet TAKE 1 TABLET BY MOUTH TWICE A  tablet 0    ELIQUIS 5 MG TABS tablet TAKE 1 TABLET BY MOUTH TWICE A  tablet 3    Magic Mouthwash (MIRACLE MOUTHWASH) Swish and spit 5 mLs 4 times daily as needed for Irritation 1:1:1 Benadryl, lidocaine, nystatin 120 mL 1    ondansetron (ZOFRAN-ODT) 8 MG TBDP disintegrating tablet Place 1 tablet under the tongue every 8 hours as needed for Nausea or Vomiting 20 tablet 2    prochlorperazine (COMPAZINE) 10 MG tablet Take 1 tablet by mouth every 6 hours as needed (nausea) 30 tablet 1    lidocaine-prilocaine (EMLA) 2.5-2.5 % cream Apply topically as needed. 5 g 1    atorvastatin (LIPITOR) 40 MG tablet TAKE 1 TABLET DAILY 90 tablet 3    levothyroxine (SYNTHROID) 125 MCG tablet TAKE 1 TABLET BY MOUTH EVERY DAY IN THE MORNING ON EMPTY STOMACH      ZINC PO Take by mouth daily      Ascorbic Acid (VITAMIN C PO) Take by mouth daily      ibuprofen (ADVIL;MOTRIN) 800 MG tablet Take 800 mg by mouth every 6 hours as needed for Pain       omeprazole (PRILOSEC) 10 MG delayed release capsule Take 10 mg by mouth daily      nitroGLYCERIN (NITROSTAT) 0.4 MG SL tablet Place 1 tablet under the tongue every 5 minutes as needed for Chest pain 25 tablet 3    aspirin 81 MG chewable tablet Take 81 mg by mouth daily  30 tablet 3     No current facility-administered medications for this encounter.          PHYSICAL EXAM:       ECO - Asymptomatic (Fully active, able to carry on all pre-disease activities without restriction)    General: NAD, AO x 3, Mentation is clear with appropriate affect. HEENT: Normocephalic, atraumatic  Thorax:  Unlabored  Breasts:  No erythema or desquamation of the radiation treatment area  Abdomen:  Non-distended  Neuro:  Cranial nerves grossly intact  Skin - treatment portal: SEE above. Chemotherapy Update: None currently    Treatment Imaging: CBCT and All imaging reviewed and approved by Dr. Dianelys Caballero: No significant radiation side effects. Responding appropriately to symptomatic management. New medications, diagnostic results: Continue treatment as planned    PLAN: Again reviewed potential side effects of radiation for the patient's treatment. Continue local/topical care (patient is allergic to steroids so she is not using betamethasone). Continue current radiation course as prescribed.

## 2022-10-21 ENCOUNTER — HOSPITAL ENCOUNTER (OUTPATIENT)
Dept: RADIATION ONCOLOGY | Age: 52
Discharge: HOME | End: 2022-10-21
Payer: COMMERCIAL

## 2022-10-21 PROCEDURE — 77014 PR CT GUIDANCE PLACEMENT RAD THERAPY FIELDS: CPT | Performed by: RADIOLOGY

## 2022-10-21 PROCEDURE — 77427 RADIATION TX MANAGEMENT X5: CPT | Performed by: RADIOLOGY

## 2022-10-21 PROCEDURE — 77385 HC NTSTY MODUL RAD TX DLVR SMPL: CPT | Performed by: RADIOLOGY

## 2022-10-24 ENCOUNTER — HOSPITAL ENCOUNTER (OUTPATIENT)
Dept: RADIATION ONCOLOGY | Age: 52
Discharge: HOME | End: 2022-10-24
Payer: COMMERCIAL

## 2022-10-24 PROCEDURE — 77336 RADIATION PHYSICS CONSULT: CPT | Performed by: RADIOLOGY

## 2022-10-24 PROCEDURE — 77014 PR CT GUIDANCE PLACEMENT RAD THERAPY FIELDS: CPT | Performed by: RADIOLOGY

## 2022-10-24 PROCEDURE — 77385 HC NTSTY MODUL RAD TX DLVR SMPL: CPT | Performed by: RADIOLOGY

## 2022-10-25 ENCOUNTER — HOSPITAL ENCOUNTER (OUTPATIENT)
Dept: RADIATION ONCOLOGY | Age: 52
Discharge: HOME | End: 2022-10-25
Payer: COMMERCIAL

## 2022-10-25 PROCEDURE — 77385 HC NTSTY MODUL RAD TX DLVR SMPL: CPT | Performed by: RADIOLOGY

## 2022-10-25 PROCEDURE — 77014 PR CT GUIDANCE PLACEMENT RAD THERAPY FIELDS: CPT | Performed by: RADIOLOGY

## 2022-10-26 ENCOUNTER — HOSPITAL ENCOUNTER (OUTPATIENT)
Dept: RADIATION ONCOLOGY | Age: 52
Discharge: HOME | End: 2022-10-26
Payer: COMMERCIAL

## 2022-10-26 PROCEDURE — 77385 HC NTSTY MODUL RAD TX DLVR SMPL: CPT | Performed by: RADIOLOGY

## 2022-10-26 PROCEDURE — 77014 PR CT GUIDANCE PLACEMENT RAD THERAPY FIELDS: CPT | Performed by: RADIOLOGY

## 2022-10-27 ENCOUNTER — HOSPITAL ENCOUNTER (OUTPATIENT)
Dept: RADIATION ONCOLOGY | Age: 52
Discharge: HOME | End: 2022-10-27
Payer: COMMERCIAL

## 2022-10-27 VITALS
RESPIRATION RATE: 18 BRPM | WEIGHT: 276.68 LBS | HEART RATE: 73 BPM | OXYGEN SATURATION: 98 % | TEMPERATURE: 97.9 F | BODY MASS INDEX: 37.52 KG/M2 | DIASTOLIC BLOOD PRESSURE: 72 MMHG | SYSTOLIC BLOOD PRESSURE: 118 MMHG

## 2022-10-27 PROCEDURE — 77014 PR CT GUIDANCE PLACEMENT RAD THERAPY FIELDS: CPT | Performed by: RADIOLOGY

## 2022-10-27 PROCEDURE — 77385 HC NTSTY MODUL RAD TX DLVR SMPL: CPT | Performed by: RADIOLOGY

## 2022-10-27 ASSESSMENT — PAIN DESCRIPTION - LOCATION: LOCATION: BACK

## 2022-10-27 ASSESSMENT — PAIN SCALES - GENERAL: PAINLEVEL_OUTOF10: 4

## 2022-10-27 NOTE — PROGRESS NOTES
[address] [facility] [address], [address]        [address] [facility], [address] [place] [address] [place]: [phone]        F: [phone]       [email or web address]            Dr. Katy Armstrong MD MS          Dr. Juan C Collado MD PhD    ON TREATMENT VISIT (OTV) NOTE     Date of Service: 10/27/2022  Patient ID: Vj Villasenor   : 1970  MRN: [id number]   Acct Number: [de-identified]     RADIATION ONCOLOGY ATTENDING:  Shyla Mclain MD MS    DIAGNOSIS:  Cancer Staging  Malignant neoplasm of lower-inner quadrant of left breast in female, estrogen receptor negative ([facility] [address]  Staging form: Breast, AJCC 8th Edition  - Clinical stage from 3/22/2022: Stage IB (cT1c, cN0(f), cM0, G3, ER-, [place]-, HER2-) - Signed by Yojana Barakat MD on 3/22/2022  - Pathologic stage from 3/29/2022: Stage IIA (pT2, pN0(sn), cM0, G3, ER-, [place]-, HER2-) - Signed by Yojana Barakat MD on 3/29/2022      Treatment Area: Breast     Current Total Dose(cGy): 1800  Current Fraction:   Final/Cumulative Rx. Dose (cGy): 6000    Patient was seen today for weekly visit. Wt Readings from Last 3 Encounters:   10/27/22 276 lb 10.8 oz (125.5 kg)   10/20/22 282 lb 3 oz (128 kg)   10/10/22 289 lb (131.1 kg)       /72   Pulse 73   Temp 97.9 °F (36.6 °C) (Infrared)   Resp 18   Wt 276 lb 10.8 oz (125.5 kg)   SpO2 98%   BMI 37.52 kg/m²     Lab Results   Component Value Date    WBC 5.5 2022     2022       Comfort Alteration  Fatigue:Able to perform daily activities with rest periods    Pain Location: Back  Pain Intensity (Current): 5 Between moderate and severe pain  Pain Treatment: OTC Medications  Pain Relief: Pain relieved 75%  Hot Flashes/Flushes:  Moderate and more than 1 per day, less frequent    Emotional Alteration:   Coping: effective    Nutritional Alteration  Anorexia: none   Nausea: No nausea noted  Vomiting: No vomiting   Dyspepsia/Heartburn: None    Skin Alteration   Skin reaction: No changes noted    Ventilation Alteration  Cough: None  Hemoptysis: None  Dyspnea: Normal  Mucous Quantity/Quality: n/a    Additional Comments: Using CeraVe BID and Aquaphor at night. MEDICATIONS:     Current Outpatient Medications   Medication Sig Dispense Refill    KLOR-CON M20 20 MEQ extended release tablet TAKE 1 TABLET BY MOUTH EVERY DAY IN THE MORNING 30 tablet 1    metoprolol succinate (TOPROL XL) 100 MG extended release tablet TAKE 1 TABLET BY MOUTH EVERY DAY 90 tablet 1    flecainide (TAMBOCOR) 100 MG tablet TAKE 1 TABLET BY MOUTH TWICE A  tablet 0    ELIQUIS 5 MG TABS tablet TAKE 1 TABLET BY MOUTH TWICE A  tablet 3    Magic Mouthwash (MIRACLE MOUTHWASH) Swish and spit 5 mLs 4 times daily as needed for Irritation 1:1:1 Benadryl, lidocaine, nystatin 120 mL 1    ondansetron (ZOFRAN-ODT) 8 MG TBDP disintegrating tablet Place 1 tablet under the tongue every 8 hours as needed for Nausea or Vomiting 20 tablet 2    prochlorperazine (COMPAZINE) 10 MG tablet Take 1 tablet by mouth every 6 hours as needed (nausea) 30 tablet 1    lidocaine-prilocaine (EMLA) 2.5-2.5 % cream Apply topically as needed. 5 g 1    atorvastatin (LIPITOR) 40 MG tablet TAKE 1 TABLET DAILY 90 tablet 3    levothyroxine (SYNTHROID) 125 MCG tablet TAKE 1 TABLET BY MOUTH EVERY DAY IN THE MORNING ON EMPTY STOMACH      ZINC PO Take by mouth daily      Ascorbic Acid (VITAMIN C PO) Take by mouth daily      ibuprofen (ADVIL;MOTRIN) 800 MG tablet Take 800 mg by mouth every 6 hours as needed for Pain       omeprazole (PRILOSEC) 10 MG delayed release capsule Take 10 mg by mouth daily      nitroGLYCERIN (NITROSTAT) 0.4 MG SL tablet Place 1 tablet under the tongue every 5 minutes as needed for Chest pain 25 tablet 3    aspirin 81 MG chewable tablet Take 81 mg by mouth daily  30 tablet 3     No current facility-administered medications for this encounter.          PHYSICAL EXAM:       ECO - Asymptomatic (Fully active, able to carry on all pre-disease activities without restriction)    General: NAD, AO x 3, Mentation is clear with appropriate affect. HEENT: Normocephalic, atraumatic  Thorax:  Unlabored  Breasts:  No erythema or desquamation of the radiation treatment area  Abdomen:  Non-distended  Neuro:  Cranial nerves grossly intact  Skin - treatment portal: SEE above. Chemotherapy Update: None currently    Treatment Imaging: CBCT and All imaging reviewed and approved by Dr. Av Lr: No significant radiation side effects. Responding appropriately to symptomatic management. New medications, diagnostic results: Continue treatment as planned    PLAN: Again reviewed potential side effects of radiation for the patient's treatment. Continue local/topical care (patient is allergic to steroids so she is not using betamethasone). Continue current radiation course as prescribed. Patient seen and examined independently by me. The above note has been modified by me to reflect current findings and plans. Labs, cultures, and radiographs where available were reviewed. I discussed patient concerns and instructions were given. Please see our orders for the updated patient care plan.       Electronically signed by Savita Reyna MD

## 2022-10-28 ENCOUNTER — HOSPITAL ENCOUNTER (OUTPATIENT)
Dept: RADIATION ONCOLOGY | Age: 52
Discharge: HOME | End: 2022-10-28
Payer: COMMERCIAL

## 2022-10-28 PROCEDURE — 77427 RADIATION TX MANAGEMENT X5: CPT | Performed by: RADIOLOGY

## 2022-10-28 PROCEDURE — 77385 HC NTSTY MODUL RAD TX DLVR SMPL: CPT | Performed by: RADIOLOGY

## 2022-10-28 PROCEDURE — 77014 PR CT GUIDANCE PLACEMENT RAD THERAPY FIELDS: CPT | Performed by: RADIOLOGY

## 2022-10-31 ENCOUNTER — HOSPITAL ENCOUNTER (OUTPATIENT)
Dept: RADIATION ONCOLOGY | Age: 52
Discharge: HOME | End: 2022-10-31
Payer: COMMERCIAL

## 2022-10-31 PROCEDURE — 77014 PR CT GUIDANCE PLACEMENT RAD THERAPY FIELDS: CPT | Performed by: RADIOLOGY

## 2022-10-31 PROCEDURE — 77336 RADIATION PHYSICS CONSULT: CPT | Performed by: RADIOLOGY

## 2022-10-31 PROCEDURE — 77385 HC NTSTY MODUL RAD TX DLVR SMPL: CPT | Performed by: RADIOLOGY

## 2022-11-01 ENCOUNTER — HOSPITAL ENCOUNTER (OUTPATIENT)
Dept: RADIATION ONCOLOGY | Age: 52
Discharge: HOME | End: 2022-11-01
Payer: COMMERCIAL

## 2022-11-01 PROCEDURE — 77385 HC NTSTY MODUL RAD TX DLVR SMPL: CPT | Performed by: RADIOLOGY

## 2022-11-01 PROCEDURE — 77014 PR CT GUIDANCE PLACEMENT RAD THERAPY FIELDS: CPT | Performed by: RADIOLOGY

## 2022-11-02 ENCOUNTER — HOSPITAL ENCOUNTER (OUTPATIENT)
Dept: RADIATION ONCOLOGY | Age: 52
Discharge: HOME | End: 2022-11-02
Payer: COMMERCIAL

## 2022-11-02 PROCEDURE — 77385 HC NTSTY MODUL RAD TX DLVR SMPL: CPT | Performed by: RADIOLOGY

## 2022-11-02 PROCEDURE — 77014 PR CT GUIDANCE PLACEMENT RAD THERAPY FIELDS: CPT | Performed by: RADIOLOGY

## 2022-11-03 ENCOUNTER — HOSPITAL ENCOUNTER (OUTPATIENT)
Dept: RADIATION ONCOLOGY | Age: 52
Discharge: HOME | End: 2022-11-03
Payer: COMMERCIAL

## 2022-11-03 VITALS
DIASTOLIC BLOOD PRESSURE: 63 MMHG | OXYGEN SATURATION: 99 % | SYSTOLIC BLOOD PRESSURE: 111 MMHG | RESPIRATION RATE: 18 BRPM | WEIGHT: 278.66 LBS | BODY MASS INDEX: 37.79 KG/M2 | TEMPERATURE: 97.9 F | HEART RATE: 71 BPM

## 2022-11-03 PROCEDURE — 77014 PR CT GUIDANCE PLACEMENT RAD THERAPY FIELDS: CPT | Performed by: RADIOLOGY

## 2022-11-03 PROCEDURE — 77385 HC NTSTY MODUL RAD TX DLVR SMPL: CPT | Performed by: RADIOLOGY

## 2022-11-03 ASSESSMENT — PAIN SCALES - GENERAL: PAINLEVEL_OUTOF10: 4

## 2022-11-03 ASSESSMENT — PAIN DESCRIPTION - LOCATION: LOCATION: BACK

## 2022-11-03 NOTE — PROGRESS NOTES
[address] [facility] [address], [address]        [address] [facility], [address] [place] [address]        Yoseph Barbosa: [phone]        F: [phone]       [email or web address]            Dr. Alma York MD MS          Dr. Chanda Mendiola MD PhD    ON TREATMENT VISIT (OTV) NOTE     Date of Service: 11/3/2022  Patient ID: Jeff Valdez   : 1970  MRN: [id number]   Acct Number: [de-identified]     RADIATION ONCOLOGY ATTENDING:  Thelma Haley MD MS    DIAGNOSIS:  Cancer Staging  Malignant neoplasm of lower-inner quadrant of left breast in female, estrogen receptor negative ([facility] [address]  Staging form: Breast, AJCC 8th Edition  - Clinical stage from 3/22/2022: Stage IB (cT1c, cN0(f), cM0, G3, ER-, [place]-, HER2-) - Signed by Gisselle Arndt MD on 3/22/2022  - Pathologic stage from 3/29/2022: Stage IIA (pT2, pN0(sn), cM0, G3, ER-, [place]-, HER2-) - Signed by Gisselle Arndt MD on 3/29/2022      Treatment Area: Breast     Current Total Dose(cGy): 2800  Current Fraction:   Final/Cumulative Rx. Dose (cGy): 6000    Patient was seen today for weekly visit. Wt Readings from Last 3 Encounters:   22 278 lb 10.6 oz (126.4 kg)   10/27/22 276 lb 10.8 oz (125.5 kg)   10/20/22 282 lb 3 oz (128 kg)       /63   Pulse 71   Temp 97.9 °F (36.6 °C) (Infrared)   Resp 18   Wt 278 lb 10.6 oz (126.4 kg)   SpO2 99%   BMI 37.79 kg/m²     Lab Results   Component Value Date    WBC 5.5 2022     2022       Comfort Alteration  Fatigue:Able to perform daily activities with rest periods    Pain Location: Back  Pain Intensity (Current): 4 Moderate pain  Pain Treatment: OTC Medications  Pain Relief: Pain relieved 75%  Hot Flashes/Flushes:  Moderate and more than 1 per day, less frequent    Emotional Alteration:   Coping: effective    Nutritional Alteration  Anorexia: none   Nausea: No nausea noted  Vomiting: No vomiting   Dyspepsia/Heartburn: None    Skin Alteration   Skin reaction: No changes noted, tenderness under nipple area. Ventilation Alteration  Cough: None  Hemoptysis: None  Dyspnea: Normal  Mucous Quantity/Quality: n/a    Additional Comments: Using CeraVe BID and Aquaphor at night. Complains of dry mouth returning, had resolved since finishing chemo. MEDICATIONS:     Current Outpatient Medications   Medication Sig Dispense Refill    KLOR-CON M20 20 MEQ extended release tablet TAKE 1 TABLET BY MOUTH EVERY DAY IN THE MORNING 30 tablet 1    metoprolol succinate (TOPROL XL) 100 MG extended release tablet TAKE 1 TABLET BY MOUTH EVERY DAY 90 tablet 1    flecainide (TAMBOCOR) 100 MG tablet TAKE 1 TABLET BY MOUTH TWICE A  tablet 0    ELIQUIS 5 MG TABS tablet TAKE 1 TABLET BY MOUTH TWICE A  tablet 3    Magic Mouthwash (MIRACLE MOUTHWASH) Swish and spit 5 mLs 4 times daily as needed for Irritation 1:1:1 Benadryl, lidocaine, nystatin 120 mL 1    ondansetron (ZOFRAN-ODT) 8 MG TBDP disintegrating tablet Place 1 tablet under the tongue every 8 hours as needed for Nausea or Vomiting 20 tablet 2    prochlorperazine (COMPAZINE) 10 MG tablet Take 1 tablet by mouth every 6 hours as needed (nausea) 30 tablet 1    lidocaine-prilocaine (EMLA) 2.5-2.5 % cream Apply topically as needed.  5 g 1    atorvastatin (LIPITOR) 40 MG tablet TAKE 1 TABLET DAILY 90 tablet 3    levothyroxine (SYNTHROID) 125 MCG tablet TAKE 1 TABLET BY MOUTH EVERY DAY IN THE MORNING ON EMPTY STOMACH      ZINC PO Take by mouth daily      Ascorbic Acid (VITAMIN C PO) Take by mouth daily      ibuprofen (ADVIL;MOTRIN) 800 MG tablet Take 800 mg by mouth every 6 hours as needed for Pain       omeprazole (PRILOSEC) 10 MG delayed release capsule Take 10 mg by mouth daily      nitroGLYCERIN (NITROSTAT) 0.4 MG SL tablet Place 1 tablet under the tongue every 5 minutes as needed for Chest pain 25 tablet 3    aspirin 81 MG chewable tablet Take 81 mg by mouth daily  30 tablet 3     No current facility-administered medications for this encounter. PHYSICAL EXAM:       ECO - Asymptomatic (Fully active, able to carry on all pre-disease activities without restriction)    General: NAD, AO x 3, Mentation is clear with appropriate affect. HEENT: Normocephalic, atraumatic  Thorax:  Unlabored  Breasts:  No erythema or desquamation of the radiation treatment area  Abdomen:  Non-distended  Neuro:  Cranial nerves grossly intact  Skin - treatment portal: SEE above. Chemotherapy Update: None currently    Treatment Imaging: CBCT and All imaging reviewed and approved by Dr. Prosper Coulter: Mild radiation side effects. Responding appropriately to symptomatic management. Increased xerostomia this week. New medications, diagnostic results: Continue treatment as planned    PLAN: Again reviewed potential side effects of radiation for the patient's treatment. Continue local/topical care (patient is allergic to steroids so she is not using betamethasone). Florencio Rivera was advised to continue doing the same thing she did while experiencing xerostomia related to chemotherapy. Will also review the treatment plan to ensure we are not near the salivary glands. Continue current radiation course as prescribed. Patient seen and examined independently by me. The above note has been modified by me to reflect current findings and plans. Labs, cultures, and radiographs where available were reviewed. I discussed patient concerns and instructions were given. Please see our orders for the updated patient care plan.     Kingston Sadler PhD MD  Radiation Oncology    Electronically signed by Lamar Hendrickson RN

## 2022-11-04 ENCOUNTER — HOSPITAL ENCOUNTER (OUTPATIENT)
Dept: RADIATION ONCOLOGY | Age: 52
Discharge: HOME | End: 2022-11-04
Payer: COMMERCIAL

## 2022-11-04 DIAGNOSIS — E87.6 HYPOKALEMIA: ICD-10-CM

## 2022-11-04 PROCEDURE — 77014 PR CT GUIDANCE PLACEMENT RAD THERAPY FIELDS: CPT | Performed by: RADIOLOGY

## 2022-11-04 PROCEDURE — 77385 HC NTSTY MODUL RAD TX DLVR SMPL: CPT | Performed by: RADIOLOGY

## 2022-11-04 PROCEDURE — 77427 RADIATION TX MANAGEMENT X5: CPT | Performed by: RADIOLOGY

## 2022-11-04 RX ORDER — POTASSIUM CHLORIDE 1500 MG/1
TABLET, EXTENDED RELEASE ORAL
Qty: 30 TABLET | Refills: 1 | Status: SHIPPED | OUTPATIENT
Start: 2022-11-04

## 2022-11-07 ENCOUNTER — HOSPITAL ENCOUNTER (OUTPATIENT)
Dept: RADIATION ONCOLOGY | Age: 52
Discharge: HOME | End: 2022-11-07
Payer: COMMERCIAL

## 2022-11-07 PROCEDURE — 77336 RADIATION PHYSICS CONSULT: CPT | Performed by: RADIOLOGY

## 2022-11-07 PROCEDURE — 77385 HC NTSTY MODUL RAD TX DLVR SMPL: CPT | Performed by: RADIOLOGY

## 2022-11-07 PROCEDURE — 77014 PR CT GUIDANCE PLACEMENT RAD THERAPY FIELDS: CPT | Performed by: RADIOLOGY

## 2022-11-08 ENCOUNTER — HOSPITAL ENCOUNTER (OUTPATIENT)
Dept: RADIATION ONCOLOGY | Age: 52
Discharge: HOME | End: 2022-11-08
Payer: COMMERCIAL

## 2022-11-08 PROCEDURE — 77014 PR CT GUIDANCE PLACEMENT RAD THERAPY FIELDS: CPT | Performed by: RADIOLOGY

## 2022-11-08 PROCEDURE — 77385 HC NTSTY MODUL RAD TX DLVR SMPL: CPT | Performed by: RADIOLOGY

## 2022-11-09 ENCOUNTER — HOSPITAL ENCOUNTER (OUTPATIENT)
Dept: RADIATION ONCOLOGY | Age: 52
Discharge: HOME | End: 2022-11-09
Payer: COMMERCIAL

## 2022-11-09 PROCEDURE — 77014 PR CT GUIDANCE PLACEMENT RAD THERAPY FIELDS: CPT | Performed by: RADIOLOGY

## 2022-11-09 PROCEDURE — 77385 HC NTSTY MODUL RAD TX DLVR SMPL: CPT | Performed by: RADIOLOGY

## 2022-11-10 ENCOUNTER — HOSPITAL ENCOUNTER (OUTPATIENT)
Dept: RADIATION ONCOLOGY | Age: 52
Discharge: HOME | End: 2022-11-10
Payer: COMMERCIAL

## 2022-11-10 VITALS
HEART RATE: 77 BPM | BODY MASS INDEX: 36.6 KG/M2 | DIASTOLIC BLOOD PRESSURE: 77 MMHG | OXYGEN SATURATION: 97 % | RESPIRATION RATE: 18 BRPM | TEMPERATURE: 97.6 F | WEIGHT: 269.84 LBS | SYSTOLIC BLOOD PRESSURE: 126 MMHG

## 2022-11-10 PROCEDURE — 77385 HC NTSTY MODUL RAD TX DLVR SMPL: CPT | Performed by: RADIOLOGY

## 2022-11-10 PROCEDURE — 77014 PR CT GUIDANCE PLACEMENT RAD THERAPY FIELDS: CPT | Performed by: RADIOLOGY

## 2022-11-10 ASSESSMENT — PAIN SCALES - GENERAL: PAINLEVEL_OUTOF10: 3

## 2022-11-10 ASSESSMENT — PAIN DESCRIPTION - LOCATION: LOCATION: BACK;HIP

## 2022-11-10 NOTE — PROGRESS NOTES
[address] [facility] [address], [address]        [place],  [facility] [place]: [phone]        F: [phone]       [employer]            Dr. Katy Armstrong MD MS          Dr. Juan C Collado MD PhD    ON TREATMENT VISIT (OTV) NOTE     Date of Service: 11/10/2022  Patient ID: Vj Villasenor   : 1970  MRN: [id number]   Acct Number: [de-identified]     RADIATION ONCOLOGY ATTENDING:  Shyla Mclain MD MS    DIAGNOSIS:  Cancer Staging  Malignant neoplasm of lower-inner quadrant of left breast in female, estrogen receptor negative ([facility] [address]  Staging form: Breast, AJCC 8th Edition  - Clinical stage from 3/22/2022: Stage IB (cT1c, cN0(f), cM0, G3, ER-, [place]-, HER2-) - Signed by Yojana Barakat MD on 3/22/2022  - Pathologic stage from 3/29/2022: Stage IIA (pT2, pN0(sn), cM0, G3, ER-, [place]-, HER2-) - Signed by Yojana Barakat MD on 3/29/2022      Treatment Area: Breast     Current Total Dose(cGy): 3800  Current Fraction:   Final/Cumulative Rx. Dose (cGy): 6000    Patient was seen today for weekly visit.      Wt Readings from Last 3 Encounters:   11/10/22 269 lb 13.5 oz (122.4 kg)   22 278 lb 10.6 oz (126.4 kg)   10/27/22 276 lb 10.8 oz (125.5 kg)       /77   Pulse 77   Temp 97.6 °F (36.4 °C) (Infrared)   Resp 18   Wt 269 lb 13.5 oz (122.4 kg)   SpO2 97%   BMI 36.60 kg/m²     Lab Results   Component Value Date    WBC 5.5 2022     2022       Comfort Alteration  Fatigue:Able to perform daily activities with rest periods    Pain Location: Back  Pain Intensity (Current): 3 Between mild and moderate pain  Pain Treatment: OTC Medications  Pain Relief: Pain relieved 75%  Hot Flashes/Flushes: None    Emotional Alteration:   Coping: effective    Nutritional Alteration  Anorexia: Loss of appetite but able to eat smaller portions of food and/or liquids  Nausea: No nausea noted  Vomiting: No vomiting   Dyspepsia/Heartburn: None    Skin Alteration   Skin reaction: No changes noted, tenderness under nipple area. Ventilation Alteration  Cough: None  Hemoptysis: None  Dyspnea: Normal  Mucous Quantity/Quality: n/a    Additional Comments: Using CeraVe BID and Aquaphor at night. Have Magic Mouthwash to use to help with esophagitis that is developing since Monday. MEDICATIONS:     Current Outpatient Medications   Medication Sig Dispense Refill    KLOR-CON M20 20 MEQ extended release tablet TAKE 1 TABLET BY MOUTH EVERY DAY IN THE MORNING 30 tablet 1    metoprolol succinate (TOPROL XL) 100 MG extended release tablet TAKE 1 TABLET BY MOUTH EVERY DAY 90 tablet 1    flecainide (TAMBOCOR) 100 MG tablet TAKE 1 TABLET BY MOUTH TWICE A  tablet 0    ELIQUIS 5 MG TABS tablet TAKE 1 TABLET BY MOUTH TWICE A  tablet 3    Magic Mouthwash (MIRACLE MOUTHWASH) Swish and spit 5 mLs 4 times daily as needed for Irritation 1:1:1 Benadryl, lidocaine, nystatin 120 mL 1    ondansetron (ZOFRAN-ODT) 8 MG TBDP disintegrating tablet Place 1 tablet under the tongue every 8 hours as needed for Nausea or Vomiting 20 tablet 2    prochlorperazine (COMPAZINE) 10 MG tablet Take 1 tablet by mouth every 6 hours as needed (nausea) 30 tablet 1    lidocaine-prilocaine (EMLA) 2.5-2.5 % cream Apply topically as needed.  5 g 1    atorvastatin (LIPITOR) 40 MG tablet TAKE 1 TABLET DAILY 90 tablet 3    levothyroxine (SYNTHROID) 125 MCG tablet TAKE 1 TABLET BY MOUTH EVERY DAY IN THE MORNING ON EMPTY STOMACH      ZINC PO Take by mouth daily      Ascorbic Acid (VITAMIN C PO) Take by mouth daily      ibuprofen (ADVIL;MOTRIN) 800 MG tablet Take 800 mg by mouth every 6 hours as needed for Pain       omeprazole (PRILOSEC) 10 MG delayed release capsule Take 10 mg by mouth daily      nitroGLYCERIN (NITROSTAT) 0.4 MG SL tablet Place 1 tablet under the tongue every 5 minutes as needed for Chest pain 25 tablet 3    aspirin 81 MG chewable tablet Take 81 mg by mouth daily  30 tablet 3     No current facility-administered medications for this encounter. PHYSICAL EXAM:       ECO - Asymptomatic (Fully active, able to carry on all pre-disease activities without restriction)    General: NAD, AO x 3, Mentation is clear with appropriate affect. HEENT: Normocephalic, atraumatic  Thorax:  Unlabored  Breasts:  No erythema or desquamation of the radiation treatment area  Abdomen:  Non-distended  Neuro:  Cranial nerves grossly intact  Skin - treatment portal: SEE above. Chemotherapy Update: None currently    Treatment Imaging: CBCT and All imaging reviewed and approved by Dr. James Brittle: Mild radiation side effects. Responding appropriately to symptomatic management. Increased xerostomia this week. New medications, diagnostic results: Continue treatment as planned    PLAN: Again reviewed potential side effects of radiation for the patient's treatment. Continue local/topical care (patient is allergic to steroids so she is not using betamethasone). Janet Christesnen was advised to continue doing the same thing she did while experiencing xerostomia related to chemotherapy. Will also review the treatment plan to ensure we are not near the salivary glands. Continue current radiation course as prescribed. Patient seen and examined independently by me. The above note has been modified by me to reflect current findings and plans. Labs, cultures, and radiographs where available were reviewed. I discussed patient concerns and instructions were given. Please see our orders for the updated patient care plan.     Morales Muñoz PhD MD  Radiation Oncology    Electronically signed by Molina Hendrix RN

## 2022-11-11 ENCOUNTER — HOSPITAL ENCOUNTER (OUTPATIENT)
Dept: RADIATION ONCOLOGY | Age: 52
Discharge: HOME | End: 2022-11-11
Payer: COMMERCIAL

## 2022-11-11 PROCEDURE — 77385 HC NTSTY MODUL RAD TX DLVR SMPL: CPT | Performed by: RADIOLOGY

## 2022-11-11 PROCEDURE — 77014 PR CT GUIDANCE PLACEMENT RAD THERAPY FIELDS: CPT | Performed by: RADIOLOGY

## 2022-11-14 ENCOUNTER — HOSPITAL ENCOUNTER (OUTPATIENT)
Dept: RADIATION ONCOLOGY | Age: 52
Discharge: HOME | End: 2022-11-14
Payer: COMMERCIAL

## 2022-11-14 PROCEDURE — 77336 RADIATION PHYSICS CONSULT: CPT | Performed by: RADIOLOGY

## 2022-11-14 PROCEDURE — 77014 PR CT GUIDANCE PLACEMENT RAD THERAPY FIELDS: CPT | Performed by: RADIOLOGY

## 2022-11-14 PROCEDURE — 77385 HC NTSTY MODUL RAD TX DLVR SMPL: CPT | Performed by: RADIOLOGY

## 2022-11-15 ENCOUNTER — HOSPITAL ENCOUNTER (OUTPATIENT)
Dept: RADIATION ONCOLOGY | Age: 52
Discharge: HOME | End: 2022-11-15
Payer: COMMERCIAL

## 2022-11-15 PROCEDURE — 77385 HC NTSTY MODUL RAD TX DLVR SMPL: CPT | Performed by: RADIOLOGY

## 2022-11-15 PROCEDURE — 77014 PR CT GUIDANCE PLACEMENT RAD THERAPY FIELDS: CPT | Performed by: RADIOLOGY

## 2022-11-16 ENCOUNTER — HOSPITAL ENCOUNTER (OUTPATIENT)
Dept: RADIATION ONCOLOGY | Age: 52
Discharge: HOME | End: 2022-11-16
Payer: COMMERCIAL

## 2022-11-16 PROCEDURE — 77385 HC NTSTY MODUL RAD TX DLVR SMPL: CPT | Performed by: RADIOLOGY

## 2022-11-16 PROCEDURE — 77334 RADIATION TREATMENT AID(S): CPT | Performed by: RADIOLOGY

## 2022-11-16 PROCEDURE — 77014 PR CT GUIDANCE PLACEMENT RAD THERAPY FIELDS: CPT | Performed by: RADIOLOGY

## 2022-11-17 ENCOUNTER — HOSPITAL ENCOUNTER (OUTPATIENT)
Dept: RADIATION ONCOLOGY | Age: 52
Discharge: HOME | End: 2022-11-17
Payer: COMMERCIAL

## 2022-11-17 VITALS
RESPIRATION RATE: 18 BRPM | SYSTOLIC BLOOD PRESSURE: 103 MMHG | BODY MASS INDEX: 37.14 KG/M2 | OXYGEN SATURATION: 98 % | HEART RATE: 75 BPM | DIASTOLIC BLOOD PRESSURE: 57 MMHG | WEIGHT: 273.81 LBS | TEMPERATURE: 98.2 F

## 2022-11-17 PROCEDURE — 77014 PR CT GUIDANCE PLACEMENT RAD THERAPY FIELDS: CPT | Performed by: RADIOLOGY

## 2022-11-17 PROCEDURE — 77321 SPECIAL TELETX PORT PLAN: CPT | Performed by: RADIOLOGY

## 2022-11-17 PROCEDURE — 77334 RADIATION TREATMENT AID(S): CPT | Performed by: RADIOLOGY

## 2022-11-17 PROCEDURE — 77385 HC NTSTY MODUL RAD TX DLVR SMPL: CPT | Performed by: RADIOLOGY

## 2022-11-17 NOTE — PROGRESS NOTES
[facility] [address]EV[address], [address]        [address] [facility], [address] [place] [address]        Johanna Villa: [phone]        F: [phone]       [email or web address]            Dr. Katy Armstrong MD MS          Dr. Juan C Collado MD PhD    ON TREATMENT VISIT (OTV) NOTE     Date of Service: 2022  Patient ID: Vj Villasenor   : 1970  MRN: [id number]   Acct Number: [de-identified]     RADIATION ONCOLOGY ATTENDING:  Shyla Mclain MD MS    DIAGNOSIS:  Cancer Staging  Malignant neoplasm of lower-inner quadrant of left breast in female, estrogen receptor negative ([facility] [address]  Staging form: Breast, AJCC 8th Edition  - Clinical stage from 3/22/2022: Stage IB (cT1c, cN0(f), cM0, G3, ER-, ME-, HER2-) - Signed by Yojana Barakat MD on 3/22/2022  - Pathologic stage from 3/29/2022: Stage IIA (pT2, pN0(sn), cM0, G3, ER-, ME-, HER2-) - Signed by Yojana Barakat MD on 3/29/2022      Treatment Area: Breast     Current Total Dose(cGy): 4800  Current Fraction:   Final/Cumulative Rx. Dose (cGy): 6000    Patient was seen today for weekly visit.      Wt Readings from Last 3 Encounters:   22 273 lb 13 oz (124.2 kg)   11/10/22 269 lb 13.5 oz (122.4 kg)   22 278 lb 10.6 oz (126.4 kg)       BP (!) 103/57   Pulse 75   Temp 98.2 °F (36.8 °C) (Infrared)   Resp 18   Wt 273 lb 13 oz (124.2 kg)   SpO2 98%   BMI 37.14 kg/m²     Lab Results   Component Value Date    WBC 5.5 2022     2022       Comfort Alteration  Fatigue:Must curtail daily activities even with rest periods and early bedtime    Pain Location: Denies  Pain Intensity (Current): 0 No Pain  Pain Treatment: OTC Medications  Pain Relief: n/a  Hot Flashes/Flushes: None    Emotional Alteration:   Coping: effective    Nutritional Alteration  Anorexia: Loss of appetite but able to eat smaller portions of food and/or liquids  Nausea: 1-2 episodes of nausea in 24 hours  Vomiting: No vomiting Dyspepsia/Heartburn: None    Skin Alteration   Skin reaction: Faint or dull erythema; follicular reaction, tenderness under nipple area. Ventilation Alteration  Cough: None  Hemoptysis: None  Dyspnea: Normal  Mucous Quantity/Quality: n/a    Additional Comments: Using CeraVe BID and Aquaphor at night. Have Magic Mouthwash to use to help with esophagitis, unchanged. MEDICATIONS:     Current Outpatient Medications   Medication Sig Dispense Refill    KLOR-CON M20 20 MEQ extended release tablet TAKE 1 TABLET BY MOUTH EVERY DAY IN THE MORNING 30 tablet 1    metoprolol succinate (TOPROL XL) 100 MG extended release tablet TAKE 1 TABLET BY MOUTH EVERY DAY 90 tablet 1    flecainide (TAMBOCOR) 100 MG tablet TAKE 1 TABLET BY MOUTH TWICE A  tablet 0    ELIQUIS 5 MG TABS tablet TAKE 1 TABLET BY MOUTH TWICE A  tablet 3    Magic Mouthwash (MIRACLE MOUTHWASH) Swish and spit 5 mLs 4 times daily as needed for Irritation 1:1:1 Benadryl, lidocaine, nystatin 120 mL 1    ondansetron (ZOFRAN-ODT) 8 MG TBDP disintegrating tablet Place 1 tablet under the tongue every 8 hours as needed for Nausea or Vomiting 20 tablet 2    prochlorperazine (COMPAZINE) 10 MG tablet Take 1 tablet by mouth every 6 hours as needed (nausea) 30 tablet 1    lidocaine-prilocaine (EMLA) 2.5-2.5 % cream Apply topically as needed.  5 g 1    atorvastatin (LIPITOR) 40 MG tablet TAKE 1 TABLET DAILY 90 tablet 3    levothyroxine (SYNTHROID) 125 MCG tablet TAKE 1 TABLET BY MOUTH EVERY DAY IN THE MORNING ON EMPTY STOMACH      ZINC PO Take by mouth daily      Ascorbic Acid (VITAMIN C PO) Take by mouth daily      ibuprofen (ADVIL;MOTRIN) 800 MG tablet Take 800 mg by mouth every 6 hours as needed for Pain       omeprazole (PRILOSEC) 10 MG delayed release capsule Take 10 mg by mouth daily      nitroGLYCERIN (NITROSTAT) 0.4 MG SL tablet Place 1 tablet under the tongue every 5 minutes as needed for Chest pain 25 tablet 3    aspirin 81 MG chewable tablet Take 81 mg by mouth daily  30 tablet 3     No current facility-administered medications for this encounter. PHYSICAL EXAM:       ECO - Asymptomatic (Fully active, able to carry on all pre-disease activities without restriction)    General: NAD, AO x 3, Mentation is clear with appropriate affect. HEENT: Normocephalic, atraumatic  Thorax:  Unlabored  Breasts:  Faint erythema near the inframammary fold of the left breast; no other erythema or desquamation of the radiation treatment area  Abdomen:  Non-distended  Neuro:  Cranial nerves grossly intact  Skin - treatment portal: SEE above. 1 cm round raised flesh-colored papule of the scalp with hair follicles, mildly tender without erythema or telangiectasias    Chemotherapy Update: None currently    Treatment Imaging: CBCT and All imaging reviewed and approved by Dr. Kassi Chung: Mild radiation side effects. Responding appropriately to symptomatic management. New medications, diagnostic results: Continue treatment as planned    PLAN: Again reviewed potential side effects of radiation for the patient's treatment. Continue local/topical care (patient is allergic to steroids so she is not using betamethasone). Continue current radiation course as prescribed.  Recommended she see her family doctor or a dermatologist regarding her scalp lesion    Naida Ariza MD  Radiation Oncology    Electronically signed by Naida Ariza MD

## 2022-11-18 ENCOUNTER — HOSPITAL ENCOUNTER (OUTPATIENT)
Dept: RADIATION ONCOLOGY | Age: 52
Discharge: HOME | End: 2022-11-18
Payer: COMMERCIAL

## 2022-11-18 PROCEDURE — 77385 HC NTSTY MODUL RAD TX DLVR SMPL: CPT | Performed by: RADIOLOGY

## 2022-11-18 PROCEDURE — 77014 PR CT GUIDANCE PLACEMENT RAD THERAPY FIELDS: CPT | Performed by: RADIOLOGY

## 2022-11-20 ENCOUNTER — HOSPITAL ENCOUNTER (OUTPATIENT)
Dept: RADIATION ONCOLOGY | Age: 52
Discharge: HOME | End: 2022-11-20
Payer: COMMERCIAL

## 2022-11-20 PROCEDURE — 99999 PR OFFICE/OUTPT VISIT,PROCEDURE ONLY: CPT | Performed by: RADIOLOGY

## 2022-11-20 PROCEDURE — 77412 RADIATION TX DELIVERY LVL 3: CPT | Performed by: RADIOLOGY

## 2022-11-20 PROCEDURE — 77336 RADIATION PHYSICS CONSULT: CPT | Performed by: RADIOLOGY

## 2022-11-20 PROCEDURE — 77280 THER RAD SIMULAJ FIELD SMPL: CPT | Performed by: RADIOLOGY

## 2022-11-21 ENCOUNTER — HOSPITAL ENCOUNTER (OUTPATIENT)
Dept: RADIATION ONCOLOGY | Age: 52
Discharge: HOME | End: 2022-11-21
Payer: COMMERCIAL

## 2022-11-21 PROCEDURE — 77412 RADIATION TX DELIVERY LVL 3: CPT | Performed by: RADIOLOGY

## 2022-11-22 ENCOUNTER — HOSPITAL ENCOUNTER (OUTPATIENT)
Dept: RADIATION ONCOLOGY | Age: 52
Discharge: HOME | End: 2022-11-22
Payer: COMMERCIAL

## 2022-11-22 ENCOUNTER — HOSPITAL ENCOUNTER (OUTPATIENT)
Dept: INFUSION THERAPY | Age: 52
Discharge: HOME | End: 2022-11-22
Payer: COMMERCIAL

## 2022-11-22 VITALS
TEMPERATURE: 98.1 F | OXYGEN SATURATION: 97 % | SYSTOLIC BLOOD PRESSURE: 115 MMHG | RESPIRATION RATE: 18 BRPM | HEART RATE: 75 BPM | DIASTOLIC BLOOD PRESSURE: 69 MMHG

## 2022-11-22 DIAGNOSIS — Z17.1 MALIGNANT NEOPLASM OF CENTRAL PORTION OF LEFT BREAST IN FEMALE, ESTROGEN RECEPTOR NEGATIVE (HCC): Primary | ICD-10-CM

## 2022-11-22 DIAGNOSIS — C50.112 MALIGNANT NEOPLASM OF CENTRAL PORTION OF LEFT BREAST IN FEMALE, ESTROGEN RECEPTOR NEGATIVE (HCC): Primary | ICD-10-CM

## 2022-11-22 PROCEDURE — 77412 RADIATION TX DELIVERY LVL 3: CPT | Performed by: RADIOLOGY

## 2022-11-22 PROCEDURE — 99212 OFFICE O/P EST SF 10 MIN: CPT

## 2022-11-22 PROCEDURE — 2580000003 HC RX 258: Performed by: INTERNAL MEDICINE

## 2022-11-22 PROCEDURE — 6360000002 HC RX W HCPCS: Performed by: INTERNAL MEDICINE

## 2022-11-22 RX ORDER — WATER 1000 ML/1000ML
2.2 INJECTION, SOLUTION INTRAVENOUS ONCE
OUTPATIENT
Start: 2022-11-22 | End: 2022-11-22

## 2022-11-22 RX ORDER — SODIUM CHLORIDE 0.9 % (FLUSH) 0.9 %
5-40 SYRINGE (ML) INJECTION PRN
OUTPATIENT
Start: 2022-11-22

## 2022-11-22 RX ORDER — SODIUM CHLORIDE 9 MG/ML
25 INJECTION, SOLUTION INTRAVENOUS PRN
OUTPATIENT
Start: 2022-11-22

## 2022-11-22 RX ORDER — SODIUM CHLORIDE 0.9 % (FLUSH) 0.9 %
5-40 SYRINGE (ML) INJECTION PRN
Status: DISCONTINUED | OUTPATIENT
Start: 2022-11-22 | End: 2022-11-23 | Stop reason: HOSPADM

## 2022-11-22 RX ORDER — HEPARIN SODIUM (PORCINE) LOCK FLUSH IV SOLN 100 UNIT/ML 100 UNIT/ML
500 SOLUTION INTRAVENOUS PRN
Status: DISCONTINUED | OUTPATIENT
Start: 2022-11-22 | End: 2022-11-23 | Stop reason: HOSPADM

## 2022-11-22 RX ORDER — HEPARIN SODIUM (PORCINE) LOCK FLUSH IV SOLN 100 UNIT/ML 100 UNIT/ML
500 SOLUTION INTRAVENOUS PRN
OUTPATIENT
Start: 2022-11-22

## 2022-11-22 RX ADMIN — SODIUM CHLORIDE, PRESERVATIVE FREE 20 ML: 5 INJECTION INTRAVENOUS at 08:42

## 2022-11-22 RX ADMIN — SODIUM CHLORIDE, PRESERVATIVE FREE 10 ML: 5 INJECTION INTRAVENOUS at 08:43

## 2022-11-22 RX ADMIN — Medication 500 UNITS: at 08:43

## 2022-11-22 NOTE — PROGRESS NOTES
Patient tolerated Medi-port flush without any complications. Discharge instructions given to patient-verbalizes understanding. Ambulated off unit per self with belongings.

## 2022-11-23 ENCOUNTER — HOSPITAL ENCOUNTER (OUTPATIENT)
Dept: RADIATION ONCOLOGY | Age: 52
Discharge: HOME | End: 2022-11-23
Payer: COMMERCIAL

## 2022-11-23 VITALS
HEART RATE: 75 BPM | DIASTOLIC BLOOD PRESSURE: 68 MMHG | OXYGEN SATURATION: 97 % | BODY MASS INDEX: 36.99 KG/M2 | RESPIRATION RATE: 18 BRPM | SYSTOLIC BLOOD PRESSURE: 103 MMHG | WEIGHT: 272.71 LBS | TEMPERATURE: 97.7 F

## 2022-11-23 PROCEDURE — 77412 RADIATION TX DELIVERY LVL 3: CPT | Performed by: RADIOLOGY

## 2022-11-23 NOTE — PROGRESS NOTES
[facility] [address], [address]        [address] [facility], [address] [place] [address]        True Jose: [phone]        F: [phone]       [facility]            Dr. Pravin Prince MD MS          Dr. Lizy Arroyo MD PhD    ON TREATMENT VISIT (OTV) NOTE     Date of Service: 2022  Patient ID: Brittany Salinas   : 1970  MRN: [id number]   Acct Number: [de-identified]     RADIATION ONCOLOGY ATTENDING:  Jacob Roque MD MS    DIAGNOSIS:  Cancer Staging  Malignant neoplasm of lower-inner quadrant of left breast in female, estrogen receptor negative ([facility] [address]  Staging form: Breast, AJCC 8th Edition  - Clinical stage from 3/22/2022: Stage IB (cT1c, cN0(f), cM0, G3, ER-, IL-, HER2-) - Signed by Urban Garduno MD on 3/22/2022  - Pathologic stage from 3/29/2022: Stage IIA (pT2, pN0(sn), cM0, G3, ER-, IL-, HER2-) - Signed by Urban Garduno MD on 3/29/2022      Treatment Area: Breast     Current Total Dose(cGy): 5800  Current Fraction:   Final/Cumulative Rx. Dose (cGy): 6000    Patient was seen today for weekly visit.      Wt Readings from Last 3 Encounters:   22 272 lb 11.3 oz (123.7 kg)   22 273 lb 13 oz (124.2 kg)   11/10/22 269 lb 13.5 oz (122.4 kg)       /68   Pulse 75   Temp 97.7 °F (36.5 °C) (Infrared)   Resp 18   Wt 272 lb 11.3 oz (123.7 kg)   SpO2 97%   BMI 36.99 kg/m²     Lab Results   Component Value Date    WBC 5.5 2022     2022       Comfort Alteration  Fatigue:Must curtail daily activities even with rest periods and early bedtime    Pain Location: Denies  Pain Intensity (Current): 0 No Pain  Pain Treatment: OTC Medications  Pain Relief: n/a  Hot Flashes/Flushes: None    Emotional Alteration:   Coping: effective    Nutritional Alteration  Anorexia: Loss of appetite but able to eat smaller portions of food and/or liquids  Nausea: No nausea noted  Vomiting: No vomiting   Dyspepsia/Heartburn: None    Skin Alteration   Skin reaction: Faint or dull erythema; follicular reaction, tenderness under nipple area. Ventilation Alteration  Cough: None  Hemoptysis: None  Dyspnea: Normal  Mucous Quantity/Quality: n/a    Additional Comments: Using CeraVe BID and Aquaphor at night. Have Magic Mouthwash to use to help with esophagitis, unchanged. MEDICATIONS:     Current Outpatient Medications   Medication Sig Dispense Refill    KLOR-CON M20 20 MEQ extended release tablet TAKE 1 TABLET BY MOUTH EVERY DAY IN THE MORNING 30 tablet 1    metoprolol succinate (TOPROL XL) 100 MG extended release tablet TAKE 1 TABLET BY MOUTH EVERY DAY 90 tablet 1    flecainide (TAMBOCOR) 100 MG tablet TAKE 1 TABLET BY MOUTH TWICE A  tablet 0    ELIQUIS 5 MG TABS tablet TAKE 1 TABLET BY MOUTH TWICE A  tablet 3    Magic Mouthwash (MIRACLE MOUTHWASH) Swish and spit 5 mLs 4 times daily as needed for Irritation 1:1:1 Benadryl, lidocaine, nystatin 120 mL 1    ondansetron (ZOFRAN-ODT) 8 MG TBDP disintegrating tablet Place 1 tablet under the tongue every 8 hours as needed for Nausea or Vomiting 20 tablet 2    prochlorperazine (COMPAZINE) 10 MG tablet Take 1 tablet by mouth every 6 hours as needed (nausea) 30 tablet 1    lidocaine-prilocaine (EMLA) 2.5-2.5 % cream Apply topically as needed.  5 g 1    atorvastatin (LIPITOR) 40 MG tablet TAKE 1 TABLET DAILY 90 tablet 3    levothyroxine (SYNTHROID) 125 MCG tablet TAKE 1 TABLET BY MOUTH EVERY DAY IN THE MORNING ON EMPTY STOMACH      ZINC PO Take by mouth daily      Ascorbic Acid (VITAMIN C PO) Take by mouth daily      ibuprofen (ADVIL;MOTRIN) 800 MG tablet Take 800 mg by mouth every 6 hours as needed for Pain       omeprazole (PRILOSEC) 10 MG delayed release capsule Take 10 mg by mouth daily      nitroGLYCERIN (NITROSTAT) 0.4 MG SL tablet Place 1 tablet under the tongue every 5 minutes as needed for Chest pain 25 tablet 3    aspirin 81 MG chewable tablet Take 81 mg by mouth daily  30 tablet 3 No current facility-administered medications for this encounter. PHYSICAL EXAM:       ECO - Symptomatic but completely ambulatory (Restricted in physically strenuous activity but ambulatory and able to carry out work of a light or sedentary nature. For example, light housework, office work)    General: NAD, AO x 3, Mentation is clear with appropriate affect. HEENT: Normocephalic, atraumatic  Thorax:  Unlabored  Breasts:  Faint erythema near the inframammary fold of the left breast; no other erythema or desquamation of the radiation treatment area. Mild erythema surrounding her hidradenitis suppurativa  Abdomen:  Non-distended  Neuro:  Cranial nerves grossly intact  Skin - treatment portal: SEE above. Chemotherapy Update: None currently    Treatment Imaging: No imaging, Electron portal    ASSESSMENT: Mild radiation side effects. Responding appropriately to symptomatic management. New medications, diagnostic results: Continue treatment as planned    PLAN: Again reviewed potential side effects of radiation for the patient's treatment. Continue local/topical care (patient is allergic to steroids so she is not using betamethasone). Continue current radiation course as prescribed. Continue to monitor mild dysphagia, she is able to maintain PO intake well.     Aakash Dumont MD  Radiation Oncology    Electronically signed by Aakash Dumont MD

## 2022-11-28 ENCOUNTER — HOSPITAL ENCOUNTER (OUTPATIENT)
Dept: RADIATION ONCOLOGY | Age: 52
Discharge: HOME | End: 2022-11-28
Payer: COMMERCIAL

## 2022-11-28 PROCEDURE — 77336 RADIATION PHYSICS CONSULT: CPT | Performed by: RADIOLOGY

## 2022-11-28 PROCEDURE — 77412 RADIATION TX DELIVERY LVL 3: CPT | Performed by: RADIOLOGY

## 2022-11-28 NOTE — DISCHARGE INSTRUCTIONS
PATIENT DISCHARGE INSTRUCTIONS    Remember that side effects present at the end of your treatments will improve within a few weeks after the last treatment. Eat well balanced meals even though your treatments are finished. This will help speed the healing process. Continue any special diets prescribed to control side effects until these side effects have been resolved. Get plenty of rest.  If you have experienced fatigue and/or weakness, this may continue for several weeks after your last treatment. Continue with your daily activities according to the way you feel. Continue to be gentle with your skin. Follow your present skin care instructions until your follow-up visit. IF YOU DEVELOP ANY CHANGES IN YOUR SKIN IN THE AREA TREATED WITH RADIATION, PLEASE CALL THE RADIATION ONCOLOGY NURSE -[phone]. Protect your skin from any injury and avoid direct sun exposure in the treatment area. The skin in the treated area may always be more sensitive than the rest of your skin. Always use SPF 27 or higher sun block if you will be in the sun and cannot avoid exposure. Please contact your referring physician for a follow-up appointment in addition to your Radiation Oncology appointment. Presence of pain: No  Medication Taper: No    See Instructions Dated: n/a  Follow up orders: Will be discussed at follow up.

## 2022-11-28 NOTE — PROGRESS NOTES
[address] [facility]           [address]        [place] [facility] [place],  [facility]        Pam Duvaller: [phone]        F: [phone]       [employer]     END OF TREATMENT SUMMARY    Date of Service: 2022  Patient ID: Adele Tang   : 1970  MRN: [id number]   Acct Number: [de-identified]           DIAGNOSIS:   Cancer Staging  Malignant neoplasm of lower-inner quadrant of left breast in female, estrogen receptor negative ([facility] [address]  Staging form: Breast, AJCC 8th Edition  - Clinical stage from 3/22/2022: Stage IB (cT1c, cN0(f), cM0, G3, ER-, MO-, HER2-) - Signed by Kayley Stewart MD on 3/22/2022  - Pathologic stage from 3/29/2022: Stage IIA (pT2, pN0(sn), cM0, G3, ER-, MO-, HER2-) - Signed by Kayley Stewart MD on 3/29/2022      HISTORY OF PRESENT ILLNESS:  Oncology History Overview Note   Adele Tang is a 50 y.o. female    HISTORY:    22 As per Dr. Willam Frost (medical oncology) note,          IMAGIN/18/22 JENNIFER DIAGNOSTIC BILATERAL, US BREAST LIMITED LEFT        22 NM BONE SCAN WHOLE BODY       Malignant neoplasm of lower-inner quadrant of left breast in female, estrogen receptor negative ([facility] [address]   2022 Surgery    Breast mass and axillary lymph node biopsy, Dr. Brodie Shaver           3/7/2022 Initial Diagnosis    Malignant neoplasm of lower-inner quadrant of left breast in female, estrogen receptor negative ([address]     3/22/2022 Surgery    Lumpectomy and axillary sentinel lymph node resection, Dr. Brodie Shaver                   10/17/2022 - 2022 Radiation    Treatment Course Number: 1    Treatment Site (s) Modality Dose (cGy) Fractions Elapsed Days   Left Breast, [facility] Irradiation, +IMNs IMRT 5000 25 32   Left Breast, Tumor Bed Boost En Face/Electrons 1000 5 8   Cumulative Dose: 6000 cGy    Radiation therapy delivered under the care of Dr. Zaina Kaufman MD MS ([facility])        Chemotherapy    Systemic therapy given under the care of  Gamble Number ([facility])         Treatment Tolerance/Response:     Comfort Alteration  Fatigue:Must curtail daily activities even with rest periods and early bedtime    Pain Location: Denies  Pain Intensity (Current): 0 No Pain  Pain Treatment: OTC Medications  Pain Relief: n/a  Hot Flashes/Flushes: None    Emotional Alteration:   Coping: effective    Nutritional Alteration  Anorexia: Loss of appetite but able to eat smaller portions of food and/or liquids  Nausea: No nausea noted  Vomiting: No vomiting   Dyspepsia/Heartburn: None    Skin Alteration   Skin reaction: Faint or dull erythema; follicular reaction, tenderness under nipple area. Ventilation Alteration  Cough: None  Hemoptysis: None  Dyspnea: Normal  Mucous Quantity/Quality: n/a    Additional Comments: Using CeraVe BID and Aquaphor at night. Have Magic Mouthwash to use to help with esophagitis, unchanged. ECO - Symptomatic but completely ambulatory (Restricted in physically strenuous activity but ambulatory and able to carry out work of a light or sedentary nature. For example, light housework, office work)    [employer] tolerated radiation therapy well with mild to moderate treatment related symptoms as detailed above. Radiation therapy was completed as planned without any significant treatment breaks or suspensions. Any treatment effects experienced at completion of therapy should improve or resolve in the next 2-3 weeks. Continue symptom management, if required, as instructed on the last day of treatment. Systemic Therapy: Systemic therapy prior to Radiation therapy      Follow Up Plan: Patient tolerated radiation therapy well overall with mild to moderate side effects noted above. Continue regular follow up with all other treating physicians. The patient will return to clinic in 1 month or sooner if clinically indicated. They have our clinic number to call with any questions or concerns if needed.  Thank you for allowing us to be a part of their care.     Electronically signed by Maxime Uribe MD on 11/28/2022 at 8:28 AM  Radiation Oncology    CC:  Dr. Rahat Schroeder ([facility]) Dr. Sarah Ly (Surgery)   [facility]: Tumor Registry: [address] [facility] [address]

## 2022-11-28 NOTE — PROGRESS NOTES
PATIENT DISCHARGE INSTRUCTIONS    Remember that side effects present at the end of your treatments will improve within a few weeks after the last treatment. Eat well balanced meals even though your treatments are finished. This will help speed the healing process. Continue any special diets prescribed to control side effects until these side effects have been resolved. Get plenty of rest.  If you have experienced fatigue and/or weakness, this may continue for several weeks after your last treatment. Continue with your daily activities according to the way you feel. Continue to be gentle with your skin. Follow your present skin care instructions until your follow-up visit. IF YOU DEVELOP ANY CHANGES IN YOUR SKIN IN THE AREA TREATED WITH RADIATION, PLEASE CALL THE RADIATION ONCOLOGY NURSE -[phone]. Protect your skin from any injury and avoid direct sun exposure in the treatment area. The skin in the treated area may always be more sensitive than the rest of your skin. Always use SPF 27 or higher sun block if you will be in the sun and cannot avoid exposure. Please contact your referring physician for a follow-up appointment in addition to your Radiation Oncology appointment. Presence of pain: No  Medication Taper: No    See Instructions Dated: n/a  Follow up orders: Will be discussed at follow up.

## 2022-11-29 ENCOUNTER — APPOINTMENT (OUTPATIENT)
Dept: RADIATION ONCOLOGY | Age: 52
End: 2022-11-29
Payer: COMMERCIAL

## 2022-12-01 RX ORDER — METOPROLOL SUCCINATE 100 MG/1
TABLET, EXTENDED RELEASE ORAL
Qty: 90 TABLET | Refills: 2 | Status: SHIPPED | OUTPATIENT
Start: 2022-12-01

## 2022-12-01 RX ORDER — FLECAINIDE ACETATE 100 MG/1
TABLET ORAL
Qty: 180 TABLET | Refills: 2 | Status: SHIPPED | OUTPATIENT
Start: 2022-12-01

## 2022-12-05 DIAGNOSIS — E87.6 HYPOKALEMIA: ICD-10-CM

## 2022-12-05 RX ORDER — POTASSIUM CHLORIDE 1500 MG/1
TABLET, EXTENDED RELEASE ORAL
Qty: 30 TABLET | Refills: 1 | Status: SHIPPED | OUTPATIENT
Start: 2022-12-05 | End: 2023-01-24 | Stop reason: ALTCHOICE

## 2022-12-27 DIAGNOSIS — I10 ESSENTIAL HYPERTENSION: ICD-10-CM

## 2022-12-27 RX ORDER — ATORVASTATIN CALCIUM 40 MG/1
TABLET, FILM COATED ORAL
Qty: 90 TABLET | Refills: 2 | Status: SHIPPED | OUTPATIENT
Start: 2022-12-27

## 2023-01-04 ENCOUNTER — HOSPITAL ENCOUNTER (OUTPATIENT)
Dept: RADIATION ONCOLOGY | Age: 53
Discharge: HOME | End: 2023-01-04
Payer: COMMERCIAL

## 2023-01-04 VITALS
DIASTOLIC BLOOD PRESSURE: 65 MMHG | RESPIRATION RATE: 18 BRPM | TEMPERATURE: 97.6 F | WEIGHT: 275 LBS | BODY MASS INDEX: 37.3 KG/M2 | HEART RATE: 78 BPM | OXYGEN SATURATION: 98 % | SYSTOLIC BLOOD PRESSURE: 123 MMHG

## 2023-01-04 DIAGNOSIS — Z17.1 MALIGNANT NEOPLASM OF CENTRAL PORTION OF LEFT BREAST IN FEMALE, ESTROGEN RECEPTOR NEGATIVE (HCC): Primary | ICD-10-CM

## 2023-01-04 DIAGNOSIS — C50.112 MALIGNANT NEOPLASM OF CENTRAL PORTION OF LEFT BREAST IN FEMALE, ESTROGEN RECEPTOR NEGATIVE (HCC): Primary | ICD-10-CM

## 2023-01-04 PROCEDURE — 99212 OFFICE O/P EST SF 10 MIN: CPT | Performed by: RADIOLOGY

## 2023-01-04 ASSESSMENT — ENCOUNTER SYMPTOMS
SHORTNESS OF BREATH: 0
TROUBLE SWALLOWING: 0
NAUSEA: 0
RECTAL PAIN: 0
DIARRHEA: 0
BACK PAIN: 1
COUGH: 0
ABDOMINAL PAIN: 0
BLOOD IN STOOL: 0

## 2023-01-04 ASSESSMENT — PAIN DESCRIPTION - LOCATION: LOCATION: GENERALIZED

## 2023-01-04 NOTE — PROGRESS NOTES
[address] [facility] [address], [address]        [place],  [facility]        Katty Briceno: [phone]        F: [phone]       [email or web address]            FOLLOW UP NOTE    Date of Service: 2023  Patient ID: Quynh Spivey   : 1970  MRN: [id number]   Acct Number: [de-identified]       DATE OF SERVICE: 2023   LOCATION: [facility]  PROVIDER: Feliciano Gan MD MS    FOLLOW UP PHYSICIANS: Dr. Emir Patel ([facility]) Dr. Ladonna Curry (Surgery)     ASSESSMENT AND PLAN:  Cancer Staging  Malignant neoplasm of lower-inner quadrant of left breast in female, estrogen receptor negative ([facility] [address]  Staging form: Breast, AJCC 8th Edition  - Clinical stage from 3/22/2022: Stage IB (cT1c, cN0(f), cM0, G3, ER-, IA-, HER2-) - Signed by Shawn Vora MD on 3/22/2022  - Pathologic stage from 3/29/2022: Stage IIA (pT2, pN0(sn), cM0, G3, ER-, IA-, HER2-) - Signed by Shawn Vora MD on 3/29/2022    - Quynh Spievy is a 50 y.o. female who presents today for regularly-scheduled follow-up for her breast cancer. She underwent left breast lumpectomy, followed by adjuvant chemotherapy, followed by radiation which was completed on 22  - She appears to be doing well overall since completion of radiation. She reports she had some tanning and subsequent \"scabbing\" on the breast (near lumpectomy scar) and upper chest, which have since resolved. She does report some chronic tenderness of left breast. She currently denies sore throat, dysphagia, shortness of breath, cough, chest pain, nipple discharge or inversion, breast or arm swelling  - Continue to follow with medical oncology, surgery, and all other medical providers.  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 They have our clinic number to call with any questions or concerns if needed. Thank you for allowing us to be a part of their care. HPI:  Oncology History Overview Note   Qiana Leggett is a 50 y.o. female    HISTORY:    22 As per Dr. Andreia Garcia (medical oncology) note,          IMAGIN/18/22 JENNIFER DIAGNOSTIC BILATERAL, US BREAST LIMITED LEFT        22 NM BONE SCAN WHOLE BODY       Malignant neoplasm of lower-inner quadrant of left breast in female, estrogen receptor negative ([facility] [address]   2022 Surgery    Breast mass and axillary lymph node biopsy, Dr. Montenegro Or           3/7/2022 Initial Diagnosis    Malignant neoplasm of lower-inner quadrant of left breast in female, estrogen receptor negative ([facility] [address]     3/22/2022 Surgery    Lumpectomy and axillary sentinel lymph node resection, Dr. Montenegro Or                   10/17/2022 - 2022 Radiation    Treatment Course Number: 1    Treatment Site (s) Modality Dose (cGy) Fractions Elapsed Days   Left Breast, [facility] Irradiation, +IMNs IMRT 5000 25 32   Left Breast, Tumor Bed Boost En Face/Electrons 1000 5 8   Cumulative Dose: 6000 cGy    Radiation therapy delivered under the care of Dr. Jesusita Giang MD MS ([facility])        Chemotherapy    Systemic therapy given under the care of Dr. Luis Max ([facility])         INTERVAL HISTORY:  Qiana Leggett is a 50 y.o. female is presenting today for regularly scheduled follow up regarding the above mentioned oncologic history. Review of Systems   Constitutional:  Positive for fatigue. Negative for activity change, appetite change and unexpected weight change. HENT:  Negative for ear pain and trouble swallowing. Respiratory:  Negative for cough and shortness of breath. Cardiovascular:  Negative for chest pain and leg swelling.    Gastrointestinal:  Negative for abdominal pain, blood in stool, diarrhea, nausea and rectal pain.   Genitourinary:  Negative for dysuria, frequency, hematuria and urgency.   Musculoskeletal:  Positive for arthralgias and back pain. Negative for myalgias.   Skin:  Negative for rash and wound.   Neurological:  Positive for numbness. Negative for dizziness, weakness, light-headedness and headaches.   Psychiatric/Behavioral:  Negative for confusion. The patient is nervous/anxious.      A complete review of systems was performed and found to be negative except as presented above.    CHAPERONE: Sydni Irby PA-C    PAIN: 4/10    LABORATORY STUDIES:  Onc labs: No results found for: PSA, CEA, LDH, AFP    MEDICATIONS:   Current Outpatient Medications   Medication Sig Dispense Refill    atorvastatin (LIPITOR) 40 MG tablet TAKE 1 TABLET DAILY 90 tablet 2    KLOR-CON M20 20 MEQ extended release tablet TAKE 1 TABLET BY MOUTH EVERY DAY IN THE MORNING 30 tablet 1    metoprolol succinate (TOPROL XL) 100 MG extended release tablet TAKE 1 TABLET BY MOUTH EVERY DAY 90 tablet 2    flecainide (TAMBOCOR) 100 MG tablet TAKE 1 TABLET BY MOUTH TWICE A  tablet 2    ELIQUIS 5 MG TABS tablet TAKE 1 TABLET BY MOUTH TWICE A  tablet 3    Magic Mouthwash (MIRACLE MOUTHWASH) Swish and spit 5 mLs 4 times daily as needed for Irritation 1:1:1 Benadryl, lidocaine, nystatin 120 mL 1    ondansetron (ZOFRAN-ODT) 8 MG TBDP disintegrating tablet Place 1 tablet under the tongue every 8 hours as needed for Nausea or Vomiting 20 tablet 2    prochlorperazine (COMPAZINE) 10 MG tablet Take 1 tablet by mouth every 6 hours as needed (nausea) 30 tablet 1    lidocaine-prilocaine (EMLA) 2.5-2.5 % cream Apply topically as needed. 5 g 1    levothyroxine (SYNTHROID) 125 MCG tablet TAKE 1 TABLET BY MOUTH EVERY DAY IN THE MORNING ON EMPTY STOMACH      ZINC PO Take by mouth daily      Ascorbic Acid (VITAMIN C PO) Take by mouth daily      ibuprofen (ADVIL;MOTRIN) 800 MG tablet Take 800 mg  by mouth every 6 hours as needed for Pain       omeprazole (PRILOSEC) 10 MG delayed release capsule Take 10 mg by mouth daily      nitroGLYCERIN (NITROSTAT) 0.4 MG SL tablet Place 1 tablet under the tongue every 5 minutes as needed for Chest pain 25 tablet 3    aspirin 81 MG chewable tablet Take 81 mg by mouth daily  30 tablet 3     No current facility-administered medications for this encounter. PHYSICAL EXAMINATION:  VITAL SIGNS: /65   Pulse 78   Temp 97.6 °F (36.4 °C) (Infrared)   Resp 18   Wt 275 lb (124.7 kg)   SpO2 98%   BMI 37.30 kg/m²     ECO - Symptomatic but completely ambulatory (Restricted in physically strenuous activity but ambulatory and able to carry out work of a light or sedentary nature. For example, light housework, office work)    Physical Exam  Constitutional:       General: She is not in acute distress. Appearance: Normal appearance. HENT:      Head: Normocephalic and atraumatic. Pulmonary:      Effort: Pulmonary effort is normal. No respiratory distress. Chest:      Comments: Limited breast exam. Hyperpigmentation of the left areola and surgical scar. Hypopigmented patch of the upper chest (following skin reaction). Chest and left breast otherwise without skin reaction. Abdominal:      General: Abdomen is flat. Neurological:      Mental Status: She is alert and oriented to person, place, and time. Electronically signed by Stephane Damon MD MS on 23 at 8:01 AM EST     ATTESTATION: 20 minutes were spent with the patient at today's visit reviewing pertinent information related to their oncologic diagnosis, including any recent labs, imaging, follow ups and plan of care going forward.     CC:Dr. Ada Santoyo ([facility]) Dr. Francine Becerra (Surgery)   ACC:[facility]

## 2023-01-08 DIAGNOSIS — E87.6 HYPOKALEMIA: ICD-10-CM

## 2023-01-09 RX ORDER — POTASSIUM CHLORIDE 1500 MG/1
TABLET, EXTENDED RELEASE ORAL
Qty: 30 TABLET | Refills: 1 | OUTPATIENT
Start: 2023-01-09

## 2023-01-24 ENCOUNTER — OFFICE VISIT (OUTPATIENT)
Dept: ONCOLOGY | Age: 53
End: 2023-01-24
Payer: COMMERCIAL

## 2023-01-24 ENCOUNTER — HOSPITAL ENCOUNTER (OUTPATIENT)
Dept: INFUSION THERAPY | Age: 53
Discharge: HOME | End: 2023-01-24
Payer: COMMERCIAL

## 2023-01-24 VITALS
TEMPERATURE: 97.6 F | DIASTOLIC BLOOD PRESSURE: 68 MMHG | SYSTOLIC BLOOD PRESSURE: 116 MMHG | BODY MASS INDEX: 37.44 KG/M2 | WEIGHT: 276.4 LBS | HEIGHT: 72 IN | OXYGEN SATURATION: 97 % | RESPIRATION RATE: 16 BRPM | HEART RATE: 73 BPM

## 2023-01-24 VITALS
HEIGHT: 72 IN | DIASTOLIC BLOOD PRESSURE: 68 MMHG | SYSTOLIC BLOOD PRESSURE: 116 MMHG | TEMPERATURE: 97.6 F | WEIGHT: 276 LBS | OXYGEN SATURATION: 97 % | HEART RATE: 73 BPM | RESPIRATION RATE: 16 BRPM | BODY MASS INDEX: 37.38 KG/M2

## 2023-01-24 DIAGNOSIS — Z17.1 MALIGNANT NEOPLASM OF LOWER-INNER QUADRANT OF LEFT BREAST IN FEMALE, ESTROGEN RECEPTOR NEGATIVE (HCC): ICD-10-CM

## 2023-01-24 DIAGNOSIS — Z17.1 MALIGNANT NEOPLASM OF CENTRAL PORTION OF LEFT BREAST IN FEMALE, ESTROGEN RECEPTOR NEGATIVE (HCC): Primary | ICD-10-CM

## 2023-01-24 DIAGNOSIS — Z12.31 VISIT FOR SCREENING MAMMOGRAM: Primary | ICD-10-CM

## 2023-01-24 DIAGNOSIS — C50.112 MALIGNANT NEOPLASM OF CENTRAL PORTION OF LEFT BREAST IN FEMALE, ESTROGEN RECEPTOR NEGATIVE (HCC): Primary | ICD-10-CM

## 2023-01-24 DIAGNOSIS — T45.1X5A CHEMOTHERAPY-INDUCED FATIGUE: ICD-10-CM

## 2023-01-24 DIAGNOSIS — Z17.1 MALIGNANT NEOPLASM OF LOWER-INNER QUADRANT OF LEFT BREAST IN FEMALE, ESTROGEN RECEPTOR NEGATIVE (HCC): Primary | ICD-10-CM

## 2023-01-24 DIAGNOSIS — Z72.0 TOBACCO USE: ICD-10-CM

## 2023-01-24 DIAGNOSIS — M54.50 LUMBAR PAIN: ICD-10-CM

## 2023-01-24 DIAGNOSIS — R53.83 CHEMOTHERAPY-INDUCED FATIGUE: ICD-10-CM

## 2023-01-24 DIAGNOSIS — Z51.11 ENCOUNTER FOR CHEMOTHERAPY MANAGEMENT: ICD-10-CM

## 2023-01-24 DIAGNOSIS — C50.312 MALIGNANT NEOPLASM OF LOWER-INNER QUADRANT OF LEFT BREAST IN FEMALE, ESTROGEN RECEPTOR NEGATIVE (HCC): Primary | ICD-10-CM

## 2023-01-24 DIAGNOSIS — C50.312 MALIGNANT NEOPLASM OF LOWER-INNER QUADRANT OF LEFT BREAST IN FEMALE, ESTROGEN RECEPTOR NEGATIVE (HCC): ICD-10-CM

## 2023-01-24 LAB
ABSOLUTE IMMATURE GRANULOCYTE: 0.01 THOU/MM3 (ref 0–0.07)
ALBUMIN SERPL BCG-MCNC: 3.8 G/DL (ref 3.5–5.1)
ALP SERPL-CCNC: 86 U/L (ref 38–126)
ALT SERPL W/O P-5'-P-CCNC: 11 U/L (ref 11–66)
AST SERPL-CCNC: 14 U/L (ref 5–40)
BASOPHILS ABSOLUTE: 0 THOU/MM3 (ref 0–0.1)
BASOPHILS NFR BLD AUTO: 0 % (ref 0–3)
BILIRUB CONJ SERPL-MCNC: < 0.2 MG/DL (ref 0–0.3)
BILIRUB SERPL-MCNC: 0.5 MG/DL (ref 0.3–1.2)
BUN BLDP-MCNC: 10 MG/DL (ref 8–26)
CHLORIDE BLD-SCNC: 105 MEQ/L (ref 98–109)
CREAT BLD-MCNC: 0.6 MG/DL (ref 0.5–1.2)
EOSINOPHIL NFR BLD AUTO: 4 % (ref 0–4)
EOSINOPHILS ABSOLUTE: 0.2 THOU/MM3 (ref 0–0.4)
ERYTHROCYTE [DISTWIDTH] IN BLOOD BY AUTOMATED COUNT: 14.5 % (ref 11.5–14.5)
GFR SERPL CREATININE-BSD FRML MDRD: > 60 ML/MIN/1.73M2
GLUCOSE BLD-MCNC: 104 MG/DL (ref 70–108)
HCT VFR BLD AUTO: 37.9 % (ref 37–47)
HGB BLD-MCNC: 12.4 GM/DL (ref 12–16)
IMMATURE GRANULOCYTES: 0 %
IONIZED CALCIUM, WHOLE BLOOD: 1.15 MMOL/L (ref 1.12–1.32)
LYMPHOCYTES ABSOLUTE: 0.8 THOU/MM3 (ref 1–4.8)
LYMPHOCYTES NFR BLD AUTO: 12 % (ref 15–47)
MCH RBC QN AUTO: 31.4 PG (ref 26–33)
MCHC RBC AUTO-ENTMCNC: 32.7 GM/DL (ref 32.2–35.5)
MCV RBC AUTO: 96 FL (ref 81–99)
MONOCYTES ABSOLUTE: 0.6 THOU/MM3 (ref 0.4–1.3)
MONOCYTES NFR BLD AUTO: 8 % (ref 0–12)
NEUTROPHILS NFR BLD AUTO: 76 % (ref 43–75)
PLATELET # BLD AUTO: 164 THOU/MM3 (ref 130–400)
PMV BLD AUTO: 9.7 FL (ref 9.4–12.4)
POTASSIUM BLD-SCNC: 4.1 MEQ/L (ref 3.5–4.9)
PROT SERPL-MCNC: 6.5 G/DL (ref 6.1–8)
RBC # BLD AUTO: 3.95 MILL/MM3 (ref 4.2–5.4)
SEGMENTED NEUTROPHILS ABSOLUTE COUNT: 5.1 THOU/MM3 (ref 1.8–7.7)
SODIUM BLD-SCNC: 141 MEQ/L (ref 138–146)
TOTAL CO2, WHOLE BLOOD: 25 MEQ/L (ref 23–33)
WBC # BLD AUTO: 6.7 THOU/MM3 (ref 4.8–10.8)

## 2023-01-24 PROCEDURE — 3074F SYST BP LT 130 MM HG: CPT | Performed by: PHYSICIAN ASSISTANT

## 2023-01-24 PROCEDURE — 80047 BASIC METABLC PNL IONIZED CA: CPT

## 2023-01-24 PROCEDURE — 99211 OFF/OP EST MAY X REQ PHY/QHP: CPT

## 2023-01-24 PROCEDURE — 6360000002 HC RX W HCPCS: Performed by: INTERNAL MEDICINE

## 2023-01-24 PROCEDURE — 99215 OFFICE O/P EST HI 40 MIN: CPT | Performed by: PHYSICIAN ASSISTANT

## 2023-01-24 PROCEDURE — 85025 COMPLETE CBC W/AUTO DIFF WBC: CPT

## 2023-01-24 PROCEDURE — 86300 IMMUNOASSAY TUMOR CA 15-3: CPT

## 2023-01-24 PROCEDURE — 2580000003 HC RX 258: Performed by: INTERNAL MEDICINE

## 2023-01-24 PROCEDURE — 80076 HEPATIC FUNCTION PANEL: CPT

## 2023-01-24 PROCEDURE — 3078F DIAST BP <80 MM HG: CPT | Performed by: PHYSICIAN ASSISTANT

## 2023-01-24 PROCEDURE — 36591 DRAW BLOOD OFF VENOUS DEVICE: CPT

## 2023-01-24 RX ORDER — SODIUM CHLORIDE 0.9 % (FLUSH) 0.9 %
5-40 SYRINGE (ML) INJECTION PRN
OUTPATIENT
Start: 2023-01-24

## 2023-01-24 RX ORDER — GABAPENTIN 300 MG/1
300 CAPSULE ORAL 2 TIMES DAILY
Qty: 60 CAPSULE | Refills: 1 | Status: SHIPPED | OUTPATIENT
Start: 2023-01-24 | End: 2023-03-25

## 2023-01-24 RX ORDER — SODIUM CHLORIDE 9 MG/ML
25 INJECTION, SOLUTION INTRAVENOUS PRN
OUTPATIENT
Start: 2023-01-24

## 2023-01-24 RX ORDER — WATER 1000 ML/1000ML
2.2 INJECTION, SOLUTION INTRAVENOUS ONCE
OUTPATIENT
Start: 2023-01-24 | End: 2023-01-24

## 2023-01-24 RX ORDER — ONDANSETRON 8 MG/1
8 TABLET, ORALLY DISINTEGRATING ORAL EVERY 8 HOURS PRN
Qty: 20 TABLET | Refills: 2 | Status: SHIPPED | OUTPATIENT
Start: 2023-01-24

## 2023-01-24 RX ORDER — HEPARIN SODIUM (PORCINE) LOCK FLUSH IV SOLN 100 UNIT/ML 100 UNIT/ML
500 SOLUTION INTRAVENOUS PRN
OUTPATIENT
Start: 2023-01-24

## 2023-01-24 RX ORDER — SODIUM CHLORIDE 0.9 % (FLUSH) 0.9 %
5-40 SYRINGE (ML) INJECTION PRN
Status: DISCONTINUED | OUTPATIENT
Start: 2023-01-24 | End: 2023-01-25 | Stop reason: HOSPADM

## 2023-01-24 RX ORDER — HEPARIN SODIUM (PORCINE) LOCK FLUSH IV SOLN 100 UNIT/ML 100 UNIT/ML
500 SOLUTION INTRAVENOUS PRN
Status: DISCONTINUED | OUTPATIENT
Start: 2023-01-24 | End: 2023-01-25 | Stop reason: HOSPADM

## 2023-01-24 RX ORDER — PROCHLORPERAZINE MALEATE 10 MG
10 TABLET ORAL EVERY 6 HOURS PRN
Qty: 30 TABLET | Refills: 1 | Status: SHIPPED | OUTPATIENT
Start: 2023-01-24

## 2023-01-24 RX ADMIN — SODIUM CHLORIDE, PRESERVATIVE FREE 10 ML: 5 INJECTION INTRAVENOUS at 11:17

## 2023-01-24 RX ADMIN — SODIUM CHLORIDE, PRESERVATIVE FREE 20 ML: 5 INJECTION INTRAVENOUS at 08:58

## 2023-01-24 RX ADMIN — HEPARIN 500 UNITS: 100 SYRINGE at 11:17

## 2023-01-24 RX ADMIN — SODIUM CHLORIDE, PRESERVATIVE FREE 10 ML: 5 INJECTION INTRAVENOUS at 08:57

## 2023-01-24 ASSESSMENT — PAIN DESCRIPTION - LOCATION
LOCATION: GENERALIZED
LOCATION: GENERALIZED

## 2023-01-24 NOTE — PROGRESS NOTES
Patient tolerated lab draw per port without any complications. Discharge instructions given to patient-verbalizes understanding. Ambulated off unit per self with belongings.

## 2023-01-24 NOTE — PLAN OF CARE
Problem: Pain  Goal: Verbalizes/displays adequate comfort level or baseline comfort level  Outcome: Progressing  Flowsheets (Taken 1/24/2023 0956)  Verbalizes/displays adequate comfort level or baseline comfort level:   Encourage patient to monitor pain and request assistance   Assess pain using appropriate pain scale  Note: Patient displays adequate comfort level. Problem: Safety - Adult  Goal: Free from fall injury  Outcome: Progressing  Flowsheets (Taken 1/24/2023 0956)  Free From Fall Injury: Instruct family/caregiver on patient safety  Note: No falls occurred with visit today. Problem: Chronic Conditions and Co-morbidities  Goal: Patient's chronic conditions and co-morbidity symptoms are monitored and maintained or improved  Outcome: Progressing  Flowsheets (Taken 1/24/2023 0956)  Care Plan - Patient's Chronic Conditions and Co-Morbidity Symptoms are Monitored and Maintained or Improved: Monitor and assess patient's chronic conditions and comorbid symptoms for stability, deterioration, or improvement  Note: Blood draw/port flush reviewed, patient verbalizes understanding. Problem: Discharge Planning  Goal: Discharge to home or other facility with appropriate resources  Outcome: Progressing  Flowsheets (Taken 1/24/2023 0956)  Discharge to home or other facility with appropriate resources: Identify barriers to discharge with patient and caregiver  Note: Verbalized understanding of discharge instructions, follow-up appointments, and when to call the physician. Care plan reviewed with patient. Patient verbalizes understanding of the plan of care and contribute to goal setting.

## 2023-01-24 NOTE — PATIENT INSTRUCTIONS
Schedule right mammogram ordered per Rad Onc - February 2023  Schedule bilateral screening mammogram end of May 2023   Will obtain bone scan  Will refer to Oncology rehab  Will refer to GI, Dr. Dipti Stringer, for baseline screening colonoscopy  Will obtain baseline CT lung screen  Will begin gabapentin 300 mg twice daily   Return to clinic with Dr. Elena Kaur in 8 weeks to assess tolerance to gabapentin/review symptoms  Please call for questions or concerns.

## 2023-01-24 NOTE — DISCHARGE INSTRUCTIONS
You received a port flush and lab draw while in outpatient oncology clinic. Call if any uncontrolled nausea/vomiting  Call if any fevers/chills/ problems or concerns  Call if any bleeding  Call if any chest pain/pressure  Call if any severe shortness of breath  Drink at least (6) 8oz glasses of fluids daily     If develop any of above condition, please call your MD or go to Emergency Department.

## 2023-01-25 LAB — CANCER AG27-29 SERPL-ACNC: < 4 U/ML (ref 0–38)

## 2023-01-30 NOTE — PROGRESS NOTES
[facility] [address], [address]  [address] [address]  Dept: [phone]  Dept Fax: [phone]: [phone]      Visit Date:1/24/2023     Renato Chen is a 50 y.o. female who presents today for:   Survivorship Visit    HPI:   Renato Chen is a 50 y.o. female with history of breast cancer. The patient has completed treatment at [facility]. Her cancer team includes:  General Surgery: Dr. Jon Stage Oncology: Dr. Yemi Winkler Oncology: Dr. Ana Billy: n/a      Treatment history includes:   Chico Wilson developed a lump in the left breast just medial to the nipple on February 8, 2022. Strong family history of malignancy including breast cancer. Last mammogram was in 2015. Mammogram on 2/18/22 additional imaging confirmed a approximate 2 cm nodular density inferior central medial left breast.  Initial biopsy revealed invasive ductal cancer triple and grade 3. Definitive left breast lumpectomy and sentinel node resection on 3/22/22 revealed a 2.7 cm pathologic stage T2N0 triple negative grade 3 breast cancer. Other feeling the lump she is asymptomatic. Specifically denies any headaches bone pain breathing problems or any other focal or systemic complaints. TREATMENT:  - Left breast lumpectomy and sentinel node resection 3/22/2022. Dr Angélica Thurston. - Adjuvant AC dose dense x4 followed by dose dense Taxol every other week. Start 5/10/2022  7/19 course 1 dose dense Taxol . 8/30/2022-4th and the last course of dose dense adjuvant Taxol.  (completed AC x4)  - postlumpectomy radiation completed per Dr. Ruel Lyon on 11/28/2022 with total cumulative dose of 6000 cGy. Interval History 1/30/2023: Today, the patient presents for Survivorship visit. Her treatment history is summarized above.  She continues regular follow up with her cancer team.  She states since her last visit in our office with Dr. Jp Romero in September 2022 she has not felt well.  On 9/27/2022 she reported grade 1 numbness and tingling sensation in fingers and balls of feet. Reports no functional difficulties at that time and did not need gabapentin. She reports in October 2022 she developed gradual and progressive neuropathy. Neuropathy involves bilateral hands, feet and heels. She is ambulating with use of cane in office today. She reports feeling unsteady and unable to walk long distances. She denies new back injury but affirms low back pain. Denies new numbness, tingling in bilateral lower extremities or loss of bowel/bladder control. She denies new chest pain, shortness of breath, dyspnea on exertion, PND or leg edema. She affirms increased fatigue. She affirms anxiety at times related to above symptoms. The patient affirms redness during radiation therapy. Denies new nipple inversion, nipple discharge or palpable masses. Denies unintentional weight loss, poor appetite, early satiety. She reports appetite is slowly improving postchemotherapy. Has any family history changed since last visit? Yes. Patient's father has known history of squamous cell carcinoma on face. Recently diagnosed with metastasis to cervical lymph nodes and ribs. PMH, SH, and FH:  I reviewed the patients medication list and allergy list as noted on the electronic medical record. The PMH, SH and FH were also reviewed as noted on the EMR. Review of Systems:   Review of Systems   Pertinent review of systems noted in HPI, all other ROS negative. Objective:   Physical Exam   /68 (Site: Right Upper Arm, Position: Sitting, Cuff Size: Medium Adult)   Pulse 73   Temp 97.6 °F (36.4 °C) (Oral)   Resp 16   Ht 6' (1.829 m)   Wt 276 lb (125.2 kg)   SpO2 97%   BMI 37.43 kg/m²    General appearance: No apparent distress, chronically ill appearing, ambulates with use of cane in office. and cooperative. HEENT: Pupils equal, round, and reactive to light. Conjunctivae/corneas clear. Oral mucosa moist.   Neck: Supple, with full range of motion. Trachea midline. Respiratory:  Normal respiratory effort. Clear to auscultation, bilaterally without Rales/Wheezes/Rhonchi. Cardiovascular: Regular rate and rhythm with normal S1/S2   Chest: hyperpigmentation involving left breast with mild lymphedema involving inferior left breast.   Abdomen: Soft, active bowel sounds. Musculoskeletal: No clubbing, cyanosis or edema bilaterally. Ambulates with use of cane. Skin: Skin color, texture, turgor normal.  No visible rashes or lesions. Neurologic:  Neurovascularly intact without any focal sensory/motor deficits. Psychiatric: Alert and oriented    Survivorship Recommendations: The patient has completed her breast cancer survivorship visit today. She was provided breast cancer survivorship care plan prepared by oncology nurse navigator and reviewed by myself. Care plan along with note from today's visit will be given to the patient's PCP Savita Mercado. 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 Impacting quality of life as patient requiring use of cane, unable to walk long distances. Will begin gabapentin 300 mg twice daily. Discussed with patient can titrate up as able. Instructed to call office if not improving before follow up appointment. Instructed can trial B complex. Will refer to Oncology rehab   2. Lumbar Back Pain - patient denies injury. Denies loss of bowel or bladder sensation.    -Will obtain bone scan. 3. Left Breast Lymphedema - noted on physical examination. Referral to [facility] as above. 1. Screenings Recommended:    A. Annual Mammogram - Patient due for right breast mammogram in February 2023. This has been ordered per Radiation Oncology, will schedule. She is due for bilateral mammogram end of May 2023, 6 months post completion of radiation. Order placed and scheduled at today's visit.      B. Regular Gynecologic Follow Up - November 2021, recommended follow up with Dr. Collette Villagomez. C. Colonoscopy Screening: No prior colonoscopy. Referred to GI for baseline screening colonoscopy. D. Lung Cancer Screening: Patient agreeable to CT lung screen. Affirms history of smoking 1 ppd from age 15 to age 50, now smoking 1 cigarette every 3 days. Patient working on cessation. CT lung screen ordered. 2. Education Provided:   A. Patient given handouts on surveillance and survivorship care. The patient will continue survivor follow-up including history, regular physical examination 1-4 times per year as clinically appropriate, then annually. Meeting with Medical Oncology, Radiation Oncology and General Surgery as deemed appropriate. Radiologic imaging to screen for distant recurrence will be not performed unless the patient will develop new symptoms. B. Patient educated on potential symptoms of recurrence including:     ?Constitutional symptoms - Anorexia, weight loss, malaise, fatigue, insomnia. ? Bone pain  ? Pulmonary symptoms - persistent cough or dyspnea (at rest or with exertion). ?Neurologic symptoms - Headache, nausea, vomiting, confusion, weakness, numbness or tingling. ?Gastrointestinal symptoms - Right upper quadrant pain, change in bowel habits, presence of bloody or tarry stools. C. Dietary recommendations given and provided handouts today. As per NCCN, all survivors are encouraged to make informed choices about food intake, limit red meat to less than 18 oz per week, avoid processed meat. Limit refined sugars and processed foods. Eat diet that is at least 50% plant-based. Minimize alcohol intake. Patient encouraged to maintain a healthy weight. D. Exercise/Activity recommendations including: exercise 30 minutes 3-5 times per week including low-resistance weight training at least 2-3 times per week   E. Smoking Cessation if indicated: Encouraged continued cessation.     F. Possible cardiac toxicity related to breast cancer treatment. Recommend following with PCP regularly to assess cardiovascular risk. BP monitoring/management. Cholesterol monitoring/management especially if on AI. Smoking cessation. Diet and weight management, DM management. Echo/EKG based on individual risk   G: Immunizations: as recommended per CDC    3. Services/Referrals Provided:    A. Financial Navigation - Financial toxicity screen completed, no referral indicated. [place] - Patient provided nutritional handouts prepared by dietician. Referral not indicated. C. Rehabilitation services including PT Referral, OT Referral, ST Referral as indicated. Referral placed to Oncology rehab. D. Psychological support including , pastoral care, support groups. E. Genetic Counselor Evaluation: Previously completed. F. Education Opportunities - Patient was provided nutritional handout for breast cancer survivors. G. Smoking Cessation - Encouraged continued smoking cessation. 4. Follow-Up/Goals   -Medical Oncology, Dr. Denice Brewer, on 3/23/2023 - to assess above symptoms.   -Radiation Oncology, Dr. Demond Grigsby, on 4/4/2023  -General Surgery, Dr. Sharona Sim, on 4/3/2023      On this date 1/24/2023 I have spent 75 minutes reviewing previous notes, test results and face to face with the patient discussing the diagnosis and importance of compliance with the treatment plan as well as documenting on the day of the visit.     Electronically signed by   Fredrik Essex, PA-C

## 2023-02-02 ENCOUNTER — HOSPITAL ENCOUNTER (OUTPATIENT)
Dept: NUCLEAR MEDICINE | Age: 53
Discharge: HOME | End: 2023-02-02
Payer: COMMERCIAL

## 2023-02-02 DIAGNOSIS — M54.50 LUMBAR PAIN: ICD-10-CM

## 2023-02-02 DIAGNOSIS — Z17.1 MALIGNANT NEOPLASM OF LOWER-INNER QUADRANT OF LEFT BREAST IN FEMALE, ESTROGEN RECEPTOR NEGATIVE (HCC): ICD-10-CM

## 2023-02-02 DIAGNOSIS — C50.312 MALIGNANT NEOPLASM OF LOWER-INNER QUADRANT OF LEFT BREAST IN FEMALE, ESTROGEN RECEPTOR NEGATIVE (HCC): ICD-10-CM

## 2023-02-02 PROCEDURE — 3430000000 HC RX DIAGNOSTIC RADIOPHARMACEUTICAL: Performed by: INTERNAL MEDICINE

## 2023-02-02 PROCEDURE — 78306 BONE IMAGING WHOLE BODY: CPT | Performed by: PHYSICIAN ASSISTANT

## 2023-02-02 PROCEDURE — A9503 TC99M MEDRONATE: HCPCS | Performed by: INTERNAL MEDICINE

## 2023-02-02 RX ORDER — TC 99M MEDRONATE 20 MG/10ML
25 INJECTION, POWDER, LYOPHILIZED, FOR SOLUTION INTRAVENOUS
Status: COMPLETED | OUTPATIENT
Start: 2023-02-02 | End: 2023-02-02

## 2023-02-02 RX ADMIN — TC 99M MEDRONATE 26.4 MILLICURIE: 20 INJECTION, POWDER, LYOPHILIZED, FOR SOLUTION INTRAVENOUS at 10:10

## 2023-02-08 ENCOUNTER — HOSPITAL ENCOUNTER (OUTPATIENT)
Dept: CT IMAGING | Age: 53
Discharge: HOME | End: 2023-02-08
Payer: COMMERCIAL

## 2023-02-08 DIAGNOSIS — C50.312 MALIGNANT NEOPLASM OF LOWER-INNER QUADRANT OF LEFT BREAST IN FEMALE, ESTROGEN RECEPTOR NEGATIVE (HCC): ICD-10-CM

## 2023-02-08 DIAGNOSIS — Z72.0 TOBACCO USE: ICD-10-CM

## 2023-02-08 DIAGNOSIS — Z17.1 MALIGNANT NEOPLASM OF LOWER-INNER QUADRANT OF LEFT BREAST IN FEMALE, ESTROGEN RECEPTOR NEGATIVE (HCC): ICD-10-CM

## 2023-02-08 PROCEDURE — 71271 CT THORAX LUNG CANCER SCR C-: CPT

## 2023-02-09 ENCOUNTER — TELEPHONE (OUTPATIENT)
Dept: ONCOLOGY | Age: 53
End: 2023-02-09

## 2023-02-09 NOTE — TELEPHONE ENCOUNTER
Called patient with CT lung screen results:  Impression   1. There are no suspicious masses or nodules within the lung fields. 2. There are no pathologically enlarged lymph nodes. 3. LUNGRADS ASSESSMENT VALUE: 1,     While on phone patient reports since initiation of gabapentin on 2/5/23 she has had burning sensation involving multiple joints. Instructed to stop gabapentin and see if symptoms improve. PT as scheduled on 2/14/23.

## 2023-02-14 ENCOUNTER — HOSPITAL ENCOUNTER (OUTPATIENT)
Dept: PHYSICAL THERAPY | Age: 53
Setting detail: THERAPIES SERIES
Discharge: HOME | End: 2023-02-14
Payer: COMMERCIAL

## 2023-02-14 PROCEDURE — 97535 SELF CARE MNGMENT TRAINING: CPT

## 2023-02-14 PROCEDURE — 97162 PT EVAL MOD COMPLEX 30 MIN: CPT

## 2023-02-14 NOTE — PROGRESS NOTES
** PLEASE SIGN, DATE AND TIME CERTIFICATION BELOW AND RETURN TO [facility] OUTPATIENT REHABILITATION (FAX #: [phone]). ATTEST/CO-SIGN IF ACCESSING VIA [employer]. THANK YOU.**    I certify that I have examined the patient below and determined that Physical Medicine and Rehabilitation service is necessary and that I approve the established plan of care for up to 90 days or as specifically noted.   Attestation, signature or co-signature of physician indicates approval of certification requirements.    ________________________ ____________ __________  Physician Signature   Date   Time  [facility]  PHYSICAL THERAPY  [x] EVALUATION    [x] SPECIALIZED THERAPY SERVICES - [place]     Date: 2023  Patient Name:  Teodoro Thakur  : 1970  MRN: [id number]  CSN: [id number]      Referring Practitioner Stacey Blank, Samul Hammans, PA*   Diagnosis Malignant neoplasm of lower-inner quadrant of left female breast [C50.312]  Estrogen receptor negative status (ER-) [Z17.1]    Treatment Diagnosis Thoracic spine/R hip/L shoulder pain, L breast lymphedema, L shoulder stiffness, difficulty walking   Date of Evaluation 23    Additional Pertinent History HTN, MI, hypothyroidism, CAD, paroxysmal A-fib, GERD      Functional Outcome Measure Used O: QuickDASH   Functional Outcome Score 25 (23)       Insurance: Primary: Payor: UMR /  /  / ,   Secondary:    Authorization Information: PRE CERTIFICATION REQUIRED: no  INSURANCE THERAPY BENEFIT:  Calendar  60 visit limit  AQUATIC THERAPY COVERED: na  MODALITIES COVERED:  no  TELEHEALTH COVERED: na   Visit # 1, 1/10 for progress note   Visits Allowed: 48/XVXTTSOX year   Recertification Date: 5/3/39   Physician Follow-Up: 3/23/23 Allison Lopez, 23 Rad Onc   Physician Orders: Fatigue, CIPN, and L breast lymphedema   History of Present Illness: 22 L IDC TNBC, 3/22/22 L lumpectomy with SLNB 0/4 nodes +,22 hematoma removal, 5/10/22 - 8/30/22 adjuvant AC dose dense x4, then dose dense Taxol, XRT X30 fractions completed on 11/28/22       SUBJECTIVE: Pt reports since starting Taxol, she has had R mid back to leg tingling and weakness with decreased balance. She states, she typically uses a straight cane but did not use today. Also notes tingling in B hands and feet - CIPN likely. Describes tightness of L breast to lateral L shoulder. 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 Notes her fatigue, pain and weakness/difficulty walking has interfered with her ability to return to work. Notes poor sensation in hands limit her ability to participate in crafting activities. Social/Functional History and Current Status:  Medications and Allergies have been reviewed and are listed on Medical History Questionnaire. Kan Mendez lives with family in a single story home with a ramp to enter. Task Previous Current   ADLs  Independent Assistance Required   IADL's Independent Assistance Required   Ambulation Independent Assistance Required   Transfers Independent [address] Enjoys being outdoors - gardening (flowers),4H, working in her yard, crafting. Community Integration Independent Assistance Required   Driving Active  Active    Work [employer].   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, 3 days in a row   On Disability - has not worked in the past year but would like to      OBJECTIVE:   Posture: Poor, Protruded Head, Protracted Scapula  Palpation: Moderate tenderness at L breast with firm, pitting tissue  Observation: Enlarged pores across L breast    Range of Motion/Strength (Range of Motion in degrees)    Right Left Comments   Shoulder Flexion PROM 180 144    Shoulder ABDuction PROM 180 173    Shoulder Strength 4/5 3+/5    Elbow Strength 4+/5 4+/5    Hip Flexion AROM [facility]    Hip Strength 4-/5 4-/5    Knee Extension/Flexion AROM [facility]    Knee Strength 4+/5 4+/5    Ankle AROM [facility]    Ankle Strength 5/5 5/5      Circumferential Measurements:   Upper Extremity Circumferential Measurements    Right (cm) Left (cm) Comments   Met Heads 20.6 20.4    Ulnar Styloid Process 17.6 17.7 Distance:18 cm   10 cm distal to Lateral Epicondyle 28.3 31.1    Elbow Crease 32 32.5    10 cm proximal from Lateral Epicondyle 41 44 Distance:16 cm   Axilla 46 49.5    Total 185.5 195.2 Axilla - to - Axilla: NT     4/6/22:    182.3     187. 2    Volume: R - 4972.4, L - 5640.9    Brief Fatigue Inventory: 4.7 (0: none, 1-3: mild, 4-6: moderate, 7-10: severe)  Quick Dash: 31.8% impaired (raw score 25)  BMI: 37.4  5 times sit to stand: 39 seconds    Treatment Initiated: Educated pt to importance of addressing her lymphedema first as it can further progress and even lead to cellulitis. Note due to lymphedema, her shoulder is limited and there is an increase in the girth of the L UE. Educated to importance of increasing her physical activity but due to balance concerns, will also address core strength and dynamic gait. TREATMENT   Precautions: L lymphedema risk   Pain: 5/10 R mid-back, R hip, L shoulder    X in shaded column indicates activity completed today   Modalities Parameters/  Location  Notes         Manual Therapy Time/Technique  Notes         Exercise/Intervention   Notes              Specific Interventions Next Treatment: Manual lymphatic drainage for L upper quadrant, educate to potential need for wrapping and compression bra/sathya, may trial Mobiderm/chip bag for breast lymphedema. Gentle shoulder stretches. Once lymphedema starts to improve, will transition to more core strengthening, spinal stretches, balance/dynamic gait, and conditioning    Activity Tolerance: Patient tolerated treatment well    ASSESSMENT:  Assessment: Pt presents with fatigue, L breast and UE lymphedema with L shoulder stiffness, back pain, hip pain and L shoulder pain.  She would benefit from skilled PT/Oncology Rehab to address these issues and allow reduced lymphedema, educated in independent management of lymphedema, improved safety with functional mobility, improved shoulder mobility, reduced back pain and improved stamina for return to work duties and improved survivorship. Body Structures/Functions/Activity Limitations:Decreased affected limb strength, Decreased core stabilization, Decreased overall strength, Decreased tolerance of activities, Decreased affected limb range of motion, Difficulty walking, Difficulty reaching/carrying/pushing/pulling, Pain, Lymphedema, Sensory changes/neuropathy, and Impaired posture  Prognosis: fair    GOALS:  Patient Goal: Improve breast lymphedema, improved walking/balance and shoulder mobility    Short Term Goals to be met in 5 weeks:  Pt to demo L shoulder flexion PROM improved from 144 deg to 175 deg for dressing. Pt to demo L shoulder abduction PROM improved 173 deg to 180 deg for dressing. Pt to demo L breast lymphedema reduced with independence management of symptoms. Pt to demo L UE girth measures reduced from 195.2 cm to 190.2 cm for improved ease with shoulder mobility. Long Term Goals to be met in 12 weeks:   Pt to demo BFI score improved from 4.7 to <3 for improved tolerance of return to work duties. Pt to demo QuickDASH score improved from 31.8% to 9.1% for improved ease with ADLs. Pt to demo completion of 6-minute walk test of 1000 feet with independence for safety in the community. Pt to demo 5 times sit to stand test improved from 39 seconds to 15 seconds for reduced risk of falling. Patient Education: Plan of care, goals. See \"Treatment Initiated\" for further details.   Education Outcome: Verbalized understanding  Education Barriers: None    PLAN:  Treatment Recommendations: Strengthening, Range of Motion, Conditioning, Ambulation, Stair Training, Tranfers, Balance/Proprioception, Lymphedema Management, Manual Techniques, Pain Management, Neuropathy Management, Postural Re-Training, Body Mechanics/Ergonomics, Home Exercise Prescription, and Safety Education    Plan of care initiated. Plan to see patient up to 3 times per week for 12 weeks to address the treatment planned outlined above.     Time In 800   Time Out 900   Timed Code Minutes: 15 min   Total Treatment Time: 60 min       Electronically Signed by: Michaela Callaway, PT, DPT, ALEXANDER, CLT [id number] 2/14/2023

## 2023-02-17 DIAGNOSIS — E87.6 HYPOKALEMIA: ICD-10-CM

## 2023-02-20 ENCOUNTER — HOSPITAL ENCOUNTER (OUTPATIENT)
Dept: PHYSICAL THERAPY | Age: 53
Setting detail: THERAPIES SERIES
Discharge: HOME | End: 2023-02-20
Payer: COMMERCIAL

## 2023-02-20 ENCOUNTER — HOSPITAL ENCOUNTER (OUTPATIENT)
Dept: WOMENS IMAGING | Age: 53
Discharge: HOME | End: 2023-02-20
Payer: COMMERCIAL

## 2023-02-20 DIAGNOSIS — C50.112 MALIGNANT NEOPLASM OF CENTRAL PORTION OF LEFT BREAST IN FEMALE, ESTROGEN RECEPTOR NEGATIVE (HCC): ICD-10-CM

## 2023-02-20 DIAGNOSIS — Z17.1 MALIGNANT NEOPLASM OF CENTRAL PORTION OF LEFT BREAST IN FEMALE, ESTROGEN RECEPTOR NEGATIVE (HCC): ICD-10-CM

## 2023-02-20 DIAGNOSIS — Z17.1 MALIGNANT NEOPLASM OF LOWER-INNER QUADRANT OF LEFT BREAST IN FEMALE, ESTROGEN RECEPTOR NEGATIVE (HCC): ICD-10-CM

## 2023-02-20 DIAGNOSIS — C50.312 MALIGNANT NEOPLASM OF LOWER-INNER QUADRANT OF LEFT BREAST IN FEMALE, ESTROGEN RECEPTOR NEGATIVE (HCC): ICD-10-CM

## 2023-02-20 PROCEDURE — G0279 TOMOSYNTHESIS, MAMMO: HCPCS

## 2023-02-20 PROCEDURE — 97140 MANUAL THERAPY 1/> REGIONS: CPT

## 2023-02-20 NOTE — PROGRESS NOTES
[address] [facility]  ONCOLOGY REHABILITATION  PHYSICAL THERAPY  [x] DAILY NOTE [] PROGRESS NOTE [] DISCHARGE NOTE    [x] SPECIALIZED THERAPY SERVICES - [place]     Date: 2023  Patient Name:  Pillo Toussaint  : 1970  MRN: [id number]  [facility]: [id number]       Referring Practitioner Nava Corrigan, [place] Fernando*   Diagnosis Malignant neoplasm of lower-inner quadrant of left female breast [C50.312]  Estrogen receptor negative status (ER-) [Z17.1]    Treatment Diagnosis Thoracic spine/R hip/L shoulder pain, L breast lymphedema, L shoulder stiffness, difficulty walking   Date of Evaluation 23    Additional Pertinent History HTN, MI, hypothyroidism, CAD, paroxysmal A-fib, GERD       Functional Outcome Measure Used O: QuickDASH   Functional Outcome Score 25 (23)        Insurance: Primary: Payor: UMR /  /  / ,   Secondary:    Authorization Information: PRE CERTIFICATION REQUIRED: no  INSURANCE THERAPY BENEFIT:   60 visit limit  AQUATIC THERAPY COVERED: na  MODALITIES COVERED:  no  TELEHEALTH COVERED: na   Visit # 2, 2/10 for progress note   Visits Allowed: 78/OZICWTSQ year   Recertification Date: 44   Physician Follow-Up: 3/23/23 Danny Srivastava, 23 Rad Onc   Physician Orders: Fatigue, CIPN, and L breast lymphedema   History of Present Illness: 22 L IDC TNBC, 3/22/22 L lumpectomy with SLNB 0/4 nodes +,22 hematoma removal,  5/10/22 - 22 adjuvant AC dose dense x4, then dose dense Taxol, XRT X30 fractions completed on 22      SUBJECTIVE: Pt reports more tightness in L breast and tenderness.        TREATMENT   Precautions: L lymphedema risk   Pain: 1-2/10 L breast, 5/10 R mid-back, R hip, L shoulder     X in shaded column indicates activity completed today   Modalities Parameters/  Location   Notes             Manual Therapy Time/Technique   Notes    Manual lymphatic drainage for L upper quadrant 55 minutes x Used following sequence of clearance: short neck, abdominal and deep abdominal work with diaphragmatic breathing, established and cleared anterior and posterior inter-axillary anastomosis, established and cleared anterior and posterior axillary-inguinalanastomosis, and anterior and posterior right upper extremity from proximal to distal. Re-work and fibrotic techniques used as needed. Exercise/Intervention     Notes    Pec stretches and ByletyBye's 3x   x Handout provided for HER      Specific Interventions for Next Treatment:  Manual lymphatic drainage for L upper quadrant, educate to potential need for wrapping and compression bra/sathya, may trial Mobiderm/chip bag for breast lymphedema. Gentle shoulder stretches. Once lymphedema starts to improve, will transition to more core strengthening, spinal stretches, balance/dynamic gait, and conditioning    Activity Tolerance: Patient tolerated treatment well    ASSESSMENT:  Assessment: Initially with quite firm tissue at L breast with peau d'orange texture however by end of session, much softer. GOALS:  Patient Goal: Improve breast lymphedema, improved walking/balance and shoulder mobility     Short Term Goals to be met in 5 weeks:  Pt to demo L shoulder flexion PROM improved from 144 deg to 175 deg for dressing. Pt to demo L shoulder abduction PROM improved 173 deg to 180 deg for dressing. Pt to demo L breast lymphedema reduced with independence management of symptoms. Pt to demo L UE girth measures reduced from 195.2 cm to 190.2 cm for improved ease with shoulder mobility. Long Term Goals to be met in 12 weeks:   Pt to demo BFI score improved from 4.7 to <3 for improved tolerance of return to work duties. Pt to demo QuickDASH score improved from 31.8% to 9.1% for improved ease with ADLs. Pt to demo completion of 6-minute walk test of 1000 feet with independence for safety in the community. Pt to demo 5 times sit to stand test improved from 39 seconds to 15 seconds for reduced risk of falling. Patient Education: Provided with handout for stretches and Bye-Bye's to be done 2x per day. Given 15x15 cm Mobiderm to wear under compressive bra (to purchase compressive bra). Instructed to wear either during the day or at night and may trial chip bag if unsuccessful, then post lumpectomy compression pad as needed.    Education Outcome: Verbalized understanding  Education Barriers: None    PLAN: Continue established plan of care      Time In 0805   Time Out 0900   Timed Code Minutes: 55 min   Total Treatment Time: 55 min       Electronically Signed by: Td Zee, PT, DPT, ALEXANDER, CLT [id number] 2/20/2023

## 2023-02-22 ENCOUNTER — TELEPHONE (OUTPATIENT)
Dept: ONCOLOGY | Age: 53
End: 2023-02-22

## 2023-02-22 ENCOUNTER — HOSPITAL ENCOUNTER (OUTPATIENT)
Dept: PHYSICAL THERAPY | Age: 53
Setting detail: THERAPIES SERIES
Discharge: HOME | End: 2023-02-22
Payer: COMMERCIAL

## 2023-02-22 DIAGNOSIS — Z17.1 MALIGNANT NEOPLASM OF LOWER-INNER QUADRANT OF LEFT BREAST IN FEMALE, ESTROGEN RECEPTOR NEGATIVE (HCC): ICD-10-CM

## 2023-02-22 DIAGNOSIS — C50.312 MALIGNANT NEOPLASM OF LOWER-INNER QUADRANT OF LEFT BREAST IN FEMALE, ESTROGEN RECEPTOR NEGATIVE (HCC): ICD-10-CM

## 2023-02-22 DIAGNOSIS — Z01.419 ENCOUNTER FOR GYNECOLOGICAL EXAMINATION WITHOUT ABNORMAL FINDING: Primary | ICD-10-CM

## 2023-02-22 PROCEDURE — 97140 MANUAL THERAPY 1/> REGIONS: CPT

## 2023-02-22 RX ORDER — POTASSIUM CHLORIDE 1500 MG/1
TABLET, EXTENDED RELEASE ORAL
Qty: 30 TABLET | Refills: 1 | Status: SHIPPED | OUTPATIENT
Start: 2023-02-22

## 2023-02-22 NOTE — PROGRESS NOTES
[address] [facility]  ONCOLOGY REHABILITATION  PHYSICAL THERAPY  [x] DAILY NOTE [] PROGRESS NOTE [] DISCHARGE NOTE    [x] SPECIALIZED THERAPY SERVICES - [place]     Date: 2023  Patient Name:  Feliciano Helms  : 1970  MRN: [id number]  CSN: [id number]       Referring Practitioner Elly Vásquez PA*   Diagnosis Malignant neoplasm of lower-inner quadrant of left female breast [C50.312]  Estrogen receptor negative status (ER-) [Z17.1]    Treatment Diagnosis Thoracic spine/R hip/L shoulder pain, L breast lymphedema, L shoulder stiffness, difficulty walking   Date of Evaluation 23    Additional Pertinent History HTN, MI, hypothyroidism, CAD, paroxysmal A-fib, GERD       Functional Outcome Measure Used O: QuickDASH   Functional Outcome Score 25 (23)        Insurance: Primary: Payor: UMR /  /  / ,   Secondary:    Authorization Information: PRE CERTIFICATION REQUIRED: no  INSURANCE THERAPY BENEFIT:   60 visit limit  AQUATIC THERAPY COVERED: na  MODALITIES COVERED:  no  TELEHEALTH COVERED: na   Visit # 3, 3/10 for progress note   Visits Allowed: 76/[id number] year   Recertification Date: 78   Physician Follow-Up: 3/23/23 Bhargav Serna, 23 Rad Onc   Physician Orders: Fatigue, CIPN, and L breast lymphedema   History of Present Illness: 22 L IDC TNBC, 3/22/22 L lumpectomy with SLNB 0/4 nodes +,22 hematoma removal,  5/10/22 - 22 adjuvant AC dose dense x4, then dose dense Taxol, XRT X30 fractions completed on 22      SUBJECTIVE: Pt reporting continued tightness in L breast and axilla. Notes she is having pain at R mid to low back and rates this a 6/10. Has tried some tighter fitting sports bras but has not been able to tolerate them due to increased pressure on her shoulders.  Has been using mobiderm at lateral inferior breast and reports this has helped to soften tissue        TREATMENT   Precautions: L lymphedema risk   Pain: 6/10 R mid and low back X in shaded column indicates activity completed today   Modalities Parameters/  Location   Notes             Manual Therapy Time/Technique   Notes   PROM to L shoulder 10 minutes x Light stretching at L UE flex, and, and ER for improved ROM    Manual lymphatic drainage for L upper quadrant 50 minutes x Used following sequence of clearance: short neck, abdominal and deep abdominal work with diaphragmatic breathing, established and cleared anterior and posterior inter-axillary anastomosis, established and cleared anterior and posterior axillary-inguinalanastomosis, and anterior and posterior right upper extremity from proximal to distal. Re-work and fibrotic techniques used as needed. Exercise/Intervention     Notes    Pec stretches and Bye-Bye's 3x    Handout provided for HER   Cane stretches into flex, abd, and ER 5x 5\" x       Specific Interventions for Next Treatment:  Manual lymphatic drainage for L upper quadrant, educate to potential need for wrapping and compression bra/sathya, may trial Mobiderm/chip bag for breast lymphedema. Gentle shoulder stretches. Once lymphedema starts to improve, will transition to more core strengthening, spinal stretches, balance/dynamic gait, and conditioning    Activity Tolerance: Patient tolerated treatment well    ASSESSMENT:  Assessment: Mildly firm tissue at lateral inferior breast with indentation noted from mobiderm. Also noted large pore presentation across superior breast. Tissue softened well with MLD. Light manual stretching of L shoulder initiated with mobility limited by banding through axilla. Pt feels tightness through axilla, down L lateral side, and under L breast with L UE flexion. Light PROM to L UE and education/demonstration provided for UE cane stretches to help increase ROM.      GOALS:  Patient Goal: Improve breast lymphedema, improved walking/balance and shoulder mobility     Short Term Goals to be met in 5 weeks:  Pt to demo L shoulder flexion PROM improved from 144 deg to 175 deg for dressing. Pt to demo L shoulder abduction PROM improved 173 deg to 180 deg for dressing. Pt to demo L breast lymphedema reduced with independence management of symptoms. Pt to demo L UE girth measures reduced from 195.2 cm to 190.2 cm for improved ease with shoulder mobility. Long Term Goals to be met in 12 weeks:   Pt to demo BFI score improved from 4.7 to <3 for improved tolerance of return to work duties. Pt to demo QuickDASH score improved from 31.8% to 9.1% for improved ease with ADLs. Pt to demo completion of 6-minute walk test of 1000 feet with independence for safety in the community. Pt to demo 5 times sit to stand test improved from 39 seconds to 15 seconds for reduced risk of falling.        Patient Education: Cane stretches  Education Outcome: Verbalized understanding  Education Barriers: None    PLAN: Continue established plan of care      Time In 0900   Time Out 1000   Timed Code Minutes: 60 min   Total Treatment Time: 60 min       Electronically Signed by: Nikki Valenzuela PTA CLT  2/22/2023

## 2023-02-22 NOTE — TELEPHONE ENCOUNTER
Patient called requesting a referral to Dr. Rubi Iglesias at [facility]. Stated she saw her in the past but needs a new referral because she has not been there in some time.  Is it okay to send referral?

## 2023-02-23 ENCOUNTER — TELEPHONE (OUTPATIENT)
Dept: CARDIOLOGY CLINIC | Age: 53
End: 2023-02-23

## 2023-02-23 NOTE — TELEPHONE ENCOUNTER
Pre op Risk Assessment    Procedure colonoscopy  Physician dr Unruly Grover  Date of surgery/procedure 3-11-23    Last OV 7-18-22  Last Stress 4-8-21  Last Echo 6-14-22  Last Cath 11-11-14    Is patient on blood thinners eliquis and asa 81 mg  Hold Meds/how many days  3 days? ?     Fax [phone]

## 2023-02-27 ENCOUNTER — HOSPITAL ENCOUNTER (OUTPATIENT)
Dept: PHYSICAL THERAPY | Age: 53
Setting detail: THERAPIES SERIES
Discharge: HOME | End: 2023-02-27
Payer: COMMERCIAL

## 2023-02-27 PROCEDURE — 97140 MANUAL THERAPY 1/> REGIONS: CPT

## 2023-02-27 NOTE — PROGRESS NOTES
[address] [facility]  ONCOLOGY REHABILITATION  PHYSICAL THERAPY  [x] DAILY NOTE [] PROGRESS NOTE [] DISCHARGE NOTE    [x] SPECIALIZED THERAPY SERVICES - [place]     Date: 2023  Patient Name:  Andrew Long  : 1970  MRN: [id number]  CSN: [id number]       Referring Practitioner Marlen Geronimo PA*   Diagnosis Malignant neoplasm of lower-inner quadrant of left female breast [C50.312]  Estrogen receptor negative status (ER-) [Z17.1]    Treatment Diagnosis Thoracic spine/R hip/L shoulder pain, L breast lymphedema, L shoulder stiffness, difficulty walking   Date of Evaluation 23    Additional Pertinent History HTN, MI, hypothyroidism, CAD, paroxysmal A-fib, GERD       Functional Outcome Measure Used O: QuickDASH   Functional Outcome Score 25 (23)        Insurance: Primary: Payor: UMR /  /  / ,   Secondary:    Authorization Information: PRE CERTIFICATION REQUIRED: no  INSURANCE THERAPY BENEFIT:   visit limit  AQUATIC THERAPY COVERED: na  MODALITIES COVERED:  no  TELEHEALTH COVERED: na   Visit # 4, 4/10 for progress note   Visits Allowed:    Recertification Date:    Physician Follow-Up: 3/23/23 Nanci Campos, 23 Rad Onc   Physician Orders: Fatigue, CIPN, and L breast lymphedema   History of Present Illness: 22 L IDC TNBC, 3/22/22 L lumpectomy with SLNB 0/4 nodes +,22 hematoma removal,  5/10/22 - 22 adjuvant AC dose dense x4, then dose dense Taxol, XRT X30 fractions completed on 22      SUBJECTIVE: Pt arrives reporting 7/10 R back pain, has had trouble sleeping past couple of nights due to this. Has been taking motrin to help decrease pain. Reports her arm felt very \"heavy\" after last session. Pt ordered compression bra and sathya from the [facility] and will be picking them up after today session.         TREATMENT   Precautions: L lymphedema risk   Pain: 7/10 R mid and low back     X in shaded column indicates activity completed today   Modalities Parameters/  Location   Notes             Manual Therapy Time/Technique   Notes         PROM to L shoulder 10 minutes x Light stretching at L UE flex, and, and ER for improved ROM, included pec major muscle bending techniques today    Manual lymphatic drainage for L upper quadrant 50 minutes x Used following sequence of clearance: short neck, abdominal and deep abdominal work with diaphragmatic breathing, established and cleared anterior and posterior inter-axillary anastomosis, established and cleared anterior and posterior axillary-inguinalanastomosis, and anterior and posterior right upper extremity from proximal to distal. Re-work and fibrotic techniques used as needed. Exercise/Intervention     Notes    Pec stretches and Bye-Bye's 3x    Handout provided for HER   Cane stretches into flex, abd, and ER 5x 5\" x       Specific Interventions for Next Treatment:  Manual lymphatic drainage for L upper quadrant, educate to potential need for wrapping and compression bra/sathya, may trial Mobiderm/chip bag for breast lymphedema. Gentle shoulder stretches. Once lymphedema starts to improve, will transition to more core strengthening, spinal stretches, balance/dynamic gait, and conditioning    Activity Tolerance: Patient tolerated treatment well    ASSESSMENT:  Assessment: Large pore presentation at superior breast continues to be present, but tissue texture is much improved through inferior breast. Continued tightness through L shoulder, especially with ER. Initiated some pec major muscle bending one handed technique with therapist supporting arm due to unable to rest hand behind head. Re education provided for importance of stretching at home. GOALS:  Patient Goal: Improve breast lymphedema, improved walking/balance and shoulder mobility     Short Term Goals to be met in 5 weeks:  Pt to demo L shoulder flexion PROM improved from 144 deg to 175 deg for dressing.   Pt to demo L shoulder abduction PROM improved 173 deg to 180 deg for dressing.  Pt to demo L breast lymphedema reduced with independence management of symptoms.  Pt to demo L UE girth measures reduced from 195.2 cm to 190.2 cm for improved ease with shoulder mobility.     Long Term Goals to be met in 12 weeks:   Pt to demo BFI score improved from 4.7 to <3 for improved tolerance of return to work duties.  Pt to demo QuickDASH score improved from 31.8% to 9.1% for improved ease with ADLs.  Pt to demo completion of 6-minute walk test of 1000 feet with independence for safety in the community.  Pt to demo 5 times sit to stand test improved from 39 seconds to 15 seconds for reduced risk of falling.       Patient Education: Continuing stretches at home  Education Outcome: Verbalized understanding  Education Barriers: None    PLAN: Continue established plan of care      Time In 0905   Time Out 1000   Timed Code Minutes: 55 min   Total Treatment Time: 55 min       Electronically Signed by: Kristin Bishop PTA, CLT  2/27/2023

## 2023-03-01 ENCOUNTER — APPOINTMENT (OUTPATIENT)
Dept: PHYSICAL THERAPY | Age: 53
End: 2023-03-01
Payer: COMMERCIAL

## 2023-03-02 ENCOUNTER — CLINICAL DOCUMENTATION (OUTPATIENT)
Dept: SPIRITUAL SERVICES | Facility: CLINIC | Age: 53
End: 2023-03-02

## 2023-03-02 NOTE — PROGRESS NOTES
The  attempted to contact Gloria concerning her recovery and progress from the cancer center. Unfortunately, the phone number was no longer in service.

## 2023-03-08 ENCOUNTER — APPOINTMENT (OUTPATIENT)
Dept: PHYSICAL THERAPY | Age: 53
End: 2023-03-08
Payer: COMMERCIAL

## 2023-03-08 ENCOUNTER — HOSPITAL ENCOUNTER (OUTPATIENT)
Dept: PHYSICAL THERAPY | Age: 53
Setting detail: THERAPIES SERIES
Discharge: HOME | End: 2023-03-08
Payer: COMMERCIAL

## 2023-03-08 PROCEDURE — 97140 MANUAL THERAPY 1/> REGIONS: CPT

## 2023-03-08 NOTE — PROGRESS NOTES
[facility] [facility]  ONCOLOGY REHABILITATION  PHYSICAL THERAPY  [x] DAILY NOTE [] PROGRESS NOTE [] DISCHARGE NOTE    [x] SPECIALIZED THERAPY SERVICES - [place]     Date: 3/8/2023  Patient Name:  Annmarie Mao  : 1970  MRN: [id number]  CSN: [id number]       Referring Practitioner Halina Valle PA*   Diagnosis Malignant neoplasm of lower-inner quadrant of left female breast [C50.312]  Estrogen receptor negative status (ER-) [Z17.1]    Treatment Diagnosis Thoracic spine/R hip/L shoulder pain, L breast lymphedema, L shoulder stiffness, difficulty walking   Date of Evaluation 23    Additional Pertinent History HTN, MI, hypothyroidism, CAD, paroxysmal A-fib, GERD       Functional Outcome Measure Used O: QuickDASH   Functional Outcome Score 25 (23)        Insurance: Primary: Payor: UMR /  /  / ,   Secondary:    Authorization Information: PRE CERTIFICATION REQUIRED: no  INSURANCE THERAPY BENEFIT:   60 visit limit  AQUATIC THERAPY COVERED: na  MODALITIES COVERED:  no  TELEHEALTH COVERED: na   Visit # 5, 5/10 for progress note   Visits Allowed: [id number] year   Recertification Date: 31   Physician Follow-Up: 3/23/23 Juan Khan, 23 Rad Onc   Physician Orders: Fatigue, CIPN, and L breast lymphedema   History of Present Illness: 22 L IDC TNBC, 3/22/22 L lumpectomy with SLNB 0/4 nodes +,22 hematoma removal,  5/10/22 - 22 adjuvant AC dose dense x4, then dose dense Taxol, XRT X30 fractions completed on 22      SUBJECTIVE: Pt arrives reporting 6/10 \"all over pain\". Pain in lower R side of back has been worse. Pts mother passed away last week and reports she has not been sleeping well or had much time for her exercises.         TREATMENT   Precautions: L lymphedema risk   Pain: 6/10 R mid and low back     X in shaded column indicates activity completed today   Modalities Parameters/  Location   Notes             Manual Therapy Time/Technique Notes         PROM to L shoulder 15 minutes x Light stretching at L UE flex, and, and ER for improved ROM. Pec major bending two handed technique, posterior clavicular rolling, and posterior shoulder glides all completed today. Manual lymphatic drainage for L upper quadrant 40 minutes x Used following sequence of clearance: short neck, abdominal and deep abdominal work with diaphragmatic breathing, established and cleared anterior and posterior inter-axillary anastomosis, established and cleared anterior and posterior axillary-inguinalanastomosis, and anterior and posterior right upper extremity from proximal to distal. Re-work and fibrotic techniques used as needed. Exercise/Intervention     Notes    Pec stretches and ByGildardoe's 3x    Handout provided for HER   Cane stretches into flex, abd, and ER 5x 5\" x    R Piriformis Stretch 3x 20\" x    PPT 10x 5\" x       Specific Interventions for Next Treatment:  Manual lymphatic drainage for L upper quadrant, educate to potential need for wrapping and compression bra/sathya, may trial Mobiderm/chip bag for breast lymphedema. Gentle shoulder stretches. Once lymphedema starts to improve, will transition to more core strengthening, spinal stretches, balance/dynamic gait, and conditioning    Activity Tolerance: Patient tolerated treatment well    ASSESSMENT:  Assessment: Large pore presentation along with mildly firm tissue throughout L breast. Noted good softening of tissue by end of session. Initiated posterior clavicular rolls and posterior shoulder glides added for improved L shoulder/pec mobility. Also added piriformis stretch and PPT for light stretching and strengthening of low back. GOALS:  Patient Goal: Improve breast lymphedema, improved walking/balance and shoulder mobility     Short Term Goals to be met in 5 weeks:  Pt to demo L shoulder flexion PROM improved from 144 deg to 175 deg for dressing.   Pt to demo L shoulder abduction PROM improved 173 deg to 180 deg for dressing. Pt to demo L breast lymphedema reduced with independence management of symptoms. Pt to demo L UE girth measures reduced from 195.2 cm to 190.2 cm for improved ease with shoulder mobility. Long Term Goals to be met in 12 weeks:   Pt to demo BFI score improved from 4.7 to <3 for improved tolerance of return to work duties. Pt to demo QuickDASH score improved from 31.8% to 9.1% for improved ease with ADLs. Pt to demo completion of 6-minute walk test of 1000 feet with independence for safety in the community. Pt to demo 5 times sit to stand test improved from 39 seconds to 15 seconds for reduced risk of falling.        Patient Education: Continuing stretches at home  Education Outcome: Verbalized understanding  Education Barriers: None    PLAN: Continue established plan of care      Time In 0900   Time Out 1000   Timed Code Minutes: 60 min   Total Treatment Time: 60 min       Electronically Signed by: Azeem Jane PTA, CLT  3/8/2023

## 2023-03-13 ENCOUNTER — HOSPITAL ENCOUNTER (OUTPATIENT)
Dept: PHYSICAL THERAPY | Age: 53
Setting detail: THERAPIES SERIES
Discharge: HOME | End: 2023-03-13
Payer: COMMERCIAL

## 2023-03-13 PROCEDURE — 97140 MANUAL THERAPY 1/> REGIONS: CPT

## 2023-03-13 NOTE — PROGRESS NOTES
[address] [facility]  ONCOLOGY REHABILITATION  PHYSICAL THERAPY  [x] DAILY NOTE [] PROGRESS NOTE [] DISCHARGE NOTE    [x] SPECIALIZED THERAPY SERVICES - [place]     Date: 3/13/2023  Patient Name:  Clint Mike  : 1970  MRN: [id number]  CSN: [id number]       Referring Practitioner Raheel Emanuel PA*   Diagnosis Malignant neoplasm of lower-inner quadrant of left female breast [C50.312]  Estrogen receptor negative status (ER-) [Z17.1]    Treatment Diagnosis Thoracic spine/R hip/L shoulder pain, L breast lymphedema, L shoulder stiffness, difficulty walking   Date of Evaluation 23    Additional Pertinent History HTN, MI, hypothyroidism, CAD, paroxysmal A-fib, GERD       Functional Outcome Measure Used O: QuickDASH   Functional Outcome Score 25 (23)        Insurance: Primary: Payor: UMR /  /  / ,   Secondary:    Authorization Information: PRE CERTIFICATION REQUIRED: no  INSURANCE THERAPY BENEFIT:   60 visit limit  AQUATIC THERAPY COVERED: na  MODALITIES COVERED:  no  TELEHEALTH COVERED: na   Visit # 5, 5/10 for progress note   Visits Allowed: /[id number] year   Recertification Date: 58   Physician Follow-Up: 3/23/23 Monicourmon Must, 23 Rad Onc   Physician Orders: Fatigue, CIPN, and L breast lymphedema   History of Present Illness: 22 L IDC TNBC, 3/22/22 L lumpectomy with SLNB 0/4 nodes +,22 hematoma removal,  5/10/22 - 22 adjuvant AC dose dense x4, then dose dense Taxol, XRT X30 fractions completed on 22      SUBJECTIVE: Pt reports increased R low back pain and soreness (7/10) has not been able to take motrin due to having a colonoscopy tomorrow. Did receive compression bra and feels it is helping to manage breast edema, continues to wait for sathya.         TREATMENT   Precautions: L lymphedema risk   Pain: 7/10 R mid and low back     X in shaded column indicates activity completed today   Modalities Parameters/  Location   Notes Manual Therapy Time/Technique   Notes         PROM to L shoulder 15 minutes x Light stretching at L UE flex, and, and ER for improved ROM. Pec major bending two handed technique, posterior clavicular rolling, and posterior shoulder glides all completed today. Manual lymphatic drainage for L upper quadrant 40 minutes x Used following sequence of clearance: short neck, abdominal and deep abdominal work with diaphragmatic breathing, established and cleared anterior and posterior inter-axillary anastomosis, established and cleared anterior and posterior axillary-inguinalanastomosis, and anterior and posterior right upper extremity from proximal to distal. Re-work and fibrotic techniques used as needed. Exercise/Intervention     Notes    Pec stretches and Bye-Bye's 3x    Handout provided for HER   Cane stretches into flex, abd, and ER 5x 5\"     R Piriformis Stretch 3x 20\"     PPT 10x 5\"        Specific Interventions for Next Treatment:  Manual lymphatic drainage for L upper quadrant, educate to potential need for wrapping and compression bra/sathya, may trial Mobiderm/chip bag for breast lymphedema. Gentle shoulder stretches. Once lymphedema starts to improve, will transition to more core strengthening, spinal stretches, balance/dynamic gait, and conditioning    Activity Tolerance: Patient tolerated treatment well    ASSESSMENT:  Assessment: Min to mild edema noted throughout L breast that softened well with MLD. Improved PROM with 162 deg of flex and 175 deg of abd. GOALS:  Patient Goal: Improve breast lymphedema, improved walking/balance and shoulder mobility     Short Term Goals to be met in 5 weeks:  Pt to demo L shoulder flexion PROM improved from 144 deg to 175 deg for dressing. Pt to demo L shoulder abduction PROM improved 173 deg to 180 deg for dressing. Pt to demo L breast lymphedema reduced with independence management of symptoms.   Pt to demo L UE girth measures reduced from 195.2 cm to 190.2 cm for improved ease with shoulder mobility. Long Term Goals to be met in 12 weeks:   Pt to demo BFI score improved from 4.7 to <3 for improved tolerance of return to work duties. Pt to demo QuickDASH score improved from 31.8% to 9.1% for improved ease with ADLs. Pt to demo completion of 6-minute walk test of 1000 feet with independence for safety in the community. Pt to demo 5 times sit to stand test improved from 39 seconds to 15 seconds for reduced risk of falling.        Patient Education: Continuing stretches at home  Education Outcome: Verbalized understanding  Education Barriers: None    PLAN: Continue established plan of care      Time In 1000   Time Out 1055   Timed Code Minutes: 60 min   Total Treatment Time: 60 min       Electronically Signed by: Ashlyn Sarabia PTA, CLT  3/13/2023

## 2023-03-15 ENCOUNTER — HOSPITAL ENCOUNTER (OUTPATIENT)
Dept: PHYSICAL THERAPY | Age: 53
Setting detail: THERAPIES SERIES
Discharge: HOME | End: 2023-03-15
Payer: COMMERCIAL

## 2023-03-15 PROCEDURE — 97140 MANUAL THERAPY 1/> REGIONS: CPT

## 2023-03-15 NOTE — PROGRESS NOTES
[address] [facility]  ONCOLOGY REHABILITATION  PHYSICAL THERAPY  [x] DAILY NOTE [] PROGRESS NOTE [] DISCHARGE NOTE    [x] SPECIALIZED THERAPY SERVICES - [place]     Date: 3/15/2023  Patient Name:  Clint Mike  : 1970  MRN: [id number]  CSN: [id number]       Referring Practitioner Raheel Emanuel PA*   Diagnosis Malignant neoplasm of lower-inner quadrant of left female breast [C50.312]  Estrogen receptor negative status (ER-) [Z17.1]    Treatment Diagnosis Thoracic spine/R hip/L shoulder pain, L breast lymphedema, L shoulder stiffness, difficulty walking   Date of Evaluation 23    Additional Pertinent History HTN, MI, hypothyroidism, CAD, paroxysmal A-fib, GERD       Functional Outcome Measure Used O: QuickDASH   Functional Outcome Score 25 (23)        Insurance: Primary: Payor: UMR /  /  / ,   Secondary:    Authorization Information: PRE CERTIFICATION REQUIRED: no  INSURANCE THERAPY BENEFIT:   60 visit limit  AQUATIC THERAPY COVERED: na  MODALITIES COVERED:  no  TELEHEALTH COVERED: na   Visit # 6, 6/10 for progress note   Visits Allowed: [id number]    Recertification Date: 19   Physician Follow-Up: 3/23/23 Kristofer Reilly, 23 Rad Onc   Physician Orders: Fatigue, CIPN, and L breast lymphedema   History of Present Illness: 22 L IDC TNBC, 3/22/22 L lumpectomy with SLNB 0/4 nodes +,22 hematoma removal,  5/10/22 - 22 adjuvant AC dose dense x4, then dose dense Taxol, XRT X30 fractions completed on 22      SUBJECTIVE: Pt reporting 6/10 R low back pain and \"soreness\" through L pec noting she may have over done her stretches. Reports she continues to have numbness and tingling in R hand and foot.         TREATMENT   Precautions: L lymphedema risk   Pain: 6/10 R mid and low back     X in shaded column indicates activity completed today   Modalities Parameters/  Location   Notes             Manual Therapy Time/Technique   Notes         PROM to L shoulder 15 minutes  Light stretching at L UE flex, and, and ER for improved ROM. Pec major bending two handed technique, posterior clavicular rolling, and posterior shoulder glides all completed today. Manual lymphatic drainage for L upper quadrant 50 minutes x Used following sequence of clearance: short neck, abdominal and deep abdominal work with diaphragmatic breathing, established and cleared anterior and posterior inter-axillary anastomosis, established and cleared anterior and posterior axillary-inguinalanastomosis, and anterior and posterior right upper extremity from proximal to distal. Re-work and fibrotic techniques used as needed. Exercise/Intervention     Notes    Pec stretches and Bye-Bye's 3x    Handout provided for HER   Cane stretches into flex, abd, and ER 5x 5\"     Pulleys - flex and scap 2'  x           R Piriformis Stretch 3x 20\" x    PPT 10x 5\" x       Specific Interventions for Next Treatment:  Manual lymphatic drainage for L upper quadrant, educate to potential need for wrapping and compression bra/sathya, may trial Mobiderm/chip bag for breast lymphedema. Gentle shoulder stretches. Once lymphedema starts to improve, will transition to more core strengthening, spinal stretches, balance/dynamic gait, and conditioning    Circumferential Measurements:         Upper Extremity Circumferential Measurements     Right (cm) Left (cm) Comments   Met Heads 20.5 20.5     Ulnar Styloid Process 17.5 17.8 Distance:18 cm   10 cm distal to Lateral Epicondyle 28.5 29     Elbow Crease 32 31.6     10 cm proximal from Lateral Epicondyle 40.2 45.9 Distance:16 cm   Axilla 44.6 44.4     Total 183.3 189.2 Axilla - to - Axilla: NT                          2/14/23:   185.5      195.2   4/6/22:                                     182.3     187. 2    Activity Tolerance: Patient tolerated treatment well    ASSESSMENT:  Assessment: Pt with decreased circumferential measurements as noted above.  Discussed obtaining compression sleeve to help maintain and manage lymphedema. GOALS:  Patient Goal: Improve breast lymphedema, improved walking/balance and shoulder mobility     Short Term Goals to be met in 5 weeks:  Pt to demo L shoulder flexion PROM improved from 144 deg to 175 deg for dressing. Pt to demo L shoulder abduction PROM improved 173 deg to 180 deg for dressing. Pt to demo L breast lymphedema reduced with independence management of symptoms. Pt to demo L UE girth measures reduced from 195.2 cm to 190.2 cm for improved ease with shoulder mobility. Long Term Goals to be met in 12 weeks:   Pt to demo BFI score improved from 4.7 to <3 for improved tolerance of return to work duties. Pt to demo QuickDASH score improved from 31.8% to 9.1% for improved ease with ADLs. Pt to demo completion of 6-minute walk test of 1000 feet with independence for safety in the community. Pt to demo 5 times sit to stand test improved from 39 seconds to 15 seconds for reduced risk of falling.        Patient Education: Continuing stretches at home  Education Outcome: Verbalized understanding  Education Barriers: None    PLAN: Continue established plan of care      Time In 1000   Time Out 1100   Timed Code Minutes: 60 min   Total Treatment Time: 60 min       Electronically Signed by: Jonathan Pinon PTA, CLT  3/15/2023

## 2023-03-20 ENCOUNTER — HOSPITAL ENCOUNTER (OUTPATIENT)
Dept: PHYSICAL THERAPY | Age: 53
Setting detail: THERAPIES SERIES
Discharge: HOME | End: 2023-03-20
Payer: COMMERCIAL

## 2023-03-20 PROCEDURE — 97535 SELF CARE MNGMENT TRAINING: CPT

## 2023-03-20 PROCEDURE — 97110 THERAPEUTIC EXERCISES: CPT

## 2023-03-20 NOTE — PROGRESS NOTES
[address] [facility]  ONCOLOGY REHABILITATION  PHYSICAL THERAPY  [] DAILY NOTE [x] PROGRESS NOTE [] DISCHARGE NOTE    [x] SPECIALIZED THERAPY SERVICES - [place]     Date: 3/20/2023  Patient Name:  Sharona Tenorio  : 1970  MRN: [id number]  CSN: [id number]       Referring Practitioner Lealnd Maharaj PA*   Diagnosis Malignant neoplasm of lower-inner quadrant of left female breast [C50.312]  Estrogen receptor negative status (ER-) [Z17.1]    Treatment Diagnosis Thoracic spine/R hip/L shoulder pain, L breast lymphedema, L shoulder stiffness, difficulty walking   Date of Evaluation 23    Additional Pertinent History HTN, MI, hypothyroidism, CAD, paroxysmal A-fib, GERD       Functional Outcome Measure Used O: QuickDASH   Functional Outcome Score 25 (23)        Insurance: Primary: Payor: R /  /  / ,   Secondary:    Authorization Information: PRE CERTIFICATION REQUIRED: no  INSURANCE THERAPY BENEFIT:   60 visit limit  AQUATIC THERAPY COVERED: na  MODALITIES COVERED:  no  TELEHEALTH COVERED: na   Visit # 7, 0/10 for progress note   Visits Allowed: 58/[id number] year   Recertification Date: 44   Physician Follow-Up: 3/23/23 Luciano Brody, 23 Rad Onc   Physician Orders: Fatigue, CIPN, and L breast lymphedema   History of Present Illness: 22 L IDC TNBC, 3/22/22 L lumpectomy with SLNB 0/4 nodes +,22 hematoma removal,  5/10/22 - 22 adjuvant AC dose dense x4, then dose dense Taxol, XRT X30 fractions completed on 22      SUBJECTIVE: States she fell on Saturday in the afternoon due to catching her R foot. Complains that majority of her pain and neuropathy are all on the right side. Describes constant pain in the back and occasional \"piercing\" pain without pattern.        TREATMENT   Precautions: L lymphedema risk   Pain: 6-7/10 R mid and low back     X in shaded column indicates activity completed today   Modalities Parameters/  Location   Notes

## 2023-03-22 ENCOUNTER — HOSPITAL ENCOUNTER (OUTPATIENT)
Dept: PHYSICAL THERAPY | Age: 53
Setting detail: THERAPIES SERIES
Discharge: HOME | End: 2023-03-22
Payer: COMMERCIAL

## 2023-03-22 PROCEDURE — 97110 THERAPEUTIC EXERCISES: CPT

## 2023-03-22 PROCEDURE — 97140 MANUAL THERAPY 1/> REGIONS: CPT

## 2023-03-22 NOTE — PROGRESS NOTES
182.3     187. 2    Volume: R - 4824.307 mL, L - 5446.842 mL      Activity Tolerance: Patient tolerated treatment well    ASSESSMENT:  Assessment: Pt feels significant pulling across left breast with any left shoulder stretching, but left shoulder abduction the worse. GOALS:  Patient Goal: Improve breast lymphedema, improved walking/balance and shoulder mobility - ONGOING     Short Term Goals to be met in 7 weeks:  Pt to demo L shoulder flexion PROM improved from 144 deg to 175 deg for dressing. - GOAL NOT MET, CONTINUE. L shoulder flexion 158 degrees  Pt to demo L shoulder abduction PROM improved 173 deg to 180 deg for dressing. - GOAL NOT MET, CONTINUE. L shoulder abduction 92 degrees  Pt to demo L breast lymphedema reduced with independence management of symptoms. - GOAL NOT MET, CONTINUE. Pt with firm, pitting edema throughout L breast with peau d'orange texture  Pt to demo L UE girth measures reduced from 195.2 cm to 190.2 cm for improved ease with shoulder mobility. - GOAL NOT MET, CONTINUE. 192.5 cm     Long Term Goals to be met in 7 weeks:   Pt to demo BFI score improved from 4.7 to <3 for improved tolerance of return to work duties. - GOAL NOT MET, CONTINUE. Score worsened to 5.8. Note pt fell over the week and recently had a death in the family. Pt to demo QuickDASH score improved from 31.8% to 9.1% for improved ease with ADLs. - GOAL NOT MET, CONTINUE. Scored 20.5% impaired. Pt to demo completion of 6-minute walk test of 1000 feet with independence for safety in the community. - GOAL NOT MET, CONTINUE. Ambulated 714 feet this date with frequent assist from wall for support. Pt to demo 5 times sit to stand test improved from 39 seconds to 15 seconds for reduced risk of falling. - GOAL NOT MET, CONTINUE. Performed task in 35 seconds this date.        Patient Education: Continuing stretches at home  Education Outcome: Verbalized understanding  Education Barriers: None    PLAN: Continue established

## 2023-03-23 ENCOUNTER — HOSPITAL ENCOUNTER (OUTPATIENT)
Dept: INFUSION THERAPY | Age: 53
Discharge: HOME | End: 2023-03-23
Payer: COMMERCIAL

## 2023-03-23 ENCOUNTER — OFFICE VISIT (OUTPATIENT)
Dept: ONCOLOGY | Age: 53
End: 2023-03-23
Payer: COMMERCIAL

## 2023-03-23 VITALS
HEIGHT: 72 IN | BODY MASS INDEX: 38.06 KG/M2 | WEIGHT: 281 LBS | RESPIRATION RATE: 18 BRPM | SYSTOLIC BLOOD PRESSURE: 109 MMHG | TEMPERATURE: 98.5 F | HEART RATE: 97 BPM | OXYGEN SATURATION: 98 % | DIASTOLIC BLOOD PRESSURE: 68 MMHG

## 2023-03-23 VITALS
BODY MASS INDEX: 38.16 KG/M2 | SYSTOLIC BLOOD PRESSURE: 109 MMHG | DIASTOLIC BLOOD PRESSURE: 68 MMHG | HEART RATE: 97 BPM | WEIGHT: 281.4 LBS | TEMPERATURE: 98.5 F | RESPIRATION RATE: 18 BRPM

## 2023-03-23 DIAGNOSIS — C50.112 MALIGNANT NEOPLASM OF CENTRAL PORTION OF LEFT BREAST IN FEMALE, ESTROGEN RECEPTOR NEGATIVE (HCC): Primary | ICD-10-CM

## 2023-03-23 DIAGNOSIS — Z17.1 MALIGNANT NEOPLASM OF CENTRAL PORTION OF LEFT BREAST IN FEMALE, ESTROGEN RECEPTOR NEGATIVE (HCC): Primary | ICD-10-CM

## 2023-03-23 DIAGNOSIS — G62.9 NEUROPATHY: Primary | ICD-10-CM

## 2023-03-23 LAB
ABSOLUTE IMMATURE GRANULOCYTE: 0 THOU/MM3 (ref 0–0.07)
ALBUMIN SERPL BCG-MCNC: 3.9 G/DL (ref 3.5–5.1)
ALP SERPL-CCNC: 77 U/L (ref 38–126)
ALT SERPL W/O P-5'-P-CCNC: 15 U/L (ref 11–66)
AST SERPL-CCNC: 15 U/L (ref 5–40)
BASOPHILS ABSOLUTE: 0 THOU/MM3 (ref 0–0.1)
BASOPHILS NFR BLD AUTO: 0 % (ref 0–3)
BILIRUB CONJ SERPL-MCNC: < 0.2 MG/DL (ref 0–0.3)
BILIRUB SERPL-MCNC: 0.4 MG/DL (ref 0.3–1.2)
BUN BLDP-MCNC: 11 MG/DL (ref 8–26)
CHLORIDE BLD-SCNC: 103 MEQ/L (ref 98–109)
CREAT BLD-MCNC: 0.6 MG/DL (ref 0.5–1.2)
EOSINOPHIL NFR BLD AUTO: 4 % (ref 0–4)
EOSINOPHILS ABSOLUTE: 0.2 THOU/MM3 (ref 0–0.4)
ERYTHROCYTE [DISTWIDTH] IN BLOOD BY AUTOMATED COUNT: 12.7 % (ref 11.5–14.5)
GFR SERPL CREATININE-BSD FRML MDRD: > 60 ML/MIN/1.73M2
GLUCOSE BLD-MCNC: 103 MG/DL (ref 70–108)
HCT VFR BLD AUTO: 40.2 % (ref 37–47)
HGB BLD-MCNC: 13.2 GM/DL (ref 12–16)
IMMATURE GRANULOCYTES: 0 %
IONIZED CALCIUM, WHOLE BLOOD: 1.14 MMOL/L (ref 1.12–1.32)
LYMPHOCYTES ABSOLUTE: 1 THOU/MM3 (ref 1–4.8)
LYMPHOCYTES NFR BLD AUTO: 16 % (ref 15–47)
MCH RBC QN AUTO: 31.6 PG (ref 26–33)
MCHC RBC AUTO-ENTMCNC: 32.8 GM/DL (ref 32.2–35.5)
MCV RBC AUTO: 96 FL (ref 81–99)
MONOCYTES ABSOLUTE: 0.5 THOU/MM3 (ref 0.4–1.3)
MONOCYTES NFR BLD AUTO: 7 % (ref 0–12)
NEUTROPHILS NFR BLD AUTO: 73 % (ref 43–75)
PLATELET # BLD AUTO: 167 THOU/MM3 (ref 130–400)
PMV BLD AUTO: 9.3 FL (ref 9.4–12.4)
POTASSIUM BLD-SCNC: 4.1 MEQ/L (ref 3.5–4.9)
PROT SERPL-MCNC: 6.8 G/DL (ref 6.1–8)
RBC # BLD AUTO: 4.18 MILL/MM3 (ref 4.2–5.4)
SEGMENTED NEUTROPHILS ABSOLUTE COUNT: 4.7 THOU/MM3 (ref 1.8–7.7)
SODIUM BLD-SCNC: 140 MEQ/L (ref 138–146)
TOTAL CO2, WHOLE BLOOD: 27 MEQ/L (ref 23–33)
WBC # BLD AUTO: 6.5 THOU/MM3 (ref 4.8–10.8)

## 2023-03-23 PROCEDURE — 85025 COMPLETE CBC W/AUTO DIFF WBC: CPT

## 2023-03-23 PROCEDURE — 2580000003 HC RX 258: Performed by: INTERNAL MEDICINE

## 2023-03-23 PROCEDURE — 3078F DIAST BP <80 MM HG: CPT | Performed by: INTERNAL MEDICINE

## 2023-03-23 PROCEDURE — 99211 OFF/OP EST MAY X REQ PHY/QHP: CPT

## 2023-03-23 PROCEDURE — 36591 DRAW BLOOD OFF VENOUS DEVICE: CPT

## 2023-03-23 PROCEDURE — 99214 OFFICE O/P EST MOD 30 MIN: CPT | Performed by: INTERNAL MEDICINE

## 2023-03-23 PROCEDURE — 80047 BASIC METABLC PNL IONIZED CA: CPT

## 2023-03-23 PROCEDURE — 6360000002 HC RX W HCPCS: Performed by: INTERNAL MEDICINE

## 2023-03-23 PROCEDURE — 80076 HEPATIC FUNCTION PANEL: CPT

## 2023-03-23 PROCEDURE — 3074F SYST BP LT 130 MM HG: CPT | Performed by: INTERNAL MEDICINE

## 2023-03-23 RX ORDER — PREGABALIN 25 MG/1
CAPSULE ORAL
Qty: 60 CAPSULE | Refills: 0 | Status: SHIPPED | OUTPATIENT
Start: 2023-03-23 | End: 2023-04-26

## 2023-03-23 RX ORDER — SODIUM CHLORIDE 9 MG/ML
25 INJECTION, SOLUTION INTRAVENOUS PRN
OUTPATIENT
Start: 2023-03-23

## 2023-03-23 RX ORDER — HEPARIN SODIUM (PORCINE) LOCK FLUSH IV SOLN 100 UNIT/ML 100 UNIT/ML
500 SOLUTION INTRAVENOUS PRN
Status: DISCONTINUED | OUTPATIENT
Start: 2023-03-23 | End: 2023-03-24 | Stop reason: HOSPADM

## 2023-03-23 RX ORDER — HEPARIN SODIUM (PORCINE) LOCK FLUSH IV SOLN 100 UNIT/ML 100 UNIT/ML
500 SOLUTION INTRAVENOUS PRN
OUTPATIENT
Start: 2023-03-23

## 2023-03-23 RX ORDER — SODIUM CHLORIDE 0.9 % (FLUSH) 0.9 %
5-40 SYRINGE (ML) INJECTION PRN
Status: DISCONTINUED | OUTPATIENT
Start: 2023-03-23 | End: 2023-03-24 | Stop reason: HOSPADM

## 2023-03-23 RX ORDER — WATER 1000 ML/1000ML
2.2 INJECTION, SOLUTION INTRAVENOUS ONCE
OUTPATIENT
Start: 2023-03-23 | End: 2023-03-23

## 2023-03-23 RX ORDER — SODIUM CHLORIDE 0.9 % (FLUSH) 0.9 %
5-40 SYRINGE (ML) INJECTION PRN
OUTPATIENT
Start: 2023-03-23

## 2023-03-23 RX ADMIN — SODIUM CHLORIDE, PRESERVATIVE FREE 10 ML: 5 INJECTION INTRAVENOUS at 10:56

## 2023-03-23 RX ADMIN — SODIUM CHLORIDE, PRESERVATIVE FREE 10 ML: 5 INJECTION INTRAVENOUS at 10:55

## 2023-03-23 RX ADMIN — SODIUM CHLORIDE, PRESERVATIVE FREE 10 ML: 5 INJECTION INTRAVENOUS at 11:56

## 2023-03-23 RX ADMIN — Medication 500 UNITS: at 11:56

## 2023-03-23 ASSESSMENT — PAIN DESCRIPTION - PAIN TYPE: TYPE: NEUROPATHIC PAIN

## 2023-03-23 ASSESSMENT — PAIN DESCRIPTION - ONSET: ONSET: ON-GOING

## 2023-03-23 ASSESSMENT — PAIN DESCRIPTION - LOCATION: LOCATION: HAND;FOOT

## 2023-03-23 ASSESSMENT — PAIN DESCRIPTION - ORIENTATION: ORIENTATION: RIGHT;LEFT

## 2023-03-23 ASSESSMENT — PAIN DESCRIPTION - FREQUENCY: FREQUENCY: CONTINUOUS

## 2023-03-23 NOTE — DISCHARGE INSTRUCTIONS
Please contact your Oncologist if you have any questions regarding the BLOOD WORK that you received today. Patient instructed if experience any of the symptoms following today's BLOOD WORK / to notify MD immediately or go to emergency department.     * dizziness/lightheadedness  *acute nausea/vomiting - not relieved with medication  *headache - not relieved from Tylenol/pain medication  *chest pain/pressure  *rash/itching  *shortness of breath        Drink fluids - 48oz fluids daily  Call if develop fever/ chills/ signs or symptoms of infection

## 2023-03-23 NOTE — PROGRESS NOTES
Pt discharged in stable condition with verbalization of discharge instructions all questions answered and all  belongings sent with patient. Patient tolerated Mediport blood draw and flush without any complications or signs of a reaction.

## 2023-03-23 NOTE — PLAN OF CARE
Problem: Pain  Goal: Verbalizes/displays adequate comfort level or baseline comfort level  Outcome: Adequate for Discharge  Flowsheets (Taken 3/23/2023 1235)  Verbalizes/displays adequate comfort level or baseline comfort level:   Encourage patient to monitor pain and request assistance   Administer analgesics based on type and severity of pain and evaluate response   Assess pain using appropriate pain scale  Note: Pain currently controlled with current pain medication and regime will be trying lyrica to assist with comfort     Problem: Safety - Adult  Goal: Free from fall injury  Outcome: Adequate for Discharge  Flowsheets (Taken 3/23/2023 1235)  Free From Fall Injury: Instruct family/caregiver on patient safety  Note: No falls this admission Patient aware of fall precautions for here and at home -call light in reach while here       Problem: Chronic Conditions and Co-morbidities  Goal: Patient's chronic conditions and co-morbidity symptoms are monitored and maintained or improved  Outcome: Adequate for Discharge  Flowsheets (Taken 3/23/2023 1235)  Care Plan - Patient's Chronic Conditions and Co-Morbidity Symptoms are Monitored and Maintained or Improved:   Monitor and assess patient's chronic conditions and comorbid symptoms for stability, deterioration, or improvement   Collaborate with multidisciplinary team to address chronic and comorbid conditions and prevent exacerbation or deterioration  Note: Reviewed Mediport blood draw and flush  with patient, patient offered no questions or concerns. Patient verbalized understanding of drug being administered.       Problem: Discharge Planning  Goal: Discharge to home or other facility with appropriate resources  Outcome: Adequate for Discharge  Flowsheets (Taken 3/23/2023 1235)  Discharge to home or other facility with appropriate resources:   Identify barriers to discharge with patient and caregiver   Identify discharge learning needs (meds, wound care, etc) Arrange for needed discharge resources and transportation as appropriate  Note: Discharge instructions given and reviewed with patient. All questions answered. Patient verbalized understanding Patient and family member able to teach back follow up appointments and when to call the doctor. Patient offers no questions at this time      Problem: Infection - Adult  Goal: Absence of infection at discharge  Outcome: Adequate for Discharge  Flowsheets (Taken 3/23/2023 1235)  Absence of infection at discharge:   Assess and monitor for signs and symptoms of infection   Monitor lab/diagnostic results   Monitor all insertion sites i.e., indwelling lines, tubes and drains   Instruct and encourage patient and family to use good hand hygiene technique  Note: No signs of infection noted at [facility] [facility] site  Patient aware that is of increased risk for infection due to receiving chemotherapy and having a central venous catheter. Patient aware of signs and symptoms of infection and when to call the doctor   Goal: Will show no infection signs and symptoms  Description: Will show no infection signs and symptoms  Outcome: Adequate for Discharge   Care plan reviewed with patient and she verbalized understanding of the plan of care and contributed to goal setting.

## 2023-03-23 NOTE — PROGRESS NOTES
ultrasound was performed. The nodule in the upper central left breast was identified, localized , and the site was marked. With    ultrasound guidance from a medial approach, the area was prepped and draped in sterile fashion. 2 % lidocaine was used for local anesthesia. 14 ga Sertera coaxial needle was advanced under ultrasound guidance. Once the needle was documented to be in the    correct location, 4 samples were taken from the area of interest. Samples were placed in formalin sent to pathology. A U shaped biopsy marker was placed at the biopsy site. Site 2, 1.9 cm mass, 9:00, inner central left breast, barbell clip:  An ultrasound-guided biopsy using real-time ultrasound was performed. The mass in the inner central left breast was identified, localized , and the site was marked. With ultrasound    guidance from a medial approach, the area was prepped and draped in sterile fashion. 2 % lidocaine was used for local anesthesia. 14 ga Sertera coaxial needle was advanced under ultrasound guidance. Once the needle was documented to be in the correct    location, 4 samples were taken from the area of interest. Samples were placed in formalin sent to pathology. A barbell biopsy marker was placed at the biopsy site. Site 3, left axillary lymph node, tribell clip: An ultrasound-guided biopsy using real-time ultrasound was performed. The lymph node in the left axilla was identified, localized , and the site was marked. With ultrasound guidance from a lateral approach,    the area was prepped and draped in sterile fashion. 2 % lidocaine was used for local anesthesia. 14 ga Sertera coaxial needle was advanced under ultrasound guidance. Once the needle was documented to be in the correct location, 4 samples were taken from    the area of interest. Samples were placed in formalin sent to pathology. A tribell biopsy marker was placed at the biopsy site.        Clip placement was confirmed with a left digital diagnostic

## 2023-03-25 DIAGNOSIS — E87.6 HYPOKALEMIA: ICD-10-CM

## 2023-03-27 ENCOUNTER — HOSPITAL ENCOUNTER (OUTPATIENT)
Dept: PHYSICAL THERAPY | Age: 53
Setting detail: THERAPIES SERIES
Discharge: HOME | End: 2023-03-27
Payer: COMMERCIAL

## 2023-03-27 PROCEDURE — 97140 MANUAL THERAPY 1/> REGIONS: CPT

## 2023-03-27 RX ORDER — POTASSIUM CHLORIDE 1500 MG/1
TABLET, EXTENDED RELEASE ORAL
Qty: 30 TABLET | Refills: 1 | Status: SHIPPED | OUTPATIENT
Start: 2023-03-27

## 2023-03-27 NOTE — PROGRESS NOTES
[address] [facility]  ONCOLOGY REHABILITATION  PHYSICAL THERAPY  [x] DAILY NOTE [] PROGRESS NOTE [] DISCHARGE NOTE    [x] SPECIALIZED THERAPY SERVICES - [place]     Date: 3/27/2023  Patient Name:  Gianna Giang  : 1970  MRN: [id number]  CSN: [id number]       Referring Practitioner Alana Dear, [place]*   Diagnosis Malignant neoplasm of lower-inner quadrant of left female breast [C50.312]  Estrogen receptor negative status (ER-) [Z17.1]    Treatment Diagnosis Thoracic spine/R hip/L shoulder pain, L breast lymphedema, L shoulder stiffness, difficulty walking   Date of Evaluation 23    Additional Pertinent History HTN, MI, hypothyroidism, CAD, paroxysmal A-fib, GERD       Functional Outcome Measure Used O: QuickDASH   Functional Outcome Score 25 (23)        Insurance: Primary: Payor: UMR /  /  / ,   Secondary:    Authorization Information: PRE CERTIFICATION REQUIRED: no  INSURANCE THERAPY BENEFIT:   60 visit limit  AQUATIC THERAPY COVERED: na  MODALITIES COVERED:  no  TELEHEALTH COVERED: na   Visit # 9, 210 for progress note   Visits Allowed: 63/NAMCNSSK year   Recertification Date: 77   Physician Follow-Up: 3/23/23 Darrelyn Lowers, 23 Rad Onc   Physician Orders: Fatigue, CIPN, and L breast lymphedema   History of Present Illness: 22 L IDC TNBC, 3/22/22 L lumpectomy with SLNB 0/4 nodes +,22 hematoma removal,  5/10/22 - 22 adjuvant AC dose dense x4, then dose dense Taxol, XRT X30 fractions completed on 22      SUBJECTIVE: States she is feeling achy today, but believes it is partly due to weather. Has been wearing the mobiderm in her bra and does feel like it is helping. Pain woke her up in the middle of the night and reports she is feeling increased firmness in left breast this morning.          TREATMENT   Precautions: L lymphedema risk   Pain: 5/10 R mid and low back, bilateral hands and feet     X in shaded column indicates activity

## 2023-03-29 ENCOUNTER — HOSPITAL ENCOUNTER (OUTPATIENT)
Dept: PHYSICAL THERAPY | Age: 53
Setting detail: THERAPIES SERIES
Discharge: HOME | End: 2023-03-29
Payer: COMMERCIAL

## 2023-03-29 PROCEDURE — 97140 MANUAL THERAPY 1/> REGIONS: CPT

## 2023-03-29 NOTE — PROGRESS NOTES
flexion PROM improved from 144 deg to 175 deg for dressing. Pt to demo L shoulder abduction PROM improved 173 deg to 180 deg for dressing. Pt to demo L breast lymphedema reduced with independence management of symptoms. Pt to demo L UE girth measures reduced from 195.2 cm to 190.2 cm for improved ease with shoulder mobility. Long Term Goals to be met in 7 weeks:   Pt to demo BFI score improved from 4.7 to <3 for improved tolerance of return to work duties. Pt to demo QuickDASH score improved from 31.8% to 9.1% for improved ease with ADLs. Pt to demo completion of 6-minute walk test of 1000 feet with independence for safety in the community. Pt to demo 5 times sit to stand test improved from 39 seconds to 15 seconds for reduced risk of falling.         Patient Education: Continuing stretches at home  Education Outcome: Verbalized understanding  Education Barriers: None    PLAN: Continue established plan of care      Time In 0900   Time Out 0955   Timed Code Minutes: 55 min   Total Treatment Time: 55 min       Electronically Signed by: Charles Diaz PTA  3/29/2023

## 2023-04-03 ENCOUNTER — OFFICE VISIT (OUTPATIENT)
Dept: SURGERY | Age: 53
End: 2023-04-03
Payer: COMMERCIAL

## 2023-04-03 VITALS
DIASTOLIC BLOOD PRESSURE: 64 MMHG | WEIGHT: 284.8 LBS | SYSTOLIC BLOOD PRESSURE: 118 MMHG | HEART RATE: 60 BPM | OXYGEN SATURATION: 96 % | BODY MASS INDEX: 38.57 KG/M2 | TEMPERATURE: 97.6 F | RESPIRATION RATE: 16 BRPM | HEIGHT: 72 IN

## 2023-04-03 DIAGNOSIS — I10 ESSENTIAL HYPERTENSION: ICD-10-CM

## 2023-04-03 DIAGNOSIS — I25.10 CORONARY ARTERY DISEASE INVOLVING NATIVE CORONARY ARTERY OF NATIVE HEART WITHOUT ANGINA PECTORIS: ICD-10-CM

## 2023-04-03 DIAGNOSIS — Z17.1 MALIGNANT NEOPLASM OF LOWER-INNER QUADRANT OF LEFT BREAST IN FEMALE, ESTROGEN RECEPTOR NEGATIVE (HCC): Primary | ICD-10-CM

## 2023-04-03 DIAGNOSIS — C50.312 MALIGNANT NEOPLASM OF LOWER-INNER QUADRANT OF LEFT BREAST IN FEMALE, ESTROGEN RECEPTOR NEGATIVE (HCC): Primary | ICD-10-CM

## 2023-04-03 DIAGNOSIS — L73.2 HIDRADENITIS AXILLARIS: ICD-10-CM

## 2023-04-03 PROCEDURE — 99214 OFFICE O/P EST MOD 30 MIN: CPT | Performed by: SURGERY

## 2023-04-03 PROCEDURE — 3074F SYST BP LT 130 MM HG: CPT | Performed by: SURGERY

## 2023-04-03 PROCEDURE — 3078F DIAST BP <80 MM HG: CPT | Performed by: SURGERY

## 2023-04-03 ASSESSMENT — ENCOUNTER SYMPTOMS
NAUSEA: 0
TROUBLE SWALLOWING: 0
SHORTNESS OF BREATH: 0
CHEST TIGHTNESS: 0
BLOOD IN STOOL: 0
ABDOMINAL PAIN: 0
VOMITING: 0
SORE THROAT: 0
COLOR CHANGE: 0
WHEEZING: 0
COUGH: 0

## 2023-04-03 NOTE — PROGRESS NOTES
under the tongue every 8 hours as needed for Nausea or Vomiting 20 tablet 2    atorvastatin (LIPITOR) 40 MG tablet TAKE 1 TABLET DAILY 90 tablet 2    metoprolol succinate (TOPROL XL) 100 MG extended release tablet TAKE 1 TABLET BY MOUTH EVERY DAY 90 tablet 2    flecainide (TAMBOCOR) 100 MG tablet TAKE 1 TABLET BY MOUTH TWICE A  tablet 2    ELIQUIS 5 MG TABS tablet TAKE 1 TABLET BY MOUTH TWICE A  tablet 3    lidocaine-prilocaine (EMLA) 2.5-2.5 % cream Apply topically as needed. 5 g 1    levothyroxine (SYNTHROID) 125 MCG tablet TAKE 1 TABLET BY MOUTH EVERY DAY IN THE MORNING ON EMPTY STOMACH      ZINC PO Take by mouth daily      Ascorbic Acid (VITAMIN C PO) Take by mouth daily      ibuprofen (ADVIL;MOTRIN) 800 MG tablet Take 1 tablet by mouth every 6 hours as needed for Pain      omeprazole (PRILOSEC) 10 MG delayed release capsule Take 1 capsule by mouth daily      nitroGLYCERIN (NITROSTAT) 0.4 MG SL tablet Place 1 tablet under the tongue every 5 minutes as needed for Chest pain 25 tablet 3    aspirin 81 MG chewable tablet Take 1 tablet by mouth daily 30 tablet 3     No current facility-administered medications for this visit.      Allergies     Allergies   Allergen Reactions    Codeine Hives and Swelling     And vomiting    Cortisone Swelling     Apparently tolerates topical and IV with benadryl well    Influenza Virus Vaccine Other (See Comments)     Pt states could not walk after getting     Neurontin [Gabapentin]      Increased pain    Darvocet A500 [Propoxyphene N-Acetaminophen] Nausea And Vomiting    Sulfa Antibiotics Nausea And Vomiting and Swelling     Not throat swelling       Family History  Family History   Problem Relation Age of Onset    Heart Disease Mother     Diabetes Mother     Cancer Mother         liposarcoma    Kidney Disease Mother     Heart Disease Father     Diabetes Father     Kidney Disease Father     Cancer Father         Non-melanoma skin cancer multiple times    No Known

## 2023-04-04 ENCOUNTER — HOSPITAL ENCOUNTER (OUTPATIENT)
Dept: RADIATION ONCOLOGY | Age: 53
Discharge: HOME | End: 2023-04-04
Payer: COMMERCIAL

## 2023-04-04 ENCOUNTER — HOSPITAL ENCOUNTER (OUTPATIENT)
Dept: RADIATION ONCOLOGY | Age: 53
End: 2023-04-04

## 2023-04-04 VITALS
RESPIRATION RATE: 18 BRPM | TEMPERATURE: 98 F | SYSTOLIC BLOOD PRESSURE: 130 MMHG | OXYGEN SATURATION: 97 % | DIASTOLIC BLOOD PRESSURE: 72 MMHG | WEIGHT: 283 LBS | HEART RATE: 73 BPM | BODY MASS INDEX: 38.38 KG/M2

## 2023-04-04 PROCEDURE — 99212 OFFICE O/P EST SF 10 MIN: CPT | Performed by: RADIOLOGY

## 2023-04-04 ASSESSMENT — ENCOUNTER SYMPTOMS
ABDOMINAL PAIN: 0
RECTAL PAIN: 0
SHORTNESS OF BREATH: 0
NAUSEA: 0
COUGH: 0
BACK PAIN: 1
DIARRHEA: 0
BLOOD IN STOOL: 0
TROUBLE SWALLOWING: 0

## 2023-04-04 ASSESSMENT — PAIN SCALES - GENERAL: PAINLEVEL_OUTOF10: 7

## 2023-04-04 ASSESSMENT — PAIN DESCRIPTION - LOCATION: LOCATION: GENERALIZED

## 2023-04-04 NOTE — PROGRESS NOTES
facility-administered medications for this encounter. PHYSICAL EXAMINATION:  VITAL SIGNS: /72   Pulse 73   Temp 98 °F (36.7 °C) (Infrared)   Resp 18   Wt 283 lb (128.4 kg)   SpO2 97%   BMI 38.38 kg/m²     ECO - Symptomatic but completely ambulatory (Restricted in physically strenuous activity but ambulatory and able to carry out work of a light or sedentary nature. For example, light housework, office work)    Physical Exam  Constitutional:       General: She is not in acute distress. Appearance: Normal appearance. HENT:      Head: Normocephalic and atraumatic. Pulmonary:      Effort: Pulmonary effort is normal. No respiratory distress. Chest:      Comments: Right breast: no swelling, skin changes, nipple changes, tenderness, or concerning masses  Left breast: lymphedema, with firm skin, fullness, and some tenderness. Skin mildly pinker/darker without any rash-like appearance or concerning masses. Abdominal:      General: Abdomen is flat. There is no distension. Lymphadenopathy:      Upper Body:      Right upper body: No supraclavicular or axillary adenopathy. Left upper body: No supraclavicular or axillary adenopathy. Neurological:      Mental Status: She is alert and oriented to person, place, and time. Electronically signed by Dilshad Romero MD MS on 23 at 9:43 AM EDT     ATTESTATION: 20 minutes were spent with the patient at today's visit reviewing pertinent information related to their oncologic diagnosis, including any recent labs, imaging, follow ups and plan of care going forward.     CC:Dr. Mark Restrepo ([facility]) Dr. Nimo Flanagan (Surgery)   ACC:[facility]

## 2023-04-05 ENCOUNTER — HOSPITAL ENCOUNTER (OUTPATIENT)
Dept: PHYSICAL THERAPY | Age: 53
Setting detail: THERAPIES SERIES
Discharge: HOME | End: 2023-04-05
Payer: COMMERCIAL

## 2023-04-05 PROCEDURE — 97140 MANUAL THERAPY 1/> REGIONS: CPT

## 2023-04-05 ASSESSMENT — ENCOUNTER SYMPTOMS: BACK PAIN: 1

## 2023-04-05 NOTE — PROGRESS NOTES
shoulder 15 minutes  Light stretching at L UE flex, abduction, and, and ER for improved ROM. (Not today: Pec major bending two handed technique, posterior clavicular rolling, and posterior shoulder glides all completed today.)    Manual lymphatic drainage for L upper quadrant 45 minutes X Used following sequence of clearance: short neck, abdominal and deep abdominal work with diaphragmatic breathing, established and cleared anterior and posterior inter-axillary anastomosis, established and cleared anterior and posterior axillary-inguinalanastomosis, and anterior and posterior right upper extremity from proximal to distal. Re-work and fibrotic techniques used as needed. Addition of LymphaTouch at 100 mmHg pulsed at 60-20 Hz with small treatment cup lift and twist technique at L breast.     Exercise/Intervention     Notes    Pec stretches and Bye-Bye's 3x    Handout provided for HER   Cane stretches into flex, abd, and ER 5x 5\" X    Pulleys - flex and scap 2'      L Pec Stretch off EOM 5x 10\" x           R Piriformis Stretch 3x 20\"     PPT 10x 5\"        Specific Interventions for Next Treatment:  Manual lymphatic drainage for L upper quadrant, educate to potential need for wrapping and compression bra/sathya, may trial Mobiderm/chip bag for breast lymphedema. Gentle shoulder stretches. Once lymphedema starts to improve, will transition to more core strengthening, spinal stretches, balance/dynamic gait, and conditioning, aquatics      Activity Tolerance: Patient tolerated treatment well    ASSESSMENT:  Assessment: Min to mild firm tissue through entire L breast. Min large pore presentation through superior breast. Firmness and skin appearance both responded very well to MLD and lymphatouch. Pt denying any sore or tender areas post session. Encouraged pt to continue stretches at home for improved L shoulder mobility. Is planning to check back in with the Misty Pandey to see if her compression sathya has arrived.

## 2023-04-17 ENCOUNTER — APPOINTMENT (OUTPATIENT)
Dept: PHYSICAL THERAPY | Age: 53
End: 2023-04-17

## 2023-04-19 ENCOUNTER — HOSPITAL ENCOUNTER (OUTPATIENT)
Dept: PHYSICAL THERAPY | Age: 53
Setting detail: THERAPIES SERIES
Discharge: HOME | End: 2023-04-19
Payer: COMMERCIAL

## 2023-04-19 PROCEDURE — 97110 THERAPEUTIC EXERCISES: CPT

## 2023-04-19 PROCEDURE — 97140 MANUAL THERAPY 1/> REGIONS: CPT

## 2023-04-19 NOTE — PROGRESS NOTES
Goal: Improve breast lymphedema, improved walking/balance and shoulder mobility      Short Term Goals to be met in 7 weeks:  Pt to demo L shoulder flexion PROM improved from 144 deg to 175 deg for dressing. Pt to demo L shoulder abduction PROM improved 173 deg to 180 deg for dressing. Pt to demo L breast lymphedema reduced with independence management of symptoms. Pt to demo L UE girth measures reduced from 195.2 cm to 190.2 cm for improved ease with shoulder mobility. Long Term Goals to be met in 7 weeks:   Pt to demo BFI score improved from 4.7 to <3 for improved tolerance of return to work duties. Pt to demo QuickDASH score improved from 31.8% to 9.1% for improved ease with ADLs. Pt to demo completion of 6-minute walk test of 1000 feet with independence for safety in the community. Pt to demo 5 times sit to stand test improved from 39 seconds to 15 seconds for reduced risk of falling.         Patient Education: Continuing stretches at home, wear compression bra, perform MLD after laying prone  Education Outcome: Verbalized understanding  Education Barriers: None    PLAN: Continue established plan of care      Time In 1005   Time Out 1100   Timed Code Minutes: 55min   Total Treatment Time: 55 min       Electronically Signed by: Joanne Sánchez PTA, CLT  4/19/2023

## 2023-04-20 DIAGNOSIS — E87.6 HYPOKALEMIA: ICD-10-CM

## 2023-04-20 RX ORDER — POTASSIUM CHLORIDE 1500 MG/1
TABLET, EXTENDED RELEASE ORAL
Qty: 30 TABLET | Refills: 1 | OUTPATIENT
Start: 2023-04-20

## 2023-04-24 ENCOUNTER — APPOINTMENT (OUTPATIENT)
Dept: PHYSICAL THERAPY | Age: 53
End: 2023-04-24

## 2023-04-25 NOTE — PROGRESS NOTES
Medications    pregabalin (LYRICA) 50 MG capsule     Sig: Take 1 capsule by mouth 2 times daily for 30 days. Max Daily Amount: 100 mg     Dispense:  60 capsule     Refill:  3          OARRS:  Controlled Substance Monitoring:    Acute and Chronic Pain Monitoring:   No flowsheet data found. 8) Research Options:      None      9) Other:        Complaint at this time is a grade 2 neuropathy with a dramatic decrease in sensation of her fingers bilaterally and also her feet. She does have trouble with dexterity and has dropped things and also has some trouble occasionally tripping because of her neuropathy. She had a trial of gabapentin but stopped it after a week because it actually made her neuropathy flare. She is open to a trial of Lyrica. 10) Follow Up: Follow-up 1 month and reassess efficacy of increased dose of Lyrica.         Dawayne Eisenmenger, MD

## 2023-04-27 ENCOUNTER — HOSPITAL ENCOUNTER (OUTPATIENT)
Dept: INFUSION THERAPY | Age: 53
Discharge: HOME | End: 2023-04-27

## 2023-04-27 ENCOUNTER — SOCIAL WORK (OUTPATIENT)
Dept: INFUSION THERAPY | Age: 53
End: 2023-04-27

## 2023-04-27 ENCOUNTER — APPOINTMENT (OUTPATIENT)
Dept: PHYSICAL THERAPY | Age: 53
End: 2023-04-27

## 2023-04-27 ENCOUNTER — CLINICAL DOCUMENTATION (OUTPATIENT)
Dept: CASE MANAGEMENT | Age: 53
End: 2023-04-27

## 2023-04-27 ENCOUNTER — OFFICE VISIT (OUTPATIENT)
Dept: ONCOLOGY | Age: 53
End: 2023-04-27
Payer: COMMERCIAL

## 2023-04-27 VITALS
DIASTOLIC BLOOD PRESSURE: 63 MMHG | HEART RATE: 90 BPM | RESPIRATION RATE: 16 BRPM | TEMPERATURE: 98 F | OXYGEN SATURATION: 94 % | SYSTOLIC BLOOD PRESSURE: 134 MMHG

## 2023-04-27 VITALS
HEART RATE: 90 BPM | DIASTOLIC BLOOD PRESSURE: 63 MMHG | TEMPERATURE: 98 F | OXYGEN SATURATION: 94 % | WEIGHT: 288 LBS | RESPIRATION RATE: 16 BRPM | BODY MASS INDEX: 39.06 KG/M2 | SYSTOLIC BLOOD PRESSURE: 134 MMHG

## 2023-04-27 DIAGNOSIS — E87.6 HYPOKALEMIA: ICD-10-CM

## 2023-04-27 DIAGNOSIS — G62.9 NEUROPATHY: Primary | ICD-10-CM

## 2023-04-27 PROCEDURE — 3075F SYST BP GE 130 - 139MM HG: CPT | Performed by: INTERNAL MEDICINE

## 2023-04-27 PROCEDURE — 3078F DIAST BP <80 MM HG: CPT | Performed by: INTERNAL MEDICINE

## 2023-04-27 PROCEDURE — 99211 OFF/OP EST MAY X REQ PHY/QHP: CPT

## 2023-04-27 PROCEDURE — 99213 OFFICE O/P EST LOW 20 MIN: CPT | Performed by: INTERNAL MEDICINE

## 2023-04-27 RX ORDER — PREGABALIN 50 MG/1
50 CAPSULE ORAL 2 TIMES DAILY
Qty: 60 CAPSULE | Refills: 3 | Status: SHIPPED | OUTPATIENT
Start: 2023-04-27 | End: 2023-05-27

## 2023-04-27 NOTE — PROGRESS NOTES
Oncology Social Work    Date: 4/27/2023  Time: 2:04 PM  Name: Ginger Mccartney  MRN: [id number]     Contact Type: Follow-up    Note:   Situation: This staff met with Ginger Mccartney (goes by Alton Nuno) while she was in the Oncology treatment area     Background: Alton Nuno was meeting with Dr Brittany Hough today to review her medications. She also met with her Nurse Navigator (Thea Lepe) about her outstanding concerns. Alton Nuno had lost her insurance in March and expressed concerns to [facility]    Assessment: Alton Nurys and this staff had met previously. At that time, she was concerned about her past employer dropping her insurance. Today she was notified, her insurance was dropped in March 2023. - We discussed completing the Financial Assistance application to present to the 's office in the hospital. She expressed willingness to complete as informed. She as also going to follow-up on her Disability application she had started last fall to determine the status after this staff explained the process of determination is not disclosed if there is outstanding information still required. - Emailed her an additional list of possible breat cancer grants we can apply for. She is going to review them online and determine those best fit for her. We will then begin the process of completion.   - This staff will follow-up with her in a week if I do not hear anything beforehand. Recommendation: Alton Nuno will initiate further follow-up based on need.  provided her with my contact information and will continue to follow up as needed.       Woodie Goodpasture, MSW, VINCENT, CIARA  Oncology Social Worker      Electronically signed by Woodie Goodpasture, MSW, LSW, ACHP-SW on 4/27/2023 at 2:04 PM

## 2023-04-27 NOTE — PROGRESS NOTES
Name: Renato Chen  : 1970  MRN: [id number]    Oncology Navigation Follow-Up Note    Contact Type:  Medical Oncology    Notes: Patient asked to see navigator. Lost her insurance coverage on 3/24/23. Has since stopped PT due to cost.  Has left should ROM issues, lymphedema. Had been seen by Fabian Beebe.  Ramos Hernandez notified and will contact patient.     Electronically signed by William Larry RN on 2023 at 10:39 AM

## 2023-05-09 DIAGNOSIS — G62.9 NEUROPATHY: ICD-10-CM

## 2023-05-09 RX ORDER — PREGABALIN 50 MG/1
50 CAPSULE ORAL 2 TIMES DAILY
Qty: 60 CAPSULE | Refills: 1 | Status: SHIPPED | OUTPATIENT
Start: 2023-05-09 | End: 2023-06-08

## 2023-05-09 NOTE — TELEPHONE ENCOUNTER
Patient had to transfer all of medications to this pharmacy due to not being able to get some medications at [facility]. Financially, this is what she needs to do. Please send in refill.

## 2023-05-10 ENCOUNTER — HOSPITAL ENCOUNTER (OUTPATIENT)
Dept: GENERAL RADIOLOGY | Age: 53
Discharge: HOME | End: 2023-05-10
Payer: COMMERCIAL

## 2023-05-10 ENCOUNTER — HOSPITAL ENCOUNTER (OUTPATIENT)
Age: 53
Discharge: HOME | End: 2023-05-10
Payer: COMMERCIAL

## 2023-05-10 DIAGNOSIS — Z02.71 DISABILITY EXAMINATION: ICD-10-CM

## 2023-05-10 PROCEDURE — 72100 X-RAY EXAM L-S SPINE 2/3 VWS: CPT

## 2023-05-10 PROCEDURE — 73560 X-RAY EXAM OF KNEE 1 OR 2: CPT

## 2023-05-24 NOTE — PROGRESS NOTES
ultrasound guided core needle biopsy. 3. Successful marker clip placements with confirmation by digital diagnostic mammogram.            Waiting for pathology results. A final report will be issued when these become available. BI-RADS Final Assessment Category: Waiting for pathology. Management Recommendation: Assess radiologic/pathologic concordance. **This report has been created using voice recognition software. It may contain minor errors which are inherent in voice recognition technology. **       Final report electronically signed by Dr. Lillette Boeck on 2/24/2022 10:00 AM           TTE procedure:ECHOCARDIOGRAM COMPLETE 2D W DOPPLER W COLOR. Procedure Date  Date: 04/22/2021 Start: 09:44 AM     Study Location: Echo Lab  Technical Quality: Limited visualization due to body habitus. Indications:Fatigue and Shortness of breath. Additional Medical History:acute coronary syndrome, chest pain,  hypertension, tobacco abuse, history of myocardial infarction, unstable  angina, coronary artery disease, atrial fibrillation, gastroesophageal  reflux disease, hyperlipidemia, hypothyroid     Patient Status: Routine     Height: 72 inches Weight: 309.01 pounds BSA: 2.56 m^2 BMI: 41.91 kg/m^2     BP: 122/79 mmHg      Conclusions      Summary  4/22/21   Ejection fraction is visually estimated at 60%. Overall left ventricular function is normal.      Signature     Followed by Dr. Casi Roque cardiology; history of proximal A. fib. Echocardiogram 4/11/2022     Conclusions      Summary   Ejection fraction is visually estimated at 60%.    Overall left ventricular function is normal.      Signature      ----------------------------------------------------------------   Electronically signed by Azael Hernandez MD (Interpreting   physician) on 04/11/2022 at 04:02 PM   ----------------------------------------------------------------       PROCEDURES:  -See above  -Screening

## 2023-05-25 ENCOUNTER — HOSPITAL ENCOUNTER (OUTPATIENT)
Dept: INFUSION THERAPY | Age: 53
Discharge: HOME | End: 2023-05-25
Payer: COMMERCIAL

## 2023-05-25 ENCOUNTER — HOSPITAL ENCOUNTER (OUTPATIENT)
Dept: INFUSION THERAPY | Age: 53
End: 2023-05-25

## 2023-05-25 ENCOUNTER — OFFICE VISIT (OUTPATIENT)
Dept: ONCOLOGY | Age: 53
End: 2023-05-25

## 2023-05-25 VITALS
DIASTOLIC BLOOD PRESSURE: 80 MMHG | HEART RATE: 73 BPM | OXYGEN SATURATION: 99 % | SYSTOLIC BLOOD PRESSURE: 143 MMHG | HEIGHT: 72 IN | WEIGHT: 291.4 LBS | BODY MASS INDEX: 39.47 KG/M2 | RESPIRATION RATE: 14 BRPM

## 2023-05-25 VITALS
RESPIRATION RATE: 14 BRPM | OXYGEN SATURATION: 99 % | TEMPERATURE: 98 F | DIASTOLIC BLOOD PRESSURE: 80 MMHG | HEART RATE: 73 BPM | SYSTOLIC BLOOD PRESSURE: 143 MMHG

## 2023-05-25 DIAGNOSIS — I10 ESSENTIAL HYPERTENSION: ICD-10-CM

## 2023-05-25 DIAGNOSIS — G62.9 NEUROPATHY: ICD-10-CM

## 2023-05-25 DIAGNOSIS — G89.29 CHRONIC RIGHT-SIDED THORACIC BACK PAIN: ICD-10-CM

## 2023-05-25 DIAGNOSIS — Z17.1 MALIGNANT NEOPLASM OF LOWER-INNER QUADRANT OF LEFT BREAST IN FEMALE, ESTROGEN RECEPTOR NEGATIVE (HCC): Primary | ICD-10-CM

## 2023-05-25 DIAGNOSIS — M54.6 CHRONIC RIGHT-SIDED THORACIC BACK PAIN: ICD-10-CM

## 2023-05-25 DIAGNOSIS — C50.312 MALIGNANT NEOPLASM OF LOWER-INNER QUADRANT OF LEFT BREAST IN FEMALE, ESTROGEN RECEPTOR NEGATIVE (HCC): Primary | ICD-10-CM

## 2023-05-25 PROCEDURE — 3077F SYST BP >= 140 MM HG: CPT | Performed by: INTERNAL MEDICINE

## 2023-05-25 PROCEDURE — 99211 OFF/OP EST MAY X REQ PHY/QHP: CPT

## 2023-05-25 PROCEDURE — 99214 OFFICE O/P EST MOD 30 MIN: CPT | Performed by: INTERNAL MEDICINE

## 2023-05-25 PROCEDURE — 3079F DIAST BP 80-89 MM HG: CPT | Performed by: INTERNAL MEDICINE

## 2023-05-25 RX ORDER — ATORVASTATIN CALCIUM 40 MG/1
40 TABLET, FILM COATED ORAL DAILY
Qty: 90 TABLET | Refills: 0 | Status: SHIPPED | OUTPATIENT
Start: 2023-05-25

## 2023-05-25 RX ORDER — FLECAINIDE ACETATE 100 MG/1
100 TABLET ORAL 2 TIMES DAILY
Qty: 180 TABLET | Refills: 0 | Status: SHIPPED | OUTPATIENT
Start: 2023-05-25

## 2023-05-25 RX ORDER — METOPROLOL SUCCINATE 100 MG/1
100 TABLET, EXTENDED RELEASE ORAL DAILY
Qty: 90 TABLET | Refills: 0 | Status: SHIPPED | OUTPATIENT
Start: 2023-05-25

## 2023-05-25 NOTE — PATIENT INSTRUCTIONS
Lyrica increased to 100 mg p.o. twice daily  Neurology consultation to assist with neuropathy  MRI thoracic spine  Echocardiogram.  Keep scheduled mammogram for May 31  Follow-up with me June 8.

## 2023-05-25 NOTE — TELEPHONE ENCOUNTER
Krishan Mackey called requesting a refill on the following medications:  Requested Prescriptions     Pending Prescriptions Disp Refills    atorvastatin (LIPITOR) 40 MG tablet 90 tablet 2     Sig: Take 1 tablet by mouth daily    apixaban (ELIQUIS) 5 MG TABS tablet 180 tablet 3     Sig: Take 1 tablet by mouth 2 times daily    flecainide (TAMBOCOR) 100 MG tablet 180 tablet 2     Sig: Take 1 tablet by mouth 2 times daily    metoprolol succinate (TOPROL XL) 100 MG extended release tablet 90 tablet 2     Sig: Take 1 tablet by mouth daily     Pharmacy verified: [facility]  . pv    PT NEEDS HER MEDS SWITCHED TO [facility] NOW    Date of last visit: 7/18/2022  Date of next visit (if applicable): 4/37/2937

## 2023-05-30 ENCOUNTER — HOSPITAL ENCOUNTER (OUTPATIENT)
Dept: NON INVASIVE DIAGNOSTICS | Age: 53
Discharge: HOME | End: 2023-05-30
Attending: INTERNAL MEDICINE

## 2023-05-30 DIAGNOSIS — M54.6 CHRONIC RIGHT-SIDED THORACIC BACK PAIN: ICD-10-CM

## 2023-05-30 DIAGNOSIS — G89.29 CHRONIC RIGHT-SIDED THORACIC BACK PAIN: ICD-10-CM

## 2023-05-30 DIAGNOSIS — C50.312 MALIGNANT NEOPLASM OF LOWER-INNER QUADRANT OF LEFT BREAST IN FEMALE, ESTROGEN RECEPTOR NEGATIVE (HCC): ICD-10-CM

## 2023-05-30 DIAGNOSIS — Z17.1 MALIGNANT NEOPLASM OF LOWER-INNER QUADRANT OF LEFT BREAST IN FEMALE, ESTROGEN RECEPTOR NEGATIVE (HCC): ICD-10-CM

## 2023-05-30 DIAGNOSIS — G62.9 NEUROPATHY: ICD-10-CM

## 2023-05-30 LAB
LV EF: 60 %
LVEF MODALITY: NORMAL

## 2023-05-30 PROCEDURE — 93356 MYOCRD STRAIN IMG SPCKL TRCK: CPT

## 2023-05-30 PROCEDURE — 93306 TTE W/DOPPLER COMPLETE: CPT

## 2023-05-31 ENCOUNTER — HOSPITAL ENCOUNTER (OUTPATIENT)
Dept: WOMENS IMAGING | Age: 53
Discharge: HOME | End: 2023-05-31
Payer: COMMERCIAL

## 2023-05-31 DIAGNOSIS — Z12.31 VISIT FOR SCREENING MAMMOGRAM: ICD-10-CM

## 2023-05-31 PROCEDURE — 77063 BREAST TOMOSYNTHESIS BI: CPT

## 2023-06-06 ENCOUNTER — HOSPITAL ENCOUNTER (OUTPATIENT)
Dept: MRI IMAGING | Age: 53
Discharge: HOME | End: 2023-06-06
Attending: INTERNAL MEDICINE

## 2023-06-06 DIAGNOSIS — G62.9 NEUROPATHY: ICD-10-CM

## 2023-06-06 DIAGNOSIS — M54.6 CHRONIC RIGHT-SIDED THORACIC BACK PAIN: ICD-10-CM

## 2023-06-06 DIAGNOSIS — G89.29 CHRONIC RIGHT-SIDED THORACIC BACK PAIN: ICD-10-CM

## 2023-06-06 PROCEDURE — 6360000004 HC RX CONTRAST MEDICATION: Performed by: INTERNAL MEDICINE

## 2023-06-06 PROCEDURE — A9579 GAD-BASE MR CONTRAST NOS,1ML: HCPCS | Performed by: INTERNAL MEDICINE

## 2023-06-06 PROCEDURE — 72157 MRI CHEST SPINE W/O & W/DYE: CPT

## 2023-06-06 RX ADMIN — GADOTERIDOL 20 ML: 279.3 INJECTION, SOLUTION INTRAVENOUS at 08:55

## 2023-06-08 ENCOUNTER — OFFICE VISIT (OUTPATIENT)
Dept: ONCOLOGY | Age: 53
End: 2023-06-08

## 2023-06-08 ENCOUNTER — HOSPITAL ENCOUNTER (OUTPATIENT)
Dept: INFUSION THERAPY | Age: 53
Discharge: HOME | End: 2023-06-08
Payer: COMMERCIAL

## 2023-06-08 VITALS — SYSTOLIC BLOOD PRESSURE: 141 MMHG | OXYGEN SATURATION: 94 % | TEMPERATURE: 98 F | DIASTOLIC BLOOD PRESSURE: 65 MMHG

## 2023-06-08 VITALS
WEIGHT: 293 LBS | SYSTOLIC BLOOD PRESSURE: 141 MMHG | HEART RATE: 69 BPM | HEIGHT: 72 IN | OXYGEN SATURATION: 94 % | BODY MASS INDEX: 39.68 KG/M2 | TEMPERATURE: 98 F | DIASTOLIC BLOOD PRESSURE: 65 MMHG | RESPIRATION RATE: 16 BRPM

## 2023-06-08 DIAGNOSIS — G62.9 NEUROPATHY: Primary | ICD-10-CM

## 2023-06-08 PROCEDURE — 99211 OFF/OP EST MAY X REQ PHY/QHP: CPT

## 2023-06-08 PROCEDURE — 3078F DIAST BP <80 MM HG: CPT | Performed by: INTERNAL MEDICINE

## 2023-06-08 PROCEDURE — 3077F SYST BP >= 140 MM HG: CPT | Performed by: INTERNAL MEDICINE

## 2023-06-08 PROCEDURE — 99214 OFFICE O/P EST MOD 30 MIN: CPT | Performed by: INTERNAL MEDICINE

## 2023-06-08 RX ORDER — DULOXETIN HYDROCHLORIDE 30 MG/1
30 CAPSULE, DELAYED RELEASE ORAL DAILY
Qty: 30 CAPSULE | Refills: 5 | Status: SHIPPED | OUTPATIENT
Start: 2023-06-08

## 2023-06-08 RX ORDER — PREGABALIN 100 MG/1
100 CAPSULE ORAL 2 TIMES DAILY
Qty: 60 CAPSULE | Refills: 5 | Status: SHIPPED | OUTPATIENT
Start: 2023-06-08 | End: 2023-07-08

## 2023-06-08 NOTE — PROGRESS NOTES
Nausea And Vomiting    Sulfa Antibiotics Nausea And Vomiting and Swelling     Not throat swelling        Adult Illness:  Patient Active Problem List   Diagnosis    ACS (acute coronary syndrome) (HCC)    Chest pain with moderate risk for cardiac etiology    Essential hypertension    HLD (hyperlipidemia)    Tobacco abuse    History of MI (myocardial infarction)    Acquired hypothyroidism    Unstable angina (HCC)    Coronary artery disease involving native coronary artery of native heart without angina pectoris    Paroxysmal atrial fibrillation (HCC)    Gastroesophageal reflux disease without esophagitis    Malignant neoplasm of lower-inner quadrant of left breast in female, estrogen receptor negative ([facility] [address]    Malignant neoplasm of central portion of left breast in female, estrogen receptor negative ([address]    Hematoma      Patient is on Eliquis and aspirin for PAF. Surgery:  Past Surgical History:   Procedure Laterality Date    BREAST LUMPECTOMY Left 3/22/2022    LEFT BREAST LUMPECTOMY PARTIAL MASTECTOMY, SENTINEL LYMPH NODE BIOPSY, PRE OP NEEDLE LOC X 2 performed by Sully Garcia MD at [place] [address] Left 3/30/2022    Evacuation hematoma left axilla performed by Sully Garcia MD at [facility] ARTHROSCOPY Right     08    JENNIFER NEEDLE BIOPSY LYMPH NODE SUPERFICIAL  02/24/2022    JENNIFER BIOPSY LYMPH NODE BY NEEDLE SUPERFICIAL 2/24/2022 Alberto Mujica MD [facility] US GUID NDL BIOPSY LEFT Left 02/24/2022    [facility] [address] LEFT 2/24/2022 Alberto Mujica MD [address] [facility] SURGERY      PORT SURGERY Right 4/13/2022    Single Lumen Smartport Right Subclavian, aspiration of left axillary seroma performed by Sully Garcia MD at [address] [place] [address] Right 4/29/2022    Mediport Right Subclavian Removal and Replacement.  performed by Sully Garcia MD at [address] Left

## 2023-06-08 NOTE — PATIENT INSTRUCTIONS
E prescription for Lyrica 100 mg p.o. twice daily  Start Cymbalta E prescription 30 mg p.o. once daily  Keep neurology consultation scheduled for 7/27.   Follow-up with me in mid August.

## 2023-07-13 ENCOUNTER — PROCEDURE VISIT (OUTPATIENT)
Dept: SURGERY | Age: 53
End: 2023-07-13

## 2023-07-13 VITALS
BODY MASS INDEX: 39.68 KG/M2 | WEIGHT: 293 LBS | OXYGEN SATURATION: 100 % | DIASTOLIC BLOOD PRESSURE: 78 MMHG | HEART RATE: 81 BPM | SYSTOLIC BLOOD PRESSURE: 126 MMHG | HEIGHT: 72 IN | TEMPERATURE: 97.6 F

## 2023-07-13 DIAGNOSIS — C50.912 MALIGNANT NEOPLASM OF LEFT FEMALE BREAST, UNSPECIFIED ESTROGEN RECEPTOR STATUS, UNSPECIFIED SITE OF BREAST (HCC): Primary | ICD-10-CM

## 2023-07-13 PROCEDURE — 36590 REMOVAL TUNNELED CV CATH: CPT | Performed by: SURGERY

## 2023-07-13 NOTE — PROGRESS NOTES
Ambulatory Procedure Note    Patient name: Yan Villarreal  Medical Record Number: [id number]  Primary Care Physician: Linda Santana    Date of Procedure: 7/13/2023    Pre-operative Diagnosis: Breast cancer completed adjuvant chemotherapy    Post-operative Diagnosis: Same    Procedure: Removal right subclavian purple PowerPort    Anesthesia: Local 2% lidocaine for mL    Surgeons/Assistants: Olt    Estimated Blood Loss: Less than 10 ml    Complications: none immediately appreciated    Specimens: Port removed with catheter attached intact and disposed    Procedure: In the office, the pt was placed supine on the procedure table. Consent was given by the patient. The right upper chest wall was prepped and draped. Timeout performed. After infiltrating the skin incision overlying the port with local anesthetic incision was made with #15 scalpel blade. Sharp dissection was carried down to the hub of the port was elevated upward the fibrous sheath surrounding the catheter was then removed it did take some traction the catheter was removed intact. The port was then removed sharply at its fibrous sheath. Port with catheter attached was removed intact. Wound was then closed in layers absorbable suture. Catheter tract was suture-ligated. Skin was closed by subcuticular 4-0 Vicryl suture. Skin glue was applied. Patient tolerated the procedure well. She was counseled to hold her blood thinning medication for at least 48 hours.         Jurline Hammans, MD

## 2023-07-17 ENCOUNTER — OFFICE VISIT (OUTPATIENT)
Dept: CARDIOLOGY CLINIC | Age: 53
End: 2023-07-17

## 2023-07-17 VITALS
WEIGHT: 293 LBS | SYSTOLIC BLOOD PRESSURE: 120 MMHG | HEART RATE: 73 BPM | DIASTOLIC BLOOD PRESSURE: 78 MMHG | BODY MASS INDEX: 39.68 KG/M2 | HEIGHT: 72 IN

## 2023-07-17 DIAGNOSIS — I48.0 PAROXYSMAL ATRIAL FIBRILLATION (HCC): ICD-10-CM

## 2023-07-17 DIAGNOSIS — I25.10 CORONARY ARTERY DISEASE INVOLVING NATIVE CORONARY ARTERY OF NATIVE HEART WITHOUT ANGINA PECTORIS: Primary | ICD-10-CM

## 2023-07-17 PROCEDURE — 93000 ELECTROCARDIOGRAM COMPLETE: CPT | Performed by: NUCLEAR MEDICINE

## 2023-07-17 PROCEDURE — 3074F SYST BP LT 130 MM HG: CPT | Performed by: NUCLEAR MEDICINE

## 2023-07-17 PROCEDURE — 3078F DIAST BP <80 MM HG: CPT | Performed by: NUCLEAR MEDICINE

## 2023-07-17 PROCEDURE — 99213 OFFICE O/P EST LOW 20 MIN: CPT | Performed by: NUCLEAR MEDICINE

## 2023-07-17 NOTE — PROGRESS NOTES
orders of the defined types were placed in this encounter. Discussed use, benefit, and side effects of prescribed medications. All patient questions answered. Pt voicedunderstanding. Instructed to continue current medications, diet and exercise. Continue risk factor modification and medical management. Patient agreed with treatment plan. Follow up as directed.     Electronically signedby Robert Sanchez MD on 7/17/2023 at 11:49 AM

## 2023-07-27 ENCOUNTER — OFFICE VISIT (OUTPATIENT)
Dept: NEUROLOGY | Age: 53
End: 2023-07-27

## 2023-07-27 VITALS
OXYGEN SATURATION: 96 % | HEIGHT: 72 IN | DIASTOLIC BLOOD PRESSURE: 80 MMHG | HEART RATE: 71 BPM | SYSTOLIC BLOOD PRESSURE: 115 MMHG | WEIGHT: 293 LBS | BODY MASS INDEX: 39.68 KG/M2

## 2023-07-27 DIAGNOSIS — R20.2 NUMBNESS AND TINGLING OF BOTH FEET: Primary | ICD-10-CM

## 2023-07-27 DIAGNOSIS — R20.0 NUMBNESS AND TINGLING IN BOTH HANDS: ICD-10-CM

## 2023-07-27 DIAGNOSIS — R20.2 NUMBNESS AND TINGLING IN BOTH HANDS: ICD-10-CM

## 2023-07-27 DIAGNOSIS — R20.0 NUMBNESS AND TINGLING OF BOTH FEET: Primary | ICD-10-CM

## 2023-07-27 DIAGNOSIS — M79.672 BILATERAL FOOT PAIN: ICD-10-CM

## 2023-07-27 DIAGNOSIS — M79.671 BILATERAL FOOT PAIN: ICD-10-CM

## 2023-07-27 PROCEDURE — 3078F DIAST BP <80 MM HG: CPT | Performed by: PSYCHIATRY & NEUROLOGY

## 2023-07-27 PROCEDURE — 3074F SYST BP LT 130 MM HG: CPT | Performed by: PSYCHIATRY & NEUROLOGY

## 2023-07-27 PROCEDURE — 99205 OFFICE O/P NEW HI 60 MIN: CPT | Performed by: PSYCHIATRY & NEUROLOGY

## 2023-07-27 RX ORDER — OXCARBAZEPINE 150 MG/1
150 TABLET, FILM COATED ORAL 2 TIMES DAILY
Qty: 60 TABLET | Refills: 3 | Status: SHIPPED | OUTPATIENT
Start: 2023-07-27

## 2023-07-27 NOTE — PATIENT INSTRUCTIONS
Start Trileptal 150 mg two times a day  Vitamin B12, folate  Vitamin B6 level  Copper level   SPEP  Anti MAG  IgM MGUS  Call with any new symptoms or concerns. Follow up in 1 month.

## 2023-08-01 ENCOUNTER — NURSE ONLY (OUTPATIENT)
Dept: LAB | Age: 53
End: 2023-08-01

## 2023-08-01 DIAGNOSIS — R20.0 NUMBNESS AND TINGLING OF BOTH FEET: ICD-10-CM

## 2023-08-01 DIAGNOSIS — M79.672 BILATERAL FOOT PAIN: ICD-10-CM

## 2023-08-01 DIAGNOSIS — R20.2 NUMBNESS AND TINGLING IN BOTH HANDS: ICD-10-CM

## 2023-08-01 DIAGNOSIS — R20.0 NUMBNESS AND TINGLING IN BOTH HANDS: ICD-10-CM

## 2023-08-01 DIAGNOSIS — R20.2 NUMBNESS AND TINGLING OF BOTH FEET: ICD-10-CM

## 2023-08-01 DIAGNOSIS — M79.671 BILATERAL FOOT PAIN: ICD-10-CM

## 2023-08-02 ENCOUNTER — TELEPHONE (OUTPATIENT)
Dept: NEUROLOGY | Age: 53
End: 2023-08-02

## 2023-08-02 LAB
FOLATE SERPL-MCNC: 15.6 NG/ML (ref 4.8–24.2)
VIT B12 SERPL-MCNC: 210 PG/ML (ref 211–911)

## 2023-08-02 NOTE — TELEPHONE ENCOUNTER
----- Message from TSOHIA Lopez CNP sent at 8/2/2023  7:58 AM EDT -----  Please let patient know her vitamin B12 level is low=210. She needs to start vitamin B12 sublingual supplements at least 3000 mcg daily over the counter.   Please ask her to call the office in 2-3 months to obtain repeat vitamin B12 level  Jamila Fajardo CNP

## 2023-08-04 LAB
COPPER SERPL-MCNC: 112.4 UG/DL (ref 80–155)
MAG IGM SER IA-ACNC: NORMAL

## 2023-08-05 LAB
IMMUNOFIXATION WITH QUANT: NORMAL
PROTEIN ELECTROPHORESIS, SERUM: NORMAL
PYRIDOXAL PHOS SERPL-SCNC: 26.6 NMOL/L (ref 20–125)

## 2023-08-07 ENCOUNTER — HOSPITAL ENCOUNTER (EMERGENCY)
Age: 53
Discharge: HOME | End: 2023-08-07
Attending: EMERGENCY MEDICINE

## 2023-08-07 ENCOUNTER — APPOINTMENT (OUTPATIENT)
Dept: GENERAL RADIOLOGY | Age: 53
End: 2023-08-07

## 2023-08-07 VITALS
DIASTOLIC BLOOD PRESSURE: 70 MMHG | BODY MASS INDEX: 39.68 KG/M2 | SYSTOLIC BLOOD PRESSURE: 113 MMHG | TEMPERATURE: 97.9 F | OXYGEN SATURATION: 99 % | WEIGHT: 293 LBS | HEIGHT: 72 IN | RESPIRATION RATE: 12 BRPM | HEART RATE: 70 BPM

## 2023-08-07 DIAGNOSIS — R00.2 PALPITATIONS: Primary | ICD-10-CM

## 2023-08-07 LAB
ANION GAP SERPL CALC-SCNC: 11 MEQ/L (ref 8–16)
BASOPHILS ABSOLUTE: 0 THOU/MM3 (ref 0–0.1)
BASOPHILS NFR BLD AUTO: 0.6 %
BUN SERPL-MCNC: 11 MG/DL (ref 7–22)
CALCIUM SERPL-MCNC: 8.9 MG/DL (ref 8.5–10.5)
CHLORIDE SERPL-SCNC: 104 MEQ/L (ref 98–111)
CO2 SERPL-SCNC: 25 MEQ/L (ref 23–33)
CREAT SERPL-MCNC: 0.5 MG/DL (ref 0.4–1.2)
DEPRECATED RDW RBC AUTO: 46.4 FL (ref 35–45)
EOSINOPHIL NFR BLD AUTO: 3.5 %
EOSINOPHILS ABSOLUTE: 0.2 THOU/MM3 (ref 0–0.4)
ERYTHROCYTE [DISTWIDTH] IN BLOOD BY AUTOMATED COUNT: 12.9 % (ref 11.5–14.5)
GFR SERPL CREATININE-BSD FRML MDRD: > 60 ML/MIN/1.73M2
GLUCOSE SERPL-MCNC: 110 MG/DL (ref 70–108)
HCT VFR BLD AUTO: 44.3 % (ref 37–47)
HGB BLD-MCNC: 14.3 GM/DL (ref 12–16)
IMM GRANULOCYTES # BLD AUTO: 0.02 THOU/MM3 (ref 0–0.07)
IMM GRANULOCYTES NFR BLD AUTO: 0.3 %
LYMPHOCYTES ABSOLUTE: 1.4 THOU/MM3 (ref 1–4.8)
LYMPHOCYTES NFR BLD AUTO: 21.2 %
MCH RBC QN AUTO: 31.6 PG (ref 26–33)
MCHC RBC AUTO-ENTMCNC: 32.3 GM/DL (ref 32.2–35.5)
MCV RBC AUTO: 97.8 FL (ref 81–99)
MONOCYTES ABSOLUTE: 0.5 THOU/MM3 (ref 0.4–1.3)
MONOCYTES NFR BLD AUTO: 6.9 %
NEUTROPHILS NFR BLD AUTO: 67.5 %
NRBC BLD AUTO-RTO: 0 /100 WBC
NT-PROBNP SERPL IA-MCNC: 147.5 PG/ML (ref 0–124)
OSMOLALITY SERPL CALC.SUM OF ELEC: 279.4 MOSMOL/KG (ref 275–300)
PLATELET # BLD AUTO: 141 THOU/MM3 (ref 130–400)
PMV BLD AUTO: 9.9 FL (ref 9.4–12.4)
POTASSIUM SERPL-SCNC: 3.8 MEQ/L (ref 3.5–5.2)
RBC # BLD AUTO: 4.53 MILL/MM3 (ref 4.2–5.4)
SEGMENTED NEUTROPHILS ABSOLUTE COUNT: 4.6 THOU/MM3 (ref 1.8–7.7)
SODIUM SERPL-SCNC: 140 MEQ/L (ref 135–145)
T4 FREE SERPL-MCNC: 0.96 NG/DL (ref 0.93–1.76)
TROPONIN, HIGH SENSITIVITY: < 6 NG/L (ref 0–12)
TSH SERPL DL<=0.005 MIU/L-ACNC: 5.37 UIU/ML (ref 0.4–4.2)
WBC # BLD AUTO: 6.8 THOU/MM3 (ref 4.8–10.8)

## 2023-08-07 PROCEDURE — 80048 BASIC METABOLIC PNL TOTAL CA: CPT

## 2023-08-07 PROCEDURE — 84443 ASSAY THYROID STIM HORMONE: CPT

## 2023-08-07 PROCEDURE — 85025 COMPLETE CBC W/AUTO DIFF WBC: CPT

## 2023-08-07 PROCEDURE — 36415 COLL VENOUS BLD VENIPUNCTURE: CPT

## 2023-08-07 PROCEDURE — 84439 ASSAY OF FREE THYROXINE: CPT

## 2023-08-07 PROCEDURE — 93005 ELECTROCARDIOGRAM TRACING: CPT | Performed by: EMERGENCY MEDICINE

## 2023-08-07 PROCEDURE — 99285 EMERGENCY DEPT VISIT HI MDM: CPT

## 2023-08-07 PROCEDURE — 84484 ASSAY OF TROPONIN QUANT: CPT

## 2023-08-07 PROCEDURE — 83880 ASSAY OF NATRIURETIC PEPTIDE: CPT

## 2023-08-07 PROCEDURE — 71046 X-RAY EXAM CHEST 2 VIEWS: CPT

## 2023-08-07 ASSESSMENT — PAIN - FUNCTIONAL ASSESSMENT: PAIN_FUNCTIONAL_ASSESSMENT: NONE - DENIES PAIN

## 2023-08-07 NOTE — ED TRIAGE NOTES
Pt arrives via EMS with c/o heart palpitations beginning around 1100 while at rest. Pt reports hx Afib and MI. Pt states she has palpitations often but they were more frequent today. Pt taking Eliquis. Pt reports taking all medications as prescribed. Pt denies any pain. Pt does reports ~30lb weight gain in the last few months. Pt reports beginning Trileptal a few weeks ago.

## 2023-08-07 NOTE — DISCHARGE INSTRUCTIONS
You were seen here today for palpitations. Continue to take your heart medications as prescribed. Follow-up with your cardiologist within the next few days for further evaluation. Return here if symptoms worsen, return here if you develop chest pain or difficulty breathing.

## 2023-08-08 PROCEDURE — 93010 ELECTROCARDIOGRAM REPORT: CPT | Performed by: INTERNAL MEDICINE

## 2023-08-11 LAB
EKG ATRIAL RATE: 68 BPM
EKG P AXIS: 46 DEGREES
EKG P-R INTERVAL: 212 MS
EKG Q-T INTERVAL: 408 MS
EKG QRS DURATION: 72 MS
EKG QTC CALCULATION (BAZETT): 433 MS
EKG T AXIS: 21 DEGREES
EKG VENTRICULAR RATE: 68 BPM

## 2023-08-14 ENCOUNTER — OFFICE VISIT (OUTPATIENT)
Dept: CARDIOLOGY CLINIC | Age: 53
End: 2023-08-14

## 2023-08-14 VITALS
DIASTOLIC BLOOD PRESSURE: 71 MMHG | BODY MASS INDEX: 39.68 KG/M2 | WEIGHT: 293 LBS | HEART RATE: 74 BPM | SYSTOLIC BLOOD PRESSURE: 117 MMHG | HEIGHT: 72 IN

## 2023-08-14 DIAGNOSIS — T50.905A WEIGHT GAIN DUE TO MEDICATION: ICD-10-CM

## 2023-08-14 DIAGNOSIS — R63.5 WEIGHT GAIN DUE TO MEDICATION: ICD-10-CM

## 2023-08-14 DIAGNOSIS — E78.01 FAMILIAL HYPERCHOLESTEROLEMIA: ICD-10-CM

## 2023-08-14 DIAGNOSIS — I10 ESSENTIAL HYPERTENSION: ICD-10-CM

## 2023-08-14 DIAGNOSIS — I10 PRIMARY HYPERTENSION: ICD-10-CM

## 2023-08-14 DIAGNOSIS — I48.0 PAROXYSMAL ATRIAL FIBRILLATION (HCC): Primary | ICD-10-CM

## 2023-08-14 PROCEDURE — 3074F SYST BP LT 130 MM HG: CPT | Performed by: NURSE PRACTITIONER

## 2023-08-14 PROCEDURE — 3078F DIAST BP <80 MM HG: CPT | Performed by: NURSE PRACTITIONER

## 2023-08-14 PROCEDURE — 99213 OFFICE O/P EST LOW 20 MIN: CPT | Performed by: NURSE PRACTITIONER

## 2023-08-14 RX ORDER — FUROSEMIDE 20 MG/1
20 TABLET ORAL DAILY PRN
Qty: 10 TABLET | Refills: 3 | Status: SHIPPED | OUTPATIENT
Start: 2023-08-14

## 2023-08-14 NOTE — PROGRESS NOTES
POSTPROCEDURE MAMMOGRAM:       Images of the left include a CC view and MLO view. Tomosynthesis. CAD was utilized. There are scattered fibroglandular elements in the breast(s) that could obscure a lesion on mammography. Post procedure mammograms were taken in a separate room. There is a U shaped biopsy clip at the biopsy site 1, 12:00, anterior depth. There is a    barbell clip in the inner central left breast, within the mammographic mass. This corresponds with the original mammographic density. There is a tribell clip in the left axilla. There are no other significant findings in the breast.               Impression   1. Successful left breast ultrasound guided core needle biopsy, 2 sites. 2. Successful left axillary lymph node ultrasound guided core needle biopsy. 3. Successful marker clip placements with confirmation by digital diagnostic mammogram.            Waiting for pathology results. A final report will be issued when these become available. BI-RADS Final Assessment Category: Waiting for pathology. Management Recommendation: Assess radiologic/pathologic concordance. **This report has been created using voice recognition software. It may contain minor errors which are inherent in voice recognition technology. **       Final report electronically signed by Dr. Lenka Barakat on 2/24/2022 10:00 AM           TTE procedure:ECHOCARDIOGRAM COMPLETE 2D W DOPPLER W COLOR. Procedure Date  Date: 04/22/2021 Start: 09:44 AM     Study Location: Echo Lab  Technical Quality: Limited visualization due to body habitus. Indications:Fatigue and Shortness of breath.      Additional Medical History:acute coronary syndrome, chest pain,  hypertension, tobacco abuse, history of myocardial infarction, unstable  angina, coronary artery disease, atrial fibrillation, gastroesophageal  reflux disease, hyperlipidemia, hypothyroid     Patient Status: Routine     Height: 72 inches

## 2023-08-14 NOTE — PATIENT INSTRUCTIONS
Follow-up with your oncologist and neurologist regarding medications and water retention. Have the labs drawn tomorrow. Use the lasix as prescribed - take 10 meq potassium if you take full 20 mg of lasix. Continue other current medications as prescribed. Stay as active as you can. Call if palpitations return. Follow-up with your PCP as scheduled. Follow-up with      as scheduled or sooner if need.

## 2023-08-14 NOTE — PROGRESS NOTES
Patient here for an ed follow up    EKG done 8/11/2023    Denies cp, sob, dizziness and SARAHY     C/o palpitations
Maintenance   Topic Date Due    COVID-19 Vaccine (1) Never done    Pneumococcal 0-64 years Vaccine (1 - PCV) Never done    Depression Screen  Never done    HIV screen  Never done    Hepatitis C screen  Never done    DTaP/Tdap/Td vaccine (1 - Tdap) Never done    Shingles vaccine (1 of 2) Never done    Colorectal Cancer Screen  Never done    Diabetes screen  02/01/2022    Lipids  12/21/2022    Breast cancer screen  05/31/2024    Cervical cancer screen  03/21/2026    Hepatitis A vaccine  Aged Out    Hib vaccine  Aged Out    Meningococcal (ACWY) vaccine  Aged Out    Flu vaccine  Discontinued     Today's visit:   Subjective[de-identified]  Was having bursts of heavy palpitations every 30 - 40 minutes  BP lower; HR lower; started to feel funny in chest - heavy pounding with the irregular beats; different than her palpitations with the afib  Only thing new was the Trileptal a couple weeks earlier - started for Neuropathy  Has not had any such incidences since  Worst has been the water weight gain with the Trileptal > 25# now  Her eating habits have not changed  Having labs tomorrow for oncology  Still has some lymphedema in L arm and L chest  TSH was elevated - dose adjusted per PCP just couple days ago - finished radiation - first time thyroid has had to be adjusted in years  Can feel the overall bloating from water weight gain - weight normally running 260's - just feels heavy    Subjective:  General:   No fever, no chills, some  fatigue; (+) weight gain  Pulmonary:    No dyspnea, no wheezing  Cardiac:    Denies recent chest pain, palpitations as above  GI:     No nausea or vomiting, no abdominal pain  Neuro:      No dizziness or light headedness,   Musculoskeletal:  No recent active issues - less easily active due to weight gain  Extremities:   No edema, no obvious claudication - (+) swelling L upper arm into shoulder, L anterior chest wall and L breast.      Objective:  General:   Well developed, well nourished; appears somewhat

## 2023-08-15 ENCOUNTER — OFFICE VISIT (OUTPATIENT)
Dept: ONCOLOGY | Age: 53
End: 2023-08-15

## 2023-08-15 ENCOUNTER — HOSPITAL ENCOUNTER (OUTPATIENT)
Dept: INFUSION THERAPY | Age: 53
Discharge: HOME | End: 2023-08-15
Payer: COMMERCIAL

## 2023-08-15 ENCOUNTER — TELEPHONE (OUTPATIENT)
Dept: NEUROLOGY | Age: 53
End: 2023-08-15

## 2023-08-15 VITALS
HEART RATE: 73 BPM | DIASTOLIC BLOOD PRESSURE: 77 MMHG | OXYGEN SATURATION: 99 % | TEMPERATURE: 97.9 F | SYSTOLIC BLOOD PRESSURE: 127 MMHG | RESPIRATION RATE: 16 BRPM

## 2023-08-15 VITALS
TEMPERATURE: 97.9 F | BODY MASS INDEX: 41.91 KG/M2 | WEIGHT: 293 LBS | DIASTOLIC BLOOD PRESSURE: 77 MMHG | HEART RATE: 73 BPM | RESPIRATION RATE: 16 BRPM | SYSTOLIC BLOOD PRESSURE: 127 MMHG | OXYGEN SATURATION: 99 %

## 2023-08-15 DIAGNOSIS — I89.0 LYMPHEDEMA: Primary | ICD-10-CM

## 2023-08-15 DIAGNOSIS — I48.0 PAROXYSMAL ATRIAL FIBRILLATION (HCC): ICD-10-CM

## 2023-08-15 DIAGNOSIS — T50.905A WEIGHT GAIN DUE TO MEDICATION: ICD-10-CM

## 2023-08-15 DIAGNOSIS — R63.5 WEIGHT GAIN DUE TO MEDICATION: ICD-10-CM

## 2023-08-15 LAB
ANION GAP SERPL CALC-SCNC: 11 MEQ/L (ref 8–16)
BUN SERPL-MCNC: 13 MG/DL (ref 7–22)
CALCIUM SERPL-MCNC: 9.1 MG/DL (ref 8.5–10.5)
CHLORIDE SERPL-SCNC: 105 MEQ/L (ref 98–111)
CO2 SERPL-SCNC: 25 MEQ/L (ref 23–33)
CREAT SERPL-MCNC: 0.6 MG/DL (ref 0.4–1.2)
GFR SERPL CREATININE-BSD FRML MDRD: > 60 ML/MIN/1.73M2
GLUCOSE SERPL-MCNC: 101 MG/DL (ref 70–108)
MAGNESIUM SERPL-MCNC: 2.5 MG/DL (ref 1.6–2.4)
POTASSIUM SERPL-SCNC: 4.9 MEQ/L (ref 3.5–5.2)
SODIUM SERPL-SCNC: 141 MEQ/L (ref 135–145)

## 2023-08-15 PROCEDURE — 80048 BASIC METABOLIC PNL TOTAL CA: CPT

## 2023-08-15 PROCEDURE — 36415 COLL VENOUS BLD VENIPUNCTURE: CPT

## 2023-08-15 PROCEDURE — 99211 OFF/OP EST MAY X REQ PHY/QHP: CPT

## 2023-08-15 PROCEDURE — 99214 OFFICE O/P EST MOD 30 MIN: CPT | Performed by: INTERNAL MEDICINE

## 2023-08-15 PROCEDURE — 83735 ASSAY OF MAGNESIUM: CPT

## 2023-08-15 PROCEDURE — 3078F DIAST BP <80 MM HG: CPT | Performed by: INTERNAL MEDICINE

## 2023-08-15 PROCEDURE — 3074F SYST BP LT 130 MM HG: CPT | Performed by: INTERNAL MEDICINE

## 2023-08-15 NOTE — TELEPHONE ENCOUNTER
Stop Trileptal, very unlikely to be related to her symptoms. If after a day of stopping the medication, she is not better, then she needs to visit her eye Dr and call the Dr taking care of the Afib.   Sharron Sanchez MD

## 2023-08-15 NOTE — TELEPHONE ENCOUNTER
Patient called stating she was started on Trileptal 150 mg twice a day at last visit. She is having increase in heart palpitations since starting the Trileptal. She has history of afib and was seen in [facility] ED on 8/7/23 due to palpitations. She is having new blurred vision for the past 1 week. Last dilated eye exam was 2 years ago. She is concerned the Trileptal is causing worsening afib and new blurred vision. She does not feel any benefit in her numbness since starting the Trileptal. Please advise. Thank you.

## 2023-08-15 NOTE — PATIENT INSTRUCTIONS
Routine follow-up with me in 3 months. Keep neurology follow-up 9/29/2023. DME durable medical equipment order for lymphedema for a compression sleeve left arm.

## 2023-09-08 DIAGNOSIS — I10 ESSENTIAL HYPERTENSION: ICD-10-CM

## 2023-09-08 RX ORDER — FLECAINIDE ACETATE 100 MG/1
100 TABLET ORAL 2 TIMES DAILY
Qty: 180 TABLET | Refills: 0 | Status: SHIPPED | OUTPATIENT
Start: 2023-09-08

## 2023-09-08 RX ORDER — METOPROLOL SUCCINATE 100 MG/1
100 TABLET, EXTENDED RELEASE ORAL DAILY
Qty: 90 TABLET | Refills: 0 | Status: SHIPPED | OUTPATIENT
Start: 2023-09-08

## 2023-09-08 RX ORDER — ATORVASTATIN CALCIUM 40 MG/1
40 TABLET, FILM COATED ORAL DAILY
Qty: 90 TABLET | Refills: 0 | Status: SHIPPED | OUTPATIENT
Start: 2023-09-08

## 2023-09-14 ENCOUNTER — TELEPHONE (OUTPATIENT)
Dept: CARDIOLOGY CLINIC | Age: 53
End: 2023-09-14

## 2023-09-14 NOTE — TELEPHONE ENCOUNTER
Called pt to inform of Eliquis approval. Pt stated pharmacy called to confirm shipment address, and delivery by 9/18/23. Pt stated she had phone number for BMS and verbalized understanding of who to call for questions.

## 2023-10-05 ENCOUNTER — OFFICE VISIT (OUTPATIENT)
Dept: SURGERY | Age: 53
End: 2023-10-05

## 2023-10-05 VITALS
SYSTOLIC BLOOD PRESSURE: 120 MMHG | OXYGEN SATURATION: 100 % | DIASTOLIC BLOOD PRESSURE: 60 MMHG | BODY MASS INDEX: 39.68 KG/M2 | RESPIRATION RATE: 16 BRPM | TEMPERATURE: 97.4 F | WEIGHT: 293 LBS | HEART RATE: 80 BPM | HEIGHT: 72 IN

## 2023-10-05 DIAGNOSIS — L73.2 HIDRADENITIS AXILLARIS: ICD-10-CM

## 2023-10-05 DIAGNOSIS — I48.0 PAROXYSMAL ATRIAL FIBRILLATION (HCC): ICD-10-CM

## 2023-10-05 DIAGNOSIS — C50.312 MALIGNANT NEOPLASM OF LOWER-INNER QUADRANT OF LEFT BREAST IN FEMALE, ESTROGEN RECEPTOR NEGATIVE (HCC): Primary | ICD-10-CM

## 2023-10-05 DIAGNOSIS — I10 ESSENTIAL HYPERTENSION: ICD-10-CM

## 2023-10-05 DIAGNOSIS — Z17.1 MALIGNANT NEOPLASM OF LOWER-INNER QUADRANT OF LEFT BREAST IN FEMALE, ESTROGEN RECEPTOR NEGATIVE (HCC): Primary | ICD-10-CM

## 2023-10-05 PROCEDURE — 3078F DIAST BP <80 MM HG: CPT | Performed by: SURGERY

## 2023-10-05 PROCEDURE — 99214 OFFICE O/P EST MOD 30 MIN: CPT | Performed by: SURGERY

## 2023-10-05 PROCEDURE — 3074F SYST BP LT 130 MM HG: CPT | Performed by: SURGERY

## 2023-10-05 RX ORDER — LEVOTHYROXINE SODIUM 137 UG/1
TABLET ORAL
COMMUNITY
Start: 2023-08-08

## 2023-10-05 ASSESSMENT — ENCOUNTER SYMPTOMS
ABDOMINAL PAIN: 0
BACK PAIN: 1
SHORTNESS OF BREATH: 0
SORE THROAT: 0
BLOOD IN STOOL: 0
NAUSEA: 0
WHEEZING: 0
COLOR CHANGE: 0
TROUBLE SWALLOWING: 0
VOMITING: 0
CHEST TIGHTNESS: 0
COUGH: 0

## 2023-10-05 NOTE — PROGRESS NOTES
IMPRESSION:  No mammographic evidence of malignancy. A 1 year screening mammogram is recommended. A result letter will be sent to the patient. She will also receive a reminder 1 month prior to her next mammogram.     BI-RADS CATEGORY 2: BENIGN FINDINGS. Management Recommendation: Routine annual mammography. **This report has been created using voice recognition software. It may contain minor errors which are inherent in voice recognition technology. **     Final report electronically signed by Dr. Lita De Leon on 6/1/2023 11:56 PM               MRI THORACIC SPINE W WO CONTRAST  Order: [id number]  Status: Final result     Visible to patient: Yes (seen)     Next appt: 11/14/2023 at 02:30 PM in Cardiology (Mendy Glasgow, APRN - CNP)     Dx: Neuropathy; Chronic right-sided thora. ..     0 Result Notes  Details    Reading Physician Reading Date Result Priority   Mark Rosales, DO 6/6/2023      Narrative & Impression  PROCEDURE: MRI THORACIC SPINE W WO CONTRAST     CLINICAL INFORMATION: Chronic right-sided thoracic back pain, Chronic right-sided thoracic back pain, Neuropathy. Back pain with neuropathy. COMPARISON: No prior study. TECHNIQUE: Sagittal T1, T2 and STIR sequences were obtained through the thoracic spine. Axial T2-weighted images were obtained through the discs. Postcontrast sagittal T1-weighted images were obtained. 20 mL ProHance was injected in the right hand. FINDINGS:   There is a mildly exaggerated thoracic kyphosis. There is otherwise anatomic vertebral body height and alignment. Marrow signal is within normal limits. The thoracic spinal cord is normal in caliber without convincing focal area of abnormal T2 signal.   Paraspinal soft tissues are unremarkable. Intervertebral discs appear preserved. At T3-4 there is no significant spinal canal stenosis. There is mild bilateral foraminal stenosis in association with facet hypertrophy and ligament flavum thickening.   At

## 2023-10-06 ENCOUNTER — HOSPITAL ENCOUNTER (OUTPATIENT)
Dept: RADIATION ONCOLOGY | Age: 53
Discharge: HOME | End: 2023-10-06
Payer: COMMERCIAL

## 2023-10-06 VITALS
OXYGEN SATURATION: 97 % | RESPIRATION RATE: 16 BRPM | WEIGHT: 293 LBS | DIASTOLIC BLOOD PRESSURE: 64 MMHG | HEART RATE: 75 BPM | SYSTOLIC BLOOD PRESSURE: 109 MMHG | TEMPERATURE: 97.6 F | BODY MASS INDEX: 41.23 KG/M2

## 2023-10-06 PROCEDURE — 99215 OFFICE O/P EST HI 40 MIN: CPT

## 2023-10-06 PROCEDURE — 99212 OFFICE O/P EST SF 10 MIN: CPT

## 2023-10-06 ASSESSMENT — ENCOUNTER SYMPTOMS
ABDOMINAL PAIN: 0
SHORTNESS OF BREATH: 0
BACK PAIN: 0
TROUBLE SWALLOWING: 0
BLOOD IN STOOL: 0
NAUSEA: 0
DIARRHEA: 0
RECTAL PAIN: 0
COUGH: 1

## 2023-10-06 ASSESSMENT — PAIN DESCRIPTION - LOCATION: LOCATION: GENERALIZED

## 2023-10-06 ASSESSMENT — PAIN SCALES - GENERAL: PAINLEVEL_OUTOF10: 5

## 2023-10-06 NOTE — PROGRESS NOTES
[facility]           [id number] [place] [address], [address] [place] [address], INC.: [phone]        F: [phone]       [email or web address]            FOLLOW UP NOTE    Date of Service: 10/6/2023  Patient ID: Xavi Bell   : 1970  MRN: [id number]   Acct Number: [de-identified]       DATE OF SERVICE: 10/6/2023   LOCATION: [facility]  PROVIDER: Micki Medina PA-C    FOLLOW UP PHYSICIANS: Dr. Mari Hashimoto ([facility]) Dr. Parker Vicente (Surgery)    ASSESSMENT AND PLAN:   Cancer Staging   Malignant neoplasm of lower-inner quadrant of left breast in female, estrogen receptor negative ([address])  Staging form: Breast, AJCC 8th Edition  - Clinical stage from 3/22/2022: Stage IB (cT1c, cN0(f), cM0, G3, ER-, MA-, HER2-) - Signed by Jake Cline MD on 3/22/2022  - Pathologic stage from 3/29/2022: Stage IIA (pT2, pN0(sn), cM0, G3, ER-, MA-, HER2-) - Signed by Jake Cline MD on 3/29/2022      - Xavi Bell is a 48 y.o. female who presents today for regularly-scheduled follow-up for her left breast cancer. She underwent adjuvant radiation to the left breast, regional lymph nodes/IMNs on 22  - She appears to be doing fair symptomatically. She notes lymphedema and tenderness of the left breast and arm, with some left shoulder heaviness, but without new breast or axillary lumps, nipple discharge or inversion, and any recent skin changes in the arms or breasts. She also endorses neurologic symptoms, such as paresthesias/neuropathy of the right mid-back, hands, and feet, overall stable. She notes particular clumsiness/heaviness of the right hand and leg, with a few recent mechanical falls which she attributes to this. She denies true back pain, bowel/bladder incontinence/retention, saddle anesthesia. She has been seeing neurology for evaluation  - No concerning findings for recurrence on clinical breast exam today.  No concerning findings on neurologic

## 2023-11-14 ENCOUNTER — OFFICE VISIT (OUTPATIENT)
Dept: CARDIOLOGY CLINIC | Age: 53
End: 2023-11-14

## 2023-11-14 ENCOUNTER — HOSPITAL ENCOUNTER (OUTPATIENT)
Dept: INFUSION THERAPY | Age: 53
Discharge: HOME | End: 2023-11-14

## 2023-11-14 ENCOUNTER — OFFICE VISIT (OUTPATIENT)
Dept: ONCOLOGY | Age: 53
End: 2023-11-14

## 2023-11-14 VITALS
HEART RATE: 69 BPM | DIASTOLIC BLOOD PRESSURE: 82 MMHG | SYSTOLIC BLOOD PRESSURE: 120 MMHG | BODY MASS INDEX: 39.68 KG/M2 | HEIGHT: 72 IN | WEIGHT: 293 LBS

## 2023-11-14 VITALS
DIASTOLIC BLOOD PRESSURE: 82 MMHG | TEMPERATURE: 97.6 F | SYSTOLIC BLOOD PRESSURE: 120 MMHG | HEART RATE: 69 BPM | OXYGEN SATURATION: 98 % | RESPIRATION RATE: 16 BRPM

## 2023-11-14 VITALS
OXYGEN SATURATION: 98 % | SYSTOLIC BLOOD PRESSURE: 120 MMHG | HEIGHT: 72 IN | HEART RATE: 69 BPM | TEMPERATURE: 97.6 F | DIASTOLIC BLOOD PRESSURE: 82 MMHG | BODY MASS INDEX: 39.68 KG/M2 | RESPIRATION RATE: 16 BRPM | WEIGHT: 293 LBS

## 2023-11-14 DIAGNOSIS — R63.5 WEIGHT GAIN DUE TO MEDICATION: ICD-10-CM

## 2023-11-14 DIAGNOSIS — G62.9 NEUROPATHY: ICD-10-CM

## 2023-11-14 DIAGNOSIS — I48.0 PAROXYSMAL ATRIAL FIBRILLATION (HCC): ICD-10-CM

## 2023-11-14 DIAGNOSIS — I10 ESSENTIAL HYPERTENSION: Primary | ICD-10-CM

## 2023-11-14 DIAGNOSIS — I89.0 LYMPHEDEMA: Primary | ICD-10-CM

## 2023-11-14 DIAGNOSIS — Z17.1 MALIGNANT NEOPLASM OF LOWER-INNER QUADRANT OF LEFT BREAST IN FEMALE, ESTROGEN RECEPTOR NEGATIVE (HCC): ICD-10-CM

## 2023-11-14 DIAGNOSIS — E78.01 FAMILIAL HYPERCHOLESTEROLEMIA: ICD-10-CM

## 2023-11-14 DIAGNOSIS — T50.905A WEIGHT GAIN DUE TO MEDICATION: ICD-10-CM

## 2023-11-14 DIAGNOSIS — C50.312 MALIGNANT NEOPLASM OF LOWER-INNER QUADRANT OF LEFT BREAST IN FEMALE, ESTROGEN RECEPTOR NEGATIVE (HCC): ICD-10-CM

## 2023-11-14 PROCEDURE — 99214 OFFICE O/P EST MOD 30 MIN: CPT | Performed by: INTERNAL MEDICINE

## 2023-11-14 PROCEDURE — 3078F DIAST BP <80 MM HG: CPT | Performed by: INTERNAL MEDICINE

## 2023-11-14 PROCEDURE — 3074F SYST BP LT 130 MM HG: CPT | Performed by: INTERNAL MEDICINE

## 2023-11-14 PROCEDURE — 99211 OFF/OP EST MAY X REQ PHY/QHP: CPT

## 2023-11-14 NOTE — PROGRESS NOTES
Chaperone for Intimate Exam  Chaperone was offered and accepted as part of the rooming process.   Chaperone: Ovidio CAREYN, RN
definite mammographic correlate. However, further evaluation with targeted ultrasound is reported below. US BREAST LIMITED LEFT:       TECHNIQUE: Targeted ultrasound of the left breast was performed. Grayscale images and color images of the real-time examination were reviewed. The axilla was also imaged. FINDINGS:        There is a hypoechoic lobulated mass within the inner central left breast near the 9:00 position 1 cm from the nipple measuring 1.9 x 1.4 x 1.6 cm. This correlates with the mammographic finding and the palpable abnormality. Recommend ultrasound-guided    biopsy. There is a small hypoechoic nodular lesion within the upper central left breast 12:00 position 3 cm from the nipple measuring 0.3 x 0.2 x 0.3 cm. This is incidental. However, recommend ultrasound-guided biopsy. There is an enlarged lymph node demonstrated within the left axilla was cortical thickening measuring 4.9 mm. There is retained fatty hilum. There is an additional prominent lymph node within the left axilla with slightly less cortical thickening    measuring 4 mm. Recommend ultrasound-guided biopsy of the largest left axillary lymph node. 2/18/22   Impression   1. There is a hypoechoic lobulated mass within the inner central left breast near the 9:00 position 1 cm from the nipple measuring 1.9 x 1.4 x 1.6 cm. This correlates with the mammographic finding and the palpable abnormality. Recommend ultrasound-guided    biopsy. 2. There is a small hypoechoic nodular lesion within the upper central left breast 12:00 position 3 cm from the nipple measuring 0.3 x 0.2 x 0.3 cm. This is incidental. However, recommend ultrasound-guided biopsy. 3. There is an enlarged lymph node demonstrated within the left axilla was cortical thickening measuring 4.9 mm. There is retained fatty hilum.   There is an additional prominent lymph node within the left axilla with slightly less cortical thickening

## 2023-11-14 NOTE — PROGRESS NOTES
[address] [place]  [place] [address]  Dept: [phone]  Dept Fax: [phone]  Loc: [phone]    Visit Date: 12/10/2023    Primary Cardiologist: Dr. Madai Balderrama today for ER follow-up    Last seen in office on 8/14/2023 per this writer. Per office note:  Assessment:    Diagnosis Orders   1. Paroxysmal atrial fibrillation (HCC)  Basic Metabolic Panel     Magnesium       2. Essential hypertension          3. Primary hypertension  furosemide (LASIX) 20 MG tablet       4. Familial hypercholesterolemia          5. Weight gain due to medication  Basic Metabolic Panel     Magnesium     furosemide (LASIX) 20 MG tablet          Was doing fine - Trileptal started for treatment of peripheral neuropathy. ~ 2 weeks later had episodes of heart pounding - hard palpitations - lasted over several hours - went to ED for evaluation. No arrhythmia in ED; trop < 6; , K 3.8. Discharged home. No reoccurrence of sx but has gained > 25# since starting Trileptal. Worsened lymphedema LUE, L chest and into L breast. Denies dyspnea; no LE edema. Abdomen soft, non-distended. Patient awaiting return call from Neurologist regarding Trileptal - has not seen any change in her sx since starting medication. Is having labs done with follow-up with Oncologist tomorrow. Denies chest pain, tolerating meds. Recent echo 5/2023 with EF 60%  Plan:  Follow-up with your oncologist and neurologist regarding medications and water retention. Have the labs drawn tomorrow. Use the lasix as prescribed - take 10 meq potassium if you take full 20 mg of lasix. Continue other current medications as prescribed. Stay as active as you can. Call if palpitations return. Follow-up with your PCP as scheduled. Follow-up with Dr. Ene Lisa in 3 months as scheduled or sooner if need. ED visit on 8/7/23 for evaluation of palpitations.  Per ED attending note:   Pt

## 2023-11-14 NOTE — PROGRESS NOTES
Med list up to date and pharmacy updated to [facility]. Patient denies any chest pain. Reports heart palpitations at times but does have a history of A-Fib. EKG in chart from 08/2023.

## 2023-11-14 NOTE — PATIENT INSTRUCTIONS
Increase Lyrica to 100 mg 3 times daily  Ultrasound left breast and digital diagnostic mammogram of left breast ordered  Follow-up with me in 3 weeks.   Handicap placard printed

## 2023-11-14 NOTE — PATIENT INSTRUCTIONS
Continue current medications as prescribed. Continue to use the Lasix as you have been. Call if palpitations or shortness of breath seeming to worsen. Stay as active as you can. Eat heart healthy diet. Follow-up with your PCP as scheduled. Follow-up with Dr. Angela Nichols on 7/17/24   as scheduled or sooner if need.

## 2023-11-21 ENCOUNTER — TELEPHONE (OUTPATIENT)
Dept: CARDIOLOGY CLINIC | Age: 53
End: 2023-11-21

## 2023-11-21 ENCOUNTER — HOSPITAL ENCOUNTER (OUTPATIENT)
Dept: WOMENS IMAGING | Age: 53
Discharge: HOME | End: 2023-11-21
Attending: INTERNAL MEDICINE
Payer: COMMERCIAL

## 2023-11-21 DIAGNOSIS — I89.0 LYMPHEDEMA: ICD-10-CM

## 2023-11-21 DIAGNOSIS — C50.312 MALIGNANT NEOPLASM OF LOWER-INNER QUADRANT OF LEFT BREAST IN FEMALE, ESTROGEN RECEPTOR NEGATIVE (HCC): ICD-10-CM

## 2023-11-21 DIAGNOSIS — Z17.1 MALIGNANT NEOPLASM OF LOWER-INNER QUADRANT OF LEFT BREAST IN FEMALE, ESTROGEN RECEPTOR NEGATIVE (HCC): ICD-10-CM

## 2023-11-21 PROCEDURE — 76642 ULTRASOUND BREAST LIMITED: CPT

## 2023-11-21 PROCEDURE — G0279 TOMOSYNTHESIS, MAMMO: HCPCS

## 2023-11-21 NOTE — TELEPHONE ENCOUNTER
Pre op Risk Assessment    Procedure Lymph Node Biopsy   Physician [facility]  Date of surgery/procedure 12/1/2023    Last OV 11/14 with Mendy, 7/17/2023-Baki  Last Stress 4/22/2021  Last Echo 5/30/2023  Last Cath 11/11/2014  Last Stent Nothing in [facility]  Is patient on blood thinners ASA, Eliquis  Hold Meds/how many days

## 2023-12-01 ENCOUNTER — HOSPITAL ENCOUNTER (OUTPATIENT)
Dept: WOMENS IMAGING | Age: 53
Discharge: HOME | End: 2023-12-01
Attending: RADIOLOGY
Payer: COMMERCIAL

## 2023-12-01 DIAGNOSIS — R59.9 ENLARGED LYMPH NODE: ICD-10-CM

## 2023-12-01 DIAGNOSIS — R59.9 LYMPH NODE ENLARGEMENT: ICD-10-CM

## 2023-12-01 PROCEDURE — 38505 NEEDLE BIOPSY LYMPH NODES: CPT

## 2023-12-01 NOTE — PROGRESS NOTES
[facility] [address] [place] [address]  Pre-Biopsy Assessment      Patient Education    Written information about procedure Yes  left   Procedural steps explained Yes Ultrasound Biopsy   Post-op potential: bruising, hematoma, pain Yes    Self-care: activity, care of dressing Yes    Patient verbalized understanding Yes    Consent signed and witnessed Yes      Hormone Therapy Status: n/a    Recent Medication: Other, Eliquis and aspirin  Last Dose: 11-27-23                                     Hormone Replacement Therapy: no    Previous Breast Biopsy: yes -     Previous Diagnosis Cancer: yes - left bresat cancer 2022    Hysterectomy:no    Emotional Status: Calm    Language or Physical Barriers: n/a    Comments: n/a      Electronically signed by Lauren Hatch on 12/1/2023 at 10:27 AM

## 2023-12-01 NOTE — PROGRESS NOTES
Breast Biopsy Flowsheet/Post-Operative Care    Date of Procedure: 12/1/2023  Physician: Dr. Florina Hall  Technologist: Willie Barnett RT(R)(M)    Biopsy:ultrasound guided breast biopsy  Lesion type: Non-palpable  Breast: left    Clock face position: Site #1: axilla         Primary Method of Detection: Ultrasound      Microcalcification's: no   Distribution: N/A        Biopsy Method:   Sertera:    Site # 1    Gauge: 14    # of Passes: 4     Clip: Bernadine        Pre-Op Assessment: (BI-RADS)   4. Suspicious Abnormality    Patient Tolerated Procedure: good  Complications: n/a  Comments: n/a    Post Operative Care  Steri strips: Yes  Dressing: Gauze, Tape   Ice Applied to Site:  No  Evidence of Bleeding:  No    Pain Verbalized: No      Written Discharge Instructions: Yes  Condition at Discharge: good  Time of Discharge: [address] [place] [address]    Electronically signed by Gianna Crump on 12/1/2023 at 11:29 AM

## 2023-12-03 LAB — LEUKEMIA/LYMPHOMA PHENOTYPING MISC: NORMAL

## 2023-12-05 ENCOUNTER — CLINICAL DOCUMENTATION (OUTPATIENT)
Dept: WOMENS IMAGING | Age: 53
End: 2023-12-05

## 2023-12-05 DIAGNOSIS — Z09 FOLLOW-UP EXAM: ICD-10-CM

## 2023-12-05 DIAGNOSIS — R59.9 ENLARGED LYMPH NODE: Primary | ICD-10-CM

## 2023-12-06 ENCOUNTER — HOSPITAL ENCOUNTER (OUTPATIENT)
Dept: INFUSION THERAPY | Age: 53
Discharge: HOME | End: 2023-12-06

## 2023-12-06 ENCOUNTER — OFFICE VISIT (OUTPATIENT)
Dept: ONCOLOGY | Age: 53
End: 2023-12-06

## 2023-12-06 VITALS
TEMPERATURE: 97.7 F | OXYGEN SATURATION: 96 % | HEART RATE: 71 BPM | SYSTOLIC BLOOD PRESSURE: 125 MMHG | DIASTOLIC BLOOD PRESSURE: 80 MMHG | RESPIRATION RATE: 16 BRPM

## 2023-12-06 VITALS
DIASTOLIC BLOOD PRESSURE: 80 MMHG | HEIGHT: 72 IN | WEIGHT: 293 LBS | RESPIRATION RATE: 16 BRPM | SYSTOLIC BLOOD PRESSURE: 125 MMHG | TEMPERATURE: 97.7 F | OXYGEN SATURATION: 96 % | HEART RATE: 71 BPM | BODY MASS INDEX: 39.68 KG/M2

## 2023-12-06 DIAGNOSIS — I10 ESSENTIAL HYPERTENSION: ICD-10-CM

## 2023-12-06 DIAGNOSIS — Z17.1 MALIGNANT NEOPLASM OF LOWER-INNER QUADRANT OF LEFT BREAST IN FEMALE, ESTROGEN RECEPTOR NEGATIVE (HCC): Primary | ICD-10-CM

## 2023-12-06 DIAGNOSIS — C50.312 MALIGNANT NEOPLASM OF LOWER-INNER QUADRANT OF LEFT BREAST IN FEMALE, ESTROGEN RECEPTOR NEGATIVE (HCC): Primary | ICD-10-CM

## 2023-12-06 PROCEDURE — 99211 OFF/OP EST MAY X REQ PHY/QHP: CPT

## 2023-12-06 PROCEDURE — 99214 OFFICE O/P EST MOD 30 MIN: CPT | Performed by: INTERNAL MEDICINE

## 2023-12-06 PROCEDURE — 3079F DIAST BP 80-89 MM HG: CPT | Performed by: INTERNAL MEDICINE

## 2023-12-06 PROCEDURE — 3074F SYST BP LT 130 MM HG: CPT | Performed by: INTERNAL MEDICINE

## 2023-12-06 RX ORDER — METOPROLOL SUCCINATE 100 MG/1
100 TABLET, EXTENDED RELEASE ORAL DAILY
Qty: 90 TABLET | Refills: 1 | Status: SHIPPED | OUTPATIENT
Start: 2023-12-06

## 2023-12-06 RX ORDER — FLECAINIDE ACETATE 100 MG/1
100 TABLET ORAL 2 TIMES DAILY
Qty: 180 TABLET | Refills: 1 | Status: SHIPPED | OUTPATIENT
Start: 2023-12-06

## 2023-12-06 RX ORDER — ATORVASTATIN CALCIUM 40 MG/1
40 TABLET, FILM COATED ORAL DAILY
Qty: 90 TABLET | Refills: 1 | Status: SHIPPED | OUTPATIENT
Start: 2023-12-06

## 2023-12-06 NOTE — PROGRESS NOTES
measurement in Millimeters (mm)): 27 mm    Additional Dimension in Millimeters (mm): 17 x 15 mm     Tumor Focality   Single focus of invasive carcinoma     Ductal Carcinoma In Situ (DCIS)   Not identified     Lobular Carcinoma In Situ (LCIS)   Not identified     Tumor Extent    Tumor Extent    Not applicable (skin, nipple, and skeletal muscle are absent)     Lymphovascular Invasion   Not identified     Dermal Lymphovascular Invasion   No skin present     Treatment Effect in the Breast   No known presurgical therapy     Treatment Effect in the Lymph Nodes   Not applicable     MARGINS   Margin Status for Invasive Carcinoma   All margins negative for invasive carcinoma    Distance from Invasive Carcinoma to Closest Margin    Specify in Millimeters (mm)    Exact distance: 1.5 mm      Closest Margin to Invasive Carcinoma    Inferior/caudal      Distance from Invasive Carcinoma to Anterior/Skin Margin    Specify in Millimeters (mm)    Exact distance: 7 mm      Distance from Invasive Carcinoma to Posterior/Deep Margin    Specify in Millimeters (mm)    Exact distance: 10 mm      Distance from Invasive Carcinoma to Superior/Cranial Margin    Specify in Millimeters (mm)    Exact distance: 7 mm      Distance from Invasive Carcinoma to Inferior/Caudal Margin    Specify in Millimeters (mm)    Exact distance: 1.5 mm      Distance from Invasive Carcinoma to Medial Margin    Specify in Millimeters (mm)    Greater than: 10 mm      Distance from Invasive Carcinoma to Lateral Margin    Specify in Millimeters (mm)    Greater than: 10 mm     Margin Status for DCIS   Not applicable (no DCIS in specimen)       REGIONAL LYMPH NODES   Regional Lymph Node Status   Regional lymph nodes present    All regional lymph nodes negative for tumor      Total Number of Lymph Nodes Examined (sentinel and non-sentinel)    Exact number (specify): 4    Number of Hector Nodes Examined    Exact number (specify): 4     Regional Lymph Node Comment: Biopsy

## 2023-12-06 NOTE — PATIENT INSTRUCTIONS
-Continue 6-month follow-up with me.  -Next screening bilateral mammography due in June.;scheduled  -And ultrasound left axilla.   To be done June at the time of her mammogram , ordered by me to be done in June at the time of her mammogram

## 2023-12-07 DIAGNOSIS — G62.9 NEUROPATHY: ICD-10-CM

## 2023-12-07 RX ORDER — PREGABALIN 100 MG/1
100 CAPSULE ORAL 2 TIMES DAILY
Qty: 60 CAPSULE | Refills: 1 | Status: SHIPPED | OUTPATIENT
Start: 2023-12-07 | End: 2024-02-07 | Stop reason: SDUPTHER

## 2023-12-07 NOTE — TELEPHONE ENCOUNTER
As per Dr. Gabriel' note on 12/6/23 - SUBJECTIVE: Despite multiple interventions her neuropathy is really not much improved.  Currently is on 100 mg tid ; no better in terms of mitigating her neuropathy with 3 times daily dosing therefore decreased to twice daily .    OARRS appropriate. Refill sent to pharmacy.

## 2024-01-12 ENCOUNTER — HOSPITAL ENCOUNTER (OUTPATIENT)
Dept: RADIATION ONCOLOGY | Age: 54
Discharge: HOME | End: 2024-01-12
Payer: COMMERCIAL

## 2024-01-12 VITALS
TEMPERATURE: 97.9 F | SYSTOLIC BLOOD PRESSURE: 128 MMHG | WEIGHT: 293 LBS | BODY MASS INDEX: 42.59 KG/M2 | RESPIRATION RATE: 18 BRPM | DIASTOLIC BLOOD PRESSURE: 82 MMHG | OXYGEN SATURATION: 96 % | HEART RATE: 76 BPM

## 2024-01-12 PROCEDURE — 99212 OFFICE O/P EST SF 10 MIN: CPT

## 2024-01-12 PROCEDURE — 99214 OFFICE O/P EST MOD 30 MIN: CPT

## 2024-01-12 ASSESSMENT — ENCOUNTER SYMPTOMS
COUGH: 1
BACK PAIN: 1
RECTAL PAIN: 0
TROUBLE SWALLOWING: 0
DIARRHEA: 0
ABDOMINAL PAIN: 0
BLOOD IN STOOL: 0
NAUSEA: 1
SHORTNESS OF BREATH: 0

## 2024-01-12 ASSESSMENT — PAIN DESCRIPTION - LOCATION: LOCATION: BACK

## 2024-01-12 ASSESSMENT — PAIN SCALES - GENERAL: PAINLEVEL_OUTOF10: 6

## 2024-01-12 NOTE — PROGRESS NOTES
axillary adenopathy.   Skin:     Comments: Hidradenitis suppurativa   Neurological:      Mental Status: She is alert and oriented to person, place, and time.   Psychiatric:      Comments: Mood improved from recent visits       ATTESTATION: 30 minutes were spent with the patient at today's visit reviewing pertinent information related to their oncologic diagnosis, including any recent labs, imaging, follow ups and plan of care going forward. >50% of time spent in counseling and coordinating care.    CC:Dr. Pasha Gabriel ([facility]) Dr. Bianca Lopez (Surgery)  ACC:[facility]

## 2024-02-05 ENCOUNTER — OFFICE VISIT (OUTPATIENT)
Dept: NEUROLOGY | Age: 54
End: 2024-02-05

## 2024-02-05 VITALS
OXYGEN SATURATION: 97 % | SYSTOLIC BLOOD PRESSURE: 112 MMHG | HEART RATE: 90 BPM | DIASTOLIC BLOOD PRESSURE: 78 MMHG | HEIGHT: 72 IN | BODY MASS INDEX: 39.68 KG/M2 | WEIGHT: 293 LBS

## 2024-02-05 DIAGNOSIS — M79.672 BILATERAL FOOT PAIN: ICD-10-CM

## 2024-02-05 DIAGNOSIS — R20.2 NUMBNESS AND TINGLING OF BOTH FEET: Primary | ICD-10-CM

## 2024-02-05 DIAGNOSIS — R20.0 NUMBNESS AND TINGLING OF BOTH FEET: Primary | ICD-10-CM

## 2024-02-05 DIAGNOSIS — M79.671 BILATERAL FOOT PAIN: ICD-10-CM

## 2024-02-05 DIAGNOSIS — G62.9 NEUROPATHY: ICD-10-CM

## 2024-02-05 PROCEDURE — 3078F DIAST BP <80 MM HG: CPT | Performed by: NURSE PRACTITIONER

## 2024-02-05 PROCEDURE — 99213 OFFICE O/P EST LOW 20 MIN: CPT | Performed by: NURSE PRACTITIONER

## 2024-02-05 PROCEDURE — 3074F SYST BP LT 130 MM HG: CPT | Performed by: NURSE PRACTITIONER

## 2024-02-05 NOTE — PATIENT INSTRUCTIONS
Continue with Lyrica at current dose, refilled by oncology  Consider referral to pain management, patient is wishing to hold off for now  Follow up as  needed.  Call if any questions or concerns.

## 2024-02-05 NOTE — PROGRESS NOTES
for bilateral foot numbness, tingling, burning sensation.  Her symptoms are the same. No recent falls. She is currently on lyrica 100 mg two times a day. She has tried trileptal in the past however she stopped it due to side effects. She has also tried gabapentin and Cymbalta without relief. 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  We discussed referral to pain management to see what they can offer her, she is wishing to hold off for now. After a discussion with patient we agreed on the following plan.         Plan:  Continue with Lyrica at current dose, refilled by oncology  Consider referral to pain management, patient is wishing to hold off for now  Follow up as  needed.  Call if any questions or concerns.    Total time 24 min    Paige L Leopold, APRN - CNP

## 2024-02-07 DIAGNOSIS — G62.9 NEUROPATHY: ICD-10-CM

## 2024-02-07 RX ORDER — PREGABALIN 100 MG/1
100 CAPSULE ORAL 2 TIMES DAILY
Qty: 60 CAPSULE | Refills: 1 | Status: SHIPPED | OUTPATIENT
Start: 2024-02-07 | End: 2024-04-07

## 2024-04-15 ENCOUNTER — OFFICE VISIT (OUTPATIENT)
Dept: SURGERY | Age: 54
End: 2024-04-15

## 2024-04-15 VITALS
RESPIRATION RATE: 18 BRPM | OXYGEN SATURATION: 97 % | SYSTOLIC BLOOD PRESSURE: 116 MMHG | DIASTOLIC BLOOD PRESSURE: 72 MMHG | HEART RATE: 68 BPM | WEIGHT: 293 LBS | HEIGHT: 72 IN | BODY MASS INDEX: 39.68 KG/M2 | TEMPERATURE: 97.6 F

## 2024-04-15 DIAGNOSIS — I48.0 PAROXYSMAL ATRIAL FIBRILLATION (HCC): ICD-10-CM

## 2024-04-15 DIAGNOSIS — L73.2 HIDRADENITIS AXILLARIS: ICD-10-CM

## 2024-04-15 DIAGNOSIS — C50.312 MALIGNANT NEOPLASM OF LOWER-INNER QUADRANT OF LEFT BREAST IN FEMALE, ESTROGEN RECEPTOR NEGATIVE (HCC): ICD-10-CM

## 2024-04-15 DIAGNOSIS — L73.2 HIDRADENITIS SUPPURATIVA OF LEFT AXILLA: Primary | ICD-10-CM

## 2024-04-15 DIAGNOSIS — I10 ESSENTIAL HYPERTENSION: ICD-10-CM

## 2024-04-15 DIAGNOSIS — Z17.1 MALIGNANT NEOPLASM OF LOWER-INNER QUADRANT OF LEFT BREAST IN FEMALE, ESTROGEN RECEPTOR NEGATIVE (HCC): ICD-10-CM

## 2024-04-15 PROCEDURE — 3078F DIAST BP <80 MM HG: CPT | Performed by: SURGERY

## 2024-04-15 PROCEDURE — 99214 OFFICE O/P EST MOD 30 MIN: CPT | Performed by: SURGERY

## 2024-04-15 PROCEDURE — 3074F SYST BP LT 130 MM HG: CPT | Performed by: SURGERY

## 2024-04-15 RX ORDER — ACETAMINOPHEN 160 MG
2000 TABLET,DISINTEGRATING ORAL DAILY
COMMUNITY

## 2024-04-15 RX ORDER — LANOLIN ALCOHOL/MO/W.PET/CERES
3000 CREAM (GRAM) TOPICAL DAILY
COMMUNITY

## 2024-04-15 RX ORDER — CLINDAMYCIN HYDROCHLORIDE 300 MG/1
300 CAPSULE ORAL 4 TIMES DAILY
Qty: 40 CAPSULE | Refills: 0 | Status: SHIPPED | OUTPATIENT
Start: 2024-04-15 | End: 2024-04-25

## 2024-04-15 ASSESSMENT — ENCOUNTER SYMPTOMS
NAUSEA: 0
ABDOMINAL PAIN: 0
BLOOD IN STOOL: 0
COLOR CHANGE: 0
SORE THROAT: 0
WHEEZING: 0
COUGH: 0
CHEST TIGHTNESS: 0
VOMITING: 0
TROUBLE SWALLOWING: 0

## 2024-04-15 NOTE — PROGRESS NOTES
Bianca Lopez MD   General Surgery  Follow up Patient Evaluation in Office  Pt Name: Lucy Woodward  Date of Birth 1970   Today's Date: 4/15/2024  Medical Record Number: [id number]  Referring Provider:RODRIGO Freedman  Primary Care Provider: Tyra Freedman  Chief Complaint:  Chief Complaint   Patient presents with    6 Month Follow-Up     Malignant neoplasm of lower-inner quadrant of left breast in female, estrogen receptor negative-Last seen 10/05/23-Follows Dr. Gabriel-Mammo scheduled 5/30/24       ASSESSMENT      1. Hidradenitis suppurativa of left axilla    2. Malignant neoplasm of lower-inner quadrant of left breast in female, estrogen receptor negative (HCC)    3. Hidradenitis axillaris    4. Essential hypertension    5. Paroxysmal atrial fibrillation (HCC)    5. No evidence recurrent disease  6. Cutting back on smoking   7. Completed radiation therapy 2022  8.  Completed chemotherapy.  9.  Neuropathy secondary to chemotherapy.  She has seen neurology to help manage symptoms.  Tried multiple medical occasions without relief.  Unchanged symptoms    Pathologic stage II aT2 N0 M0, grade 3, triple negative  PLANS   Clinical breast examination  performed today is benign.  Hidradenitis abscess left axilla.  Clindamycin ordered   survivorship plan again reviewed.  Colonoscopy 2023.    Interval breast imaging had left axillary lymph node biopsy benign.  Has follow-up imaging ordered.    Neuropathy symptoms stable, persistent on current medical regimen  Encourage self breast examinations.  Patient to call for any changes.  Follow-up surgical clinic in 1 year  7.  I encourage continue follow-up with medical oncology and radiation oncology for surveillance.  8.  Encourage smoke cessation  SUBJECTIVE   History of present illness:  Lucy is a 53 y.o. year old female,  who is presenting today in the office for follow-up evaluation of   triple negative invasive ductal carcinoma of the left breast.   Lucy had a

## 2024-04-17 ASSESSMENT — ENCOUNTER SYMPTOMS
SHORTNESS OF BREATH: 1
BACK PAIN: 0

## 2024-05-28 ENCOUNTER — HOSPITAL ENCOUNTER (OUTPATIENT)
Dept: WOMENS IMAGING | Age: 54
Discharge: HOME | End: 2024-05-28
Attending: RADIOLOGY
Payer: COMMERCIAL

## 2024-05-28 DIAGNOSIS — Z09 FOLLOW-UP EXAM: ICD-10-CM

## 2024-05-28 DIAGNOSIS — R59.9 ENLARGED LYMPH NODE: ICD-10-CM

## 2024-05-28 PROCEDURE — 76882 US LMTD JT/FCL EVL NVASC XTR: CPT

## 2024-05-30 ENCOUNTER — HOSPITAL ENCOUNTER (OUTPATIENT)
Dept: WOMENS IMAGING | Age: 54
Discharge: HOME | End: 2024-05-30
Payer: COMMERCIAL

## 2024-05-30 DIAGNOSIS — Z12.31 VISIT FOR SCREENING MAMMOGRAM: ICD-10-CM

## 2024-05-30 PROCEDURE — 77063 BREAST TOMOSYNTHESIS BI: CPT

## 2024-05-31 ENCOUNTER — HOSPITAL ENCOUNTER (OUTPATIENT)
Dept: WOMENS IMAGING | Age: 54
Discharge: HOME | End: 2024-05-31
Attending: RADIOLOGY
Payer: COMMERCIAL

## 2024-05-31 DIAGNOSIS — N63.11 LUMP IN UPPER OUTER QUADRANT OF RIGHT BREAST: ICD-10-CM

## 2024-05-31 PROCEDURE — 76642 ULTRASOUND BREAST LIMITED: CPT

## 2024-06-03 NOTE — PROGRESS NOTES
[facility] [place] [facility]  [facility]  [address]  [address]  [place] [id number]  Dept: [phone]  Loc: [phone]   Hematology/Oncology Consult (Clinic)          Lucy [place]   1970     No ref. provider found   Tyra Freedman MD     6/4/24     DIAGNOSIS:  -Invasive ductal carcinoma, left breast, ER negative, SC negative and HER-2 negative, 2.7 cm, grade 3, 0/ 4 sentinel nodes . ps T2N0M0 /IIA.(High risk: triple negative/grade 3).  Inner central left breast anteriorly.   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 Dr Lopez.  - Adjuvant AC dose dense x4 followed by dose dense Taxol every other week.  Start 5/10/2022  7/19 course 1 dose dense Taxol .  8/30/2022-4th and the last course of dose dense adjuvant Taxol.  (completed AC x4)  - Postlumpectomy Radiation.  6000 cGy completed by Dr. Barrera 11/28/2022.    PARAMETERS:  -History, exam,  -Bone scan ordered  -B Mammo 5/30/24, US R breast - benign.    SUBJECTIVE: Last seen by me 6 months ago.    Despite multiple interventions her neuropathy is really not much improved.  Currently is on Lyrica 100 mg tid ; no better in terms of mitigating her neuropathy with 3 times daily dosing therefore decreased to twice daily as the extra dosing does not cause some dry mouth and sleepiness.    Intolerant of Trileptal.    Intolerant of gabapentin.    She is on oral B12.  For the last 10 days reports a vague discomfort throughout her entire left breast.  No other focal or systemic complaints.    She does request a handicap placard because of difficulty walking because of her neuropathy which is significant.    Seen by neurology 2/5/24;Cont Lyrica 100 mg po bid. patient stopped the Lyrica and found no change with or without it.  Pt declined pain management referral but will agree to

## 2024-06-04 ENCOUNTER — HOSPITAL ENCOUNTER (OUTPATIENT)
Dept: INFUSION THERAPY | Age: 54
Discharge: HOME | End: 2024-06-04

## 2024-06-04 ENCOUNTER — OFFICE VISIT (OUTPATIENT)
Dept: ONCOLOGY | Age: 54
End: 2024-06-04

## 2024-06-04 VITALS
HEIGHT: 72 IN | WEIGHT: 293 LBS | OXYGEN SATURATION: 94 % | BODY MASS INDEX: 39.68 KG/M2 | DIASTOLIC BLOOD PRESSURE: 80 MMHG | HEART RATE: 77 BPM | RESPIRATION RATE: 16 BRPM | SYSTOLIC BLOOD PRESSURE: 122 MMHG | TEMPERATURE: 97.8 F

## 2024-06-04 VITALS
HEART RATE: 77 BPM | TEMPERATURE: 97.8 F | SYSTOLIC BLOOD PRESSURE: 122 MMHG | RESPIRATION RATE: 16 BRPM | DIASTOLIC BLOOD PRESSURE: 80 MMHG | OXYGEN SATURATION: 94 %

## 2024-06-04 DIAGNOSIS — Z17.1 MALIGNANT NEOPLASM OF LOWER-INNER QUADRANT OF LEFT BREAST IN FEMALE, ESTROGEN RECEPTOR NEGATIVE (HCC): ICD-10-CM

## 2024-06-04 DIAGNOSIS — Z17.1 MALIGNANT NEOPLASM OF LOWER-INNER QUADRANT OF LEFT BREAST IN FEMALE, ESTROGEN RECEPTOR NEGATIVE (HCC): Primary | ICD-10-CM

## 2024-06-04 DIAGNOSIS — C50.312 MALIGNANT NEOPLASM OF LOWER-INNER QUADRANT OF LEFT BREAST IN FEMALE, ESTROGEN RECEPTOR NEGATIVE (HCC): ICD-10-CM

## 2024-06-04 DIAGNOSIS — C50.312 MALIGNANT NEOPLASM OF LOWER-INNER QUADRANT OF LEFT BREAST IN FEMALE, ESTROGEN RECEPTOR NEGATIVE (HCC): Primary | ICD-10-CM

## 2024-06-04 LAB
ALBUMIN SERPL BCG-MCNC: 3.8 G/DL (ref 3.5–5.1)
ALP SERPL-CCNC: 90 U/L (ref 38–126)
ALT SERPL W/O P-5'-P-CCNC: 12 U/L (ref 11–66)
AST SERPL-CCNC: 11 U/L (ref 5–40)
BASOPHILS ABSOLUTE: 0 THOU/MM3 (ref 0–0.1)
BASOPHILS NFR BLD AUTO: 0 % (ref 0–3)
BILIRUB CONJ SERPL-MCNC: < 0.2 MG/DL (ref 0–0.3)
BILIRUB SERPL-MCNC: 0.4 MG/DL (ref 0.3–1.2)
BUN BLDP-MCNC: 11 MG/DL (ref 8–26)
CHLORIDE BLD-SCNC: 108 MEQ/L (ref 98–109)
CREAT BLD-MCNC: 0.5 MG/DL (ref 0.5–1.2)
EOSINOPHIL NFR BLD AUTO: 4 % (ref 0–4)
EOSINOPHILS ABSOLUTE: 0.3 THOU/MM3 (ref 0–0.4)
ERYTHROCYTE [DISTWIDTH] IN BLOOD BY AUTOMATED COUNT: 12.7 % (ref 11.5–14.5)
GFR SERPL CREATININE-BSD FRML MDRD: > 90 ML/MIN/1.73M2
GLUCOSE BLD-MCNC: 125 MG/DL (ref 70–108)
HCT VFR BLD AUTO: 43.8 % (ref 37–47)
HGB BLD-MCNC: 14.7 GM/DL (ref 12–16)
IMMATURE GRANULOCYTES %: 0 %
IMMATURE GRANULOCYTES ABSOLUTE: 0.01 THOU/MM3 (ref 0–0.07)
IONIZED CALCIUM, WHOLE BLOOD: 1.11 MMOL/L (ref 1.12–1.32)
LYMPHOCYTES ABSOLUTE: 1.5 THOU/MM3 (ref 1–4.8)
LYMPHOCYTES NFR BLD AUTO: 19 % (ref 15–47)
MCH RBC QN AUTO: 32.1 PG (ref 26–33)
MCHC RBC AUTO-ENTMCNC: 33.6 GM/DL (ref 32.2–35.5)
MCV RBC AUTO: 96 FL (ref 81–99)
MONOCYTES ABSOLUTE: 0.5 THOU/MM3 (ref 0.4–1.3)
MONOCYTES NFR BLD AUTO: 7 % (ref 0–12)
NEUTROPHILS ABSOLUTE: 5.3 THOU/MM3 (ref 1.8–7.7)
NEUTROPHILS NFR BLD AUTO: 70 % (ref 43–75)
PLATELET # BLD AUTO: 170 THOU/MM3 (ref 130–400)
PMV BLD AUTO: 9.4 FL (ref 9.4–12.4)
POTASSIUM BLD-SCNC: 3.9 MEQ/L (ref 3.5–4.9)
PROT SERPL-MCNC: 7.1 G/DL (ref 6.1–8)
RBC # BLD AUTO: 4.58 MILL/MM3 (ref 4.2–5.4)
SODIUM BLD-SCNC: 143 MEQ/L (ref 138–146)
TOTAL CO2, WHOLE BLOOD: 26 MEQ/L (ref 23–33)
WBC # BLD AUTO: 7.6 THOU/MM3 (ref 4.8–10.8)

## 2024-06-04 PROCEDURE — 3079F DIAST BP 80-89 MM HG: CPT | Performed by: INTERNAL MEDICINE

## 2024-06-04 PROCEDURE — 80047 BASIC METABLC PNL IONIZED CA: CPT

## 2024-06-04 PROCEDURE — 80076 HEPATIC FUNCTION PANEL: CPT

## 2024-06-04 PROCEDURE — 85025 COMPLETE CBC W/AUTO DIFF WBC: CPT

## 2024-06-04 PROCEDURE — 36415 COLL VENOUS BLD VENIPUNCTURE: CPT

## 2024-06-04 PROCEDURE — 3074F SYST BP LT 130 MM HG: CPT | Performed by: INTERNAL MEDICINE

## 2024-06-04 PROCEDURE — 99211 OFF/OP EST MAY X REQ PHY/QHP: CPT

## 2024-06-04 PROCEDURE — 99213 OFFICE O/P EST LOW 20 MIN: CPT | Performed by: INTERNAL MEDICINE

## 2024-06-04 NOTE — PATIENT INSTRUCTIONS
-Patient known to  surgery.  Referral back for evaluation of palpable lesion upper outer quadrant right breast.  -Routine follow-up with me in 6 months  -Labs today ordered CBC, CMP

## 2024-06-06 ENCOUNTER — OFFICE VISIT (OUTPATIENT)
Dept: SURGERY | Age: 54
End: 2024-06-06

## 2024-06-06 ENCOUNTER — TELEPHONE (OUTPATIENT)
Dept: SURGERY | Age: 54
End: 2024-06-06

## 2024-06-06 VITALS
WEIGHT: 293 LBS | OXYGEN SATURATION: 96 % | DIASTOLIC BLOOD PRESSURE: 74 MMHG | SYSTOLIC BLOOD PRESSURE: 114 MMHG | HEART RATE: 78 BPM | TEMPERATURE: 97.3 F | BODY MASS INDEX: 39.68 KG/M2 | RESPIRATION RATE: 18 BRPM | HEIGHT: 72 IN

## 2024-06-06 DIAGNOSIS — Z17.1 MALIGNANT NEOPLASM OF LOWER-INNER QUADRANT OF LEFT BREAST IN FEMALE, ESTROGEN RECEPTOR NEGATIVE (HCC): ICD-10-CM

## 2024-06-06 DIAGNOSIS — L73.2 HIDRADENITIS AXILLARIS: ICD-10-CM

## 2024-06-06 DIAGNOSIS — C50.312 MALIGNANT NEOPLASM OF LOWER-INNER QUADRANT OF LEFT BREAST IN FEMALE, ESTROGEN RECEPTOR NEGATIVE (HCC): ICD-10-CM

## 2024-06-06 DIAGNOSIS — I10 ESSENTIAL HYPERTENSION: ICD-10-CM

## 2024-06-06 DIAGNOSIS — N63.11 MASS OF UPPER OUTER QUADRANT OF RIGHT BREAST: Primary | ICD-10-CM

## 2024-06-06 DIAGNOSIS — I48.0 PAROXYSMAL ATRIAL FIBRILLATION (HCC): ICD-10-CM

## 2024-06-06 DIAGNOSIS — I25.10 CORONARY ARTERY DISEASE INVOLVING NATIVE CORONARY ARTERY OF NATIVE HEART WITHOUT ANGINA PECTORIS: ICD-10-CM

## 2024-06-06 PROCEDURE — 3074F SYST BP LT 130 MM HG: CPT | Performed by: SURGERY

## 2024-06-06 PROCEDURE — 3078F DIAST BP <80 MM HG: CPT | Performed by: SURGERY

## 2024-06-06 PROCEDURE — 99214 OFFICE O/P EST MOD 30 MIN: CPT | Performed by: SURGERY

## 2024-06-06 ASSESSMENT — ENCOUNTER SYMPTOMS
BACK PAIN: 0
BLOOD IN STOOL: 0
ABDOMINAL PAIN: 0
CHEST TIGHTNESS: 0
VOMITING: 0
TROUBLE SWALLOWING: 0
SORE THROAT: 0
NAUSEA: 0
COLOR CHANGE: 0
COUGH: 0
WHEEZING: 0

## 2024-06-06 NOTE — TELEPHONE ENCOUNTER
Pt of Dr. Hurtado's is scheduled for excision of a right breast mass by Dr. Lopez on 6/24/24 under MAC anesthesia.  Ok for pt to hold Eliquis 2 days prior?

## 2024-06-06 NOTE — PROGRESS NOTES
Bianca Lopez MD   General Surgery  Follow up Patient Evaluation in Office  Pt Name: Lucy Woodward  Date of Birth 1970   Today's Date: 6/6/2024  Medical Record Number: [id number]  Referring Provider:RODRIGO Freedman  Primary Care Provider: Tyra Freedman MD  Chief Complaint:  Chief Complaint   Patient presents with    Surgical Consult     Est pt seen 4/15/24 refer Dr Gabriel-Right breast upper outer quadrant palpable mass       ASSESSMENT    1.  Mass upper outer quadrant right breast  2.  History of malignant neoplasm lower inner quadrant left breast female estrogen receptor negative  3.  Essential hypertension  4.  Paroxysmal atrial fibrillation  5.  History of coronary artery disease  6.  Axillary hidradenitis   7. Completed radiation therapy 2022  8.  Completed chemotherapy.  9.  Neuropathy secondary to chemotherapy.  She has seen neurology to help manage symptoms.  Tried multiple medical occasions without relief.  Unchanged symptoms    Pathologic stage II aT2 N0 M0, grade 3, triple negative  PLANS   Clinical breast examination  performed today is benign.  She does have a palpable mass superficial or outer right quadrant, clinical characteristics are benign  Interval imaging no suspicious findings.  Reviewed  survivorship plan again reviewed.  Colonoscopy 2023.    Patient with anxiety regarding this mass.  Superficial nature, not seen on imaging for image guided biopsy.  Patient simply desires excision for definitive pathology given her history.  5.  Schedule patient for excisional biopsy of palpable right breast mass  MAC anesthesia  Techniques and risks of procedure discussed with patient.  All questions answered.  Hold Eliquis 48 hours prior to procedure  SUBJECTIVE   History of present illness:  Lucy is a 53 y.o. year old female,  who is presenting today in the office for follow-up evaluation of   triple negative invasive ductal carcinoma of the left breast.   Lucy had a prior biopsy of the left

## 2024-06-10 RX ORDER — FLECAINIDE ACETATE 100 MG/1
100 TABLET ORAL 2 TIMES DAILY
Qty: 180 TABLET | Refills: 0 | Status: SHIPPED | OUTPATIENT
Start: 2024-06-10

## 2024-06-24 ENCOUNTER — ANESTHESIA (OUTPATIENT)
Dept: OPERATING ROOM | Age: 54
End: 2024-06-24
Payer: COMMERCIAL

## 2024-06-24 ENCOUNTER — HOSPITAL ENCOUNTER (OUTPATIENT)
Age: 54
Setting detail: OUTPATIENT SURGERY
Discharge: HOME | End: 2024-06-24
Attending: SURGERY | Admitting: SURGERY
Payer: COMMERCIAL

## 2024-06-24 ENCOUNTER — ANESTHESIA EVENT (OUTPATIENT)
Dept: OPERATING ROOM | Age: 54
End: 2024-06-24
Payer: COMMERCIAL

## 2024-06-24 VITALS
SYSTOLIC BLOOD PRESSURE: 108 MMHG | OXYGEN SATURATION: 99 % | DIASTOLIC BLOOD PRESSURE: 74 MMHG | RESPIRATION RATE: 16 BRPM | HEIGHT: 72 IN | WEIGHT: 293 LBS | HEART RATE: 63 BPM | BODY MASS INDEX: 39.68 KG/M2 | TEMPERATURE: 97 F

## 2024-06-24 DIAGNOSIS — N63.11 MASS OF UPPER OUTER QUADRANT OF RIGHT BREAST: Primary | ICD-10-CM

## 2024-06-24 PROBLEM — N63.10 MASS OF RIGHT BREAST: Status: ACTIVE | Noted: 2024-06-24

## 2024-06-24 LAB
ANION GAP SERPL CALC-SCNC: 11 MEQ/L (ref 8–16)
BUN SERPL-MCNC: 11 MG/DL (ref 7–22)
CALCIUM SERPL-MCNC: 8.6 MG/DL (ref 8.5–10.5)
CHLORIDE SERPL-SCNC: 107 MEQ/L (ref 98–111)
CO2 SERPL-SCNC: 23 MEQ/L (ref 23–33)
CREAT SERPL-MCNC: 0.6 MG/DL (ref 0.4–1.2)
GFR SERPL CREATININE-BSD FRML MDRD: > 90 ML/MIN/1.73M2
GLUCOSE SERPL-MCNC: 119 MG/DL (ref 70–108)
POTASSIUM SERPL-SCNC: 4.1 MEQ/L (ref 3.5–5.2)
SODIUM SERPL-SCNC: 141 MEQ/L (ref 135–145)

## 2024-06-24 PROCEDURE — 19120 REMOVAL OF BREAST LESION: CPT | Performed by: SURGERY

## 2024-06-24 PROCEDURE — 6360000002 HC RX W HCPCS: Performed by: SURGERY

## 2024-06-24 PROCEDURE — 2709999900 HC NON-CHARGEABLE SUPPLY: Performed by: SURGERY

## 2024-06-24 PROCEDURE — 36415 COLL VENOUS BLD VENIPUNCTURE: CPT

## 2024-06-24 PROCEDURE — 2580000003 HC RX 258: Performed by: SURGERY

## 2024-06-24 PROCEDURE — 2500000003 HC RX 250 WO HCPCS: Performed by: SURGERY

## 2024-06-24 PROCEDURE — 7100000011 HC PHASE II RECOVERY - ADDTL 15 MIN: Performed by: SURGERY

## 2024-06-24 PROCEDURE — 80048 BASIC METABOLIC PNL TOTAL CA: CPT

## 2024-06-24 PROCEDURE — 6360000002 HC RX W HCPCS: Performed by: NURSE ANESTHETIST, CERTIFIED REGISTERED

## 2024-06-24 PROCEDURE — 3600000012 HC SURGERY LEVEL 2 ADDTL 15MIN: Performed by: SURGERY

## 2024-06-24 PROCEDURE — 2580000003 HC RX 258: Performed by: NURSE ANESTHETIST, CERTIFIED REGISTERED

## 2024-06-24 PROCEDURE — 88304 TISSUE EXAM BY PATHOLOGIST: CPT

## 2024-06-24 PROCEDURE — 3700000001 HC ADD 15 MINUTES (ANESTHESIA): Performed by: SURGERY

## 2024-06-24 PROCEDURE — 3600000002 HC SURGERY LEVEL 2 BASE: Performed by: SURGERY

## 2024-06-24 PROCEDURE — 3700000000 HC ANESTHESIA ATTENDED CARE: Performed by: SURGERY

## 2024-06-24 PROCEDURE — 7100000010 HC PHASE II RECOVERY - FIRST 15 MIN: Performed by: SURGERY

## 2024-06-24 RX ORDER — LIDOCAINE HYDROCHLORIDE 20 MG/ML
INJECTION, SOLUTION INTRAVENOUS PRN
Status: DISCONTINUED | OUTPATIENT
Start: 2024-06-24 | End: 2024-06-24 | Stop reason: SDUPTHER

## 2024-06-24 RX ORDER — SODIUM CHLORIDE 9 MG/ML
INJECTION, SOLUTION INTRAVENOUS CONTINUOUS PRN
Status: DISCONTINUED | OUTPATIENT
Start: 2024-06-24 | End: 2024-06-24 | Stop reason: SDUPTHER

## 2024-06-24 RX ORDER — ACETAMINOPHEN 325 MG/1
650 TABLET ORAL EVERY 4 HOURS PRN
Status: DISCONTINUED | OUTPATIENT
Start: 2024-06-24 | End: 2024-06-24 | Stop reason: HOSPADM

## 2024-06-24 RX ORDER — SODIUM CHLORIDE 0.9 % (FLUSH) 0.9 %
5-40 SYRINGE (ML) INJECTION EVERY 12 HOURS SCHEDULED
Status: DISCONTINUED | OUTPATIENT
Start: 2024-06-24 | End: 2024-06-24 | Stop reason: HOSPADM

## 2024-06-24 RX ORDER — SODIUM CHLORIDE 9 MG/ML
INJECTION, SOLUTION INTRAVENOUS CONTINUOUS
Status: DISCONTINUED | OUTPATIENT
Start: 2024-06-24 | End: 2024-06-24 | Stop reason: HOSPADM

## 2024-06-24 RX ORDER — MIDAZOLAM HYDROCHLORIDE 1 MG/ML
INJECTION INTRAMUSCULAR; INTRAVENOUS PRN
Status: DISCONTINUED | OUTPATIENT
Start: 2024-06-24 | End: 2024-06-24 | Stop reason: SDUPTHER

## 2024-06-24 RX ORDER — SODIUM CHLORIDE 9 MG/ML
INJECTION, SOLUTION INTRAVENOUS PRN
Status: DISCONTINUED | OUTPATIENT
Start: 2024-06-24 | End: 2024-06-24 | Stop reason: HOSPADM

## 2024-06-24 RX ORDER — ONDANSETRON 4 MG/1
4 TABLET, ORALLY DISINTEGRATING ORAL EVERY 8 HOURS PRN
Status: DISCONTINUED | OUTPATIENT
Start: 2024-06-24 | End: 2024-06-24 | Stop reason: HOSPADM

## 2024-06-24 RX ORDER — MORPHINE SULFATE 4 MG/ML
4 INJECTION, SOLUTION INTRAMUSCULAR; INTRAVENOUS
Status: DISCONTINUED | OUTPATIENT
Start: 2024-06-24 | End: 2024-06-24 | Stop reason: HOSPADM

## 2024-06-24 RX ORDER — TRAMADOL HYDROCHLORIDE 50 MG/1
50 TABLET ORAL EVERY 6 HOURS PRN
Status: DISCONTINUED | OUTPATIENT
Start: 2024-06-24 | End: 2024-06-24 | Stop reason: HOSPADM

## 2024-06-24 RX ORDER — TRAMADOL HYDROCHLORIDE 50 MG/1
100 TABLET ORAL EVERY 6 HOURS PRN
Status: DISCONTINUED | OUTPATIENT
Start: 2024-06-24 | End: 2024-06-24 | Stop reason: HOSPADM

## 2024-06-24 RX ORDER — PROPOFOL 10 MG/ML
INJECTION, EMULSION INTRAVENOUS CONTINUOUS PRN
Status: DISCONTINUED | OUTPATIENT
Start: 2024-06-24 | End: 2024-06-24 | Stop reason: SDUPTHER

## 2024-06-24 RX ORDER — MORPHINE SULFATE 2 MG/ML
2 INJECTION, SOLUTION INTRAMUSCULAR; INTRAVENOUS
Status: DISCONTINUED | OUTPATIENT
Start: 2024-06-24 | End: 2024-06-24 | Stop reason: HOSPADM

## 2024-06-24 RX ORDER — SODIUM CHLORIDE 0.9 % (FLUSH) 0.9 %
5-40 SYRINGE (ML) INJECTION PRN
Status: DISCONTINUED | OUTPATIENT
Start: 2024-06-24 | End: 2024-06-24 | Stop reason: HOSPADM

## 2024-06-24 RX ORDER — ONDANSETRON 2 MG/ML
4 INJECTION INTRAMUSCULAR; INTRAVENOUS EVERY 6 HOURS PRN
Status: DISCONTINUED | OUTPATIENT
Start: 2024-06-24 | End: 2024-06-24 | Stop reason: HOSPADM

## 2024-06-24 RX ORDER — TRAMADOL HYDROCHLORIDE 50 MG/1
50 TABLET ORAL EVERY 6 HOURS PRN
Qty: 12 TABLET | Refills: 0 | Status: SHIPPED | OUTPATIENT
Start: 2024-06-24 | End: 2024-06-27

## 2024-06-24 RX ADMIN — Medication 3000 MG: at 07:32

## 2024-06-24 RX ADMIN — SODIUM CHLORIDE: 9 INJECTION, SOLUTION INTRAVENOUS at 07:30

## 2024-06-24 RX ADMIN — LIDOCAINE HYDROCHLORIDE 100 MG: 20 INJECTION, SOLUTION INTRAVENOUS at 07:32

## 2024-06-24 RX ADMIN — SODIUM CHLORIDE: 9 INJECTION, SOLUTION INTRAVENOUS at 06:40

## 2024-06-24 RX ADMIN — MIDAZOLAM 2 MG: 1 INJECTION INTRAMUSCULAR; INTRAVENOUS at 07:32

## 2024-06-24 RX ADMIN — PROPOFOL 150 MCG/KG/MIN: 10 INJECTION, EMULSION INTRAVENOUS at 07:32

## 2024-06-24 ASSESSMENT — PAIN - FUNCTIONAL ASSESSMENT: PAIN_FUNCTIONAL_ASSESSMENT: 0-10

## 2024-06-24 ASSESSMENT — LIFESTYLE VARIABLES: SMOKING_STATUS: 1

## 2024-06-24 NOTE — PROGRESS NOTES
Patient oriented to Same Day department and admitted to Same Day Surgery room 6.   Patient verbalized approval for first name, last initial with physician name on unit whiteboard.     Plan of care reviewed with patient.   Patient room whiteboard filled out and discussed with patient and responsible adult.   Patient and responsible adult offered Same Day Welcome Packet to review.    Call light in reach.   Bed in lowest position, locked, with one bed rail up.   SCDs and warming blanket in place.  Appropriate arm bands on patient.   Bathroom offered.   All questions and concerns of patient addressed.        Meds to Beds:   Patient informed of [facility] Meds to Beds program during admission. Patient is agreeable to program.   Contact information for the pharmacy and the Meds to Beds program:   Name: Iman    Relationship to patient:patient   Phone number: same day

## 2024-06-24 NOTE — ANESTHESIA PRE PROCEDURE
Department of Anesthesiology  Preprocedure Note       Name:  Lucy Woodward   Age:  53 y.o.  :  1970                                          MRN:  [id number]         Date:  2024      Surgeon: Surgeon(s):  Bianca Lopez MD    Procedure: Procedure(s):  Excision Palpable Right Breast Mass    Medications prior to admission:   Prior to Admission medications    Medication Sig Start Date End Date Taking? Authorizing Provider   flecainide (TAMBOCOR) 100 MG tablet Take 1 tablet by mouth 2 times daily 6/10/24   Eric Patel MD   vitamin B-12 (CYANOCOBALAMIN) 1000 MCG tablet Take 3 tablets by mouth daily    Natalia Cortez MD   vitamin D (VITAMIN D3) 50 MCG ( UT) CAPS capsule Take 1 capsule by mouth daily    Natalia Cortez MD   atorvastatin (LIPITOR) 40 MG tablet Take 1 tablet by mouth daily 23   Eric Patle MD   metoprolol succinate (TOPROL XL) 100 MG extended release tablet Take 1 tablet by mouth daily 23   Eric Patel MD   Handicap Placard MISC by Does not apply route Diagnosis-breast cancer with severe neuropathy affecting ambulation  Handicap placard x1 year.  Renewable 23   Pasha Gabriel MD   levothyroxine (SYNTHROID) 137 MCG tablet take 1 tablet by mouth on an empty stomach in the morning 23   Natalia Cortez MD   furosemide (LASIX) 20 MG tablet Take 1 tablet by mouth daily as needed (weight gain, swelling, shortness of breath or generalized bloating.) Take 0.5 to 1 tablet; on days you take full tablet take 10 meq of potassium (do not take on empty stomach). 23   HemMendy valentine, APRN - CNP   apixaban (ELIQUIS) 5 MG TABS tablet Take 1 tablet by mouth 2 times daily 23   Eric Patel MD   prochlorperazine (COMPAZINE) 10 MG tablet Take 1 tablet by mouth every 6 hours as needed (nausea) 23   Yasemin Schroeder PAChachoC   ondansetron (ZOFRAN-ODT) 8 MG TBDP disintegrating tablet Place 1 tablet under the tongue

## 2024-06-24 NOTE — ANESTHESIA POSTPROCEDURE EVALUATION
Department of Anesthesiology  Postprocedure Note    Patient: Lucy Woodward  MRN: [id number]  YOB: 1970  Date of evaluation: 6/24/2024    Procedure Summary       Date: 06/24/24 Room / Location: [facility] OR 05 / [facility] OR    Anesthesia Start: 0730 Anesthesia Stop: 0811    Procedure: Excision Palpable Right Breast Mass (Breast) Diagnosis:       Mass of right breast, unspecified quadrant      (Mass of right breast, unspecified quadrant [N63.10])    Surgeons: Bianca Lopez MD Responsible Provider: Karthikeyan Santoyo DO    Anesthesia Type: MAC ASA Status: 3            Anesthesia Type: MAC    Andre Phase I: Andre Score: 10    Andre Phase II:      Anesthesia Post Evaluation    Patient location during evaluation: bedside  Patient participation: complete - patient participated  Level of consciousness: awake and alert  Pain score: 0  Airway patency: patent  Nausea & Vomiting: no vomiting and no nausea  Cardiovascular status: hemodynamically stable  Respiratory status: acceptable  Hydration status: stable  Pain management: adequate        No notable events documented.

## 2024-06-24 NOTE — OP NOTE
Operative Note      Patient: Lucy Woodward  YOB: 1970  MRN: [id number]    Date of Procedure: 6/24/2024    Pre-Op Diagnosis Codes:     * Mass of right breast, unspecified quadrant [N63.10]  History of left breast cancer  Post-Op Diagnosis: Same       Procedure(s):  Excision Palpable Right Breast Mass    Surgeon(s):  Bianca Lopez MD    Assistant:   First Assistant: Hakeem Marroquin RN    Anesthesia: Monitor Anesthesia Care    Estimated Blood Loss (mL): Minimal    Complications: None    Specimens:   ID Type Source Tests Collected by Time Destination   A : Right breast mass Tissue Breast SURGICAL PATHOLOGY Bianca Lopez MD 6/24/2024 0747        Implants:  * No implants in log *      Drains: * No LDAs found *    Findings:  Infection Present At Time Of Surgery (PATOS) (choose all levels that have infection present):  No infection present  Other Findings:       Detailed Description of Procedure:   Patient was brought to the operating room mass in the upper outer quadrant of the right breast had been marked, by myself, confirmed by the patient preoperatively.  She was given 3 g of Ancef intravenously.  After sedation was given right breast was prepped and draped in usual sterile fashion.  Timeout was performed.  After infiltrating with local anesthetic skin incision was made in the upper outer quadrant of the right breast mass was excised sharply.  Specimen was oriented with margin map markers sent to pathology.  Hemostasis was achieved electrocautery.  Wound was irrigated.  Dermis was closed with interrupted Vicryl suture and skin was closed with running subcuticular 4-0 Monocryl suture.  Skin glue was applied.  Sponge sharp and counts were correct.  Patient tolerated procedure well was transported back to same-day surgery in stable condition.  Patient may resume blood thinning medications in 48 hours if no excessive bruising of the breast.    Electronically signed by Bianca Lopez MD on

## 2024-06-24 NOTE — DISCHARGE INSTRUCTIONS
DR BARNES'S DISCHARGE INSTRUCTIONS    Pt Name: Lucy OSWALD Amasa  Medical Record Number: [id number]  Today's Date: 6/24/2024    GENERAL ANESTHESIA OR SEDATION  1. Do not drive or operate hazardous machinery for 24 hours.  2. Do not make important business or personal decisions for 24 hours.  3. Do not drink alcoholic beverages or use tobacco for 24 hours.    ACTIVITY INSTRUCTIONS:  [] Rest today. Resume light to normal activity tomorrow.   [] You may resume normal activity tomorrow. Do not engage in strenuous activity that may place stress on your incision.  [x] Do not drive for 3-5 days and avoid heavy lifting, tugging, pullings greater than 10-20 lbs until seen in the office.      DIET INSTRUCTIONS:  []Begin with clear liquids. If not nauseated, may increase to a low-fat diet when you desire. Greasy and spicy foods are not advised.  [x]Regular diet as tolerated.  []Other:     MEDICATIONS  [x]Prescription sent with you to be used as directed.   []Lortab   [x]Tramadol   []Percocet   []Tylenol #3   []Oxycontin   Do not drink alcohol or drive while taking these medications. You may experience dizziness or drowsiness with these medications. You may also experience constipation which can be relieved with stool softners or laxatives.  [x]You may resume your daily prescription medication schedule unless otherwise specified.  [x]Do not take 325mg Aspirin or other blood thinners such as Eliquis or Plavix for 2 days.     WOUND/DRESSING INSTRUCTIONS:  Always ensure you and your care giver clean hands before and after caring for the wound.  [] Keep dressing clean and dry for 48 hours. Change when soiled or wet.      [] Allow steri-strips to fall off on their own.   [] Ice operative site for 20 minutes 4 times a day.     [x] May wash over incision in shower in  24-48 hours, but do not soak in a bath.  [] Take sitz bath for 20 minutes twice daily and after bowel movements.  [] Keep the abdominal binder in place during the day.

## 2024-06-24 NOTE — H&P
[facility]  History and Physical Update    Pt Name: Lucy Woodward  MRN: [id number]  YOB: 1970  Date of evaluation: 6/24/2024    [x] I have examined the patient and reviewed the H&P/Consult and there are no changes to the patient or plans.    [] I have examined the patient and reviewed the H&P/Consult and have noted the following changes:        Bianca Lopez MD MD  Electronically signed 6/24/2024 at 6:23 AM       Bianca Lopez MD   General Surgery  Follow up Patient Evaluation in Office  Pt Name: Lucy Woodward  Date of Birth 1970   Today's Date: 6/6/2024  Medical Record Number: [id number]  Referring Provider:RODRIGO Freedman  Primary Care Provider: Tyra Freedman MD  Chief Complaint:       Chief Complaint   Patient presents with    Surgical Consult       Est pt seen 4/15/24 refer Dr Gabriel-Right breast upper outer quadrant palpable mass         ASSESSMENT      Assessment  1.  Mass upper outer quadrant right breast  2.  History of malignant neoplasm lower inner quadrant left breast female estrogen receptor negative  3.  Essential hypertension  4.  Paroxysmal atrial fibrillation  5.  History of coronary artery disease  6.  Axillary hidradenitis     7. Completed radiation therapy 2022  8.  Completed chemotherapy.  9.  Neuropathy secondary to chemotherapy.  She has seen neurology to help manage symptoms.  Tried multiple medical occasions without relief.  Unchanged symptoms     Pathologic stage II aT2 N0 M0, grade 3, triple negative  PLANS   Clinical breast examination  performed today is benign.  She does have a palpable mass superficial or outer right quadrant, clinical characteristics are benign  Interval imaging no suspicious findings.  Reviewed  survivorship plan again reviewed.  Colonoscopy 2023.    Patient with anxiety regarding this mass.  Superficial nature, not seen on imaging for image guided biopsy.  Patient simply desires excision for definitive pathology given her

## 2024-06-24 NOTE — PROGRESS NOTES
DISCHARGE INSTRUCTIONS REVIEWED WITH PATIENT AND FAMILY.  INCISION REMAINS CLEAN DRY AND INTACT. DISCHARGED BY WHEELCHAIR TO CAR.

## 2024-06-27 ENCOUNTER — TELEPHONE (OUTPATIENT)
Dept: SURGERY | Age: 54
End: 2024-06-27

## 2024-07-01 RX ORDER — METOPROLOL SUCCINATE 100 MG/1
100 TABLET, EXTENDED RELEASE ORAL DAILY
Qty: 30 TABLET | Refills: 0 | Status: SHIPPED | OUTPATIENT
Start: 2024-07-01

## 2024-07-03 DIAGNOSIS — N63.11 MASS OF UPPER OUTER QUADRANT OF RIGHT BREAST: ICD-10-CM

## 2024-07-11 ENCOUNTER — OFFICE VISIT (OUTPATIENT)
Dept: SURGERY | Age: 54
End: 2024-07-11

## 2024-07-11 VITALS
SYSTOLIC BLOOD PRESSURE: 122 MMHG | TEMPERATURE: 98 F | DIASTOLIC BLOOD PRESSURE: 74 MMHG | RESPIRATION RATE: 18 BRPM | WEIGHT: 293 LBS | BODY MASS INDEX: 39.68 KG/M2 | OXYGEN SATURATION: 97 % | HEART RATE: 98 BPM | HEIGHT: 72 IN

## 2024-07-11 DIAGNOSIS — N63.11 MASS OF UPPER OUTER QUADRANT OF RIGHT BREAST: Primary | ICD-10-CM

## 2024-07-11 DIAGNOSIS — C50.312 MALIGNANT NEOPLASM OF LOWER-INNER QUADRANT OF LEFT BREAST IN FEMALE, ESTROGEN RECEPTOR NEGATIVE (HCC): ICD-10-CM

## 2024-07-11 DIAGNOSIS — Z09 POSTOP CHECK: ICD-10-CM

## 2024-07-11 DIAGNOSIS — I10 ESSENTIAL HYPERTENSION: ICD-10-CM

## 2024-07-11 DIAGNOSIS — Z17.1 MALIGNANT NEOPLASM OF LOWER-INNER QUADRANT OF LEFT BREAST IN FEMALE, ESTROGEN RECEPTOR NEGATIVE (HCC): ICD-10-CM

## 2024-07-11 PROCEDURE — 99024 POSTOP FOLLOW-UP VISIT: CPT | Performed by: SURGERY

## 2024-07-11 NOTE — PROGRESS NOTES
tablet, Take 1 tablet by mouth daily as needed (weight gain, swelling, shortness of breath or generalized bloating.) Take 0.5 to 1 tablet; on days you take full tablet take 10 meq of potassium (do not take on empty stomach)., Disp: 10 tablet, Rfl: 3    apixaban (ELIQUIS) 5 MG TABS tablet, Take 1 tablet by mouth 2 times daily, Disp: 180 tablet, Rfl: 0    prochlorperazine (COMPAZINE) 10 MG tablet, Take 1 tablet by mouth every 6 hours as needed (nausea), Disp: 30 tablet, Rfl: 1    ondansetron (ZOFRAN-ODT) 8 MG TBDP disintegrating tablet, Place 1 tablet under the tongue every 8 hours as needed for Nausea or Vomiting, Disp: 20 tablet, Rfl: 2    ZINC PO, Take by mouth daily, Disp: , Rfl:     Ascorbic Acid (VITAMIN C PO), Take by mouth daily, Disp: , Rfl:     ibuprofen (ADVIL;MOTRIN) 800 MG tablet, Take 1 tablet by mouth every 6 hours as needed for Pain, Disp: , Rfl:     omeprazole (PRILOSEC) 10 MG delayed release capsule, Take 1 capsule by mouth daily, Disp: , Rfl:     nitroGLYCERIN (NITROSTAT) 0.4 MG SL tablet, Place 1 tablet under the tongue every 5 minutes as needed for Chest pain, Disp: 25 tablet, Rfl: 3    aspirin 81 MG chewable tablet, Take 1 tablet by mouth daily, Disp: 30 tablet, Rfl: 3  Allergies    Allergies   Allergen Reactions    Codeine Hives and Swelling     And vomiting    Cortisone Swelling     Apparently tolerates topical and IV with benadryl well    Influenza Virus Vaccine Other (See Comments)     Pt states could not walk after getting     Neurontin [Gabapentin]      Increased pain    Darvocet A500 [Propoxyphene N-Acetaminophen] Nausea And Vomiting    Sulfa Antibiotics Nausea And Vomiting and Swelling     Not throat swelling       Review of Systems  History obtained from the patient.    Constitutional: Denies any fever, chills, fatigue.  Wound: Denies any rash, skin color changes or wound problems.       OBJECTIVE     VITALS: /74 (Site: Right Lower Arm, Position: Sitting, Cuff Size: Medium Adult)

## 2024-07-12 ENCOUNTER — HOSPITAL ENCOUNTER (OUTPATIENT)
Dept: RADIATION ONCOLOGY | Age: 54
Discharge: HOME | End: 2024-07-12
Payer: COMMERCIAL

## 2024-07-12 VITALS
OXYGEN SATURATION: 97 % | DIASTOLIC BLOOD PRESSURE: 83 MMHG | TEMPERATURE: 98.2 F | BODY MASS INDEX: 42.18 KG/M2 | HEART RATE: 74 BPM | RESPIRATION RATE: 18 BRPM | WEIGHT: 293 LBS | SYSTOLIC BLOOD PRESSURE: 126 MMHG

## 2024-07-12 DIAGNOSIS — C50.312 MALIGNANT NEOPLASM OF LOWER-INNER QUADRANT OF LEFT BREAST IN FEMALE, ESTROGEN RECEPTOR NEGATIVE (HCC): Primary | ICD-10-CM

## 2024-07-12 DIAGNOSIS — Z17.1 MALIGNANT NEOPLASM OF LOWER-INNER QUADRANT OF LEFT BREAST IN FEMALE, ESTROGEN RECEPTOR NEGATIVE (HCC): Primary | ICD-10-CM

## 2024-07-12 PROCEDURE — 99214 OFFICE O/P EST MOD 30 MIN: CPT

## 2024-07-12 PROCEDURE — 99212 OFFICE O/P EST SF 10 MIN: CPT

## 2024-07-12 ASSESSMENT — ENCOUNTER SYMPTOMS
BACK PAIN: 1
COUGH: 0
TROUBLE SWALLOWING: 0
BLOOD IN STOOL: 0
NAUSEA: 0
SHORTNESS OF BREATH: 1
ABDOMINAL PAIN: 0
RECTAL PAIN: 0
DIARRHEA: 0

## 2024-07-12 ASSESSMENT — PAIN DESCRIPTION - LOCATION: LOCATION: BACK;FOOT

## 2024-07-12 ASSESSMENT — PAIN SCALES - GENERAL: PAINLEVEL_OUTOF10: 6

## 2024-07-12 ASSESSMENT — PAIN DESCRIPTION - PAIN TYPE: TYPE: NEUROPATHIC PAIN

## 2024-07-12 NOTE — PROGRESS NOTES
[facility]           [address], [address]        [place], [place] [address]        O: [phone]        F: [phone]       Rebls            FOLLOW UP NOTE    Date of Service: 2024  Patient ID: Lucy Woodward   : 1970  MRN: [id number]   Acct Number: [id number]       DATE OF SERVICE: 2024   LOCATION: [facility]  PROVIDER: Sydni Irby PA-C    FOLLOW UP PHYSICIANS: Dr. Pasha Gabriel ([facility]) Dr. Bianca Lopez (Surgery)    ASSESSMENT AND PLAN:   Cancer Staging   Malignant neoplasm of lower-inner quadrant of left breast in female, estrogen receptor negative (HCC)  Staging form: Breast, AJCC 8th Edition  - Clinical stage from 3/22/2022: Stage IB (cT1c, cN0(f), cM0, G3, ER-, MS-, HER2-) - Signed by Bianca Lopez MD on 3/22/2022  - Pathologic stage from 3/29/2022: Stage IIA (pT2, pN0(sn), cM0, G3, ER-, MS-, HER2-) - Signed by Bianca Lopez MD on 3/29/2022      - Lucy Woodward is a 54 y.o. female who presents today for regularly-scheduled follow-up for her left breast cancer. She underwent adjuvant radiation to the left breast, regional lymph nodes/IMNs on 22. She recently was found to have a palpable mass of the right breast, but bilateral mammogram and U/S left breast 24 resulted as BI-RADS 2. She underwent lumpectomy to remove the mass on 24, pathology negative for malignancy  - She appears to be doing well. She denies lumps (apart from the recently biopsied lump), nipple discharge or inversion, skin changes, swelling, or new pain in the breasts or arms. She states her left breast and arm lymphedema are stable and she continues to use a compression sleeve. She reports stability of her chronic right back pain and neuropathy  - Continue to follow with medical oncology, surgery, and all other medical providers  - I will order MRI breast bilateral to be completed around 24 as per ACR guidelines (history of

## 2024-07-17 ENCOUNTER — OFFICE VISIT (OUTPATIENT)
Dept: CARDIOLOGY CLINIC | Age: 54
End: 2024-07-17

## 2024-07-17 VITALS
DIASTOLIC BLOOD PRESSURE: 82 MMHG | HEART RATE: 72 BPM | SYSTOLIC BLOOD PRESSURE: 126 MMHG | BODY MASS INDEX: 39.68 KG/M2 | WEIGHT: 293 LBS | HEIGHT: 72 IN

## 2024-07-17 DIAGNOSIS — I48.0 PAROXYSMAL ATRIAL FIBRILLATION (HCC): Primary | ICD-10-CM

## 2024-07-17 DIAGNOSIS — I10 ESSENTIAL HYPERTENSION: ICD-10-CM

## 2024-07-17 PROCEDURE — 93000 ELECTROCARDIOGRAM COMPLETE: CPT | Performed by: NUCLEAR MEDICINE

## 2024-07-17 PROCEDURE — 99213 OFFICE O/P EST LOW 20 MIN: CPT | Performed by: NUCLEAR MEDICINE

## 2024-07-17 PROCEDURE — 3074F SYST BP LT 130 MM HG: CPT | Performed by: NUCLEAR MEDICINE

## 2024-07-17 PROCEDURE — 3079F DIAST BP 80-89 MM HG: CPT | Performed by: NUCLEAR MEDICINE

## 2024-07-17 RX ORDER — FLECAINIDE ACETATE 100 MG/1
100 TABLET ORAL 2 TIMES DAILY
Qty: 180 TABLET | Refills: 3 | Status: SHIPPED | OUTPATIENT
Start: 2024-07-17

## 2024-07-17 RX ORDER — ATORVASTATIN CALCIUM 40 MG/1
40 TABLET, FILM COATED ORAL DAILY
Qty: 90 TABLET | Refills: 3 | Status: SHIPPED | OUTPATIENT
Start: 2024-07-17

## 2024-07-17 RX ORDER — METOPROLOL SUCCINATE 100 MG/1
100 TABLET, EXTENDED RELEASE ORAL DAILY
Qty: 90 TABLET | Refills: 3 | Status: SHIPPED | OUTPATIENT
Start: 2024-07-17

## 2024-07-17 NOTE — PROGRESS NOTES
[facility] PHYSICIANS [place] [facility]  [facility] CARDIOLOGY  [address]  [address]  [place] [address]  Dept: [phone]  Dept Fax: [phone]  Loc: [phone]    Visit Date: 7/17/2024    Lucy Woodward is a 54 y.o. female who presents todayfor:  Chief Complaint   Patient presents with    Follow-up     1 yr fu     Hypertension    Atrial Fibrillation     Known A fib   Usually PAF  No chest pain   No changes in breathing  Some intermittent in nature  Bp is stable  No dizziness  No syncope  On statins for hyperlipidemia      HPI:  HPI  Past Medical History:   Diagnosis Date    Afib (HCC)     Baki    Arthritis     BRCA1 negative     2022    BRCA2 negative     2022    Breast cancer (HCC) 02/24/2022    Left IDC    CAD (coronary artery disease)     GERD (gastroesophageal reflux disease)     History of therapeutic radiation 2022    Hx antineoplastic chemo 2022    Hyperlipidemia     Hypertension     Hypothyroid     Invasive ductal carcinoma of breast, female, left (HCC) 02/24/2022    Malignant neoplasm of lower-inner quadrant of left breast in female, estrogen receptor negative (HCC) 03/07/2022    MI (myocardial infarction) ([facility])     Dr. Hurtado    Pneumonia       Past Surgical History:   Procedure Laterality Date    BREAST LUMPECTOMY Left 03/22/2022    LEFT BREAST LUMPECTOMY PARTIAL MASTECTOMY, SENTINEL LYMPH NODE BIOPSY, PRE OP NEEDLE LOC X 2 performed by Bianca Lopez MD at [facility] OR    BREAST SURGERY Left 03/30/2022    Evacuation hematoma left axilla performed by Bianca Lopez MD at [facility] OR    BREAST SURGERY N/A 6/24/2024    Excision Palpable Right Breast Mass performed by Bianca Lopez MD at [facility] OR    CARDIAC CATHETERIZATION      CHOLECYSTECTOMY  1992    COLONOSCOPY  03/14/2023    Dr. Howard Vargas    CORONARY ANGIOPLASTY      KNEE ARTHROSCOPY Right     08    JENNIFER NEEDLE BIOPSY LYMPH NODE SUPERFICIAL  02/24/2022    JENNIFER BIOPSY LYMPH NODE BY NEEDLE SUPERFICIAL 2/24/2022 Jade Elizondo MD [facility]

## 2024-12-02 ENCOUNTER — HOSPITAL ENCOUNTER (OUTPATIENT)
Dept: MRI IMAGING | Age: 54
Discharge: HOME | End: 2024-12-02
Payer: COMMERCIAL

## 2024-12-02 DIAGNOSIS — C50.312 MALIGNANT NEOPLASM OF LOWER-INNER QUADRANT OF LEFT BREAST IN FEMALE, ESTROGEN RECEPTOR NEGATIVE (HCC): ICD-10-CM

## 2024-12-02 DIAGNOSIS — Z17.1 MALIGNANT NEOPLASM OF LOWER-INNER QUADRANT OF LEFT BREAST IN FEMALE, ESTROGEN RECEPTOR NEGATIVE (HCC): ICD-10-CM

## 2024-12-02 PROCEDURE — 6360000004 HC RX CONTRAST MEDICATION

## 2024-12-02 PROCEDURE — A9579 GAD-BASE MR CONTRAST NOS,1ML: HCPCS

## 2024-12-02 PROCEDURE — C8908 MRI W/O FOL W/CONT, BREAST,: HCPCS

## 2024-12-02 RX ADMIN — GADOTERIDOL 20 ML: 279.3 INJECTION, SOLUTION INTRAVENOUS at 11:08

## 2024-12-02 NOTE — PROGRESS NOTES
----------------------------------------------------------------    ECHO 23   Conclusions      Summary   Ejection fraction is visually estimated at 60%.   Overall left ventricular function is normal.   normal strain pattern average GLS -19.5 %    PROCEDURES:  -See above  -Screening colonoscopy by Dr. Vargas  3/14/2023-normal colonoscopy to cecum.  Small hemorrhoids.  Next colonoscopy in 5 years.  Path normal colonoscopy.    PATHOLOGY:              : 1970  AGE: 51 Y                          PATHOLOGY REPORT                       ATTN: ADAM BESS                       REQ: ADAM BESS       Copies To:   SUSANNAH BERRY; OTILIO HOOPER; CARLOS MENDENHALL       Clinical Information: LT BREAST MASS 12:00, 9:00, LT ENLARGED AXILLARY   LYMPH NODE   22  ADDENDUM REPORT 2022     FINAL DIAGNOSIS:   A.  Left breast mass, 12:00, U-Clip, biopsy:             Fibroglandular breast tissue with stromal fibrosis.     B.  Left breast mass, 9:00, barbell clip, biopsy:             Invasive ductal carcinoma, Rekha grade 3.     C.  Left axillary lymph node, [place] clip, biopsy:    Fragments of lymph node, negative for malignancy.     BREAST BIOMARKERS*   Estrogen Receptor: (Clone SP1), [employer]       Negative (<1% of cells staining)     Progesterone Receptor: (Clone 1E2), [employer]       Negative (<1% of cells staining)     Ki-67 (clone 30-9)       Percentage of positive nuclei:  95%               Favorable <10%               Borderline 10-20%               Unfavorable >20%        2018          Inform HER2 Dual CHEPE         HER2 IHCClone 4B5      ASCO/CAP      [employer]      [employer]      HER2 dual                                  Systems Additional      CHEPE Group #                                Workup   (  Group 4:      HER2/CEP17 Ratio <2.0 and    HER2 NEGATIVE, HER2   X                >=.0 and <6.0 HER2           IHC is 0-1+. See Group   )

## 2024-12-03 ENCOUNTER — OFFICE VISIT (OUTPATIENT)
Dept: ONCOLOGY | Age: 54
End: 2024-12-03

## 2024-12-03 ENCOUNTER — HOSPITAL ENCOUNTER (OUTPATIENT)
Dept: INFUSION THERAPY | Age: 54
Discharge: HOME | End: 2024-12-03

## 2024-12-03 VITALS
WEIGHT: 293 LBS | BODY MASS INDEX: 41.37 KG/M2 | SYSTOLIC BLOOD PRESSURE: 122 MMHG | RESPIRATION RATE: 16 BRPM | OXYGEN SATURATION: 97 % | DIASTOLIC BLOOD PRESSURE: 72 MMHG | HEART RATE: 71 BPM | TEMPERATURE: 98.1 F

## 2024-12-03 VITALS
SYSTOLIC BLOOD PRESSURE: 122 MMHG | HEART RATE: 71 BPM | OXYGEN SATURATION: 97 % | TEMPERATURE: 98.1 F | DIASTOLIC BLOOD PRESSURE: 72 MMHG | RESPIRATION RATE: 16 BRPM

## 2024-12-03 DIAGNOSIS — I89.0 LYMPHEDEMA: ICD-10-CM

## 2024-12-03 DIAGNOSIS — C50.312 MALIGNANT NEOPLASM OF LOWER-INNER QUADRANT OF LEFT BREAST IN FEMALE, ESTROGEN RECEPTOR NEGATIVE (HCC): ICD-10-CM

## 2024-12-03 DIAGNOSIS — Z17.1 MALIGNANT NEOPLASM OF LOWER-INNER QUADRANT OF LEFT BREAST IN FEMALE, ESTROGEN RECEPTOR NEGATIVE (HCC): ICD-10-CM

## 2024-12-03 DIAGNOSIS — G62.9 NEUROPATHY: ICD-10-CM

## 2024-12-03 PROCEDURE — 99211 OFF/OP EST MAY X REQ PHY/QHP: CPT

## 2024-12-03 PROCEDURE — 3074F SYST BP LT 130 MM HG: CPT | Performed by: INTERNAL MEDICINE

## 2024-12-03 PROCEDURE — 99214 OFFICE O/P EST MOD 30 MIN: CPT | Performed by: INTERNAL MEDICINE

## 2024-12-03 PROCEDURE — 3078F DIAST BP <80 MM HG: CPT | Performed by: INTERNAL MEDICINE

## 2024-12-03 RX ORDER — DULOXETIN HYDROCHLORIDE 60 MG/1
60 CAPSULE, DELAYED RELEASE ORAL DAILY
Qty: 30 CAPSULE | Refills: 5 | Status: SHIPPED | OUTPATIENT
Start: 2024-12-03

## 2024-12-03 RX ORDER — DULOXETIN HYDROCHLORIDE 30 MG/1
30 CAPSULE, DELAYED RELEASE ORAL DAILY
Qty: 7 CAPSULE | Refills: 0 | Status: SHIPPED | OUTPATIENT
Start: 2024-12-03

## 2024-12-03 NOTE — PATIENT INSTRUCTIONS
E-prescription for Cymbalta 30 mg daily x 7  E-prescription for Cymbalta 60 mg extended release capsule daily thereafter #30 refill x 5  Please schedule routine follow-up with me in 6 months.

## 2025-01-13 NOTE — PROGRESS NOTES
0759 Pt arrives to recovery responsive to verbal stimulation. Pt respirations are even and unlabored on 4 L nasal canula. VSS. Pt denies any pain at this time   0809 Pt states her pain is now a 7/10. Pt medicated with fentanyl. Pt respirations are unlabored on 2 L nasal canula. VSS. Pt resting with eyes closed   0819 Pt states her pain is now a 4/10 and tolerable. Pt respirations are unlabored on room air. VSS.  Pt repositioned in bed   0829 Pt meets criteria for discharge from recovery at this time Plastic Surgeon Procedure Text (A): After obtaining clear surgical margins the patient was sent to plastics for surgical repair.  The patient understands they will receive post-surgical care and follow-up from the referring physician's office. person

## 2025-04-11 ENCOUNTER — HOSPITAL ENCOUNTER (EMERGENCY)
Age: 55
Discharge: HOME | End: 2025-04-11
Attending: EMERGENCY MEDICINE
Payer: MEDICARE

## 2025-04-11 ENCOUNTER — APPOINTMENT (OUTPATIENT)
Dept: CT IMAGING | Age: 55
End: 2025-04-11
Payer: MEDICARE

## 2025-04-11 VITALS
WEIGHT: 293 LBS | DIASTOLIC BLOOD PRESSURE: 84 MMHG | BODY MASS INDEX: 41.64 KG/M2 | OXYGEN SATURATION: 96 % | RESPIRATION RATE: 14 BRPM | TEMPERATURE: 98 F | SYSTOLIC BLOOD PRESSURE: 117 MMHG | HEART RATE: 60 BPM

## 2025-04-11 DIAGNOSIS — R07.9 CHEST PAIN, UNSPECIFIED TYPE: Primary | ICD-10-CM

## 2025-04-11 LAB
ANION GAP SERPL CALC-SCNC: 12 MEQ/L (ref 8–16)
BASOPHILS ABSOLUTE: 0 THOU/MM3 (ref 0–0.1)
BASOPHILS NFR BLD AUTO: 0.5 %
BUN SERPL-MCNC: 10 MG/DL (ref 8–23)
CALCIUM SERPL-MCNC: 9.2 MG/DL (ref 8.6–10)
CHLORIDE SERPL-SCNC: 103 MEQ/L (ref 98–111)
CO2 SERPL-SCNC: 26 MEQ/L (ref 22–29)
CREAT SERPL-MCNC: 0.7 MG/DL (ref 0.5–0.9)
DEPRECATED RDW RBC AUTO: 44.6 FL (ref 35–45)
EKG ATRIAL RATE: 59 BPM
EKG P AXIS: 47 DEGREES
EKG P-R INTERVAL: 192 MS
EKG Q-T INTERVAL: 422 MS
EKG QRS DURATION: 80 MS
EKG QTC CALCULATION (BAZETT): 417 MS
EKG R AXIS: 6 DEGREES
EKG T AXIS: 33 DEGREES
EKG VENTRICULAR RATE: 59 BPM
EOSINOPHIL NFR BLD AUTO: 3.4 %
EOSINOPHILS ABSOLUTE: 0.3 THOU/MM3 (ref 0–0.4)
ERYTHROCYTE [DISTWIDTH] IN BLOOD BY AUTOMATED COUNT: 12.8 % (ref 11.5–14.5)
GFR SERPL CREATININE-BSD FRML MDRD: > 90 ML/MIN/1.73M2
GLUCOSE SERPL-MCNC: 91 MG/DL (ref 74–109)
HCT VFR BLD AUTO: 48.5 % (ref 37–47)
HGB BLD-MCNC: 16.1 GM/DL (ref 12–16)
IMM GRANULOCYTES # BLD AUTO: 0.03 THOU/MM3 (ref 0–0.07)
IMM GRANULOCYTES NFR BLD AUTO: 0.4 %
LYMPHOCYTES ABSOLUTE: 1.7 THOU/MM3 (ref 1–4.8)
LYMPHOCYTES NFR BLD AUTO: 20.1 %
MCH RBC QN AUTO: 31.6 PG (ref 26–33)
MCHC RBC AUTO-ENTMCNC: 33.2 GM/DL (ref 32.2–35.5)
MCV RBC AUTO: 95.1 FL (ref 81–99)
MONOCYTES ABSOLUTE: 0.5 THOU/MM3 (ref 0.4–1.3)
MONOCYTES NFR BLD AUTO: 5.6 %
NEUTROPHILS ABSOLUTE: 5.8 THOU/MM3 (ref 1.8–7.7)
NEUTROPHILS NFR BLD AUTO: 70 %
NRBC BLD AUTO-RTO: 0 /100 WBC
NT-PROBNP SERPL IA-MCNC: 66 PG/ML (ref 0–124)
OSMOLALITY SERPL CALC.SUM OF ELEC: 279.9 MOSMOL/KG (ref 275–300)
PLATELET # BLD AUTO: 206 THOU/MM3 (ref 130–400)
PMV BLD AUTO: 10.4 FL (ref 9.4–12.4)
POTASSIUM SERPL-SCNC: 4.4 MEQ/L (ref 3.5–5.2)
RBC # BLD AUTO: 5.1 MILL/MM3 (ref 4.2–5.4)
REASON FOR REJECTION: NORMAL
REJECTED TEST: NORMAL
SODIUM SERPL-SCNC: 141 MEQ/L (ref 135–145)
TROPONIN, HIGH SENSITIVITY: < 6 NG/L (ref 0–12)
TROPONIN, HIGH SENSITIVITY: < 6 NG/L (ref 0–12)
WBC # BLD AUTO: 8.3 THOU/MM3 (ref 4.8–10.8)

## 2025-04-11 PROCEDURE — 6360000004 HC RX CONTRAST MEDICATION: Performed by: EMERGENCY MEDICINE

## 2025-04-11 PROCEDURE — 85025 COMPLETE CBC W/AUTO DIFF WBC: CPT

## 2025-04-11 PROCEDURE — 93010 ELECTROCARDIOGRAM REPORT: CPT | Performed by: NUCLEAR MEDICINE

## 2025-04-11 PROCEDURE — 93005 ELECTROCARDIOGRAM TRACING: CPT | Performed by: EMERGENCY MEDICINE

## 2025-04-11 PROCEDURE — 36415 COLL VENOUS BLD VENIPUNCTURE: CPT

## 2025-04-11 PROCEDURE — 6370000000 HC RX 637 (ALT 250 FOR IP): Performed by: EMERGENCY MEDICINE

## 2025-04-11 PROCEDURE — 80048 BASIC METABOLIC PNL TOTAL CA: CPT

## 2025-04-11 PROCEDURE — 83880 ASSAY OF NATRIURETIC PEPTIDE: CPT

## 2025-04-11 PROCEDURE — 84484 ASSAY OF TROPONIN QUANT: CPT

## 2025-04-11 PROCEDURE — 99285 EMERGENCY DEPT VISIT HI MDM: CPT

## 2025-04-11 PROCEDURE — 71275 CT ANGIOGRAPHY CHEST: CPT

## 2025-04-11 RX ORDER — NITROGLYCERIN 0.4 MG/1
0.4 TABLET SUBLINGUAL EVERY 5 MIN PRN
Status: DISCONTINUED | OUTPATIENT
Start: 2025-04-11 | End: 2025-04-11 | Stop reason: HOSPADM

## 2025-04-11 RX ORDER — ASPIRIN 81 MG/1
243 TABLET, CHEWABLE ORAL ONCE
Status: COMPLETED | OUTPATIENT
Start: 2025-04-11 | End: 2025-04-11

## 2025-04-11 RX ORDER — IOPAMIDOL 755 MG/ML
80 INJECTION, SOLUTION INTRAVASCULAR
Status: COMPLETED | OUTPATIENT
Start: 2025-04-11 | End: 2025-04-11

## 2025-04-11 RX ADMIN — ASPIRIN 243 MG: 81 TABLET, CHEWABLE ORAL at 16:35

## 2025-04-11 RX ADMIN — NITROGLYCERIN 0.4 MG: 0.4 TABLET, ORALLY DISINTEGRATING SUBLINGUAL at 16:39

## 2025-04-11 RX ADMIN — IOPAMIDOL 80 ML: 755 INJECTION, SOLUTION INTRAVENOUS at 17:51

## 2025-04-11 RX ADMIN — NITROGLYCERIN 0.4 MG: 0.4 TABLET, ORALLY DISINTEGRATING SUBLINGUAL at 16:36

## 2025-04-11 ASSESSMENT — PAIN - FUNCTIONAL ASSESSMENT
PAIN_FUNCTIONAL_ASSESSMENT: 0-10
PAIN_FUNCTIONAL_ASSESSMENT: 0-10
PAIN_FUNCTIONAL_ASSESSMENT: NONE - DENIES PAIN
PAIN_FUNCTIONAL_ASSESSMENT: 0-10

## 2025-04-11 ASSESSMENT — PAIN SCALES - GENERAL
PAINLEVEL_OUTOF10: 5
PAINLEVEL_OUTOF10: 5
PAINLEVEL_OUTOF10: 0
PAINLEVEL_OUTOF10: 2
PAINLEVEL_OUTOF10: 0

## 2025-04-11 ASSESSMENT — PAIN DESCRIPTION - LOCATION
LOCATION: CHEST

## 2025-04-11 ASSESSMENT — PAIN DESCRIPTION - DESCRIPTORS
DESCRIPTORS: HEAVINESS
DESCRIPTORS: PRESSURE

## 2025-04-11 ASSESSMENT — HEART SCORE: ECG: NORMAL

## 2025-04-11 NOTE — ED PROVIDER NOTES
[facility] EMERGENCY DEPARTMENT  EMERGENCY DEPARTMENT ENCOUNTER          Pt Name: Lucy Woodward  MRN: [id number]  Birthdate 1970  Date of evaluation: 4/11/2025  Physician: Oscar Lyn DO       Electronically signed Oscar Lyn DO, 4/11/2025, 8:59 PM    CHIEF COMPLAINT       Chief Complaint   Patient presents with    Chest Pain         HISTORY OF PRESENT ILLNESS    HPI  Lucy Woodward is a 54 y.o. female with a history of CAD, but no stent placement, breast cancer status post chemoradiation, HTN, HLD who presents to the emergency department from home for evaluation of chest pressure that started at rest around 1:30 PM today.  Patient was just watching TV, the pain radiates to her back between her shoulder blades.  Patient reports she felt like this in 2017 and was diagnosed with a heart attack but does not have a stent placed.  Pain is intermittent, last for a few minutes at a time, nothing seems to make it better or worse.  Patient reports compliance with her aspirin and Eliquis.  She denies any recent illnesses, vomiting, diarrhea, cough, congestion, dyspnea.  Patient denies any recent travel, recent surgery, history of blood clots.  Last chemo treatment was 2 and half years ago.      PAST MEDICAL AND SURGICAL HISTORY     Past Medical History:   Diagnosis Date    Afib (HCC)     Baki    Arthritis     BRCA1 negative     2022    BRCA2 negative     2022    Breast cancer 02/24/2022    Left IDC    CAD (coronary artery disease)     GERD (gastroesophageal reflux disease)     History of therapeutic radiation 2022    Hx antineoplastic chemo 2022    Hyperlipidemia     Hypertension     Hypothyroid     Invasive ductal carcinoma of breast, female, left 02/24/2022    Malignant neoplasm of lower-inner quadrant of left breast in female, estrogen receptor negative 03/07/2022    MI (myocardial infarction) (HCC)     Dr. Hurtado    Pneumonia      Past Surgical History:   Procedure Laterality Date  [AC]   1754 Her blood work at this time is unremarkable [AC]   1807 Pt pain free at this time. Her PIV infiltrated while at CT. We are working on placing another US guided PIV at this time [AC]   1932 Impression:  Normal CTA angiography of the aorta.  Negative for pulmonary embolus.  Visualized portions of lungs are clear. The posterior lungs and posterior   chest wall are not included in the field of view   [AC]   1936 Pt still pain free. HEART score currently 4. I discussed with this Pt how she is moderate risk. She is understanding of this. We discussed admission vs waiting for the 2nd trop (which was rejected by lab) and cardiology one click if negative. Pt prefers the latter option.  [AC]   2056 Troponin, High Sensitivity: < 6 [AC]   2058 One click for Monday at 10 AM [AC]   2059 No evidence of ACS at this time [AC]      ED Course User Index  [AC] Oscar Lyn DO       (A negative COVID-19 test should be interpreted as COVID no longer suspected unless otherwise noted in this encounter documentation note)    PROCEDURES: (None if blank)  Procedures:       MEDICATION CHANGES     New Prescriptions    No medications on file         FINAL DISPOSITION   MDM       Patient and care plan discussed with collaborating physician Jose Slater DO. Criteria for staffing: Emergency Severity Index (1/2).    Shared Decision-Making was performed, disposition discussed with the patient/family and questions answered.    Outpatient follow up (If applicable):  Tyra Freedman MD  [address]  [facility] [address] [phone]    In 2 days  As needed    [facility] [place] Emergency Department  [address] [place] [address]  [phone]    If symptoms worsen    The results of pertinent diagnostic studies and exam findings were discussed with the patient/surrogate.   The patient’s provisional diagnosis and plan of care were discussed with the patient and present family who expressed understanding.   Medications were

## 2025-04-11 NOTE — ED NOTES
Pt given nitro tablets to help alleviate chest pain. After first dose pain stated there was no change in pain. After second pain stated that she felt the pressure in her chest decrease and did not need the third dose. Pt rates pain 2/10 but says it is tolerable. Will monitor.

## 2025-04-11 NOTE — ED TRIAGE NOTES
Pt brought to ED by EMS for complaint of chest pain. Pt states she began to feel a heaviness and discomfort in her chest around 1300. Pt states she felt lightheaded and dizzy but denies those feelings now. Pt has a history of a heart attack in 2017 and states the pain was similar. Pt denies taking any nitro tablets at home. Pt has a history of Afib.    Show Aperture Variable?: Yes Consent: The patient's consent was obtained including but not limited to risks of crusting, scabbing, blistering, scarring, darker or lighter pigmentary change, recurrence, incomplete removal and infection. Detail Level: Detailed Render Note In Bullet Format When Appropriate: No Duration Of Freeze Thaw-Cycle (Seconds): 0 Post-Care Instructions: I reviewed with the patient in detail post-care instructions. Patient is to wear sunprotection, and avoid picking at any of the treated lesions. Pt may apply Vaseline to crusted or scabbing areas.  May apply bandaid if desired. Number Of Freeze-Thaw Cycles: 1 freeze-thaw cycle Post-Care Instructions: I reviewed with the patient in detail post-care instructions. Patient is to wear sunprotection, and avoid picking at any of the treated lesions. Pt may apply Vaseline to crusted or scabbing areas. Medical Necessity Clause: This procedure was medically necessary because the lesions that were treated were: Detail Level: Simple Medical Necessity Information: It is in your best interest to select a reason for this procedure from the list below. All of these items fulfill various CMS LCD requirements except the new and changing color options.

## 2025-04-11 NOTE — ED NOTES
Pt in bed. Shaneigabe at bedside. Dr. Lyn at bedside to discuss poc. Side rails up x2. Will monitor

## 2025-04-14 ENCOUNTER — OFFICE VISIT (OUTPATIENT)
Dept: CARDIOLOGY CLINIC | Age: 55
End: 2025-04-14

## 2025-04-14 VITALS
HEIGHT: 72 IN | DIASTOLIC BLOOD PRESSURE: 72 MMHG | HEART RATE: 80 BPM | SYSTOLIC BLOOD PRESSURE: 142 MMHG | WEIGHT: 293 LBS | BODY MASS INDEX: 39.68 KG/M2

## 2025-04-14 DIAGNOSIS — I25.10 CAD IN NATIVE ARTERY: ICD-10-CM

## 2025-04-14 DIAGNOSIS — R07.9 CHEST PAIN, UNSPECIFIED TYPE: ICD-10-CM

## 2025-04-14 DIAGNOSIS — I20.9 ANGINA PECTORIS: Primary | ICD-10-CM

## 2025-04-14 PROCEDURE — 99214 OFFICE O/P EST MOD 30 MIN: CPT | Performed by: NUCLEAR MEDICINE

## 2025-04-14 PROCEDURE — 3078F DIAST BP <80 MM HG: CPT | Performed by: NUCLEAR MEDICINE

## 2025-04-14 PROCEDURE — 3077F SYST BP >= 140 MM HG: CPT | Performed by: NUCLEAR MEDICINE

## 2025-04-14 RX ORDER — METOPROLOL SUCCINATE 100 MG/1
50 TABLET, EXTENDED RELEASE ORAL DAILY
Qty: 90 TABLET | Refills: 3
Start: 2025-04-14

## 2025-04-14 NOTE — PROGRESS NOTES
[facility] [place] [facility]  [facility] CARDIOLOGY  [address]  [address]  [place] [address]  Dept: [phone]  Dept Fax: [phone]  Loc: [phone]    Visit Date: 4/14/2025    Lucy Woodward is a 54 y.o. female who presents todayfor:  Chief Complaint   Patient presents with    Follow-up     ED visit on 4/11/2025 for chest pain, began at rest watching TV.  Negative EKG, negative troponins. EKG rate 59.  Relieved by two tablets of nitro.  Concern for orthostatic hypotension.  Known A fib   Usually PAF  No chest pain currently.  No changes in breathing  Bp is stable  No dizziness  No syncope  On statins for hyperlipidemia      HPI:  HPI  Past Medical History:   Diagnosis Date    Afib (HCC)     Baki    Arthritis     BRCA1 negative     2022    BRCA2 negative     2022    Breast cancer 02/24/2022    Left IDC    CAD (coronary artery disease)     GERD (gastroesophageal reflux disease)     History of therapeutic radiation 2022    Hx antineoplastic chemo 2022    Hyperlipidemia     Hypertension     Hypothyroid     Invasive ductal carcinoma of breast, female, left 02/24/2022    Malignant neoplasm of lower-inner quadrant of left breast in female, estrogen receptor negative 03/07/2022    MI (myocardial infarction) (HCC)     Dr. Hurtado    Pneumonia       Past Surgical History:   Procedure Laterality Date    BREAST LUMPECTOMY Left 03/22/2022    LEFT BREAST LUMPECTOMY PARTIAL MASTECTOMY, SENTINEL LYMPH NODE BIOPSY, PRE OP NEEDLE LOC X 2 performed by Bianca Lopez MD at [facility] OR    BREAST SURGERY Left 03/30/2022    Evacuation hematoma left axilla performed by Bianca Lopez MD at [facility] OR    BREAST SURGERY N/A 6/24/2024    Excision Palpable Right Breast Mass performed by Bianca Lopez MD at [facility] OR    CARDIAC CATHETERIZATION      CHOLECYSTECTOMY  1992    COLONOSCOPY  03/14/2023    Dr. Howard Vargas    CORONARY ANGIOPLASTY      KNEE ARTHROSCOPY Right     08    JENNIFER NEEDLE BIOPSY LYMPH NODE SUPERFICIAL

## 2025-04-18 ENCOUNTER — HOSPITAL ENCOUNTER (OUTPATIENT)
Dept: RADIATION ONCOLOGY | Age: 55
Discharge: HOME | End: 2025-04-18
Payer: MEDICARE

## 2025-04-18 VITALS
DIASTOLIC BLOOD PRESSURE: 81 MMHG | TEMPERATURE: 98.1 F | SYSTOLIC BLOOD PRESSURE: 142 MMHG | OXYGEN SATURATION: 96 % | WEIGHT: 293 LBS | HEART RATE: 89 BPM | RESPIRATION RATE: 18 BRPM | BODY MASS INDEX: 41.91 KG/M2

## 2025-04-18 DIAGNOSIS — Z17.1 MALIGNANT NEOPLASM OF LOWER-INNER QUADRANT OF LEFT BREAST IN FEMALE, ESTROGEN RECEPTOR NEGATIVE: Primary | ICD-10-CM

## 2025-04-18 DIAGNOSIS — C50.312 MALIGNANT NEOPLASM OF LOWER-INNER QUADRANT OF LEFT BREAST IN FEMALE, ESTROGEN RECEPTOR NEGATIVE: Primary | ICD-10-CM

## 2025-04-18 PROCEDURE — 99212 OFFICE O/P EST SF 10 MIN: CPT

## 2025-04-18 PROCEDURE — 99214 OFFICE O/P EST MOD 30 MIN: CPT

## 2025-04-18 ASSESSMENT — ENCOUNTER SYMPTOMS
TROUBLE SWALLOWING: 0
BLOOD IN STOOL: 0
RECTAL PAIN: 0
COUGH: 0
SHORTNESS OF BREATH: 0
DIARRHEA: 0
BACK PAIN: 1
ABDOMINAL PAIN: 0
NAUSEA: 0

## 2025-04-18 NOTE — PROGRESS NOTES
[facility]           [address]        [place], [place] [address]        O: [phone]        F: [phone]       [place]            FOLLOW UP NOTE    Date of Service: 2025  Patient ID: Lucy Woodward   : 1970  MRN: [id number]   Acct Number: [id number]       DATE OF SERVICE: 2025   LOCATION: [facility]  PROVIDER: Sydni Irby PA-C    FOLLOW UP PHYSICIANS: Dr. Pasha Gabriel ([facility])    ASSESSMENT AND PLAN:   Cancer Staging   Malignant neoplasm of lower-inner quadrant of left breast in female, estrogen receptor negative  Staging form: Breast, AJCC 8th Edition  - Clinical stage from 3/22/2022: Stage IB (cT1c, cN0(f), cM0, G3, ER-, WV-, HER2-) - Signed by Bianca Lopez MD on 3/22/2022  - Pathologic stage from 3/29/2022: Stage IIA (pT2, pN0(sn), cM0, G3, ER-, WV-, HER2-) - Signed by Bianca Lopez MD on 3/29/2022      - Lucy Woodward is a 54 y.o. female who presents today for regularly-scheduled follow-up for her left breast cancer. She underwent adjuvant radiation to the left breast, regional lymph nodes/IMNs on 22  - She reports occasional and intermittent soreness of the left breast near her surgical scars over the last 2 months, occurring randomly. She states her left breast edema (\"orange peel look and heaviness\") is intermittent and stable since treatments ended. She reports tightness/pulling of the left arm, still doing PT exercises. She denies breast/axillary lumps, nipple discharge or inversion, skin changes, or other pain/swelling. She does report a recent episode of chest pain radiating to the back, sought care in the ED with unremarkable workup and now getting further testing with cardiologist. She also reports room-spinning sensation with position changes. Back pain and neuropathy are chronic and stable  - No concerning findings for breast cancer recurrence on clinical breast exam today  - Recent

## 2025-04-22 DIAGNOSIS — Z12.39 ENCOUNTER FOR SCREENING FOR MALIGNANT NEOPLASM OF BREAST, UNSPECIFIED SCREENING MODALITY: Primary | ICD-10-CM

## 2025-05-09 ENCOUNTER — HOSPITAL ENCOUNTER (OUTPATIENT)
Dept: NUCLEAR MEDICINE | Age: 55
Discharge: HOME | End: 2025-05-09
Attending: NUCLEAR MEDICINE
Payer: MEDICARE

## 2025-05-09 ENCOUNTER — HOSPITAL ENCOUNTER (OUTPATIENT)
Age: 55
Discharge: HOME | End: 2025-05-11
Attending: NUCLEAR MEDICINE
Payer: MEDICARE

## 2025-05-09 ENCOUNTER — HOSPITAL ENCOUNTER (OUTPATIENT)
Age: 55
End: 2025-05-09
Payer: MEDICARE

## 2025-05-09 DIAGNOSIS — I20.9 ANGINA PECTORIS: ICD-10-CM

## 2025-05-09 DIAGNOSIS — R07.9 CHEST PAIN, UNSPECIFIED TYPE: ICD-10-CM

## 2025-05-09 DIAGNOSIS — I25.10 CAD IN NATIVE ARTERY: ICD-10-CM

## 2025-05-09 LAB
ECHO AO ASC DIAM: 3.5 CM
ECHO AV CUSP MM: 1.8 CM
ECHO AV MEAN GRADIENT: 6 MMHG
ECHO AV MEAN VELOCITY: 1.2 M/S
ECHO AV PEAK GRADIENT: 11 MMHG
ECHO AV PEAK VELOCITY: 1.7 M/S
ECHO AV VELOCITY RATIO: 0.53
ECHO AV VTI: 39.2 CM
ECHO EST RA PRESSURE: 3 MMHG
ECHO LA AREA 2C: 24.8 CM2
ECHO LA AREA 4C: 25.8 CM2
ECHO LA DIAMETER: 4.1 CM
ECHO LA MAJOR AXIS: 6.4 CM
ECHO LA MINOR AXIS: 5.9 CM
ECHO LA VOL BP: 90 ML (ref 22–52)
ECHO LA VOL MOD A2C: 86 ML (ref 22–52)
ECHO LA VOL MOD A4C: 87 ML (ref 22–52)
ECHO LV E' LATERAL VELOCITY: 10 CM/S
ECHO LV E' SEPTAL VELOCITY: 6.7 CM/S
ECHO LV EDV A2C: 123 ML
ECHO LV EDV A4C: 149 ML
ECHO LV EJECTION FRACTION 3D: 58 %
ECHO LV EJECTION FRACTION A2C: 58 %
ECHO LV EJECTION FRACTION A4C: 58 %
ECHO LV EJECTION FRACTION BIPLANE: 58 % (ref 55–100)
ECHO LV ESV A2C: 52 ML
ECHO LV ESV A4C: 63 ML
ECHO LV FRACTIONAL SHORTENING: 29 % (ref 28–44)
ECHO LV INTERNAL DIMENSION DIASTOLIC: 4.8 CM (ref 3.9–5.3)
ECHO LV INTERNAL DIMENSION SYSTOLIC: 3.4 CM
ECHO LV ISOVOLUMETRIC RELAXATION TIME (IVRT): 81 MS
ECHO LV IVSD: 0.9 CM (ref 0.6–0.9)
ECHO LV MASS 2D: 170.2 G (ref 67–162)
ECHO LV POSTERIOR WALL DIASTOLIC: 1.1 CM (ref 0.6–0.9)
ECHO LV RELATIVE WALL THICKNESS RATIO: 0.46
ECHO LVOT AV VTI INDEX: 0.57
ECHO LVOT MEAN GRADIENT: 2 MMHG
ECHO LVOT PEAK GRADIENT: 3 MMHG
ECHO LVOT PEAK VELOCITY: 0.9 M/S
ECHO LVOT VTI: 22.5 CM
ECHO MV A VELOCITY: 0.9 M/S
ECHO MV E DECELERATION TIME (DT): 201 MS
ECHO MV E VELOCITY: 0.97 M/S
ECHO MV E/A RATIO: 1.08
ECHO MV E/E' LATERAL: 9.7
ECHO MV E/E' RATIO (AVERAGED): 12.09
ECHO MV E/E' SEPTAL: 14.48
ECHO PV MAX VELOCITY: 0.7 M/S
ECHO PV PEAK GRADIENT: 2 MMHG
ECHO RIGHT VENTRICULAR SYSTOLIC PRESSURE (RVSP): 25 MMHG
ECHO RV INTERNAL DIMENSION: 3.5 CM
ECHO RV TAPSE: 2.4 CM (ref 1.7–?)
ECHO TV E WAVE: 0.7 M/S
ECHO TV REGURGITANT MAX VELOCITY: 2.36 M/S
ECHO TV REGURGITANT PEAK GRADIENT: 22 MMHG
NUC STRESS EJECTION FRACTION: 57 %
STRESS BASELINE DIAS BP: 73 MMHG
STRESS BASELINE HR: 66 BPM
STRESS BASELINE SYS BP: 137 MMHG
STRESS ESTIMATED WORKLOAD: 1 METS
STRESS PEAK DIAS BP: 74 MMHG
STRESS PEAK SYS BP: 163 MMHG
STRESS PERCENT HR ACHIEVED: 54 %
STRESS POST PEAK HR: 91 BPM
STRESS RATE PRESSURE PRODUCT: NORMAL BPM*MMHG
STRESS STAGE 1 BP: NORMAL MMHG
STRESS STAGE 1 DURATION: 1 MIN:SEC
STRESS STAGE 1 HR: 70 BPM
STRESS STAGE 2 BP: NORMAL MMHG
STRESS STAGE 2 DURATION: 1 MIN:SEC
STRESS STAGE 2 HR: 88 BPM
STRESS STAGE 3 BP: NORMAL MMHG
STRESS STAGE 3 DURATION: 1 MIN:SEC
STRESS STAGE 3 HR: 82 BPM
STRESS STAGE RECOVERY 1 BP: NORMAL MMHG
STRESS STAGE RECOVERY 1 DURATION: 1 MIN:SEC
STRESS STAGE RECOVERY 1 HR: 83 BPM
STRESS STAGE RECOVERY 2 BP: NORMAL MMHG
STRESS STAGE RECOVERY 2 DURATION: 1 MIN:SEC
STRESS STAGE RECOVERY 2 HR: 82 BPM
STRESS STAGE RECOVERY 3 BP: NORMAL MMHG
STRESS STAGE RECOVERY 3 DURATION: 1 MIN:SEC
STRESS STAGE RECOVERY 3 HR: 81 BPM
STRESS STAGE RECOVERY 4 BP: NORMAL MMHG
STRESS STAGE RECOVERY 4 DURATION: 1 MIN:SEC
STRESS STAGE RECOVERY 4 HR: 76 BPM
STRESS TARGET HR: 168 BPM

## 2025-05-09 PROCEDURE — A9500 TC99M SESTAMIBI: HCPCS | Performed by: NUCLEAR MEDICINE

## 2025-05-09 PROCEDURE — 93017 CV STRESS TEST TRACING ONLY: CPT

## 2025-05-09 PROCEDURE — 78452 HT MUSCLE IMAGE SPECT MULT: CPT

## 2025-05-09 PROCEDURE — 3430000000 HC RX DIAGNOSTIC RADIOPHARMACEUTICAL: Performed by: NUCLEAR MEDICINE

## 2025-05-09 PROCEDURE — 6360000002 HC RX W HCPCS: Performed by: NUCLEAR MEDICINE

## 2025-05-09 PROCEDURE — 93306 TTE W/DOPPLER COMPLETE: CPT

## 2025-05-09 RX ORDER — TETRAKIS(2-METHOXYISOBUTYLISOCYANIDE)COPPER(I) TETRAFLUOROBORATE 1 MG/ML
11 INJECTION, POWDER, LYOPHILIZED, FOR SOLUTION INTRAVENOUS
Status: COMPLETED | OUTPATIENT
Start: 2025-05-09 | End: 2025-05-09

## 2025-05-09 RX ORDER — REGADENOSON 0.08 MG/ML
0.4 INJECTION, SOLUTION INTRAVENOUS
Status: COMPLETED | OUTPATIENT
Start: 2025-05-09 | End: 2025-05-09

## 2025-05-09 RX ORDER — TETRAKIS(2-METHOXYISOBUTYLISOCYANIDE)COPPER(I) TETRAFLUOROBORATE 1 MG/ML
35 INJECTION, POWDER, LYOPHILIZED, FOR SOLUTION INTRAVENOUS
Status: COMPLETED | OUTPATIENT
Start: 2025-05-09 | End: 2025-05-09

## 2025-05-09 RX ADMIN — Medication 11 MILLICURIE: at 13:00

## 2025-05-09 RX ADMIN — REGADENOSON 0.4 MG: 0.08 INJECTION, SOLUTION INTRAVENOUS at 13:51

## 2025-05-09 RX ADMIN — Medication 35 MILLICURIE: at 13:55

## 2025-05-12 ENCOUNTER — TELEPHONE (OUTPATIENT)
Dept: CARDIOLOGY CLINIC | Age: 55
End: 2025-05-12

## 2025-05-30 ENCOUNTER — HOSPITAL ENCOUNTER (OUTPATIENT)
Dept: WOMENS IMAGING | Age: 55
Discharge: HOME | End: 2025-05-30
Payer: MEDICARE

## 2025-05-30 DIAGNOSIS — C50.312 MALIGNANT NEOPLASM OF LOWER-INNER QUADRANT OF LEFT BREAST IN FEMALE, ESTROGEN RECEPTOR NEGATIVE (HCC): ICD-10-CM

## 2025-05-30 DIAGNOSIS — Z17.1 ESTROGEN RECEPTOR NEGATIVE STATUS (ER-): ICD-10-CM

## 2025-05-30 DIAGNOSIS — Z17.1 MALIGNANT NEOPLASM OF LOWER-INNER QUADRANT OF LEFT BREAST IN FEMALE, ESTROGEN RECEPTOR NEGATIVE (HCC): ICD-10-CM

## 2025-05-30 DIAGNOSIS — C50.312 CARCINOMA OF LOWER-INNER QUADRANT OF BREAST, LEFT (HCC): ICD-10-CM

## 2025-05-30 PROCEDURE — 76642 ULTRASOUND BREAST LIMITED: CPT

## 2025-05-30 PROCEDURE — G0279 TOMOSYNTHESIS, MAMMO: HCPCS

## 2025-06-02 NOTE — PROGRESS NOTES
[facility] [place] [facility]  [facility]  [address]  [address]  [place] [id number]  Dept: [phone]  Loc: [phone]   Hematology/Oncology Consult (Clinic)          Lucy Woodward   1970     No ref. provider found   Tyra Freedman MD     6/3/25     DIAGNOSIS:  -Invasive ductal carcinoma, left breast, ER negative, TX negative and HER-2 negative, 2.7 cm, grade 3, 0/ 4 sentinel nodes . ps T2N0M0 /IIA.(High risk: triple negative/grade 3).  Inner central left breast anteriorly.   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 Dr Barnes.  - Adjuvant AC dose dense x4 followed by dose dense Taxol every other week.  Start 5/10/2022  7/19 course 1 dose dense Taxol .  8/30/2022-4th and the last course of dose dense adjuvant Taxol.  (completed AC x4)  - Postlumpectomy Radiation.  6000 cGy completed by Dr. Barrera 11/28/2022.    PARAMETERS:  -History, exam,  -Bone scan ordered  -B Mammo 5/30/24, US R breast - benign.    SUBJECTIVE: Last seen by me 6 months ago.12/3/24.Prescribed Cymbalta then.  Status of her neuropathy since starting Cymbalta 6 months ago.  Patient did not tolerate the Cymbalta even after just 5 days that initial dosing had extreme fatigue and nausea therefore discontinued.  Neuropathy remains severe and unchanged.Exacerbated with cold weather.  Patient agrees to pain clinic referral to see if there is some new treatment available such as nerve stimulation etc.    Per Dr. Dr. BARNES    Post-Op Check       S/P Excision Palpable Right Breast Mass 6/24/24    Mass R UOQ   Pathology reviewed with the patient who understands. All questions were answered.  Benign fibrocystic changes    Despite multiple interventions her neuropathy is really not much improved.  Lyrica did not help and besides she had terrible dry mouth and

## 2025-06-03 ENCOUNTER — OFFICE VISIT (OUTPATIENT)
Dept: ONCOLOGY | Age: 55
End: 2025-06-03
Payer: MEDICARE

## 2025-06-03 ENCOUNTER — HOSPITAL ENCOUNTER (OUTPATIENT)
Dept: INFUSION THERAPY | Age: 55
Discharge: HOME | End: 2025-06-03
Payer: MEDICARE

## 2025-06-03 VITALS
SYSTOLIC BLOOD PRESSURE: 116 MMHG | WEIGHT: 293 LBS | HEART RATE: 81 BPM | DIASTOLIC BLOOD PRESSURE: 68 MMHG | RESPIRATION RATE: 16 BRPM | OXYGEN SATURATION: 96 % | BODY MASS INDEX: 39.68 KG/M2 | HEIGHT: 72 IN

## 2025-06-03 VITALS
HEART RATE: 81 BPM | OXYGEN SATURATION: 96 % | DIASTOLIC BLOOD PRESSURE: 68 MMHG | TEMPERATURE: 98.1 F | RESPIRATION RATE: 16 BRPM | SYSTOLIC BLOOD PRESSURE: 116 MMHG

## 2025-06-03 DIAGNOSIS — G62.9 NEUROPATHY: ICD-10-CM

## 2025-06-03 DIAGNOSIS — Z17.1 MALIGNANT NEOPLASM OF LOWER-INNER QUADRANT OF LEFT BREAST IN FEMALE, ESTROGEN RECEPTOR NEGATIVE (HCC): Primary | ICD-10-CM

## 2025-06-03 DIAGNOSIS — C50.312 MALIGNANT NEOPLASM OF LOWER-INNER QUADRANT OF LEFT BREAST IN FEMALE, ESTROGEN RECEPTOR NEGATIVE (HCC): Primary | ICD-10-CM

## 2025-06-03 PROCEDURE — 99214 OFFICE O/P EST MOD 30 MIN: CPT | Performed by: INTERNAL MEDICINE

## 2025-06-03 PROCEDURE — 4004F PT TOBACCO SCREEN RCVD TLK: CPT | Performed by: INTERNAL MEDICINE

## 2025-06-03 PROCEDURE — 3017F COLORECTAL CA SCREEN DOC REV: CPT | Performed by: INTERNAL MEDICINE

## 2025-06-03 PROCEDURE — 3074F SYST BP LT 130 MM HG: CPT | Performed by: INTERNAL MEDICINE

## 2025-06-03 PROCEDURE — 3078F DIAST BP <80 MM HG: CPT | Performed by: INTERNAL MEDICINE

## 2025-06-03 PROCEDURE — G8417 CALC BMI ABV UP PARAM F/U: HCPCS | Performed by: INTERNAL MEDICINE

## 2025-06-03 PROCEDURE — G8427 DOCREV CUR MEDS BY ELIG CLIN: HCPCS | Performed by: INTERNAL MEDICINE

## 2025-06-03 PROCEDURE — 99211 OFF/OP EST MAY X REQ PHY/QHP: CPT

## 2025-06-03 NOTE — PATIENT INSTRUCTIONS
- Pain clinic referral to investigate possible new treatments for neuropathy  -Bilateral breast MRI is scheduled for week of 11/5/2025  -Please schedule follow-up with me 1 week later.

## 2025-06-09 ENCOUNTER — OFFICE VISIT (OUTPATIENT)
Dept: CARDIOLOGY CLINIC | Age: 55
End: 2025-06-09
Payer: MEDICARE

## 2025-06-09 VITALS
HEART RATE: 88 BPM | SYSTOLIC BLOOD PRESSURE: 138 MMHG | DIASTOLIC BLOOD PRESSURE: 70 MMHG | WEIGHT: 293 LBS | BODY MASS INDEX: 39.68 KG/M2 | HEIGHT: 72 IN

## 2025-06-09 DIAGNOSIS — E78.01 FAMILIAL HYPERCHOLESTEROLEMIA: ICD-10-CM

## 2025-06-09 DIAGNOSIS — I10 PRIMARY HYPERTENSION: ICD-10-CM

## 2025-06-09 DIAGNOSIS — I48.0 PAROXYSMAL ATRIAL FIBRILLATION (HCC): Primary | ICD-10-CM

## 2025-06-09 PROCEDURE — 4004F PT TOBACCO SCREEN RCVD TLK: CPT | Performed by: NUCLEAR MEDICINE

## 2025-06-09 PROCEDURE — 3075F SYST BP GE 130 - 139MM HG: CPT | Performed by: NUCLEAR MEDICINE

## 2025-06-09 PROCEDURE — G8427 DOCREV CUR MEDS BY ELIG CLIN: HCPCS | Performed by: NUCLEAR MEDICINE

## 2025-06-09 PROCEDURE — 3078F DIAST BP <80 MM HG: CPT | Performed by: NUCLEAR MEDICINE

## 2025-06-09 PROCEDURE — G8417 CALC BMI ABV UP PARAM F/U: HCPCS | Performed by: NUCLEAR MEDICINE

## 2025-06-09 PROCEDURE — 3017F COLORECTAL CA SCREEN DOC REV: CPT | Performed by: NUCLEAR MEDICINE

## 2025-06-09 PROCEDURE — 99214 OFFICE O/P EST MOD 30 MIN: CPT | Performed by: NUCLEAR MEDICINE

## 2025-06-09 NOTE — PROGRESS NOTES
Patient here for follow up after stress test.  Patient states does have periods of SOB and fatigue.  Can feel heart flutters.   
nourished  Lungs:   Clear to auscultation  Heart:    Normal S1 S2, Slight murmur. no rubs, no gallops  Abdomen:   Soft, non tender, no organomegalies, positive bowel sounds  Extremities:   No edema, no cyanosis, good peripheral pulses  Neurological:   Awake, alert, oriented. No obvious focal deficits  Musculoskelatal:  No obvious deformities   /70   Pulse 88   Ht 1.829 m (6')   Wt (!) 140.2 kg (309 lb)   BMI 41.91 kg/m²     Assessment:  Assessment & Plan    Diagnosis Orders   1. Paroxysmal atrial fibrillation (HCC)        2. Primary hypertension        3. Familial hypercholesterolemia        Possible underlying CAD  No active angina  Multiple risk factors for CAD  Here to discuss   Does have dyspnea  Does have major FH of CAD  Multiple signs and symptoms suggestive of sleep apnea, will consider sleep study after cardiac evaluation is completed      Plan:  No follow-ups on file.  Discussed  Options  Cath vs close monitoring discussed  Consider a sleep study   Continue risk factor modification and medical management  Thank you for allowing me to participate in the care of your patient. Please don't hesitate to contact me regarding any further issues related to the patient care    Orders Placed:  No orders of the defined types were placed in this encounter.      Prescribed:  No orders of the defined types were placed in this encounter.         Discussed use, benefit, and side effects of prescribed medications. All patient questions answered. Pt voicedunderstanding. Instructed to continue current medications, diet and exercise. Continue risk factor modification and medical management. Patient agreed with treatment plan. Follow up as directed.    Electronically signedby Eric Patel MD on 6/9/2025 at 7:44 AM

## 2025-07-29 DIAGNOSIS — I10 ESSENTIAL HYPERTENSION: ICD-10-CM

## 2025-07-29 RX ORDER — METOPROLOL SUCCINATE 100 MG/1
50 TABLET, EXTENDED RELEASE ORAL DAILY
Qty: 90 TABLET | Refills: 3 | Status: SHIPPED | OUTPATIENT
Start: 2025-07-29

## 2025-07-29 RX ORDER — FLECAINIDE ACETATE 100 MG/1
100 TABLET ORAL 2 TIMES DAILY
Qty: 180 TABLET | Refills: 3 | Status: SHIPPED | OUTPATIENT
Start: 2025-07-29

## 2025-07-29 RX ORDER — ATORVASTATIN CALCIUM 40 MG/1
40 TABLET, FILM COATED ORAL DAILY
Qty: 90 TABLET | Refills: 3 | Status: SHIPPED | OUTPATIENT
Start: 2025-07-29

## 2025-07-29 NOTE — TELEPHONE ENCOUNTER
Lucy is requesting a refill of their   Requested Prescriptions     Pending Prescriptions Disp Refills    atorvastatin (LIPITOR) 40 MG tablet 90 tablet 3     Sig: Take 1 tablet by mouth daily    flecainide (TAMBOCOR) 100 MG tablet 180 tablet 3     Sig: Take 1 tablet by mouth 2 times daily    metoprolol succinate (TOPROL XL) 100 MG extended release tablet 90 tablet 3     Sig: Take 0.5 tablets by mouth daily   . Please advise.      Last Appt:  Visit date not found  Next Appt:   6/10/26  Preferred pharmacy:   [id number] - [place], [place] - [address] - P [phone] - F [phone]

## (undated) DEVICE — BREAST HERNIA PACK: Brand: MEDLINE INDUSTRIES, INC.

## (undated) DEVICE — HYPODERMIC SAFETY NEEDLE: Brand: MAGELLAN

## (undated) DEVICE — PENCIL SMK EVAC ALL IN 1 DSGN ENH VISIBILITY IMPROVED AIR

## (undated) DEVICE — Device

## (undated) DEVICE — GLOVE SURG 7 PF POLYMER COAT WHT STRL SIGN LTX ESSENTIAL LTX

## (undated) DEVICE — PROVE COVER: Brand: UNBRANDED

## (undated) DEVICE — BANDAGE ADH W1XL3IN NAT FAB WVN FLX DURABLE N ADH PD SEAL

## (undated) DEVICE — SUTURE VCRL SZ 2-0 L27IN ABSRB UD L26MM SH 1/2 CIR J417H

## (undated) DEVICE — GLOVE SURG SZ 7 L12IN FNGR THK94MIL TRNSLUC YEL LTX HYDRGEL

## (undated) DEVICE — BREAST HERNIA: Brand: MEDLINE INDUSTRIES, INC.

## (undated) DEVICE — ADHESIVE SKIN CLSR 0.7ML TOP DERMBND ADV

## (undated) DEVICE — SUTURE VICRYL + SZ 3-0 L27IN ABSRB UD L26MM SH 1/2 CIR VCP416H

## (undated) DEVICE — LIQUIBAND RAPID ADHESIVE 36/CS 0.8ML: Brand: MEDLINE

## (undated) DEVICE — SUTURE VCRL + SZ 3-0 L27IN ABSRB UD L26MM SH 1/2 CIR VCP416H

## (undated) DEVICE — SPONGE GZ W4XL4IN COT 12 PLY TYP VII WVN C FLD DSGN

## (undated) DEVICE — SUTURE MONOCRYL SZ 4-0 L27IN ABSRB UD L19MM PS-2 1/2 CIR PRIM Y426H

## (undated) DEVICE — PAD,ABDOMINAL,5"X9",ST,LF,25/BX: Brand: MEDLINE INDUSTRIES, INC.

## (undated) DEVICE — BAG,BANDED,W/RUBBERBAND,STERILE,30X36: Brand: MEDLINE

## (undated) DEVICE — SPECIMEN ORIENTATION CHARMS, SIX DISTINCTLY SHAPED STERILE 10MM CHARMS: Brand: MARGINMAP

## (undated) DEVICE — SYRINGE MED 10ML LUERLOCK TIP W/O SFTY DISP

## (undated) DEVICE — KIT INTRO 9FR L14CM DIA0.038IN PEEL AWAY DI-LOCK DIL CLOSE

## (undated) DEVICE — SUTURE MCRYL SZ 4-0 L27IN ABSRB UD L19MM PS-2 1/2 CIR PRIM Y426H

## (undated) DEVICE — AGENT HEMSTAT 3GM OXIDIZED REGENERATED CELOS ABSRB FOR CONT (ORDER MULTIPLES OF 5EA)

## (undated) DEVICE — INTENDED FOR TISSUE SEPARATION, AND OTHER PROCEDURES THAT REQUIRE A SHARP SURGICAL BLADE TO PUNCTURE OR CUT.: Brand: BARD-PARKER ® CARBON RIB-BACK BLADES

## (undated) DEVICE — 450 ML BOTTLE OF 0.05% CHLORHEXIDINE GLUCONATE IN 99.95% STERILE WATER FOR IRRIGATION, USP AND APPLICATOR.: Brand: IRRISEPT ANTIMICROBIAL WOUND LAVAGE

## (undated) DEVICE — APPLIER CLP L9.375IN APER 2.1MM CLS L3.8MM 20 SM TI CLP

## (undated) DEVICE — SPONGE LAP W18XL18IN WHT COT 4 PLY FLD STRUNG RADPQ DISP ST